# Patient Record
Sex: FEMALE | Race: WHITE | NOT HISPANIC OR LATINO | Employment: OTHER | URBAN - METROPOLITAN AREA
[De-identification: names, ages, dates, MRNs, and addresses within clinical notes are randomized per-mention and may not be internally consistent; named-entity substitution may affect disease eponyms.]

---

## 2017-05-31 ENCOUNTER — HISTORICAL (OUTPATIENT)
Dept: ADMINISTRATIVE | Facility: HOSPITAL | Age: 82
End: 2017-05-31

## 2017-05-31 LAB
APPEARANCE, UA: ABNORMAL
BACTERIA #/AREA URNS AUTO: ABNORMAL /HPF
BILIRUB UR QL STRIP: NEGATIVE
CHOLEST SERPL-MCNC: 105 MG/DL (ref 0–200)
CHOLEST/HDLC SERPL: 2.3 {RATIO} (ref 0–4)
COLOR UR: YELLOW
ERYTHROCYTE [DISTWIDTH] IN BLOOD BY AUTOMATED COUNT: 14.1 % (ref 11.5–17)
FERRITIN SERPL-MCNC: 74.2 NG/ML (ref 8–388)
FOLATE SERPL-MCNC: 16.8 NG/ML (ref 3.1–17.5)
GLUCOSE (UA): NEGATIVE
HCT VFR BLD AUTO: 38.3 % (ref 37–47)
HDLC SERPL-MCNC: 45 MG/DL (ref 35–60)
HGB BLD-MCNC: 11.5 GM/DL (ref 12–16)
HGB UR QL STRIP: NEGATIVE
IRON SATN MFR SERPL: 18.1 % (ref 20–50)
IRON SERPL-MCNC: 75 MCG/DL (ref 50–175)
KETONES UR QL STRIP: NEGATIVE
LDLC SERPL CALC-MCNC: 35 MG/DL (ref 0–129)
LEUKOCYTE ESTERASE UR QL STRIP: ABNORMAL
MCH RBC QN AUTO: 28 PG (ref 27–31)
MCHC RBC AUTO-ENTMCNC: 30 GM/DL (ref 33–36)
MCV RBC AUTO: 93.4 FL (ref 80–94)
NITRITE UR QL STRIP.AUTO: NEGATIVE
PH UR STRIP: 6 [PH] (ref 5–9)
PLATELET # BLD AUTO: 295 X10(3)/MCL (ref 130–400)
PMV BLD AUTO: 11.3 FL (ref 9.4–12.4)
PROT UR QL STRIP: NEGATIVE
RBC # BLD AUTO: 4.1 X10(6)/MCL (ref 4.2–5.4)
RBC #/AREA URNS HPF: ABNORMAL /[HPF]
SP GR UR STRIP: 1.01 (ref 1–1.03)
SQUAMOUS EPITHELIAL, UA: ABNORMAL
TIBC SERPL-MCNC: 415 MCG/DL (ref 250–450)
TRANSFERRIN SERPL-MCNC: 345 MG/DL (ref 200–360)
TRIGL SERPL-MCNC: 124 MG/DL (ref 30–150)
TSH SERPL-ACNC: 2.71 MIU/ML (ref 0.36–3.74)
UROBILINOGEN UR STRIP-ACNC: 0.2
VIT B12 SERPL-MCNC: 113 PG/ML (ref 193–986)
VLDLC SERPL CALC-MCNC: 25 MG/DL
WBC # SPEC AUTO: 5.3 X10(3)/MCL (ref 4.5–11.5)
WBC #/AREA URNS AUTO: 37 /HPF (ref 0–3)

## 2017-11-11 LAB — RAPID GROUP A STREP (OHS): NEGATIVE

## 2018-06-14 ENCOUNTER — HISTORICAL (OUTPATIENT)
Dept: ADMINISTRATIVE | Facility: HOSPITAL | Age: 83
End: 2018-06-14

## 2018-06-14 LAB
ABS NEUT (OLG): 3.71 X10(3)/MCL (ref 2.1–9.2)
ALBUMIN SERPL-MCNC: 3.8 GM/DL (ref 3.4–5)
ALBUMIN/GLOB SERPL: 1.2 RATIO (ref 1.1–2)
ALP SERPL-CCNC: 54 UNIT/L (ref 38–126)
ALT SERPL-CCNC: 28 UNIT/L (ref 12–78)
APPEARANCE, UA: ABNORMAL
AST SERPL-CCNC: 19 UNIT/L (ref 15–37)
BACTERIA SPEC CULT: ABNORMAL /HPF
BASOPHILS # BLD AUTO: 0.1 X10(3)/MCL (ref 0–0.2)
BASOPHILS NFR BLD AUTO: 1 %
BILIRUB SERPL-MCNC: 0.5 MG/DL (ref 0.2–1)
BILIRUB UR QL STRIP: NEGATIVE
BILIRUBIN DIRECT+TOT PNL SERPL-MCNC: 0.2 MG/DL (ref 0–0.5)
BILIRUBIN DIRECT+TOT PNL SERPL-MCNC: 0.3 MG/DL (ref 0–0.8)
BUN SERPL-MCNC: 30 MG/DL (ref 7–18)
CALCIUM SERPL-MCNC: 9.5 MG/DL (ref 8.5–10.1)
CHLORIDE SERPL-SCNC: 104 MMOL/L (ref 98–107)
CHOLEST SERPL-MCNC: 78 MG/DL (ref 0–200)
CHOLEST/HDLC SERPL: 2.3 {RATIO} (ref 0–4)
CO2 SERPL-SCNC: 26 MMOL/L (ref 21–32)
COLOR UR: YELLOW
CREAT SERPL-MCNC: 1.19 MG/DL (ref 0.55–1.02)
DEPRECATED CALCIDIOL+CALCIFEROL SERPL-MC: 37 NG/ML (ref 30–80)
EOSINOPHIL # BLD AUTO: 0.2 X10(3)/MCL (ref 0–0.9)
EOSINOPHIL NFR BLD AUTO: 4 %
ERYTHROCYTE [DISTWIDTH] IN BLOOD BY AUTOMATED COUNT: 12.9 % (ref 11.5–17)
EST. AVERAGE GLUCOSE BLD GHB EST-MCNC: 154 MG/DL
GLOBULIN SER-MCNC: 3.1 GM/DL (ref 2.4–3.5)
GLUCOSE (UA): NEGATIVE
GLUCOSE SERPL-MCNC: 121 MG/DL (ref 74–106)
HBA1C MFR BLD: 7 % (ref 4.2–6.3)
HCT VFR BLD AUTO: 38.2 % (ref 37–47)
HDLC SERPL-MCNC: 34 MG/DL (ref 35–60)
HGB BLD-MCNC: 11.9 GM/DL (ref 12–16)
HGB UR QL STRIP: NEGATIVE
KETONES UR QL STRIP: NEGATIVE
LDLC SERPL CALC-MCNC: 19 MG/DL (ref 0–129)
LEUKOCYTE ESTERASE UR QL STRIP: ABNORMAL
LYMPHOCYTES # BLD AUTO: 2 X10(3)/MCL (ref 0.6–4.6)
LYMPHOCYTES NFR BLD AUTO: 31 %
MCH RBC QN AUTO: 29.7 PG (ref 27–31)
MCHC RBC AUTO-ENTMCNC: 31.2 GM/DL (ref 33–36)
MCV RBC AUTO: 95.3 FL (ref 80–94)
MONOCYTES # BLD AUTO: 0.4 X10(3)/MCL (ref 0.1–1.3)
MONOCYTES NFR BLD AUTO: 7 %
NEUTROPHILS # BLD AUTO: 3.71 X10(3)/MCL (ref 1.4–7.9)
NEUTROPHILS NFR BLD AUTO: 57 %
NITRITE UR QL STRIP: NEGATIVE
PH UR STRIP: 5 [PH] (ref 5–9)
PLATELET # BLD AUTO: 256 X10(3)/MCL (ref 130–400)
PMV BLD AUTO: 11 FL (ref 9.4–12.4)
POTASSIUM SERPL-SCNC: 4.8 MMOL/L (ref 3.5–5.1)
PROT SERPL-MCNC: 6.9 GM/DL (ref 6.4–8.2)
PROT UR QL STRIP: NEGATIVE
RBC # BLD AUTO: 4.01 X10(6)/MCL (ref 4.2–5.4)
RBC #/AREA URNS HPF: ABNORMAL /[HPF]
SODIUM SERPL-SCNC: 137 MMOL/L (ref 136–145)
SP GR UR STRIP: 1.02 (ref 1–1.03)
SQUAMOUS EPITHELIAL, UA: 10 /HPF (ref 0–4)
TRIGL SERPL-MCNC: 123 MG/DL (ref 30–150)
TSH SERPL-ACNC: 2.01 MIU/L (ref 0.36–3.74)
UROBILINOGEN UR STRIP-ACNC: 0.2
VLDLC SERPL CALC-MCNC: 25 MG/DL
WBC # SPEC AUTO: 6.5 X10(3)/MCL (ref 4.5–11.5)
WBC #/AREA URNS HPF: 50 /HPF (ref 0–3)

## 2018-06-21 LAB — BMD RECOMMENDATION EXT: NORMAL

## 2019-01-22 ENCOUNTER — HISTORICAL (OUTPATIENT)
Dept: ADMINISTRATIVE | Facility: HOSPITAL | Age: 84
End: 2019-01-22

## 2019-01-22 LAB
BUN SERPL-MCNC: 17 MG/DL (ref 7–18)
CALCIUM SERPL-MCNC: 9.9 MG/DL (ref 8.5–10.1)
CHLORIDE SERPL-SCNC: 106 MMOL/L (ref 98–107)
CHOLEST SERPL-MCNC: 94 MG/DL (ref 0–200)
CHOLEST/HDLC SERPL: 2.4 {RATIO} (ref 0–4)
CO2 SERPL-SCNC: 30 MMOL/L (ref 21–32)
CREAT SERPL-MCNC: 1.06 MG/DL (ref 0.55–1.02)
CREAT/UREA NIT SERPL: 16
EST. AVERAGE GLUCOSE BLD GHB EST-MCNC: 143 MG/DL
GLUCOSE SERPL-MCNC: 74 MG/DL (ref 74–106)
HBA1C MFR BLD: 6.6 % (ref 4.2–6.3)
HDLC SERPL-MCNC: 40 MG/DL (ref 35–60)
LDLC SERPL CALC-MCNC: 24 MG/DL (ref 0–129)
POTASSIUM SERPL-SCNC: 4.3 MMOL/L (ref 3.5–5.1)
SODIUM SERPL-SCNC: 143 MMOL/L (ref 136–145)
TRIGL SERPL-MCNC: 149 MG/DL (ref 30–150)
VLDLC SERPL CALC-MCNC: 30 MG/DL

## 2019-08-02 ENCOUNTER — HISTORICAL (OUTPATIENT)
Dept: ADMINISTRATIVE | Facility: HOSPITAL | Age: 84
End: 2019-08-02

## 2019-08-02 LAB
ABS NEUT (OLG): 4.44 X10(3)/MCL (ref 2.1–9.2)
ALBUMIN SERPL-MCNC: 3.9 GM/DL (ref 3.4–5)
ALBUMIN/GLOB SERPL: 1.4 RATIO (ref 1.1–2)
ALP SERPL-CCNC: 58 UNIT/L (ref 38–126)
ALT SERPL-CCNC: 21 UNIT/L (ref 12–78)
APPEARANCE, UA: CLEAR
AST SERPL-CCNC: 12 UNIT/L (ref 15–37)
BACTERIA SPEC CULT: ABNORMAL /HPF
BASOPHILS # BLD AUTO: 0.1 X10(3)/MCL (ref 0–0.2)
BASOPHILS NFR BLD AUTO: 1 %
BILIRUB SERPL-MCNC: 0.3 MG/DL (ref 0.2–1)
BILIRUB UR QL STRIP: NEGATIVE
BILIRUBIN DIRECT+TOT PNL SERPL-MCNC: 0.1 MG/DL (ref 0–0.5)
BILIRUBIN DIRECT+TOT PNL SERPL-MCNC: 0.2 MG/DL (ref 0–0.8)
BUN SERPL-MCNC: 18 MG/DL (ref 7–18)
CALCIUM SERPL-MCNC: 9.9 MG/DL (ref 8.5–10.1)
CHLORIDE SERPL-SCNC: 104 MMOL/L (ref 98–107)
CHOLEST SERPL-MCNC: 117 MG/DL (ref 0–200)
CHOLEST/HDLC SERPL: 3.2 {RATIO} (ref 0–4)
CO2 SERPL-SCNC: 29 MMOL/L (ref 21–32)
COLOR UR: YELLOW
CREAT SERPL-MCNC: 0.92 MG/DL (ref 0.55–1.02)
EOSINOPHIL # BLD AUTO: 0.2 X10(3)/MCL (ref 0–0.9)
EOSINOPHIL NFR BLD AUTO: 2 %
ERYTHROCYTE [DISTWIDTH] IN BLOOD BY AUTOMATED COUNT: 13.2 % (ref 11.5–17)
EST. AVERAGE GLUCOSE BLD GHB EST-MCNC: 137 MG/DL
GLOBULIN SER-MCNC: 2.7 GM/DL (ref 2.4–3.5)
GLUCOSE (UA): NEGATIVE
GLUCOSE SERPL-MCNC: 98 MG/DL (ref 74–106)
HBA1C MFR BLD: 6.4 % (ref 4.2–6.3)
HCT VFR BLD AUTO: 38.7 % (ref 37–47)
HDLC SERPL-MCNC: 37 MG/DL (ref 35–60)
HGB BLD-MCNC: 11.6 GM/DL (ref 12–16)
HGB UR QL STRIP: NEGATIVE
KETONES UR QL STRIP: NEGATIVE
LDLC SERPL CALC-MCNC: 51 MG/DL (ref 0–129)
LEUKOCYTE ESTERASE UR QL STRIP: ABNORMAL
LYMPHOCYTES # BLD AUTO: 1.9 X10(3)/MCL (ref 0.6–4.6)
LYMPHOCYTES NFR BLD AUTO: 27 %
MCH RBC QN AUTO: 28.4 PG (ref 27–31)
MCHC RBC AUTO-ENTMCNC: 30 GM/DL (ref 33–36)
MCV RBC AUTO: 94.9 FL (ref 80–94)
MONOCYTES # BLD AUTO: 0.5 X10(3)/MCL (ref 0.1–1.3)
MONOCYTES NFR BLD AUTO: 7 %
NEUTROPHILS # BLD AUTO: 4.44 X10(3)/MCL (ref 2.1–9.2)
NEUTROPHILS NFR BLD AUTO: 62 %
NITRITE UR QL STRIP: NEGATIVE
PH UR STRIP: 5 [PH] (ref 5–9)
PLATELET # BLD AUTO: 243 X10(3)/MCL (ref 130–400)
PMV BLD AUTO: 10.9 FL (ref 9.4–12.4)
POTASSIUM SERPL-SCNC: 4.4 MMOL/L (ref 3.5–5.1)
PROT SERPL-MCNC: 6.6 GM/DL (ref 6.4–8.2)
PROT UR QL STRIP: NEGATIVE
RBC # BLD AUTO: 4.08 X10(6)/MCL (ref 4.2–5.4)
RBC #/AREA URNS HPF: ABNORMAL /[HPF]
SODIUM SERPL-SCNC: 142 MMOL/L (ref 136–145)
SP GR UR STRIP: 1.01 (ref 1–1.03)
SQUAMOUS EPITHELIAL, UA: ABNORMAL
TRIGL SERPL-MCNC: 144 MG/DL (ref 30–150)
UROBILINOGEN UR STRIP-ACNC: 0.2
VLDLC SERPL CALC-MCNC: 29 MG/DL
WBC # SPEC AUTO: 7.1 X10(3)/MCL (ref 4.5–11.5)
WBC #/AREA URNS HPF: 12 /HPF (ref 0–3)

## 2020-06-04 ENCOUNTER — HISTORICAL (OUTPATIENT)
Dept: ADMINISTRATIVE | Facility: HOSPITAL | Age: 85
End: 2020-06-04

## 2020-06-04 LAB
BUN SERPL-MCNC: 19.7 MG/DL (ref 9.8–20.1)
CALCIUM SERPL-MCNC: 9.5 MG/DL (ref 8.4–10.2)
CHLORIDE SERPL-SCNC: 107 MMOL/L (ref 98–107)
CHOLEST SERPL-MCNC: 111 MG/DL
CHOLEST/HDLC SERPL: 3 {RATIO} (ref 0–5)
CO2 SERPL-SCNC: 26 MMOL/L (ref 23–31)
CREAT SERPL-MCNC: 1.1 MG/DL (ref 0.55–1.02)
CREAT/UREA NIT SERPL: 18
EST. AVERAGE GLUCOSE BLD GHB EST-MCNC: 114 MG/DL
GLUCOSE SERPL-MCNC: 108 MG/DL (ref 82–115)
HBA1C MFR BLD: 5.6 %
HDLC SERPL-MCNC: 42 MG/DL (ref 35–60)
LDLC SERPL CALC-MCNC: 48 MG/DL (ref 50–140)
POTASSIUM SERPL-SCNC: 5 MMOL/L (ref 3.5–5.1)
SODIUM SERPL-SCNC: 142 MMOL/L (ref 136–145)
TRIGL SERPL-MCNC: 104 MG/DL (ref 37–140)
VLDLC SERPL CALC-MCNC: 21 MG/DL

## 2021-02-09 ENCOUNTER — HISTORICAL (OUTPATIENT)
Dept: ADMINISTRATIVE | Facility: HOSPITAL | Age: 86
End: 2021-02-09

## 2021-02-09 LAB
ABS NEUT (OLG): 2.73 X10(3)/MCL (ref 2.1–9.2)
ALBUMIN SERPL-MCNC: 3.9 GM/DL (ref 3.4–4.8)
ALBUMIN/GLOB SERPL: 1.6 RATIO (ref 1.1–2)
ALP SERPL-CCNC: 54 UNIT/L (ref 40–150)
ALT SERPL-CCNC: 10 UNIT/L (ref 0–55)
AST SERPL-CCNC: 8 UNIT/L (ref 5–34)
BASOPHILS # BLD AUTO: 0.1 X10(3)/MCL (ref 0–0.2)
BASOPHILS NFR BLD AUTO: 1 %
BILIRUB SERPL-MCNC: 0.3 MG/DL
BILIRUBIN DIRECT+TOT PNL SERPL-MCNC: 0.1 MG/DL (ref 0–0.8)
BILIRUBIN DIRECT+TOT PNL SERPL-MCNC: 0.2 MG/DL (ref 0–0.5)
BUN SERPL-MCNC: 24.2 MG/DL (ref 9.8–20.1)
CALCIUM SERPL-MCNC: 9.5 MG/DL (ref 8.4–10.2)
CHLORIDE SERPL-SCNC: 106 MMOL/L (ref 98–107)
CHOLEST SERPL-MCNC: 100 MG/DL
CHOLEST/HDLC SERPL: 3 {RATIO} (ref 0–5)
CO2 SERPL-SCNC: 25 MMOL/L (ref 23–31)
CREAT SERPL-MCNC: 1.39 MG/DL (ref 0.55–1.02)
DEPRECATED CALCIDIOL+CALCIFEROL SERPL-MC: 62.4 NG/ML (ref 30–80)
EOSINOPHIL # BLD AUTO: 0.2 X10(3)/MCL (ref 0–0.9)
EOSINOPHIL NFR BLD AUTO: 3 %
ERYTHROCYTE [DISTWIDTH] IN BLOOD BY AUTOMATED COUNT: 13.2 % (ref 11.5–17)
EST. AVERAGE GLUCOSE BLD GHB EST-MCNC: 168.6 MG/DL
GLOBULIN SER-MCNC: 2.5 GM/DL (ref 2.4–3.5)
GLUCOSE SERPL-MCNC: 124 MG/DL (ref 82–115)
HBA1C MFR BLD: 7.5 %
HCT VFR BLD AUTO: 37.7 % (ref 37–47)
HDLC SERPL-MCNC: 35 MG/DL (ref 35–60)
HGB BLD-MCNC: 11.3 GM/DL (ref 12–16)
LDLC SERPL CALC-MCNC: 47 MG/DL (ref 50–140)
LYMPHOCYTES # BLD AUTO: 1.8 X10(3)/MCL (ref 0.6–4.6)
LYMPHOCYTES NFR BLD AUTO: 34 %
MCH RBC QN AUTO: 28.5 PG (ref 27–31)
MCHC RBC AUTO-ENTMCNC: 30 GM/DL (ref 33–36)
MCV RBC AUTO: 95 FL (ref 80–94)
MONOCYTES # BLD AUTO: 0.4 X10(3)/MCL (ref 0.1–1.3)
MONOCYTES NFR BLD AUTO: 7 %
NEUTROPHILS # BLD AUTO: 2.73 X10(3)/MCL (ref 2.1–9.2)
NEUTROPHILS NFR BLD AUTO: 54 %
PLATELET # BLD AUTO: 252 X10(3)/MCL (ref 130–400)
PMV BLD AUTO: 11.2 FL (ref 9.4–12.4)
POTASSIUM SERPL-SCNC: 5 MMOL/L (ref 3.5–5.1)
PROT SERPL-MCNC: 6.4 GM/DL (ref 5.8–7.6)
RBC # BLD AUTO: 3.97 X10(6)/MCL (ref 4.2–5.4)
SODIUM SERPL-SCNC: 140 MMOL/L (ref 136–145)
TRIGL SERPL-MCNC: 92 MG/DL (ref 37–140)
VLDLC SERPL CALC-MCNC: 18 MG/DL
WBC # SPEC AUTO: 5.1 X10(3)/MCL (ref 4.5–11.5)

## 2021-08-19 ENCOUNTER — HISTORICAL (OUTPATIENT)
Dept: ADMINISTRATIVE | Facility: HOSPITAL | Age: 86
End: 2021-08-19

## 2021-08-19 LAB
ABS NEUT (OLG): 2.4 X10(3)/MCL (ref 2.1–9.2)
ALBUMIN SERPL-MCNC: 3.8 GM/DL (ref 3.4–4.8)
ALBUMIN/GLOB SERPL: 1.3 RATIO (ref 1.1–2)
ALP SERPL-CCNC: 56 UNIT/L (ref 40–150)
ALT SERPL-CCNC: 11 UNIT/L (ref 0–55)
AST SERPL-CCNC: 9 UNIT/L (ref 5–34)
BASOPHILS # BLD AUTO: 0 X10(3)/MCL (ref 0–0.2)
BASOPHILS NFR BLD AUTO: 1 %
BILIRUB SERPL-MCNC: 0.4 MG/DL
BILIRUBIN DIRECT+TOT PNL SERPL-MCNC: 0.2 MG/DL (ref 0–0.5)
BILIRUBIN DIRECT+TOT PNL SERPL-MCNC: 0.2 MG/DL (ref 0–0.8)
BUN SERPL-MCNC: 29.6 MG/DL (ref 9.8–20.1)
CALCIUM SERPL-MCNC: 10 MG/DL (ref 8.4–10.2)
CHLORIDE SERPL-SCNC: 113 MMOL/L (ref 98–107)
CO2 SERPL-SCNC: 21 MMOL/L (ref 23–31)
CREAT SERPL-MCNC: 1.49 MG/DL (ref 0.55–1.02)
EOSINOPHIL # BLD AUTO: 0.1 X10(3)/MCL (ref 0–0.9)
EOSINOPHIL NFR BLD AUTO: 2 %
ERYTHROCYTE [DISTWIDTH] IN BLOOD BY AUTOMATED COUNT: 13.6 % (ref 11.5–17)
EST. AVERAGE GLUCOSE BLD GHB EST-MCNC: 128.4 MG/DL
GLOBULIN SER-MCNC: 2.9 GM/DL (ref 2.4–3.5)
GLUCOSE SERPL-MCNC: 99 MG/DL (ref 82–115)
HBA1C MFR BLD: 6.1 %
HCT VFR BLD AUTO: 31.7 % (ref 37–47)
HGB BLD-MCNC: 9.7 GM/DL (ref 12–16)
LYMPHOCYTES # BLD AUTO: 2 X10(3)/MCL (ref 0.6–4.6)
LYMPHOCYTES NFR BLD AUTO: 40 %
MCH RBC QN AUTO: 28.4 PG (ref 27–31)
MCHC RBC AUTO-ENTMCNC: 30.6 GM/DL (ref 33–36)
MCV RBC AUTO: 93 FL (ref 80–94)
MONOCYTES # BLD AUTO: 0.4 X10(3)/MCL (ref 0.1–1.3)
MONOCYTES NFR BLD AUTO: 8 %
NEUTROPHILS # BLD AUTO: 2.4 X10(3)/MCL (ref 2.1–9.2)
NEUTROPHILS NFR BLD AUTO: 49 %
PLATELET # BLD AUTO: 221 X10(3)/MCL (ref 130–400)
PMV BLD AUTO: 11.1 FL (ref 9.4–12.4)
POTASSIUM SERPL-SCNC: 5.6 MMOL/L (ref 3.5–5.1)
PROT SERPL-MCNC: 6.7 GM/DL (ref 5.8–7.6)
RBC # BLD AUTO: 3.41 X10(6)/MCL (ref 4.2–5.4)
SODIUM SERPL-SCNC: 142 MMOL/L (ref 136–145)
WBC # SPEC AUTO: 4.9 X10(3)/MCL (ref 4.5–11.5)

## 2021-08-20 ENCOUNTER — HISTORICAL (OUTPATIENT)
Dept: ADMINISTRATIVE | Facility: HOSPITAL | Age: 86
End: 2021-08-20

## 2021-08-20 LAB
ABS NEUT (OLG): 2.33 X10(3)/MCL (ref 2.1–9.2)
BASOPHILS # BLD AUTO: 0 X10(3)/MCL (ref 0–0.2)
BASOPHILS NFR BLD AUTO: 1 %
BUN SERPL-MCNC: 31.9 MG/DL (ref 9.8–20.1)
CALCIUM SERPL-MCNC: 9.9 MG/DL (ref 8.4–10.2)
CHLORIDE SERPL-SCNC: 110 MMOL/L (ref 98–107)
CO2 SERPL-SCNC: 20 MMOL/L (ref 23–31)
CREAT SERPL-MCNC: 1.56 MG/DL (ref 0.55–1.02)
CREAT/UREA NIT SERPL: 20
EOSINOPHIL # BLD AUTO: 0.1 X10(3)/MCL (ref 0–0.9)
EOSINOPHIL NFR BLD AUTO: 2 %
ERYTHROCYTE [DISTWIDTH] IN BLOOD BY AUTOMATED COUNT: 13.5 % (ref 11.5–17)
FOLATE SERPL-MCNC: 11.7 NG/ML (ref 7–31.4)
GLUCOSE SERPL-MCNC: 85 MG/DL (ref 82–115)
HCT VFR BLD AUTO: 31 % (ref 37–47)
HGB BLD-MCNC: 9.5 GM/DL (ref 12–16)
IRON SATN MFR SERPL: 16 % (ref 20–50)
IRON SERPL-MCNC: 55 UG/DL (ref 50–170)
LYMPHOCYTES # BLD AUTO: 1.7 X10(3)/MCL (ref 0.6–4.6)
LYMPHOCYTES NFR BLD AUTO: 38 %
MCH RBC QN AUTO: 28.5 PG (ref 27–31)
MCHC RBC AUTO-ENTMCNC: 30.6 GM/DL (ref 33–36)
MCV RBC AUTO: 93.1 FL (ref 80–94)
MONOCYTES # BLD AUTO: 0.4 X10(3)/MCL (ref 0.1–1.3)
MONOCYTES NFR BLD AUTO: 8 %
NEUTROPHILS # BLD AUTO: 2.33 X10(3)/MCL (ref 2.1–9.2)
NEUTROPHILS NFR BLD AUTO: 51 %
PLATELET # BLD AUTO: 223 X10(3)/MCL (ref 130–400)
PMV BLD AUTO: 11.3 FL (ref 9.4–12.4)
POTASSIUM SERPL-SCNC: 4.9 MMOL/L (ref 3.5–5.1)
RBC # BLD AUTO: 3.33 X10(6)/MCL (ref 4.2–5.4)
RET# (OHS): 0.04 X10^6/ML (ref 0.02–0.08)
RETICULOCYTE COUNT AUTOMATED (OLG): 1.4 % (ref 1.1–2.1)
SODIUM SERPL-SCNC: 144 MMOL/L (ref 136–145)
TIBC SERPL-MCNC: 281 UG/DL (ref 70–310)
TIBC SERPL-MCNC: 336 UG/DL (ref 250–450)
TRANSFERRIN SERPL-MCNC: 318 MG/DL
VIT B12 SERPL-MCNC: 407 PG/ML (ref 213–816)
WBC # SPEC AUTO: 4.6 X10(3)/MCL (ref 4.5–11.5)

## 2022-02-23 ENCOUNTER — HISTORICAL (OUTPATIENT)
Dept: ADMINISTRATIVE | Facility: HOSPITAL | Age: 87
End: 2022-02-23

## 2022-02-23 LAB
ABS NEUT (OLG): 2.75 (ref 2.1–9.2)
ALBUMIN SERPL-MCNC: 3.7 G/DL (ref 3.4–4.8)
ALBUMIN/GLOB SERPL: 1.3 {RATIO} (ref 1.1–2)
ALP SERPL-CCNC: 53 U/L (ref 40–150)
ALT SERPL-CCNC: 13 U/L (ref 0–55)
APPEARANCE, UA: NORMAL
AST SERPL-CCNC: 12 U/L (ref 5–34)
BACTERIA SPEC CULT: NORMAL
BASOPHILS # BLD AUTO: 0 10*3/UL (ref 0–0.2)
BASOPHILS NFR BLD AUTO: 1 %
BILIRUB SERPL-MCNC: 0.4 MG/DL
BILIRUB UR QL STRIP: NEGATIVE
BILIRUBIN DIRECT+TOT PNL SERPL-MCNC: 0.2 (ref 0–0.5)
BILIRUBIN DIRECT+TOT PNL SERPL-MCNC: 0.2 (ref 0–0.8)
BUDDING YEAST: NORMAL
BUN SERPL-MCNC: 40.2 MG/DL (ref 9.8–20.1)
CALCIUM SERPL-MCNC: 9.8 MG/DL (ref 8.7–10.5)
CASTS, UA: 6
CHLORIDE SERPL-SCNC: 113 MMOL/L (ref 98–107)
CHOLEST SERPL-MCNC: 92 MG/DL
CHOLEST/HDLC SERPL: 2 {RATIO} (ref 0–5)
CO2 SERPL-SCNC: 21 MMOL/L (ref 23–31)
COLOR UR: YELLOW
CREAT SERPL-MCNC: 1.86 MG/DL (ref 0.55–1.02)
CRYSTALS: NORMAL
EOSINOPHIL # BLD AUTO: 0.2 10*3/UL (ref 0–0.9)
EOSINOPHIL NFR BLD AUTO: 3 %
ERYTHROCYTE [DISTWIDTH] IN BLOOD BY AUTOMATED COUNT: 13.9 % (ref 11.5–17)
EST. AVERAGE GLUCOSE BLD GHB EST-MCNC: 116.9 MG/DL
FERRITIN SERPL-MCNC: 28.68 NG/ML (ref 4.63–204)
GLOBULIN SER-MCNC: 2.9 G/DL (ref 2.4–3.5)
GLUCOSE (UA): NEGATIVE
GLUCOSE SERPL-MCNC: 91 MG/DL (ref 82–115)
HBA1C MFR BLD: 5.7 %
HCT VFR BLD AUTO: 32.2 % (ref 37–47)
HDLC SERPL-MCNC: 37 MG/DL (ref 35–60)
HEMOLYSIS INTERF INDEX SERPL-ACNC: 3
HGB BLD-MCNC: 9.9 G/DL (ref 12–16)
HGB UR QL STRIP: NEGATIVE
ICTERIC INTERF INDEX SERPL-ACNC: 0
IRON SATN MFR SERPL: 20 % (ref 20–50)
IRON SERPL-MCNC: 69 UG/DL (ref 50–170)
KETONES UR QL STRIP: NEGATIVE
LDLC SERPL CALC-MCNC: 40 MG/DL (ref 50–140)
LEUKOCYTE ESTERASE UR QL STRIP: NORMAL
LIPEMIC INTERF INDEX SERPL-ACNC: <0
LYMPHOCYTES # BLD AUTO: 1.8 10*3/UL (ref 0.6–4.6)
LYMPHOCYTES NFR BLD AUTO: 35 %
MANUAL DIFF? (OHS): NO
MCH RBC QN AUTO: 29.6 PG (ref 27–31)
MCHC RBC AUTO-ENTMCNC: 30.7 G/DL (ref 33–36)
MCV RBC AUTO: 96.4 FL (ref 80–94)
MONOCYTES # BLD AUTO: 0.4 10*3/UL (ref 0.1–1.3)
MONOCYTES NFR BLD AUTO: 8 %
NEUTROPHILS # BLD AUTO: 2.75 10*3/UL (ref 2.1–9.2)
NEUTROPHILS NFR BLD AUTO: 53 %
NITRITE UR QL STRIP: NEGATIVE
PH UR STRIP: 5 [PH] (ref 5–9)
PLATELET # BLD AUTO: 222 10*3/UL (ref 130–400)
PMV BLD AUTO: 10.6 FL (ref 9.4–12.4)
POTASSIUM SERPL-SCNC: 6 MMOL/L (ref 3.5–5.1)
PROT SERPL-MCNC: 6.6 G/DL (ref 5.8–7.6)
PROT UR QL STRIP: NEGATIVE
RBC # BLD AUTO: 3.34 10*6/UL (ref 4.2–5.4)
RBC #/AREA URNS HPF: NORMAL /[HPF] (ref 0–2)
SMALL ROUND CELLS, UA: PRESENT
SODIUM SERPL-SCNC: 144 MMOL/L (ref 136–145)
SP GR UR STRIP: 1.01 (ref 1–1.03)
SPERM URNS QL MICRO: NORMAL
SQUAMOUS EPITHELIAL, UA: 7 (ref 0–4)
TIBC SERPL-MCNC: 269 UG/DL (ref 70–310)
TIBC SERPL-MCNC: 338 UG/DL (ref 250–450)
TRANSFERRIN SERPL-MCNC: 315 MG/DL
TRIGL SERPL-MCNC: 74 MG/DL (ref 37–140)
UA WBC MAN: NORMAL (ref 0–2)
UROBILINOGEN UR STRIP-ACNC: 0.2
VLDLC SERPL CALC-MCNC: 15 MG/DL
WBC # SPEC AUTO: 5.2 10*3/UL (ref 4.5–11.5)
WBC #/AREA URNS HPF: 44 /[HPF] (ref 0–3)

## 2022-03-04 ENCOUNTER — HISTORICAL (OUTPATIENT)
Dept: ADMINISTRATIVE | Facility: HOSPITAL | Age: 87
End: 2022-03-04

## 2022-03-04 LAB
HEMOLYSIS INTERF INDEX SERPL-ACNC: 1
POTASSIUM SERPL-SCNC: 5.6 MMOL/L (ref 3.5–5.1)

## 2022-04-11 ENCOUNTER — HISTORICAL (OUTPATIENT)
Dept: ADMINISTRATIVE | Facility: HOSPITAL | Age: 87
End: 2022-04-11
Payer: MEDICARE

## 2022-04-26 VITALS
WEIGHT: 149.94 LBS | SYSTOLIC BLOOD PRESSURE: 143 MMHG | HEIGHT: 61 IN | DIASTOLIC BLOOD PRESSURE: 67 MMHG | OXYGEN SATURATION: 100 % | BODY MASS INDEX: 28.31 KG/M2

## 2022-04-28 ENCOUNTER — HISTORICAL (OUTPATIENT)
Dept: ADMINISTRATIVE | Facility: HOSPITAL | Age: 87
End: 2022-04-28
Payer: MEDICARE

## 2022-04-28 LAB
HEMOLYSIS INTERF INDEX SERPL-ACNC: 73
POTASSIUM SERPL-SCNC: 5.8 MMOL/L (ref 3.5–5.1)

## 2022-05-03 ENCOUNTER — TELEPHONE (OUTPATIENT)
Dept: INTERNAL MEDICINE | Facility: CLINIC | Age: 87
End: 2022-05-03
Payer: MEDICARE

## 2022-05-03 RX ORDER — HYDROCHLOROTHIAZIDE 25 MG/1
25 TABLET ORAL DAILY
Qty: 30 TABLET | Refills: 11 | Status: SHIPPED | OUTPATIENT
Start: 2022-05-03 | End: 2022-08-22

## 2022-05-03 RX ORDER — AMLODIPINE BESYLATE 10 MG/1
10 TABLET ORAL DAILY
Qty: 30 TABLET | Refills: 11 | Status: SHIPPED | OUTPATIENT
Start: 2022-05-03 | End: 2022-06-03

## 2022-05-03 NOTE — TELEPHONE ENCOUNTER
Spoke with melena this morning.  Her repeat potassium is 5.8.  Plan is to DC the valsartan and begin amlodipine 10 mg with HCTZ 25 mg.

## 2022-05-31 ENCOUNTER — OFFICE VISIT (OUTPATIENT)
Dept: URGENT CARE | Facility: CLINIC | Age: 87
End: 2022-05-31
Payer: MEDICARE

## 2022-05-31 ENCOUNTER — HOSPITAL ENCOUNTER (EMERGENCY)
Facility: HOSPITAL | Age: 87
Discharge: HOME | End: 2022-05-31
Attending: EMERGENCY MEDICINE
Payer: MEDICARE

## 2022-05-31 VITALS
RESPIRATION RATE: 18 BRPM | OXYGEN SATURATION: 95 % | BODY MASS INDEX: 33.66 KG/M2 | SYSTOLIC BLOOD PRESSURE: 188 MMHG | TEMPERATURE: 98 F | HEIGHT: 63 IN | DIASTOLIC BLOOD PRESSURE: 81 MMHG | WEIGHT: 190 LBS | HEART RATE: 75 BPM

## 2022-05-31 VITALS
WEIGHT: 190 LBS | RESPIRATION RATE: 18 BRPM | TEMPERATURE: 98 F | DIASTOLIC BLOOD PRESSURE: 72 MMHG | HEART RATE: 62 BPM | HEIGHT: 63 IN | OXYGEN SATURATION: 95 % | BODY MASS INDEX: 33.66 KG/M2 | SYSTOLIC BLOOD PRESSURE: 174 MMHG

## 2022-05-31 DIAGNOSIS — R60.0 LOWER EXTREMITY EDEMA: Primary | ICD-10-CM

## 2022-05-31 DIAGNOSIS — R60.0 EDEMA OF BOTH LEGS: ICD-10-CM

## 2022-05-31 DIAGNOSIS — M79.89 LEG SWELLING: ICD-10-CM

## 2022-05-31 LAB
ALBUMIN SERPL-MCNC: 3.9 GM/DL (ref 3.4–4.8)
ALBUMIN/GLOB SERPL: 1.3 RATIO (ref 1.1–2)
ALP SERPL-CCNC: 66 UNIT/L (ref 40–150)
ALT SERPL-CCNC: 17 UNIT/L (ref 0–55)
AST SERPL-CCNC: 16 UNIT/L (ref 5–34)
BASOPHILS # BLD AUTO: 0.06 X10(3)/MCL (ref 0–0.2)
BASOPHILS NFR BLD AUTO: 0.9 %
BILIRUBIN DIRECT+TOT PNL SERPL-MCNC: 0.4 MG/DL
BNP BLD-MCNC: 146.9 PG/ML
BUN SERPL-MCNC: 46.3 MG/DL (ref 9.8–20.1)
CALCIUM SERPL-MCNC: 10.1 MG/DL (ref 8.4–10.2)
CHLORIDE SERPL-SCNC: 111 MMOL/L (ref 98–107)
CO2 SERPL-SCNC: 20 MMOL/L (ref 23–31)
CREAT SERPL-MCNC: 2.06 MG/DL (ref 0.55–1.02)
EOSINOPHIL # BLD AUTO: 0.17 X10(3)/MCL (ref 0–0.9)
EOSINOPHIL NFR BLD AUTO: 2.7 %
ERYTHROCYTE [DISTWIDTH] IN BLOOD BY AUTOMATED COUNT: 13.2 % (ref 11.5–17)
GLOBULIN SER-MCNC: 3.1 GM/DL (ref 2.4–3.5)
GLUCOSE SERPL-MCNC: 131 MG/DL (ref 82–115)
HCT VFR BLD AUTO: 30.5 % (ref 37–47)
HGB BLD-MCNC: 9.7 GM/DL (ref 12–16)
IMM GRANULOCYTES # BLD AUTO: 0.05 X10(3)/MCL (ref 0–0.02)
IMM GRANULOCYTES NFR BLD AUTO: 0.8 % (ref 0–0.43)
LYMPHOCYTES # BLD AUTO: 1.32 X10(3)/MCL (ref 0.6–4.6)
LYMPHOCYTES NFR BLD AUTO: 20.7 %
MCH RBC QN AUTO: 29.7 PG (ref 27–31)
MCHC RBC AUTO-ENTMCNC: 31.8 MG/DL (ref 33–36)
MCV RBC AUTO: 93.3 FL (ref 80–94)
MONOCYTES # BLD AUTO: 0.53 X10(3)/MCL (ref 0.1–1.3)
MONOCYTES NFR BLD AUTO: 8.3 %
NEUTROPHILS # BLD AUTO: 4.3 X10(3)/MCL (ref 2.1–9.2)
NEUTROPHILS NFR BLD AUTO: 66.6 %
NRBC BLD AUTO-RTO: 0 %
PLATELET # BLD AUTO: 266 X10(3)/MCL (ref 130–400)
PMV BLD AUTO: 10.8 FL (ref 9.4–12.4)
POTASSIUM SERPL-SCNC: 5.5 MMOL/L (ref 3.5–5.1)
PROT SERPL-MCNC: 7 GM/DL (ref 5.8–7.6)
RBC # BLD AUTO: 3.27 X10(6)/MCL (ref 4.2–5.4)
SODIUM SERPL-SCNC: 141 MMOL/L (ref 136–145)
WBC # SPEC AUTO: 6.4 X10(3)/MCL (ref 4.5–11.5)

## 2022-05-31 PROCEDURE — 99284 EMERGENCY DEPT VISIT MOD MDM: CPT | Mod: 25

## 2022-05-31 PROCEDURE — 83880 ASSAY OF NATRIURETIC PEPTIDE: CPT | Performed by: PHYSICIAN ASSISTANT

## 2022-05-31 PROCEDURE — 80053 COMPREHEN METABOLIC PANEL: CPT | Performed by: PHYSICIAN ASSISTANT

## 2022-05-31 PROCEDURE — 99203 OFFICE O/P NEW LOW 30 MIN: CPT | Mod: ,,, | Performed by: PHYSICIAN ASSISTANT

## 2022-05-31 PROCEDURE — 99203 PR OFFICE/OUTPT VISIT, NEW, LEVL III, 30-44 MIN: ICD-10-PCS | Mod: ,,, | Performed by: PHYSICIAN ASSISTANT

## 2022-05-31 PROCEDURE — 36415 COLL VENOUS BLD VENIPUNCTURE: CPT | Performed by: PHYSICIAN ASSISTANT

## 2022-05-31 PROCEDURE — 85025 COMPLETE CBC W/AUTO DIFF WBC: CPT | Performed by: PHYSICIAN ASSISTANT

## 2022-05-31 PROCEDURE — 25000003 PHARM REV CODE 250: Performed by: STUDENT IN AN ORGANIZED HEALTH CARE EDUCATION/TRAINING PROGRAM

## 2022-05-31 RX ORDER — LORAZEPAM 0.5 MG/1
0.5 TABLET ORAL DAILY PRN
COMMUNITY
Start: 2022-05-11 | End: 2022-08-15 | Stop reason: SDUPTHER

## 2022-05-31 RX ORDER — FUROSEMIDE 40 MG/1
40 TABLET ORAL
Status: COMPLETED | OUTPATIENT
Start: 2022-05-31 | End: 2022-05-31

## 2022-05-31 RX ORDER — VALSARTAN AND HYDROCHLOROTHIAZIDE 320; 25 MG/1; MG/1
TABLET, FILM COATED ORAL
COMMUNITY
Start: 2022-04-11 | End: 2022-08-22

## 2022-05-31 RX ORDER — METFORMIN HYDROCHLORIDE 500 MG/1
1000 TABLET, EXTENDED RELEASE ORAL 2 TIMES DAILY
COMMUNITY
Start: 2022-05-27 | End: 2022-10-25 | Stop reason: SDUPTHER

## 2022-05-31 RX ORDER — METOPROLOL TARTRATE 50 MG/1
50 TABLET ORAL 2 TIMES DAILY
COMMUNITY
Start: 2022-05-27 | End: 2022-08-22

## 2022-05-31 RX ORDER — NAPROXEN SODIUM 220 MG/1
81 TABLET, FILM COATED ORAL DAILY
COMMUNITY

## 2022-05-31 RX ADMIN — FUROSEMIDE 40 MG: 40 TABLET ORAL at 03:05

## 2022-05-31 NOTE — PROGRESS NOTES
"Subjective:       Patient ID: Rayna Haider is a 87 y.o. female.    Vitals:  height is 5' 3" (1.6 m) and weight is 86.2 kg (190 lb). Her oral temperature is 98.3 °F (36.8 °C). Her blood pressure is 174/72 (abnormal) and her pulse is 62. Her respiration is 18 and oxygen saturation is 95%.     Chief Complaint: Leg Swelling (Leg swelling x 3 weeks and leg rash x 1 week)    Leg swelling x 3 weeks and leg rash x 1 week    Patient is an 87-year-old female who complains of a 3 week history of gradually progressing bilateral lower extremity edema.  She also reports a 1 history of a rash on the lower legs.  He denies any chest pain shortness of breath or fever.  She denies having a history of lower extremity swelling or known cardiac disease.    Rash  This is a new problem. The current episode started 1 to 4 weeks ago. The problem has been gradually worsening since onset. The affected locations include the left lower leg, left ankle, right lower leg and right ankle. The rash is characterized by redness and swelling. She was exposed to nothing. Past treatments include moisturizer. The treatment provided no relief.       Skin: Positive for rash and erythema.       Objective:      Physical Exam   HENT:   Head: Normocephalic and atraumatic.   Neck: Neck supple.   Cardiovascular: Normal rate and regular rhythm.   Murmur heard.  Pulmonary/Chest: No respiratory distress. She has no wheezes. She has no rhonchi.   Abdominal: Normal appearance.   Neurological: She is alert.   Skin: Skin is rash. erythema and lesion     Significant bilateral lower extremity edema.  There is also an erythematous rash present on the lower legs.  Due to the patient's age and lower extremity edema I feel as though the patient needs further evaluation at the emergency department.  She will have a family member take her to the emergency department now.      Assessment:       1. Lower extremity edema          Plan:         Lower extremity edema  -     " Refer to Emergency Dept.      As discussed, it is recommended you go to the ER now for further evaluation.

## 2022-05-31 NOTE — ED NOTES
No evidence of deep vein thrombosis identified in bilateral lower extremities.    Verbal given to JENNIFER Zuluaga.

## 2022-05-31 NOTE — ED PROVIDER NOTES
Encounter Date: 5/31/2022       History     Chief Complaint   Patient presents with    Leg Swelling     Patient reports sent from urgent care for lower bilateral leg swelling and redness     The history is provided by the patient. No  was used.   Leg Pain   The incident occurred at home. There was no injury mechanism. The incident occurred several weeks ago. The pain is present in the left leg and right leg. The pain is at a severity of 0/10. The pain has been constant since onset. She reports no foreign bodies present. Nothing aggravates the symptoms. She has tried nothing for the symptoms. The treatment provided no relief.      88yo F presents complaining of LE swelling x3 weeks with associated rash on bilateral LEs. Rash was initially on the RLE but just recently began on the LLE. Patient denies pain, difficulty with ambulation, no warmth in the bilateral LEs, no SOB, orthopnea, or changes in urination. Patient went to urgent care this AM and was sent to the ED for further evaluation of her edema. Patient did report changing from valsartan to amlodipine about 3wks ago due to hyperkalemia, but no other changes.     PMH: CKD stage II, HTN, T2DM      Review of patient's allergies indicates:  No Known Allergies  Past Medical History:   Diagnosis Date    Diabetes mellitus, type 2     Hypertension      Past Surgical History:   Procedure Laterality Date    EYE SURGERY      HYSTERECTOMY       Family History   Problem Relation Age of Onset    Cancer Mother     Cancer Father      Social History     Tobacco Use    Smoking status: Never Smoker    Smokeless tobacco: Never Used   Substance Use Topics    Alcohol use: Not Currently    Drug use: Never     Review of Systems   Constitutional: Negative for activity change, fatigue and unexpected weight change.   Respiratory: Negative for cough, chest tightness and shortness of breath.    Cardiovascular: Positive for leg swelling. Negative for chest  pain and palpitations.   Gastrointestinal: Negative for abdominal pain, nausea and vomiting.   Genitourinary: Negative for frequency.   Musculoskeletal: Negative for arthralgias, joint swelling and myalgias.   Skin: Positive for rash.   Neurological: Negative for dizziness, syncope and light-headedness.       Physical Exam     Initial Vitals [05/31/22 1129]   BP Pulse Resp Temp SpO2   (!) 182/71 68 18 97.9 °F (36.6 °C) 96 %      MAP       --         Physical Exam    Nursing note and vitals reviewed.  Constitutional: She appears well-developed and well-nourished. She is not diaphoretic. She is Obese . No distress.   HENT:   Head: Normocephalic and atraumatic.   Nose: Nose normal.   Mouth/Throat: Oropharynx is clear and moist.   Eyes: Conjunctivae and EOM are normal. Pupils are equal, round, and reactive to light.   Neck: Trachea normal. Neck supple. No thyromegaly present. No JVD present.   Normal range of motion.  Cardiovascular: Normal rate, regular rhythm and intact distal pulses.   Murmur heard.  Pulmonary/Chest: Breath sounds normal. No respiratory distress. She has no wheezes. She has no rhonchi. She has no rales. She exhibits no tenderness.   Abdominal: Abdomen is soft. Bowel sounds are normal. She exhibits no distension and no mass. There is no abdominal tenderness. There is no rebound and no guarding.   Musculoskeletal:         General: Edema present. No tenderness. Normal range of motion.      Cervical back: Normal range of motion and neck supple.      Lumbar back: Normal. Normal range of motion.      Right lower leg: No tenderness. Edema present.      Left lower leg: No tenderness. Edema present.      Right foot: Normal capillary refill. Normal pulse.      Left foot: Normal capillary refill. Normal pulse.      Comments: Trace pitting in bilateral LEs     Neurological: She is alert and oriented to person, place, and time. She has normal strength. No cranial nerve deficit or sensory deficit.   Skin: Skin is  warm and dry. Capillary refill takes less than 2 seconds. Rash noted. No abscess noted. Rash is macular. There is erythema. No pallor.   Bilateral macular erythematous rash on bilateral LEs with no scaling, no raised borders, no central clearing   Psychiatric: She has a normal mood and affect. Her behavior is normal. Judgment and thought content normal.         ED Course   Procedures  Labs Reviewed   B-TYPE NATRIURETIC PEPTIDE - Abnormal; Notable for the following components:       Result Value    Natriuretic Peptide 146.9 (*)     All other components within normal limits   COMPREHENSIVE METABOLIC PANEL - Abnormal; Notable for the following components:    Potassium Level 5.5 (*)     Chloride 111 (*)     Carbon Dioxide 20 (*)     Glucose Level 131 (*)     Blood Urea Nitrogen 46.3 (*)     Creatinine 2.06 (*)     All other components within normal limits   CBC WITH DIFFERENTIAL - Abnormal; Notable for the following components:    RBC 3.27 (*)     Hgb 9.7 (*)     Hct 30.5 (*)     MCHC 31.8 (*)     IG# 0.05 (*)     IG% 0.8 (*)     All other components within normal limits   CBC W/ AUTO DIFFERENTIAL    Narrative:     The following orders were created for panel order CBC auto differential.  Procedure                               Abnormality         Status                     ---------                               -----------         ------                     CBC with Differential[[id number]]        Abnormal            Final result                 Please view results for these tests on the individual orders.          Imaging Results          X-Ray Chest 1 View (Final result)  Result time 05/31/22 16:03:45    Final result by Michele Stanley MD (05/31/22 16:03:45)                 Impression:      Mild cardiomegaly and mild bilateral perihilar interstitial prominence, suggestive of mild congestion.  No focal consolidative airspace disease.      Electronically signed by: Michele Stanley  Date:    05/31/2022  Time:    16:03              Narrative:    EXAMINATION:  XR CHEST 1 VIEW    CLINICAL HISTORY:  Localized edema    TECHNIQUE:  Portable upright AP view of the chest.    COMPARISON:  Chest radiographs 06/14/2018.    FINDINGS:  Mild enlargement and mild bilateral perihilar interstitial prominence, suggestive of mild congestion.  No focal consolidation, pleural effusion, or pneumothorax.    The imaged osseous structures and soft tissues are without acute abnormality.                                 Medications   furosemide tablet 40 mg (40 mg Oral Given 5/31/22 1525)     Medical Decision Making:   History:   Old Medical Records: I decided to obtain old medical records.  Old Records Summarized: records from clinic visits.  Initial Assessment:   86yo F presented with 3wks of bilateral LE swelling and a rash on bilateral LEs. Patient seen in [facility] and was advised to come get evaluated in the ED. She had a bilateral LE NIVA US and showed no signs of DVT, BNP was mildly elevated to 146, BP was elevated on arrival. PE was only significant for bilateral LE edema with trace pitting and venous stasis-like skin changes. CXR showed mild cardiomegaly and mild increased interstitial markings.  Differential Diagnosis:   CHF, DVT, Venous Stasis, Medication Side effect  Clinical Tests:   Lab Tests: Ordered and Reviewed  The following lab test(s) were unremarkable: CBC, CMP and BNP  Radiological Study: Ordered and Reviewed  ED Management:  Labs and imaging were reviewed and patient was stable with no acute complaints. She was given a one time dose of Lasix 40mg and advised to follow up with PCP within the next few days. Return ED precautions given. Patient and daughter in room were agreeable to plan.             Attending Attestation:   Physician Attestation Statement for Resident:  As the supervising MD   Physician Attestation Statement: I have personally seen and examined this patient.   I agree with the above history. -:   As the supervising MD I agree with the  above PE.    As the supervising MD I agree with the above treatment, course, plan, and disposition.  I have reviewed and agree with the residents interpretation of the following: lab data and x-rays.  I have reviewed the following: old records at this facility.                ED Course as of 05/31/22 1929 Tue May 31, 2022   1420 Dr. Neely supervisory attestation  I performed substantive portion of MDM. I performed a history, physical exam, reviewed pertinent available previous records and discussed plan of care with resident I had face to face time evaluation of the patient    HPI:  87-year-old female with a history of hypertension and CKD presents ED with several weeks of lower extremity edema.  In the morning she wakes up with no edema and worsened throughout the day.  She has no pain or fever.  She has no shortness of breath or chest discomfort.  She does have some erythema but it does not itch.  She went to an urgent care was sent to the ED for further evaluation.  She was recently discontinued off of her valsartan due to hyperkalemia and was started on amlodipine about 1 month ago  PE:  Well-developed, well-nourished  Normocephalic, atraumatic  Regular rate, was clear auscultation bilaterally  Abdomen soft and nontender  2+ bilateral lower extremity edema with venous stasis type dermatitis  MDM:  Mild increase in creatinine from baseline and slight hyperkalemia.  Will obtain a chest x-ray.  Anemia is chronic and she is on iron.  Will discuss treatment of venous stasis with leg elevation and compression stocks.  May consider p.r.n. Lasix and patient should follow-up closely with her PCP for repeat chemistry   [BS]      ED Course User Index  [BS] Brandi Neely MD             Clinical Impression:   Final diagnoses:  [M79.89] Leg swelling          ED Disposition Condition    Discharge Stable        ED Prescriptions     None        Follow-up Information     Follow up With Specialties Details Why Contact  Info    David Moore MD Internal Medicine In 1 week  [address] [facility] [address]  [phone]      [facility] - Emergency Dept Emergency Medicine  If symptoms worsen, As needed [address] [facility] [address]  [phone]           Kathy Brown MD  Resident  05/31/22 1607       Brandi Neely MD  05/31/22 1923

## 2022-05-31 NOTE — FIRST PROVIDER EVALUATION
"Medical screening exam completed.  I have conducted a focused provider triage encounter, findings are as follows:    Brief history of present illness:  86 yo female presents to ED for evaluation of bilateral leg swelling with rash to right leg. Hx of DM. Sent from  for further evaluation.    Vitals:    05/31/22 1129   BP: (!) 182/71   Pulse: 68   Resp: 18   Temp: 97.9 °F (36.6 °C)   TempSrc: Oral   SpO2: 96%   Weight: 86.2 kg (190 lb)   Height: 5' 3" (1.6 m)       Pertinent physical exam:  Ambulated into triage. DP pulses 2+. Erythema noted to anterior shin.    Brief workup plan:  Labs and NIVA.    Preliminary workup initiated; this workup will be continued and followed by the physician or advanced practice provider that is assigned to the patient when roomed.  "

## 2022-06-03 ENCOUNTER — OFFICE VISIT (OUTPATIENT)
Dept: INTERNAL MEDICINE | Facility: CLINIC | Age: 87
End: 2022-06-03
Payer: MEDICARE

## 2022-06-03 VITALS
RESPIRATION RATE: 18 BRPM | BODY MASS INDEX: 34.38 KG/M2 | DIASTOLIC BLOOD PRESSURE: 80 MMHG | WEIGHT: 194 LBS | SYSTOLIC BLOOD PRESSURE: 140 MMHG | HEIGHT: 63 IN | HEART RATE: 78 BPM | TEMPERATURE: 97 F

## 2022-06-03 DIAGNOSIS — E87.5 HYPERKALEMIA: ICD-10-CM

## 2022-06-03 DIAGNOSIS — I10 HYPERTENSION, UNSPECIFIED TYPE: ICD-10-CM

## 2022-06-03 DIAGNOSIS — R60.0 PEDAL EDEMA: Primary | ICD-10-CM

## 2022-06-03 DIAGNOSIS — E11.9 TYPE 2 DIABETES MELLITUS WITHOUT COMPLICATION, WITHOUT LONG-TERM CURRENT USE OF INSULIN: ICD-10-CM

## 2022-06-03 DIAGNOSIS — E55.9 VITAMIN D DEFICIENCY: ICD-10-CM

## 2022-06-03 DIAGNOSIS — D63.8 ANEMIA OF CHRONIC DISEASE: ICD-10-CM

## 2022-06-03 DIAGNOSIS — N18.2 CKD (CHRONIC KIDNEY DISEASE), STAGE II: ICD-10-CM

## 2022-06-03 PROCEDURE — 99214 PR OFFICE/OUTPT VISIT, EST, LEVL IV, 30-39 MIN: ICD-10-PCS | Mod: ,,, | Performed by: INTERNAL MEDICINE

## 2022-06-03 PROCEDURE — 99214 OFFICE O/P EST MOD 30 MIN: CPT | Mod: ,,, | Performed by: INTERNAL MEDICINE

## 2022-06-03 RX ORDER — FUROSEMIDE 20 MG/1
20 TABLET ORAL DAILY
Qty: 30 TABLET | Refills: 11 | Status: ON HOLD | OUTPATIENT
Start: 2022-06-03 | End: 2023-01-18 | Stop reason: HOSPADM

## 2022-06-03 RX ORDER — AMLODIPINE BESYLATE 5 MG/1
5 TABLET ORAL DAILY
Qty: 30 TABLET | Refills: 11 | Status: SHIPPED | OUTPATIENT
Start: 2022-06-03 | End: 2022-08-22

## 2022-06-03 RX ORDER — AMOXICILLIN 500 MG
1 CAPSULE ORAL DAILY
COMMUNITY
End: 2023-01-23

## 2022-06-03 NOTE — PROGRESS NOTES
David Castañeda MD        PATIENT NAME: Rayna Haider  : 1935  DATE: 6/3/22  MRN: [id number]      Billing Provider: David Castañeda MD  Level of Service: MD OFFICE/OUTPT VISIT, EST, LEVL IV, 30-39 MIN  Patient PCP Information     Provider PCP Type    David Castañeda MD General          Reason for Visit / Chief Complaint: Follow-up (Hosp follow up) and Leg Swelling       Update PCP  Update Chief Complaint         History of Present Illness / Problem Focused Workflow     Rayna Haider presents to the clinic with Follow-up (Hosp follow up) and Leg Swelling     Esperanza comes in today for follow-up for an urgent care visit.  Over the last few weeks she has developed significant edema of her lower legs from her knees down.  In urgent care they gave her a dose of Lasix and had blood work in venous NIVA done I reviewed the knee which was normal.  Her blood work her BNP is only slightly elevated.      Review of Systems   Review of Systems   Constitutional: Negative.    HENT: Negative.    Eyes: Negative.    Respiratory: Negative.    Cardiovascular: Positive for leg swelling.   Gastrointestinal: Negative.    Endocrine: Negative.    Genitourinary: Negative.    Musculoskeletal: Negative.    Integumentary:  Negative.   Neurological: Negative.    Psychiatric/Behavioral: Negative.         Medical / Social / Family History     Past Medical History:   Diagnosis Date    Diabetes mellitus, type 2     Hyperkalemia     Hypertension        Past Surgical History:   Procedure Laterality Date    EYE SURGERY      HYSTERECTOMY      RETINAL DETACHMENT SURGERY Left        Social History  Ms. Haider  reports that she has never smoked. She has never used smokeless tobacco. She reports previous alcohol use. She reports that she does not use drugs.    Family History  Ms.'s Haider  family history includes Cancer in her brother, father, and mother; Diabetes in her brother and father; Glaucoma in her  mother.    Medications and Allergies     Medications  Outpatient Medications Marked as Taking for the 6/3/22 encounter (Office Visit) with David Moore MD   Medication Sig Dispense Refill    amLODIPine (NORVASC) 10 MG tablet Take 1 tablet (10 mg total) by mouth once daily. 30 tablet 11    aspirin 81 MG Chew Take 81 mg by mouth once daily.      hydroCHLOROthiazide (HYDRODIURIL) 25 MG tablet Take 1 tablet (25 mg total) by mouth once daily. 30 tablet 11    LORazepam (ATIVAN) 0.5 MG tablet Take 0.5 mg by mouth daily as needed.      metFORMIN (GLUCOPHAGE-XR) 500 MG ER 24hr tablet Take 1,000 mg by mouth 2 (two) times daily.      metoprolol tartrate (LOPRESSOR) 50 MG tablet Take 50 mg by mouth 2 (two) times daily.         Allergies  Review of patient's allergies indicates:  No Known Allergies    Physical Examination     Vitals:    22 0855   BP: (!) 158/72   Pulse: 78   Resp: 18   Temp: 97.4 °F (36.3 °C)     Physical Exam  Constitutional:       Appearance: Normal appearance.   HENT:      Head: Normocephalic and atraumatic.      Right Ear: Tympanic membrane normal.      Left Ear: Tympanic membrane normal.      Nose: Nose normal.      Mouth/Throat:      Mouth: Mucous membranes are moist.   Eyes:      Extraocular Movements: Extraocular movements intact.      Pupils: Pupils are equal, round, and reactive to light.   Cardiovascular:      Rate and Rhythm: Normal rate and regular rhythm.      Pulses: Normal pulses.   Pulmonary:      Effort: Pulmonary effort is normal.      Breath sounds: Normal breath sounds.   Abdominal:      General: Abdomen is flat. Bowel sounds are normal.      Palpations: Abdomen is soft.   Musculoskeletal:         General: Normal range of motion.      Cervical back: Normal range of motion and neck supple.      Right lower le+ Edema present.      Left lower le+ Edema present.   Skin:     General: Skin is warm and dry.   Neurological:      General: No focal deficit present.       Mental Status: She is alert and oriented to person, place, and time.   Psychiatric:         Mood and Affect: Mood normal.         Behavior: Behavior normal.          Assessment and Plan (including Health Maintenance)      Problem List  Smart Sets  Document Outside HM   :    Plan:   Pedal edema    Hypertension, unspecified type    Type 2 diabetes mellitus without complication, without long-term current use of insulin    CKD (chronic kidney disease), stage II    Vitamin D deficiency    Anemia of chronic disease    Hyperkalemia    Discussed leg edema and it is causes.  Will decrease her amlodipine to 5 mg a day.  Add Lasix 20 mg daily.  Needs evaluation with Cardiology with a 2D echo.  Will follow up here in July with further lab work.          Health Maintenance Due   Topic Date Due    COVID-19 Vaccine (4 - Booster for Pfizer series) 02/12/2022       Problem List Items Addressed This Visit        Cardiac/Vascular    Hypertension       Renal/    CKD (chronic kidney disease), stage II    Hyperkalemia       Oncology    Anemia of chronic disease       Endocrine    Diabetes mellitus, type 2    Vitamin D deficiency       Other    Pedal edema - Primary          Health Maintenance Topics with due status: Not Due       Topic Last Completion Date    Lipid Panel 02/23/2022       No future appointments.         Signature:  David Castañeda MD  [facility]  [facility] [facility]  [place] [address]    Date of encounter: 6/3/22

## 2022-06-21 ENCOUNTER — PATIENT OUTREACH (OUTPATIENT)
Dept: ADMINISTRATIVE | Facility: HOSPITAL | Age: 87
End: 2022-06-21
Payer: MEDICARE

## 2022-06-21 NOTE — PROGRESS NOTES
The following record(s)  below were uploaded for Health Maintenance .        06/21/2018 DEXA SCREENING

## 2022-07-21 ENCOUNTER — LAB VISIT (OUTPATIENT)
Dept: LAB | Facility: HOSPITAL | Age: 87
End: 2022-07-21
Attending: INTERNAL MEDICINE
Payer: MEDICARE

## 2022-07-21 ENCOUNTER — TELEPHONE (OUTPATIENT)
Dept: INTERNAL MEDICINE | Facility: CLINIC | Age: 87
End: 2022-07-21
Payer: MEDICARE

## 2022-07-21 DIAGNOSIS — R60.0 PEDAL EDEMA: ICD-10-CM

## 2022-07-21 LAB
ALBUMIN SERPL-MCNC: 3.7 GM/DL (ref 3.4–4.8)
ALBUMIN/GLOB SERPL: 1.2 RATIO (ref 1.1–2)
ALP SERPL-CCNC: 54 UNIT/L (ref 40–150)
ALT SERPL-CCNC: 6 UNIT/L (ref 0–55)
AST SERPL-CCNC: 10 UNIT/L (ref 5–34)
BILIRUBIN DIRECT+TOT PNL SERPL-MCNC: 0.4 MG/DL
BNP BLD-MCNC: 115 PG/ML
BUN SERPL-MCNC: 41.7 MG/DL (ref 9.8–20.1)
CALCIUM SERPL-MCNC: 9.7 MG/DL (ref 8.4–10.2)
CHLORIDE SERPL-SCNC: 106 MMOL/L (ref 98–107)
CO2 SERPL-SCNC: 20 MMOL/L (ref 23–31)
CREAT SERPL-MCNC: 2.04 MG/DL (ref 0.55–1.02)
GLOBULIN SER-MCNC: 3.1 GM/DL (ref 2.4–3.5)
GLUCOSE SERPL-MCNC: 114 MG/DL (ref 82–115)
POTASSIUM SERPL-SCNC: 5.3 MMOL/L (ref 3.5–5.1)
PROT SERPL-MCNC: 6.8 GM/DL (ref 5.8–7.6)
SODIUM SERPL-SCNC: 136 MMOL/L (ref 136–145)

## 2022-07-21 PROCEDURE — 36415 COLL VENOUS BLD VENIPUNCTURE: CPT

## 2022-07-21 PROCEDURE — 83880 ASSAY OF NATRIURETIC PEPTIDE: CPT

## 2022-07-21 PROCEDURE — 80053 COMPREHEN METABOLIC PANEL: CPT

## 2022-07-21 NOTE — TELEPHONE ENCOUNTER
----- Message from Belkys Berry sent at 7/21/2022  9:19 AM CDT -----  Regarding: needs diagnosis info for ins  Daughter Kaye called and needs some info on patient. Her supplemental ins was dropped. Now she needs to know if patient has any heart or kidney illnesses and when she was diagnosed with them.  Her callback number is [phone]

## 2022-07-21 NOTE — TELEPHONE ENCOUNTER
Call Back patient's daughter  Listed the diagnosis she has on the chart 1. Diabetes 2. Hypertension 3. Chronic kidney disease 4. Anemia secondary to the chronic kidney disease  She understood she needs help for her new health policy.

## 2022-08-15 DIAGNOSIS — F41.9 ANXIETY: Primary | ICD-10-CM

## 2022-08-15 RX ORDER — LORAZEPAM 0.5 MG/1
0.5 TABLET ORAL DAILY PRN
Qty: 7 TABLET | Refills: 0 | Status: SHIPPED | OUTPATIENT
Start: 2022-08-15 | End: 2022-08-22 | Stop reason: SDUPTHER

## 2022-08-22 ENCOUNTER — OFFICE VISIT (OUTPATIENT)
Dept: INTERNAL MEDICINE | Facility: CLINIC | Age: 87
End: 2022-08-22
Payer: MEDICARE

## 2022-08-22 VITALS
DIASTOLIC BLOOD PRESSURE: 84 MMHG | WEIGHT: 178 LBS | OXYGEN SATURATION: 97 % | TEMPERATURE: 97 F | SYSTOLIC BLOOD PRESSURE: 120 MMHG | BODY MASS INDEX: 33.61 KG/M2 | HEART RATE: 58 BPM | HEIGHT: 61 IN

## 2022-08-22 DIAGNOSIS — E11.9 TYPE 2 DIABETES MELLITUS WITHOUT COMPLICATION, WITHOUT LONG-TERM CURRENT USE OF INSULIN: ICD-10-CM

## 2022-08-22 DIAGNOSIS — N18.2 CKD (CHRONIC KIDNEY DISEASE), STAGE II: Primary | ICD-10-CM

## 2022-08-22 DIAGNOSIS — I10 PRIMARY HYPERTENSION: ICD-10-CM

## 2022-08-22 DIAGNOSIS — F41.9 ANXIETY: ICD-10-CM

## 2022-08-22 PROBLEM — E78.00 HYPERCHOLESTEROLEMIA: Status: ACTIVE | Noted: 2022-08-22

## 2022-08-22 PROBLEM — R60.0 PEDAL EDEMA: Status: RESOLVED | Noted: 2022-05-31 | Resolved: 2022-08-22

## 2022-08-22 PROBLEM — E66.9 OBESITY: Status: ACTIVE | Noted: 2022-08-22

## 2022-08-22 PROCEDURE — 99213 OFFICE O/P EST LOW 20 MIN: CPT | Mod: ,,,

## 2022-08-22 PROCEDURE — 99213 PR OFFICE/OUTPT VISIT, EST, LEVL III, 20-29 MIN: ICD-10-PCS | Mod: ,,,

## 2022-08-22 RX ORDER — CARVEDILOL 12.5 MG/1
12.5 TABLET ORAL 2 TIMES DAILY
Status: ON HOLD | COMMUNITY
Start: 2022-07-25 | End: 2023-01-18 | Stop reason: HOSPADM

## 2022-08-22 RX ORDER — CLONIDINE HYDROCHLORIDE 0.1 MG/1
0.2 TABLET ORAL
COMMUNITY
Start: 2022-07-01 | End: 2023-01-14

## 2022-08-22 RX ORDER — LORAZEPAM 0.5 MG/1
0.5 TABLET ORAL DAILY PRN
Qty: 30 TABLET | Refills: 2 | Status: SHIPPED | OUTPATIENT
Start: 2022-08-22 | End: 2022-11-28 | Stop reason: SDUPTHER

## 2022-08-22 NOTE — Clinical Note
Please request most recent note, labs, and medication list from patient's cardiologist Dr. Gibbons with [facility]

## 2022-08-22 NOTE — PROGRESS NOTES
Patient ID: Rayna Haider is a 87 y.o. female.    Chief Complaint: Follow-up (No complaints/concerns)    87-year-old female with hypertension, DM2, obesity, and vitamin-D deficiency.  Today presents for follow-up on pedal edema now much improved currently off of amlodipine.  Also noted currently on furosemide with previously elevated renal function.  Discussed obtaining hydrated follow-up labs as well as hemoglobin A1c, for which patient was in agreeance to.  There is some discrepancies on antihypertensive medications, we will request most recent notes, diagnostics, and medication list from Cardiology.  Otherwise denies any recent injuries or illnesses since last visit.  No other acute medical concerns noted today.      MEDICAL HISTORY:    Past Medical History:   Diagnosis Date    Diabetes mellitus, type 2     Hyperkalemia     Hypertension       Past Surgical History:   Procedure Laterality Date    EYE SURGERY      HYSTERECTOMY      RETINAL DETACHMENT SURGERY Left       Social History     Tobacco Use    Smoking status: Never Smoker    Smokeless tobacco: Never Used   Substance Use Topics    Alcohol use: Not Currently    Drug use: Never          Health Maintenance Due   Topic Date Due    Diabetes Urine Screening  Never done    Foot Exam  Never done    COVID-19 Vaccine (4 - Booster for Pfizer series) 02/12/2022    Eye Exam  09/13/2022          Patient Care Team:  David Moore MD as PCP - General (Internal Medicine)  David Moore MD as Primary Care  Destiny Gibbons MD as Consulting Physician (Cardiovascular Disease)  Bebeto Reilly MD as Consulting Physician (Ophthalmology)      Review of Systems   Constitutional: Negative for fatigue and fever.   HENT: Negative for congestion, rhinorrhea, sore throat and trouble swallowing.    Eyes: Negative for redness and visual disturbance.   Respiratory: Negative for cough, chest tightness and shortness of breath.    Cardiovascular: Negative  "for chest pain and palpitations.   Gastrointestinal: Negative for abdominal pain, constipation, diarrhea, nausea and vomiting.   Genitourinary: Negative for dysuria, flank pain, frequency and urgency.   Musculoskeletal: Negative for arthralgias, gait problem and myalgias.   Skin: Negative for rash and wound.   Neurological: Negative for facial asymmetry, speech difficulty, weakness and headaches.   All other systems reviewed and are negative.      Objective:   /84   Pulse (!) 58   Temp 97.3 °F (36.3 °C)   Ht 5' 1" (1.549 m)   Wt 80.7 kg (178 lb)   LMP  (LMP Unknown)   SpO2 97%   BMI 33.63 kg/m²      Physical Exam  Constitutional:       General: She is not in acute distress.     Appearance: Normal appearance.   HENT:      Right Ear: Tympanic membrane, ear canal and external ear normal.      Left Ear: Tympanic membrane, ear canal and external ear normal.      Nose: Nose normal.      Mouth/Throat:      Mouth: Mucous membranes are moist.      Pharynx: Oropharynx is clear.   Eyes:      Extraocular Movements: Extraocular movements intact.      Conjunctiva/sclera: Conjunctivae normal.      Pupils: Pupils are equal, round, and reactive to light.   Cardiovascular:      Rate and Rhythm: Normal rate and regular rhythm.      Pulses: Normal pulses.      Heart sounds: Normal heart sounds. No murmur heard.    No gallop.   Pulmonary:      Effort: Pulmonary effort is normal.      Breath sounds: Normal breath sounds. No wheezing.   Abdominal:      General: Bowel sounds are normal. There is no distension.      Palpations: Abdomen is soft. There is no mass.      Tenderness: There is no abdominal tenderness. There is no guarding.   Musculoskeletal:         General: Normal range of motion.      Right lower leg: Edema (Minimal dependent) present.      Left lower leg: Edema ( minimal dependent) present.   Skin:     General: Skin is warm and dry.   Neurological:      Mental Status: She is alert. Mental status is at baseline.    "   Sensory: No sensory deficit.      Motor: No weakness.         Assessment:     Problem List Items Addressed This Visit        Psychiatric    Anxiety     -currently well controlled on Ativan 0.5 mg daily p.r.n. for several years now, refill medication           Relevant Medications    carvediloL (COREG) 12.5 MG tablet    LORazepam (ATIVAN) 0.5 MG tablet       Cardiac/Vascular    Hypertension     -initially BP elevated 166/71 however repeat 120/84  -does have some discrepancies regarding antihypertensives, request most recent notes, labs, and medication list from Cardiology           Relevant Medications    carvediloL (COREG) 12.5 MG tablet    cloNIDine (CATAPRES) 0.1 MG tablet       Renal/    CKD (chronic kidney disease), stage II - Primary    Relevant Orders    Hemoglobin A1C       Endocrine    Diabetes mellitus, type 2     -currently on metformin 1000 mg b.i.d., continue   -obtain up-to-date HGB A1c           Relevant Orders    Comprehensive Metabolic Panel    Hemoglobin A1C           Plan:   Follow up for Previously scheduled visit in December and p.r.n. if need.     -plan specifics discussed above    Orders Placed This Encounter    Comprehensive Metabolic Panel    Hemoglobin A1C    LORazepam (ATIVAN) 0.5 MG tablet        Medication List with Changes/Refills   Current Medications    ASPIRIN 81 MG CHEW    Take 81 mg by mouth once daily.    CARVEDILOL (COREG) 12.5 MG TABLET    Take 12.5 mg by mouth 2 (two) times daily.    CLONIDINE (CATAPRES) 0.1 MG TABLET        FERROUS SULFATE, DRIED (SLOW FE) 160 MG (50 MG IRON) TBSR    Take 160 mg by mouth once daily.    FUROSEMIDE (LASIX) 20 MG TABLET    Take 1 tablet (20 mg total) by mouth once daily.    METFORMIN (GLUCOPHAGE-XR) 500 MG ER 24HR TABLET    Take 1,000 mg by mouth 2 (two) times daily.    OMEGA-3 FATTY ACIDS/FISH OIL (FISH OIL-OMEGA-3 FATTY ACIDS) 300-1,000 MG CAPSULE    Take 1 capsule by mouth once daily.   Changed and/or Refilled Medications    Modified  Medication Previous Medication    LORAZEPAM (ATIVAN) 0.5 MG TABLET LORazepam (ATIVAN) 0.5 MG tablet       Take 1 tablet (0.5 mg total) by mouth daily as needed.    Take 1 tablet (0.5 mg total) by mouth daily as needed.   Discontinued Medications    AMLODIPINE (NORVASC) 5 MG TABLET    Take 1 tablet (5 mg total) by mouth once daily.    HYDROCHLOROTHIAZIDE (HYDRODIURIL) 25 MG TABLET    Take 1 tablet (25 mg total) by mouth once daily.    METOPROLOL TARTRATE (LOPRESSOR) 50 MG TABLET    Take 50 mg by mouth 2 (two) times daily.    VALSARTAN-HYDROCHLOROTHIAZIDE (DIOVAN-HCT) 320-25 MG PER TABLET           95.3

## 2022-08-22 NOTE — ASSESSMENT & PLAN NOTE
-initially BP elevated 166/71 however repeat 120/84  -does have some discrepancies regarding antihypertensives, request most recent notes, labs, and medication list from Cardiology

## 2022-09-22 ENCOUNTER — HISTORICAL (OUTPATIENT)
Dept: ADMINISTRATIVE | Facility: HOSPITAL | Age: 87
End: 2022-09-22
Payer: MEDICARE

## 2022-10-13 ENCOUNTER — OFFICE VISIT (OUTPATIENT)
Dept: URGENT CARE | Facility: CLINIC | Age: 87
End: 2022-10-13
Payer: MEDICARE

## 2022-10-13 VITALS
DIASTOLIC BLOOD PRESSURE: 76 MMHG | WEIGHT: 173 LBS | HEART RATE: 86 BPM | BODY MASS INDEX: 32.66 KG/M2 | SYSTOLIC BLOOD PRESSURE: 178 MMHG | HEIGHT: 61 IN | TEMPERATURE: 98 F | RESPIRATION RATE: 18 BRPM | OXYGEN SATURATION: 95 %

## 2022-10-13 DIAGNOSIS — R05.9 COUGH, UNSPECIFIED TYPE: ICD-10-CM

## 2022-10-13 DIAGNOSIS — J01.40 ACUTE NON-RECURRENT PANSINUSITIS: Primary | ICD-10-CM

## 2022-10-13 DIAGNOSIS — H61.23 IMPACTED CERUMEN OF BOTH EARS: ICD-10-CM

## 2022-10-13 LAB
CTP QC/QA: YES
CTP QC/QA: YES
FLUAV AG NPH QL: NEGATIVE
FLUBV AG NPH QL: NEGATIVE
SARS-COV-2 RDRP RESP QL NAA+PROBE: NEGATIVE

## 2022-10-13 PROCEDURE — 87804 POCT INFLUENZA A/B: ICD-10-PCS | Mod: 59,QW,, | Performed by: FAMILY MEDICINE

## 2022-10-13 PROCEDURE — 87804 INFLUENZA ASSAY W/OPTIC: CPT | Mod: QW,,, | Performed by: FAMILY MEDICINE

## 2022-10-13 PROCEDURE — 87635: ICD-10-PCS | Mod: QW,CR,, | Performed by: FAMILY MEDICINE

## 2022-10-13 PROCEDURE — 87635 SARS-COV-2 COVID-19 AMP PRB: CPT | Mod: QW,CR,, | Performed by: FAMILY MEDICINE

## 2022-10-13 PROCEDURE — 99214 OFFICE O/P EST MOD 30 MIN: CPT | Mod: ,,, | Performed by: FAMILY MEDICINE

## 2022-10-13 PROCEDURE — 99214 PR OFFICE/OUTPT VISIT, EST, LEVL IV, 30-39 MIN: ICD-10-PCS | Mod: ,,, | Performed by: FAMILY MEDICINE

## 2022-10-13 RX ORDER — AMOXICILLIN AND CLAVULANATE POTASSIUM 875; 125 MG/1; MG/1
1 TABLET, FILM COATED ORAL EVERY 12 HOURS
Qty: 14 TABLET | Refills: 0 | Status: SHIPPED | OUTPATIENT
Start: 2022-10-13 | End: 2022-10-18

## 2022-10-13 NOTE — PATIENT INSTRUCTIONS
Saline nasal spray twice a day.  Stay hydrated.  Augmentin with food, yogurt or daily probiotic.  Monitor for stomach issues. Cough may linger a few weeks but should not have fever, chest pain, or shortness of breath.

## 2022-10-13 NOTE — PROGRESS NOTES
"Subjective:       Patient ID: Rayna Haider is a 87 y.o. female.    Vitals:  height is 5' 1" (1.549 m) and weight is 78.5 kg (173 lb). Her temperature is 98.4 °F (36.9 °C). Her blood pressure is 178/76 (abnormal) and her pulse is 86. Her respiration is 18 and oxygen saturation is 95%.     Chief Complaint: Cough (Sinus pressure, cough, runny nose, loss of appetite, fatigue x 1 week. )    7 days of sinusitis, cough and fatigue as well as reduced appetite.  No fever.       Constitution: Negative for chills, fatigue and fever.   HENT:  Positive for postnasal drip. Negative for congestion, sinus pressure and trouble swallowing.    Neck: Negative for neck pain and neck stiffness.   Cardiovascular:  Negative for chest pain, leg swelling and sob on exertion.   Respiratory:  Positive for cough. Negative for chest tightness, shortness of breath and wheezing.    Neurological:  Negative for dizziness, disorientation and altered mental status.   Psychiatric/Behavioral:  Negative for altered mental status and disorientation.      Objective:      Physical Exam   Constitutional: She is oriented to person, place, and time. She appears well-developed. No distress.   HENT:   Head: Normocephalic.   Ears:   Right Ear: External ear normal. impacted cerumen  Left Ear: External ear normal. impacted cerumen  Nose: Rhinorrhea present.   Mouth/Throat: Uvula is midline and mucous membranes are normal. No uvula swelling. Cobblestoning present. No oropharyngeal exudate or posterior oropharyngeal edema. Tonsils are 0 on the right. Tonsils are 0 on the left. No tonsillar exudate.   Eyes: Pupils are equal, round, and reactive to light. Right eye exhibits no discharge. Left eye exhibits no discharge.   Neck: Neck supple. No tracheal deviation present.   Cardiovascular: Normal rate, regular rhythm and normal heart sounds.   No murmur heard.  Pulmonary/Chest: Effort normal and breath sounds normal. No stridor. No respiratory distress. She has no " wheezes.   Lymphadenopathy:     She has no cervical adenopathy.   Neurological: no focal deficit. She is alert and oriented to person, place, and time.   Skin: Skin is warm and dry.   Psychiatric: Thought content normal.   Nursing note and vitals reviewed.      Assessment:       1. Acute non-recurrent pansinusitis    2. Cough, unspecified type    3. Impacted cerumen of both ears            Plan:         Acute non-recurrent pansinusitis  -     amoxicillin-clavulanate 875-125mg (AUGMENTIN) 875-125 mg per tablet; Take 1 tablet by mouth every 12 (twelve) hours. for 7 days  Dispense: 14 tablet; Refill: 0    Cough, unspecified type  -     POCT COVID-19 Rapid Screening  -     POCT Influenza A/B    Impacted cerumen of both ears  -     Ear wax removal          COVID and Flu test negative.        Ceruminosis is noted.  Wax is removed from both ears by syringing and manual debridement. Instructions for home care to prevent wax buildup are given.

## 2022-10-18 ENCOUNTER — TELEPHONE (OUTPATIENT)
Dept: URGENT CARE | Facility: CLINIC | Age: 87
End: 2022-10-18
Payer: MEDICARE

## 2022-10-18 DIAGNOSIS — J01.40 ACUTE NON-RECURRENT PANSINUSITIS: Primary | ICD-10-CM

## 2022-10-18 RX ORDER — CEFDINIR 300 MG/1
300 CAPSULE ORAL 2 TIMES DAILY
Qty: 14 CAPSULE | Refills: 0 | Status: SHIPPED | OUTPATIENT
Start: 2022-10-18 | End: 2022-10-25

## 2022-10-25 ENCOUNTER — TELEPHONE (OUTPATIENT)
Dept: INTERNAL MEDICINE | Facility: CLINIC | Age: 87
End: 2022-10-25
Payer: MEDICARE

## 2022-10-25 DIAGNOSIS — E11.9 TYPE 2 DIABETES MELLITUS WITHOUT COMPLICATION, WITHOUT LONG-TERM CURRENT USE OF INSULIN: ICD-10-CM

## 2022-10-25 DIAGNOSIS — E11.9 TYPE 2 DIABETES MELLITUS WITHOUT COMPLICATION, WITHOUT LONG-TERM CURRENT USE OF INSULIN: Primary | ICD-10-CM

## 2022-10-25 RX ORDER — METFORMIN HYDROCHLORIDE 500 MG/1
1000 TABLET, EXTENDED RELEASE ORAL 2 TIMES DAILY
Qty: 120 TABLET | Refills: 11 | Status: SHIPPED | OUTPATIENT
Start: 2022-10-25 | End: 2022-10-25 | Stop reason: SDUPTHER

## 2022-10-25 RX ORDER — METFORMIN HYDROCHLORIDE 500 MG/1
1000 TABLET, EXTENDED RELEASE ORAL 2 TIMES DAILY
Qty: 120 TABLET | Refills: 11 | Status: ON HOLD | OUTPATIENT
Start: 2022-10-25 | End: 2023-01-18 | Stop reason: HOSPADM

## 2022-10-25 NOTE — TELEPHONE ENCOUNTER
----- Message from Belkys Berry sent at 10/25/2022  9:07 AM CDT -----  Regarding: refill  Patient wants to know if you received her refill request for metformin 500mg from [facility] pharmacy. Call her at [phone]

## 2022-10-26 ENCOUNTER — CLINICAL SUPPORT (OUTPATIENT)
Dept: URGENT CARE | Facility: CLINIC | Age: 87
End: 2022-10-26
Payer: MEDICARE

## 2022-10-26 VITALS — RESPIRATION RATE: 18 BRPM

## 2022-10-26 DIAGNOSIS — Z23 NEED FOR IMMUNIZATION AGAINST INFLUENZA: Primary | ICD-10-CM

## 2022-10-26 PROCEDURE — G0008 FLU VACCINE - QUADRIVALENT - HIGH DOSE (65+) PRESERVATIVE FREE IM: ICD-10-PCS | Mod: ,,, | Performed by: FAMILY MEDICINE

## 2022-10-26 PROCEDURE — 90662 FLU VACCINE - QUADRIVALENT - HIGH DOSE (65+) PRESERVATIVE FREE IM: ICD-10-PCS | Mod: ,,, | Performed by: FAMILY MEDICINE

## 2022-10-26 PROCEDURE — 90662 IIV NO PRSV INCREASED AG IM: CPT | Mod: ,,, | Performed by: FAMILY MEDICINE

## 2022-10-26 PROCEDURE — G0008 ADMIN INFLUENZA VIRUS VAC: HCPCS | Mod: ,,, | Performed by: FAMILY MEDICINE

## 2022-10-26 NOTE — PROGRESS NOTES
Subjective:       Patient ID: Rayna Haider is a 87 y.o. female.    Vitals:  respiration is 18.     Chief Complaint: No chief complaint on file.    Patient presented to clinic for influenza vaccination. Vaccine information sheet given to patient along with written consent form. After written consent was obtained, patient given influenza vaccine in Left deltoid. Patient tolerated vaccine well.    ROS    Objective:      Physical Exam      Assessment:       1. Need for immunization against influenza            Plan:         Need for immunization against influenza  -     Influenza - Quadrivalent - High Dose (65+) (PF) (IM)

## 2022-11-14 ENCOUNTER — TELEPHONE (OUTPATIENT)
Dept: INTERNAL MEDICINE | Facility: CLINIC | Age: 87
End: 2022-11-14
Payer: MEDICARE

## 2022-11-14 DIAGNOSIS — E11.9 TYPE 2 DIABETES MELLITUS WITHOUT COMPLICATION, WITHOUT LONG-TERM CURRENT USE OF INSULIN: Primary | ICD-10-CM

## 2022-11-14 NOTE — TELEPHONE ENCOUNTER
----- Message from Belkys Berry sent at 11/14/2022  9:09 AM CST -----  Regarding: refill  Needs free style light test strips also needles (she said plungers) sent to [facility].   Her number is [phone]

## 2022-11-15 ENCOUNTER — TELEPHONE (OUTPATIENT)
Dept: INTERNAL MEDICINE | Facility: CLINIC | Age: 87
End: 2022-11-15
Payer: MEDICARE

## 2022-11-15 NOTE — TELEPHONE ENCOUNTER
----- Message from Belkys Berry sent at 11/15/2022  9:16 AM CST -----  Regarding: test strips  Patient wants to know if her test strips were called into [facility]. She tests once a day. Her number is [phone]

## 2022-11-28 DIAGNOSIS — F41.9 ANXIETY: Primary | ICD-10-CM

## 2022-11-28 RX ORDER — LORAZEPAM 0.5 MG/1
0.5 TABLET ORAL DAILY PRN
Qty: 30 TABLET | Refills: 5 | Status: SHIPPED | OUTPATIENT
Start: 2022-11-28 | End: 2023-09-12 | Stop reason: SDUPTHER

## 2022-11-28 NOTE — TELEPHONE ENCOUNTER
----- Message from Antonette Carreon sent at 2022 10:49 AM CST -----  Regarding: refill  MEDICATION REFILL REQUEST      PATIENT PHONE #:[phone]     :[birth year]     PHARMACY:[facility]     PHARMACY PHONE #: [phone]     ALLERGIES:     MESSAGE    Lorazepam   .5 mg   PRN

## 2022-12-19 ENCOUNTER — TELEPHONE (OUTPATIENT)
Dept: INTERNAL MEDICINE | Facility: CLINIC | Age: 87
End: 2022-12-19
Payer: MEDICARE

## 2022-12-19 NOTE — TELEPHONE ENCOUNTER
----- Message from Antonette Carreon sent at 12/19/2022  9:11 AM CST -----  Regarding: release labs  Please make sure patient labs are ready at [facility]. Said she doesn't want to go and not be seen. Not used to not having her lab order

## 2023-01-12 ENCOUNTER — TELEPHONE (OUTPATIENT)
Dept: INTERNAL MEDICINE | Facility: CLINIC | Age: 88
End: 2023-01-12
Payer: MEDICARE

## 2023-01-12 DIAGNOSIS — R53.1 GENERALIZED WEAKNESS: Primary | ICD-10-CM

## 2023-01-12 DIAGNOSIS — E87.5 HYPERKALEMIA: Primary | ICD-10-CM

## 2023-01-12 NOTE — TELEPHONE ENCOUNTER
----- Message from Antonette Carreon sent at 1/12/2023  2:56 PM CST -----  Regarding: observation  Dtr Kaye has been observing different things lately. (Doesn't want her mom to know that she called)    1.) Patient had a sinus infection a few months ago, and was put on Augmentin, tore patients stomach up. She lost 34 pounds. She is just now getting her appetite back.    2.) Heart problems--sees Dr. Gibbons    3.) memory loss    4.) sleepless nights    5.) anxiety    6.) would like home health to go out and evaluate to see if PT would help to strengthen her legs.      Kaye (dtr)  [phone]

## 2023-01-12 NOTE — TELEPHONE ENCOUNTER
Spoke with the patient in regards to her potassium level results.  She is aware that she will need to repeat the lab on 1/17/23 and drink at least 4 bottles of water a day.

## 2023-01-12 NOTE — TELEPHONE ENCOUNTER
----- Message from Antonette Carreon sent at 1/12/2023  2:56 PM CST -----  Regarding: observation  Dtr Kaye has been observing different things lately. (Doesn't want her mom to know that she called)    1.) Patient had a sinus infection a few months ago, and was put on Augmentin, tore patients stomach up. She lost 34 pounds. She is just now getting her appetite back.    2.) Heart problems--sees Dr. Gibbons    3.) memory loss    4.) sleepless nights    5.) anxiety    6.) would like home health to go out and evaluate to see if PT would help to strengthen her legs.      Kaye (dtr)  [phone]

## 2023-01-12 NOTE — TELEPHONE ENCOUNTER
Patient having multiple problems as outlined by the previous note   Refer her to home health for evaluation.

## 2023-01-13 ENCOUNTER — TELEPHONE (OUTPATIENT)
Dept: INTERNAL MEDICINE | Facility: CLINIC | Age: 88
End: 2023-01-13
Payer: MEDICARE

## 2023-01-13 ENCOUNTER — HOSPITAL ENCOUNTER (INPATIENT)
Facility: HOSPITAL | Age: 88
LOS: 5 days | Discharge: HOME HEALTH | DRG: 683 | End: 2023-01-18
Attending: STUDENT IN AN ORGANIZED HEALTH CARE EDUCATION/TRAINING PROGRAM | Admitting: INTERNAL MEDICINE
Payer: MEDICARE

## 2023-01-13 DIAGNOSIS — D64.9 ANEMIA, UNSPECIFIED TYPE: ICD-10-CM

## 2023-01-13 DIAGNOSIS — R07.9 CHEST PAIN: ICD-10-CM

## 2023-01-13 DIAGNOSIS — N17.9 ACUTE RENAL FAILURE, UNSPECIFIED ACUTE RENAL FAILURE TYPE: Primary | ICD-10-CM

## 2023-01-13 DIAGNOSIS — N18.9 ACUTE RENAL FAILURE SUPERIMPOSED ON CHRONIC KIDNEY DISEASE, UNSPECIFIED CKD STAGE, UNSPECIFIED ACUTE RENAL FAILURE TYPE: ICD-10-CM

## 2023-01-13 DIAGNOSIS — E87.5 HYPERKALEMIA: ICD-10-CM

## 2023-01-13 DIAGNOSIS — N17.9 ACUTE RENAL FAILURE SUPERIMPOSED ON CHRONIC KIDNEY DISEASE, UNSPECIFIED CKD STAGE, UNSPECIFIED ACUTE RENAL FAILURE TYPE: ICD-10-CM

## 2023-01-13 LAB
ABORH RETYPE: NORMAL
ALBUMIN SERPL-MCNC: 3.6 G/DL (ref 3.4–4.8)
ALBUMIN/GLOB SERPL: 1.1 RATIO (ref 1.1–2)
ALP SERPL-CCNC: 46 UNIT/L (ref 40–150)
ALT SERPL-CCNC: 6 UNIT/L (ref 0–55)
APPEARANCE UR: CLEAR
AST SERPL-CCNC: 9 UNIT/L (ref 5–34)
BACTERIA #/AREA URNS AUTO: ABNORMAL /HPF
BASOPHILS # BLD AUTO: 0.05 X10(3)/MCL (ref 0–0.2)
BASOPHILS NFR BLD AUTO: 1.2 %
BILIRUB UR QL STRIP.AUTO: NEGATIVE MG/DL
BILIRUBIN DIRECT+TOT PNL SERPL-MCNC: 0.5 MG/DL
BUN SERPL-MCNC: 81.9 MG/DL (ref 9.8–20.1)
CALCIUM SERPL-MCNC: 9.7 MG/DL (ref 8.4–10.2)
CHLORIDE SERPL-SCNC: 107 MMOL/L (ref 98–107)
CO2 SERPL-SCNC: 12 MMOL/L (ref 23–31)
COLOR UR AUTO: YELLOW
CREAT SERPL-MCNC: 5.54 MG/DL (ref 0.55–1.02)
EOSINOPHIL # BLD AUTO: 0.1 X10(3)/MCL (ref 0–0.9)
EOSINOPHIL NFR BLD AUTO: 2.3 %
ERYTHROCYTE [DISTWIDTH] IN BLOOD BY AUTOMATED COUNT: 15.3 % (ref 11.5–17)
GFR SERPLBLD CREATININE-BSD FMLA CKD-EPI: 7 MLS/MIN/1.73/M2
GLOBULIN SER-MCNC: 3.3 GM/DL (ref 2.4–3.5)
GLUCOSE SERPL-MCNC: 93 MG/DL (ref 82–115)
GLUCOSE UR QL STRIP.AUTO: NEGATIVE MG/DL
GROUP & RH: NORMAL
HCT VFR BLD AUTO: 24.3 % (ref 37–47)
HGB BLD-MCNC: 7.5 GM/DL (ref 12–16)
IMM GRANULOCYTES # BLD AUTO: 0.01 X10(3)/MCL (ref 0–0.04)
IMM GRANULOCYTES NFR BLD AUTO: 0.2 %
INDIRECT COOMBS GEL: NORMAL
KETONES UR QL STRIP.AUTO: NEGATIVE MG/DL
LACTATE SERPL-SCNC: 1.6 MMOL/L (ref 0.5–2.2)
LEUKOCYTE ESTERASE UR QL STRIP.AUTO: ABNORMAL UNIT/L
LYMPHOCYTES # BLD AUTO: 1.03 X10(3)/MCL (ref 0.6–4.6)
LYMPHOCYTES NFR BLD AUTO: 24 %
MAGNESIUM SERPL-MCNC: 1.8 MG/DL (ref 1.6–2.6)
MCH RBC QN AUTO: 28.6 PG
MCHC RBC AUTO-ENTMCNC: 30.9 MG/DL (ref 33–36)
MCV RBC AUTO: 92.7 FL (ref 80–94)
MONOCYTES # BLD AUTO: 0.37 X10(3)/MCL (ref 0.1–1.3)
MONOCYTES NFR BLD AUTO: 8.6 %
NEUTROPHILS # BLD AUTO: 2.74 X10(3)/MCL (ref 2.1–9.2)
NEUTROPHILS NFR BLD AUTO: 63.7 %
NITRITE UR QL STRIP.AUTO: NEGATIVE
NRBC BLD AUTO-RTO: 0 %
PH UR STRIP.AUTO: 5 [PH]
PHOSPHATE SERPL-MCNC: 5.2 MG/DL (ref 2.3–4.7)
PLATELET # BLD AUTO: 219 X10(3)/MCL (ref 130–400)
PMV BLD AUTO: 10.9 FL (ref 7.4–10.4)
POCT GLUCOSE: 103 MG/DL (ref 70–110)
POCT GLUCOSE: 110 MG/DL (ref 70–110)
POCT GLUCOSE: 79 MG/DL (ref 70–110)
POCT GLUCOSE: 92 MG/DL (ref 70–110)
POTASSIUM SERPL-SCNC: 5.6 MMOL/L (ref 3.5–5.1)
PROT SERPL-MCNC: 6.9 GM/DL (ref 5.8–7.6)
PROT UR QL STRIP.AUTO: ABNORMAL MG/DL
RBC # BLD AUTO: 2.62 X10(6)/MCL (ref 4.2–5.4)
RBC #/AREA URNS AUTO: <5 /HPF
RBC UR QL AUTO: NEGATIVE UNIT/L
SODIUM SERPL-SCNC: 134 MMOL/L (ref 136–145)
SP GR UR STRIP.AUTO: 1.01 (ref 1–1.03)
SQUAMOUS #/AREA URNS AUTO: <5 /HPF
UROBILINOGEN UR STRIP-ACNC: 0.2 MG/DL
WBC # SPEC AUTO: 4.3 X10(3)/MCL (ref 4.5–11.5)
WBC #/AREA URNS AUTO: 12 /HPF

## 2023-01-13 PROCEDURE — 25000003 PHARM REV CODE 250: Performed by: NURSE PRACTITIONER

## 2023-01-13 PROCEDURE — 80053 COMPREHEN METABOLIC PANEL: CPT | Performed by: NURSE PRACTITIONER

## 2023-01-13 PROCEDURE — 83605 ASSAY OF LACTIC ACID: CPT | Performed by: NURSE PRACTITIONER

## 2023-01-13 PROCEDURE — 93005 ELECTROCARDIOGRAM TRACING: CPT

## 2023-01-13 PROCEDURE — 93010 ELECTROCARDIOGRAM REPORT: CPT | Mod: ,,, | Performed by: STUDENT IN AN ORGANIZED HEALTH CARE EDUCATION/TRAINING PROGRAM

## 2023-01-13 PROCEDURE — 87088 URINE BACTERIA CULTURE: CPT | Performed by: NURSE PRACTITIONER

## 2023-01-13 PROCEDURE — 11000001 HC ACUTE MED/SURG PRIVATE ROOM

## 2023-01-13 PROCEDURE — 81001 URINALYSIS AUTO W/SCOPE: CPT | Performed by: NURSE PRACTITIONER

## 2023-01-13 PROCEDURE — 86900 BLOOD TYPING SEROLOGIC ABO: CPT | Performed by: NURSE PRACTITIONER

## 2023-01-13 PROCEDURE — P9016 RBC LEUKOCYTES REDUCED: HCPCS | Performed by: NURSE PRACTITIONER

## 2023-01-13 PROCEDURE — 99285 EMERGENCY DEPT VISIT HI MDM: CPT | Mod: 25

## 2023-01-13 PROCEDURE — 83735 ASSAY OF MAGNESIUM: CPT | Performed by: NURSE PRACTITIONER

## 2023-01-13 PROCEDURE — 36430 TRANSFUSION BLD/BLD COMPNT: CPT

## 2023-01-13 PROCEDURE — 96361 HYDRATE IV INFUSION ADD-ON: CPT

## 2023-01-13 PROCEDURE — 82962 GLUCOSE BLOOD TEST: CPT

## 2023-01-13 PROCEDURE — 84100 ASSAY OF PHOSPHORUS: CPT | Performed by: NURSE PRACTITIONER

## 2023-01-13 PROCEDURE — 96360 HYDRATION IV INFUSION INIT: CPT

## 2023-01-13 PROCEDURE — 93010 EKG 12-LEAD: ICD-10-PCS | Mod: ,,, | Performed by: STUDENT IN AN ORGANIZED HEALTH CARE EDUCATION/TRAINING PROGRAM

## 2023-01-13 PROCEDURE — 86923 COMPATIBILITY TEST ELECTRIC: CPT | Performed by: NURSE PRACTITIONER

## 2023-01-13 PROCEDURE — 85025 COMPLETE CBC W/AUTO DIFF WBC: CPT | Performed by: NURSE PRACTITIONER

## 2023-01-13 RX ORDER — CLONIDINE HYDROCHLORIDE 0.2 MG/1
0.2 TABLET ORAL 3 TIMES DAILY PRN
Status: DISCONTINUED | OUTPATIENT
Start: 2023-01-13 | End: 2023-01-15

## 2023-01-13 RX ORDER — ONDANSETRON 2 MG/ML
4 INJECTION INTRAMUSCULAR; INTRAVENOUS EVERY 4 HOURS PRN
Status: DISCONTINUED | OUTPATIENT
Start: 2023-01-14 | End: 2023-01-18 | Stop reason: HOSPADM

## 2023-01-13 RX ORDER — LABETALOL HYDROCHLORIDE 5 MG/ML
10 INJECTION, SOLUTION INTRAVENOUS EVERY 4 HOURS PRN
Status: DISCONTINUED | OUTPATIENT
Start: 2023-01-13 | End: 2023-01-17

## 2023-01-13 RX ORDER — SODIUM CHLORIDE 9 MG/ML
1000 INJECTION, SOLUTION INTRAVENOUS CONTINUOUS
Status: DISCONTINUED | OUTPATIENT
Start: 2023-01-13 | End: 2023-01-13

## 2023-01-13 RX ORDER — POLYETHYLENE GLYCOL 3350 17 G/17G
17 POWDER, FOR SOLUTION ORAL 2 TIMES DAILY PRN
Status: DISCONTINUED | OUTPATIENT
Start: 2023-01-14 | End: 2023-01-18 | Stop reason: HOSPADM

## 2023-01-13 RX ORDER — MAG HYDROX/ALUMINUM HYD/SIMETH 200-200-20
30 SUSPENSION, ORAL (FINAL DOSE FORM) ORAL 4 TIMES DAILY PRN
Status: DISCONTINUED | OUTPATIENT
Start: 2023-01-14 | End: 2023-01-18 | Stop reason: HOSPADM

## 2023-01-13 RX ORDER — PROCHLORPERAZINE EDISYLATE 5 MG/ML
5 INJECTION INTRAMUSCULAR; INTRAVENOUS EVERY 6 HOURS PRN
Status: DISCONTINUED | OUTPATIENT
Start: 2023-01-14 | End: 2023-01-18 | Stop reason: HOSPADM

## 2023-01-13 RX ORDER — HYDROCODONE BITARTRATE AND ACETAMINOPHEN 500; 5 MG/1; MG/1
TABLET ORAL
Status: DISCONTINUED | OUTPATIENT
Start: 2023-01-13 | End: 2023-01-18 | Stop reason: HOSPADM

## 2023-01-13 RX ORDER — SODIUM CHLORIDE 0.9 % (FLUSH) 0.9 %
10 SYRINGE (ML) INJECTION
Status: DISCONTINUED | OUTPATIENT
Start: 2023-01-14 | End: 2023-01-18 | Stop reason: HOSPADM

## 2023-01-13 RX ORDER — ACETAMINOPHEN 325 MG/1
650 TABLET ORAL EVERY 4 HOURS PRN
Status: DISCONTINUED | OUTPATIENT
Start: 2023-01-14 | End: 2023-01-18 | Stop reason: HOSPADM

## 2023-01-13 RX ORDER — SIMETHICONE 80 MG
1 TABLET,CHEWABLE ORAL 4 TIMES DAILY PRN
Status: DISCONTINUED | OUTPATIENT
Start: 2023-01-14 | End: 2023-01-18 | Stop reason: HOSPADM

## 2023-01-13 RX ORDER — HYDRALAZINE HYDROCHLORIDE 20 MG/ML
10 INJECTION INTRAMUSCULAR; INTRAVENOUS EVERY 4 HOURS PRN
Status: DISCONTINUED | OUTPATIENT
Start: 2023-01-13 | End: 2023-01-16

## 2023-01-13 RX ORDER — ACETAMINOPHEN 500 MG
1000 TABLET ORAL EVERY 6 HOURS PRN
Status: DISCONTINUED | OUTPATIENT
Start: 2023-01-14 | End: 2023-01-18 | Stop reason: HOSPADM

## 2023-01-13 RX ORDER — TALC
6 POWDER (GRAM) TOPICAL NIGHTLY PRN
Status: DISCONTINUED | OUTPATIENT
Start: 2023-01-14 | End: 2023-01-18 | Stop reason: HOSPADM

## 2023-01-13 RX ORDER — AMOXICILLIN 250 MG
2 CAPSULE ORAL 2 TIMES DAILY PRN
Status: DISCONTINUED | OUTPATIENT
Start: 2023-01-14 | End: 2023-01-18 | Stop reason: HOSPADM

## 2023-01-13 RX ORDER — IBUPROFEN 200 MG
24 TABLET ORAL
Status: DISCONTINUED | OUTPATIENT
Start: 2023-01-14 | End: 2023-01-18 | Stop reason: HOSPADM

## 2023-01-13 RX ORDER — GLUCAGON 1 MG
1 KIT INJECTION
Status: DISCONTINUED | OUTPATIENT
Start: 2023-01-14 | End: 2023-01-18 | Stop reason: HOSPADM

## 2023-01-13 RX ORDER — INSULIN ASPART 100 [IU]/ML
1-10 INJECTION, SOLUTION INTRAVENOUS; SUBCUTANEOUS
Status: DISCONTINUED | OUTPATIENT
Start: 2023-01-14 | End: 2023-01-18 | Stop reason: HOSPADM

## 2023-01-13 RX ORDER — IBUPROFEN 200 MG
16 TABLET ORAL
Status: DISCONTINUED | OUTPATIENT
Start: 2023-01-14 | End: 2023-01-18 | Stop reason: HOSPADM

## 2023-01-13 RX ADMIN — SODIUM CHLORIDE 1000 ML: 9 INJECTION, SOLUTION INTRAVENOUS at 03:01

## 2023-01-13 NOTE — TELEPHONE ENCOUNTER
(labs reviewed along side of Dr. David Moore)    Spoke to patient encouraged to present to ER for acute renal failure GFR 8, previously GFR of 20.  Along with worsening BUN and creatinine.  Daughter Kaye called twice with voicemail left once with same instructions to bring mother to ER for evaluation of new renal failure.    Ms. Way was called twice after initial call this morning as well, however no answer.  Voice message left with Rayna to again encouraged to present to ER for evaluation of acute renal failure.

## 2023-01-13 NOTE — FIRST PROVIDER EVALUATION
Medical screening examination initiated.  I have conducted a focused provider triage encounter, findings are as follows:    Brief history of present illness:  88 y/o female with abnormal labs sent from pcp office.     There were no vitals filed for this visit.    Pertinent physical exam:  alert, nonlabored, ambulatory    Brief workup plan:  labs, urine, ekg    Preliminary workup initiated; this workup will be continued and followed by the physician or advanced practice provider that is assigned to the patient when roomed.

## 2023-01-14 PROBLEM — N18.9 ACUTE KIDNEY INJURY SUPERIMPOSED ON CKD: Status: ACTIVE | Noted: 2023-01-14

## 2023-01-14 PROBLEM — N17.9 ACUTE KIDNEY INJURY SUPERIMPOSED ON CKD: Status: ACTIVE | Noted: 2023-01-14

## 2023-01-14 LAB
ABO + RH BLD: NORMAL
ABO + RH BLD: NORMAL
ALBUMIN SERPL-MCNC: 3.3 G/DL (ref 3.4–4.8)
ALBUMIN/GLOB SERPL: 1.2 RATIO (ref 1.1–2)
ALP SERPL-CCNC: 42 UNIT/L (ref 40–150)
ALT SERPL-CCNC: 5 UNIT/L (ref 0–55)
AST SERPL-CCNC: 10 UNIT/L (ref 5–34)
BASOPHILS # BLD AUTO: 0.04 X10(3)/MCL (ref 0–0.2)
BASOPHILS NFR BLD AUTO: 1 %
BILIRUBIN DIRECT+TOT PNL SERPL-MCNC: 0.6 MG/DL
BLD PROD TYP BPU: NORMAL
BLD PROD TYP BPU: NORMAL
BLOOD UNIT EXPIRATION DATE: NORMAL
BLOOD UNIT EXPIRATION DATE: NORMAL
BLOOD UNIT TYPE CODE: 5100
BLOOD UNIT TYPE CODE: 5100
BUN SERPL-MCNC: 77.2 MG/DL (ref 9.8–20.1)
CALCIUM SERPL-MCNC: 9.3 MG/DL (ref 8.4–10.2)
CHLORIDE SERPL-SCNC: 108 MMOL/L (ref 98–107)
CK SERPL-CCNC: 31 U/L (ref 29–168)
CO2 SERPL-SCNC: 16 MMOL/L (ref 23–31)
CREAT SERPL-MCNC: 5.43 MG/DL (ref 0.55–1.02)
CREAT UR-MCNC: 45.6 MG/DL (ref 47–110)
CROSSMATCH INTERPRETATION: NORMAL
CROSSMATCH INTERPRETATION: NORMAL
DEPRECATED CALCIDIOL+CALCIFEROL SERPL-MC: 44 NG/ML (ref 30–80)
DISPENSE STATUS: NORMAL
DISPENSE STATUS: NORMAL
EOSINOPHIL # BLD AUTO: 0.1 X10(3)/MCL (ref 0–0.9)
EOSINOPHIL NFR BLD AUTO: 2.5 %
ERYTHROCYTE [DISTWIDTH] IN BLOOD BY AUTOMATED COUNT: 16.3 % (ref 11.5–17)
FERRITIN SERPL-MCNC: 73.01 NG/ML (ref 4.63–204)
FOLATE SERPL-MCNC: 7.1 NG/ML (ref 7–31.4)
GFR SERPLBLD CREATININE-BSD FMLA CKD-EPI: 7 MLS/MIN/1.73/M2
GLOBULIN SER-MCNC: 2.7 GM/DL (ref 2.4–3.5)
GLUCOSE SERPL-MCNC: 197 MG/DL (ref 82–115)
HCT VFR BLD AUTO: 30.7 % (ref 37–47)
HGB BLD-MCNC: 9.8 GM/DL (ref 12–16)
IMM GRANULOCYTES # BLD AUTO: 0.01 X10(3)/MCL (ref 0–0.04)
IMM GRANULOCYTES NFR BLD AUTO: 0.2 %
IRON SATN MFR SERPL: 18 % (ref 20–50)
IRON SERPL-MCNC: 45 UG/DL (ref 50–170)
LYMPHOCYTES # BLD AUTO: 0.88 X10(3)/MCL (ref 0.6–4.6)
LYMPHOCYTES NFR BLD AUTO: 21.7 %
MAGNESIUM SERPL-MCNC: 2 MG/DL (ref 1.6–2.6)
MCH RBC QN AUTO: 28.1 PG
MCHC RBC AUTO-ENTMCNC: 31.9 MG/DL (ref 33–36)
MCV RBC AUTO: 88 FL (ref 80–94)
MONOCYTES # BLD AUTO: 0.33 X10(3)/MCL (ref 0.1–1.3)
MONOCYTES NFR BLD AUTO: 8.1 %
NEUTROPHILS # BLD AUTO: 2.7 X10(3)/MCL (ref 2.1–9.2)
NEUTROPHILS NFR BLD AUTO: 66.5 %
NRBC BLD AUTO-RTO: 0 %
PHOSPHATE SERPL-MCNC: 5.2 MG/DL (ref 2.3–4.7)
PHOSPHATE SERPL-MCNC: 5.4 MG/DL (ref 2.3–4.7)
PLATELET # BLD AUTO: 209 X10(3)/MCL (ref 130–400)
PMV BLD AUTO: 10.6 FL (ref 7.4–10.4)
POCT GLUCOSE: 121 MG/DL (ref 70–110)
POCT GLUCOSE: 130 MG/DL (ref 70–110)
POCT GLUCOSE: 190 MG/DL (ref 70–110)
POTASSIUM SERPL-SCNC: 5.3 MMOL/L (ref 3.5–5.1)
PROT SERPL-MCNC: 6 GM/DL (ref 5.8–7.6)
PROT UR STRIP-MCNC: 22.5 MG/DL
PTH-INTACT SERPL-MCNC: 118.8 PG/ML (ref 8.7–77)
RBC # BLD AUTO: 3.49 X10(6)/MCL (ref 4.2–5.4)
RET# (OHS): 0.03 (ref 0.02–0.08)
RETICULOCYTE COUNT AUTOMATED (OLG): 1.01 % (ref 1.1–2.1)
SODIUM SERPL-SCNC: 137 MMOL/L (ref 136–145)
SODIUM UR-SCNC: 54 MMOL/L
TIBC SERPL-MCNC: 203 UG/DL (ref 70–310)
TIBC SERPL-MCNC: 248 UG/DL (ref 250–450)
UNIT NUMBER: NORMAL
UNIT NUMBER: NORMAL
URATE SERPL-MCNC: 10.7 MG/DL (ref 2.6–6)
URINE PROTEIN/CREATININE RATIO (OHS): 0.5
UUN UR-MCNC: 356 MG/DL
VIT B12 SERPL-MCNC: 223 PG/ML (ref 213–816)
WBC # SPEC AUTO: 4.1 X10(3)/MCL (ref 4.5–11.5)

## 2023-01-14 PROCEDURE — 84550 ASSAY OF BLOOD/URIC ACID: CPT | Performed by: INTERNAL MEDICINE

## 2023-01-14 PROCEDURE — 82550 ASSAY OF CK (CPK): CPT | Performed by: INTERNAL MEDICINE

## 2023-01-14 PROCEDURE — 85045 AUTOMATED RETICULOCYTE COUNT: CPT | Performed by: INTERNAL MEDICINE

## 2023-01-14 PROCEDURE — 82607 VITAMIN B-12: CPT | Performed by: INTERNAL MEDICINE

## 2023-01-14 PROCEDURE — 63600175 PHARM REV CODE 636 W HCPCS: Performed by: INTERNAL MEDICINE

## 2023-01-14 PROCEDURE — 83550 IRON BINDING TEST: CPT | Performed by: INTERNAL MEDICINE

## 2023-01-14 PROCEDURE — 82746 ASSAY OF FOLIC ACID SERUM: CPT | Performed by: INTERNAL MEDICINE

## 2023-01-14 PROCEDURE — 84100 ASSAY OF PHOSPHORUS: CPT | Performed by: INTERNAL MEDICINE

## 2023-01-14 PROCEDURE — 82570 ASSAY OF URINE CREATININE: CPT | Performed by: INTERNAL MEDICINE

## 2023-01-14 PROCEDURE — 25000003 PHARM REV CODE 250: Performed by: INTERNAL MEDICINE

## 2023-01-14 PROCEDURE — 84520 ASSAY OF UREA NITROGEN: CPT | Performed by: INTERNAL MEDICINE

## 2023-01-14 PROCEDURE — 84300 ASSAY OF URINE SODIUM: CPT | Performed by: INTERNAL MEDICINE

## 2023-01-14 PROCEDURE — 83735 ASSAY OF MAGNESIUM: CPT | Performed by: INTERNAL MEDICINE

## 2023-01-14 PROCEDURE — 21400001 HC TELEMETRY ROOM

## 2023-01-14 PROCEDURE — 85025 COMPLETE CBC W/AUTO DIFF WBC: CPT | Performed by: INTERNAL MEDICINE

## 2023-01-14 PROCEDURE — 80053 COMPREHEN METABOLIC PANEL: CPT | Performed by: INTERNAL MEDICINE

## 2023-01-14 PROCEDURE — 82306 VITAMIN D 25 HYDROXY: CPT | Performed by: INTERNAL MEDICINE

## 2023-01-14 PROCEDURE — 83970 ASSAY OF PARATHORMONE: CPT | Performed by: INTERNAL MEDICINE

## 2023-01-14 PROCEDURE — 82728 ASSAY OF FERRITIN: CPT | Performed by: INTERNAL MEDICINE

## 2023-01-14 PROCEDURE — P9016 RBC LEUKOCYTES REDUCED: HCPCS | Performed by: NURSE PRACTITIONER

## 2023-01-14 RX ORDER — CYANOCOBALAMIN 1000 UG/ML
1000 INJECTION, SOLUTION INTRAMUSCULAR; SUBCUTANEOUS ONCE
Status: COMPLETED | OUTPATIENT
Start: 2023-01-14 | End: 2023-01-14

## 2023-01-14 RX ORDER — DOXAZOSIN 1 MG/1
4 TABLET ORAL DAILY
Status: DISCONTINUED | OUTPATIENT
Start: 2023-01-14 | End: 2023-01-18 | Stop reason: HOSPADM

## 2023-01-14 RX ORDER — CLONIDINE HYDROCHLORIDE 0.1 MG/1
0.1 TABLET ORAL 2 TIMES DAILY
Status: DISCONTINUED | OUTPATIENT
Start: 2023-01-14 | End: 2023-01-14

## 2023-01-14 RX ORDER — GLUCOSAM/CHONDRO/HERB 149/HYAL 750-100 MG
1 TABLET ORAL DAILY
Status: DISCONTINUED | OUTPATIENT
Start: 2023-01-14 | End: 2023-01-18 | Stop reason: HOSPADM

## 2023-01-14 RX ORDER — LORAZEPAM 0.5 MG/1
0.5 TABLET ORAL DAILY PRN
Status: DISCONTINUED | OUTPATIENT
Start: 2023-01-14 | End: 2023-01-18 | Stop reason: HOSPADM

## 2023-01-14 RX ORDER — DOXAZOSIN 4 MG/1
4 TABLET ORAL DAILY
Status: ON HOLD | COMMUNITY
Start: 2022-12-19 | End: 2023-02-10 | Stop reason: HOSPADM

## 2023-01-14 RX ORDER — NIFEDIPINE 60 MG/1
60 TABLET, EXTENDED RELEASE ORAL 2 TIMES DAILY
Status: DISCONTINUED | OUTPATIENT
Start: 2023-01-14 | End: 2023-01-14

## 2023-01-14 RX ORDER — CARVEDILOL 12.5 MG/1
12.5 TABLET ORAL 2 TIMES DAILY
Status: DISCONTINUED | OUTPATIENT
Start: 2023-01-14 | End: 2023-01-18

## 2023-01-14 RX ORDER — ALLOPURINOL 100 MG/1
100 TABLET ORAL DAILY
Status: DISCONTINUED | OUTPATIENT
Start: 2023-01-14 | End: 2023-01-18 | Stop reason: HOSPADM

## 2023-01-14 RX ADMIN — INSULIN ASPART 2 UNITS: 100 INJECTION, SOLUTION INTRAVENOUS; SUBCUTANEOUS at 05:01

## 2023-01-14 RX ADMIN — LABETALOL HYDROCHLORIDE 10 MG: 5 INJECTION, SOLUTION INTRAVENOUS at 09:01

## 2023-01-14 RX ADMIN — CARVEDILOL 12.5 MG: 12.5 TABLET, FILM COATED ORAL at 08:01

## 2023-01-14 RX ADMIN — Medication 6 MG: at 08:01

## 2023-01-14 RX ADMIN — DOXAZOSIN 4 MG: 4 TABLET ORAL at 01:01

## 2023-01-14 RX ADMIN — CYANOCOBALAMIN 1000 MCG: 1000 INJECTION, SOLUTION INTRAMUSCULAR; SUBCUTANEOUS at 11:01

## 2023-01-14 RX ADMIN — ONDANSETRON 4 MG: 2 INJECTION INTRAMUSCULAR; INTRAVENOUS at 10:01

## 2023-01-14 RX ADMIN — OMEGA-3 FATTY ACIDS CAP 1000 MG 1 CAPSULE: 1000 CAP at 11:01

## 2023-01-14 RX ADMIN — CLONIDINE HYDROCHLORIDE 0.2 MG: 0.2 TABLET ORAL at 02:01

## 2023-01-14 RX ADMIN — LORAZEPAM 0.5 MG: 0.5 TABLET ORAL at 09:01

## 2023-01-14 RX ADMIN — ALLOPURINOL 100 MG: 100 TABLET ORAL at 11:01

## 2023-01-14 RX ADMIN — CARVEDILOL 12.5 MG: 12.5 TABLET, FILM COATED ORAL at 11:01

## 2023-01-14 RX ADMIN — SODIUM ZIRCONIUM CYCLOSILICATE 10 G: 10 POWDER, FOR SUSPENSION ORAL at 07:01

## 2023-01-14 RX ADMIN — SODIUM BICARBONATE: 84 INJECTION, SOLUTION INTRAVENOUS at 11:01

## 2023-01-14 RX ADMIN — SODIUM BICARBONATE: 84 INJECTION, SOLUTION INTRAVENOUS at 08:01

## 2023-01-14 RX ADMIN — HYDRALAZINE HYDROCHLORIDE 10 MG: 20 INJECTION INTRAMUSCULAR; INTRAVENOUS at 09:01

## 2023-01-14 RX ADMIN — INSULIN ASPART 1 UNITS: 100 INJECTION, SOLUTION INTRAVENOUS; SUBCUTANEOUS at 08:01

## 2023-01-14 RX ADMIN — SODIUM CHLORIDE 125 MG: 9 INJECTION, SOLUTION INTRAVENOUS at 01:01

## 2023-01-14 RX ADMIN — NEPHROCAP 1 CAPSULE: 1 CAP ORAL at 11:01

## 2023-01-14 NOTE — H&P
[facility] Hospital Medicine - H&P Note    Patient Name: Rayna Haider  : 1935  MRN: [id number]  PCP: David Castañeda MD  Admitting Physician: Jennifer Guzman MD  Admission Class: IP- Inpatient   Length of Stay: 0  Face-to-Face encounter date: 2023  Code status: Full    Chief Complaint   Abnormal labs    History of Present Illness   This is an 87-year-old female with medical history of T2DM, hypertension, hyperlipidemia, anemia of chronic disease/iron-deficiency, CKD stage 4 with creatinine over the past year ranging between 1.8-2 and estimated GFR 24-27 present to the ED for evaluation of abnormal labs found on routine outpatient visit that showed hyperkalemia, worsening renal function and anemia.  Patient herself denies any new complaints over the past 2 months except for worsening claudication of bilateral lower extremities.  Denies melena or hematochezia.  Report no changes in urination.  No recent medication changes.    On arrival to ED she was afebrile, hypertensive, and saturating 97% on room air.  Labs repeated and noted for potassium 5.6, CO2 12, creatinine 5.54, BUN 81.9, hemoglobin 7.5.  Urinalysis show +1 protein.  She was given 1 L of normal saline and started on blood transfusion and subsequently referred to hospital medicine service for further evaluation and management.    ROS   Except as documented, all other systems reviewed and negative     Past Medical History   T2DM  HTN  HLD  Anemia of chronic disease/iron-deficiency - baseline Hb 9 -10  CKD stage 4 ( baseline Cr 1.8  to 2 and eGFR 24-27  -- feb to 2022)  Anxiety    Past Surgical History     Past Surgical History:   Procedure Laterality Date    EYE SURGERY      HYSTERECTOMY      RETINAL DETACHMENT SURGERY Left      Social History     Social History     Tobacco Use    Smoking status: Never    Smokeless tobacco: Never   Substance Use Topics    Alcohol use: Not Currently        Family History    Reviewed and negative    Allergies   Patient has no known allergies.    Home Medications     Prior to Admission medications    Medication Sig Start Date End Date Taking? Authorizing Provider   aspirin 81 MG Chew Take 81 mg by mouth once daily.    Historical Provider   blood sugar diagnostic Strp 1 strip by Misc.(Non-Drug; Combo Route) route once daily. 11/14/22   David Moore MD   carvediloL (COREG) 12.5 MG tablet Take 12.5 mg by mouth 2 (two) times daily. 7/25/22   Historical Provider   cloNIDine (CATAPRES) 0.1 MG tablet  7/1/22   Historical Provider   ferrous sulfate, dried (SLOW FE) 160 mg (50 mg iron) TbSR Take 160 mg by mouth once daily.    Historical Provider   furosemide (LASIX) 20 MG tablet Take 1 tablet (20 mg total) by mouth once daily. 6/3/22 6/3/23  David Moore MD   LORazepam (ATIVAN) 0.5 MG tablet Take 1 tablet (0.5 mg total) by mouth daily as needed for Anxiety. 11/28/22   David Moore MD   metFORMIN (GLUCOPHAGE-XR) 500 MG ER 24hr tablet Take 2 tablets (1,000 mg total) by mouth 2 (two) times daily. 10/25/22   David Moore MD   omega-3 fatty acids/fish oil (FISH OIL-OMEGA-3 FATTY ACIDS) 300-1,000 mg capsule Take 1 capsule by mouth once daily.    Historical Provider        Physical Exam   Vital Signs  Temp:  [98 °F (36.7 °C)-98.1 °F (36.7 °C)]   Pulse:  [62-71]   Resp:  [17-20]   BP: (151-194)/(50-70)   SpO2:  [94 %-97 %]    General: Appears comfortable  HEENT: NC/AT  Neck:  No JVD  Chest: CTABL  CVS: Regular rhythm. Normal S1/S2.  Trace pedal  Abdomen: nondistended, normoactive BS, soft and non-tender.  MSK: No obvious deformity or joint swelling  Skin: Warm and dry  Neuro: AAOx3, no focal neurological deficit  Psych: Cooperative    Labs     Recent Labs     01/13/23  1251   WBC 4.3*   RBC 2.62*   HGB 7.5*   HCT 24.3*   MCV 92.7   MCH 28.6   MCHC 30.9*   RDW 15.3        Recent Labs     01/14/23  0046   FERRITIN 73.01   IRON 45*   TIBC 248*   LABIRON 18*   [id number]  223   FOLATE 7.1   RETICCNTAUTO 1.01*   RETABS 0.0276      Recent Labs     01/12/23  0723 01/13/23  1251    134*   K 5.7* 5.6*   CHLORIDE 107 107   CO2 16* 12*   BUN 88.9* 81.9*   CREATININE 5.18* 5.54*   EGFRNORACEVR 8 7   GLUCOSE 87 93   CALCIUM 9.5 9.7   MG  --  1.80   PHOS  --  5.2*   ALBUMIN 3.6 3.6   GLOBULIN 2.9 3.3   ALKPHOS 41 46   ALT 7 6   AST 8 9   BILITOT 0.6 0.5   HGBA1C 5.3  --      Recent Labs     01/13/23  2224   LACTIC 1.6          Microbiology Results (last 7 days)       Procedure Component Value Units Date/Time    Urine culture [[id number]] Collected: 01/13/23 1440    Order Status: Sent Specimen: Urine Updated: 01/13/23 1530           Imaging     US Retroperitoneal Complete    (Results Pending)     Assessment & Plan   AMPARO superimposed on CKD stage 4  Hyperkalemia and metabolic acidosis  Acute on chronic disease anemia and iron-deficiency  Hypertensive urgency    HX T2DM, HTN, Anxiety      Plan:    Start D5W+150mEq Sodium bicarb @ 150 ml/hr  Lokelma 10 mg x 1  Retroperitoneal ultrasound  Urine FENa and PRr/Cr  CK level, uric acid, PTH  Nephrology consult  Transfuse 2 unit PRBC  Ferrlecit 125 mg daily for 5 doses  B12 borderline low will give IM 1000 mcg x1, then PO/SL  Nephrocaps daily  Stool occult blood  Continue carvedilol 12.5mg BID, start nifedipine XL 60 mg BID  SSI ACHS, hold metformin  Hold aspirin pending stool occult blood  Hold Lasix  Remaining home medication reviewed and resumed  VTE Prophylaxis: SCDs, to start pharmacological prophylaxis if stool occult blood negative    Critical care time:  35 minutes  Critical care diagnosis:  Acute on chronic anemia, hyperkalemia and metabolic acidosis    Jennifer Guzman MD  Internal Medicine

## 2023-01-14 NOTE — PROGRESS NOTES
[facility]  Hospital Medicine Progress Note        Chief Complaint: Inpatient Follow-up for AMPARO on CKD, Hyperkalemia     HPI: This is an 87-year-old female with medical history of T2DM, hypertension, hyperlipidemia, anemia of chronic disease/iron-deficiency, CKD stage 4 with creatinine over the past year ranging between 1.8-2 and estimated GFR 24-27 present to the ED for evaluation of abnormal labs found on routine outpatient visit that showed hyperkalemia, worsening renal function and anemia.  Patient herself denies any new complaints over the past 2 months except for worsening claudication of bilateral lower extremities.  Denies melena or hematochezia.  Report no changes in urination.  No recent medication changes.  On arrival to ED she was afebrile, hypertensive, and saturating 97% on room air.  Labs repeated and noted for potassium 5.6, CO2 12, creatinine 5.54, BUN 81.9, hemoglobin 7.5.  Urinalysis show +1 protein.  She was given 1 L of normal saline and started on blood transfusion and subsequently referred to hospital medicine service for further evaluation and management.    Interval Hx:   Sitting in recliner, daughter at bedside and grand daughter on the phone  Reports she feels a little better.   Discussed finding and treatment plan and they agree  Pt and family informed me that pt has never seen a nephrologist and was managed per her primary care physician Dr Moore.  Last blood work 5 months ago and everything was reported as stable to them        Objective/physical exam:  General: In no acute distress, afebrile  Chest: Clear to auscultation bilaterally  Heart: RRR, +S1, S2, no appreciable murmur  Abdomen: Soft, nontender, BS +  MSK: Warm, no lower extremity edema, no clubbing or cyanosis  Neurologic: Alert and oriented x4, Cranial nerve II-XII intact, Strength 5/5 in all 4 extremities    VITAL SIGNS: 24 HRS MIN & MAX LAST   Temp  Min: 98 °F (36.7 °C)  Max: 98.6 °F (37 °C)  98.1 °F (36.7 °C)   BP  Min: 151/50  Max: 194/59 (!) 194/59     Pulse  Min: 59  Max: 73  73   Resp  Min: 16  Max: 25 18   SpO2  Min: 89 %  Max: 97 % (!) 94 %         Recent Labs   Lab 01/13/23  1251   WBC 4.3*   RBC 2.62*   HGB 7.5*   HCT 24.3*   MCV 92.7   MCH 28.6   MCHC 30.9*   RDW 15.3      MPV 10.9*       Recent Labs   Lab 01/12/23  0723 01/13/23  0959 01/13/23  1251     --  134*   K 5.7* 6.0* 5.6*   CO2 16*  --  12*   BUN 88.9*  --  81.9*   CREATININE 5.18*  --  5.54*   CALCIUM 9.5  --  9.7   MG  --   --  1.80   ALBUMIN 3.6  --  3.6   ALKPHOS 41  --  46   ALT 7  --  6   AST 8  --  9   BILITOT 0.6  --  0.5          Microbiology Results (last 7 days)       Procedure Component Value Units Date/Time    Urine culture [[id number]] Collected: 01/13/23 1440    Order Status: Sent Specimen: Urine Updated: 01/13/23 1530             See below for Radiology    Scheduled Med:   sodium zirconium cyclosilicate  10 g Oral Once        Continuous Infusions:   sodium bicarbonate drip          PRN Meds:  sodium chloride, acetaminophen, acetaminophen, aluminum-magnesium hydroxide-simethicone, cloNIDine, dextrose 10%, dextrose 10%, glucagon (human recombinant), glucose, glucose, hydrALAZINE, insulin aspart U-100, labetalol, melatonin, ondansetron, polyethylene glycol, prochlorperazine, senna-docusate 8.6-50 mg, simethicone, sodium chloride 0.9%       Assessment/Plan:  AMPARO superimposed on CKD stage 4  Hyperkalemia and metabolic acidosis  Acute on chronic disease anemia and iron-deficiency  Hypertensive urgency  T2DM- well controlled at 5.3 on 1/12/2023  HTN, Anxiety        Admitted as in patient on 1/13  Nephrology consulted, pending eval and recs   Started D5W+150mEq Sodium bicarb @ 150 ml/hr, monitor fluid status   Lokelma 10 mg x 1, K has come down to 5.3 (was 6.0)  Chart reviewed, noted K has peen persistently elevated for atleast 10 months   Retroperitoneal ultrasound--> prelim report: There is increased cortical  echogenicity with maintained corticomedullary junction distinction seen in both kidneys. Suggestive of chronic renal disease  .8(H), Phos 5.4 (H), Vit D 25 OH 44 (wnl)  Urine creatinine 45.6, protein 22.5 with ratio 0.5  Urine sodium 54  CK level 31 (wnl)  Uric acid 10.7 (H)  S/P  2 unit PRBC transfusion  Hb improved from 7.5--> 9.8  Ferrlecit 125 mg daily for 5 doses- day 1 of 5  B12 borderline low will give IM 1000 mcg x1, then PO/SL ordered  Nephrocaps daily  Stool occult blood  Continue carvedilol 12.5mg BID, and doxazosin 4mg daily, will discontinue Nifedipine given pt and family reported excessive leg swelling so PCP changed it to Doxazosin and since then BP is stable at home   A1C 5.3 on 1/12/2023  SSI ACHS, hold metformin  Hold aspirin pending stool occult blood  Hold Lasix given AMPARO on underlying CKD   Remaining home medication reviewed and resumed  Morning CBC, renal function, Mag ordered       VTE prophylaxis: SCDs for now, to start pharmacological prophylaxis if stool occult blood negative    Patient condition:  Fair    Anticipated discharge and Disposition:   TBD     Critical care note:  Critical care diagnosis: metabolic acidosis requiring bicarb drip  Critical care interventions: Hands-on evaluation, review of labs/radiographs/records and discussion with patient and family if present  Critical care time spent: 35 minutes        All diagnosis and differential diagnosis have been reviewed; assessment and plan has been documented; I have personally reviewed the labs and test results that are presently available; I have reviewed the patients medication list; I have reviewed the consulting providers response and recommendations. I have reviewed or attempted to review medical records based upon their availability    All of the patient's questions have been  addressed and answered. Patient's is agreeable to the above stated plan. I will continue to monitor closely and make adjustments to medical  management as needed.  _____________________________________________________________________    Nutrition Status:    Radiology:  US Retroperitoneal Complete  START OF REPORT:  Technique: Gray scale and color doppler images of the kidneys and bladder were performed.    Comparison: None.    Clinical history: ED 10 marsha.    Kidneys: There is increased cortical echogenicity with maintained corticomedullary junction distinction seen in both kidneys. Suggestive of chronic renal disease.  Right Kidney: Measurement long 11.49 cm.  Intrarenal arterial resistive index: Multiple non-shadowing echogenic foci throughout the right kidney could represent tiny calcifications/concretions. There is a simple cyst measuring 2.1 x 2.3 x 1.4 cm in the lower pole. Otherwise unremarkable right kidney with no masses or hydronephrosis identified.  Left Kidney: Unremarkable left kidney with no stones cysts masses or hydronephrosis identified. Measurement long 9.5 cm.    Urinary bladder: Urinary bladder is collapsed at the time of scan.    Impression:  1. There is increased cortical echogenicity with maintained corticomedullary junction distinction seen in both kidneys. Suggestive of chronic renal disease.  2. Details and other findings as discussed above.      Gerardo Bianchi MD  Department of Hospital Medicine   [facility]   01/14/2023

## 2023-01-14 NOTE — ED PROVIDER NOTES
Encounter Date: 1/13/2023       History     Chief Complaint   Patient presents with    Abnormal Lab     Reports Dr Moore sent for kidney failure from labs yesterday/today. Pt reports some fatigue and insomnia but otherwise denies symptoms. GCS 15. Ambulatory with steady gait.     See MDM    The history is provided by the patient. No  was used.   Review of patient's allergies indicates:  No Known Allergies  Past Medical History:   Diagnosis Date    Anemia, unspecified     Anxiety disorder, unspecified     CKD (chronic kidney disease)     Diabetes mellitus, type 2     Hyperkalemia     Hypertension     Mixed hyperlipidemia     Vitamin D deficiency      Past Surgical History:   Procedure Laterality Date    EYE SURGERY      HYSTERECTOMY      RETINAL DETACHMENT SURGERY Left      Family History   Problem Relation Age of Onset    Cancer Mother     Glaucoma Mother     Diabetes Father     Cancer Father     Cancer Brother     Diabetes Brother      Social History     Tobacco Use    Smoking status: Never    Smokeless tobacco: Never   Substance Use Topics    Alcohol use: Not Currently    Drug use: Never     Review of Systems   Constitutional:  Negative for fever.   Respiratory:  Negative for cough and shortness of breath.    Cardiovascular:  Negative for chest pain.   Gastrointestinal:  Negative for abdominal pain.   Genitourinary:  Negative for difficulty urinating and dysuria.   Musculoskeletal:  Negative for gait problem.   Skin:  Negative for color change.   Neurological:  Negative for dizziness, speech difficulty and headaches.   Psychiatric/Behavioral:  Negative for hallucinations and suicidal ideas.    All other systems reviewed and are negative.    Physical Exam     Initial Vitals [01/13/23 1222]   BP Pulse Resp Temp SpO2   (!) 170/69 71 18 98 °F (36.7 °C) (!) 94 %      MAP       --         Physical Exam    Nursing note and vitals reviewed.  Constitutional: She appears well-developed and  well-nourished.   HENT:   Head: Normocephalic.   Eyes: EOM are normal.   Neck:   Normal range of motion.  Cardiovascular:  Normal rate, regular rhythm, normal heart sounds and intact distal pulses.           Pulmonary/Chest: Breath sounds normal. No respiratory distress.   Abdominal: Abdomen is soft. Bowel sounds are normal. There is no abdominal tenderness.   Genitourinary: Rectum:      Guaiac result negative.   Guaiac negative stool. : Acceptable.   Genitourinary Comments: Matthew Becerril     Musculoskeletal:         General: Normal range of motion.      Cervical back: Normal range of motion.     Neurological: She is alert and oriented to person, place, and time. She has normal strength.   Skin: Skin is warm and dry.   Psychiatric: She has a normal mood and affect. Her behavior is normal. Judgment and thought content normal.       ED Course   Procedures  Labs Reviewed   CBC WITH DIFFERENTIAL - Abnormal; Notable for the following components:       Result Value    WBC 4.3 (*)     RBC 2.62 (*)     Hgb 7.5 (*)     Hct 24.3 (*)     MCHC 30.9 (*)     MPV 10.9 (*)     All other components within normal limits   COMPREHENSIVE METABOLIC PANEL - Abnormal; Notable for the following components:    Sodium Level 134 (*)     Potassium Level 5.6 (*)     Carbon Dioxide 12 (*)     Blood Urea Nitrogen 81.9 (*)     Creatinine 5.54 (*)     All other components within normal limits   URINALYSIS, REFLEX TO URINE CULTURE - Abnormal; Notable for the following components:    Protein, UA 1+ (*)     Leukocyte Esterase, UA 2+ (*)     All other components within normal limits   PHOSPHORUS - Abnormal; Notable for the following components:    Phosphorus Level 5.2 (*)     All other components within normal limits   URINALYSIS, MICROSCOPIC - Abnormal; Notable for the following components:    WBC, UA 12 (*)     All other components within normal limits   MAGNESIUM - Normal   CULTURE, URINE   TYPE & SCREEN   ABORH RETYPE   POCT  GLUCOSE   POCT GLUCOSE   POCT GLUCOSE     EKG Readings: (Independently Interpreted)   Rhythm: Normal Sinus Rhythm. Heart Rate: 67. Ectopy: No Ectopy. Conduction: Normal. ST Segments: Normal ST Segments. T Waves: Normal. Clinical Impression: Normal Sinus Rhythm     Imaging Results    None          Medications   sodium chloride 0.9% bolus 1,000 mL 1,000 mL (1,000 mLs Intravenous New Bag 1/13/23 9359)     Medical Decision Making:   Initial Assessment:   Historian:  Daughter.  Patient is a 87-year-old female  that presents with abnormal lab studies that has been present yesterday. Associated symptoms none. Surrounding information is sent by internal medicine for acute on chronic renal failure. Exacerbated by nothing. Relieved by nothing. Patient treatment prior to arrival none. Risk factors include age. Other history pertaining to this complaint nothing.   Assessment:  See physical exam.    ED Management:  Medical Decision Making:     Patient also has chronic illness diabetes, hypertension, chronic kidney disease, hyperlipidemia.  Patient's blood pressure is not controlled at present time.  She will need medications to help control her blood pressure..      Independent review of previous charts and test with results.   Reviewed notes from Dr. Moore.  Patient has having worsening of her kidney functions.  She was sent to the emergency room for evaluation.  Independent review of current medications performed.     Ancillary test ordered in the ER were CBC, CMP, magnesium, phosphorus, urinalysis.  Interpretation of these tests are patient is anemic with a H&H of 7.5 and 24.3.  BUN and creatinine 81.9 and 5.54.  Potassium is 5.6 with a phosphorus of 5.2.  Treatments/Procedures ordered in the ER were none.  Evaluation of patient response to treatments/procedures in the ER were not applicable. Medications ordered in the ER were normal saline.  Evaluation of response to medications given in ER were tolerating well.       Consults Nephrology.  Content discussed with consult was spoke to Rhonda.  Discussed current and previous labs.  They will consult and see patient tomorrow.  Continue patient on IV fluids..  Spoke to Raghu with [facility].  They accept admit.  We did discuss current lab studies and previous lab studies.    Disposition was as follows:  Disposition to admission, disposition diagnoses acute on chronic renal failure, anemia.    Rationale for surgical consult not indicated  Rationale and decision-making for disposition patient has worsening of her kidney functions.  She also has some anemia that required blood transfusion.  Patient be better served to be admitted to monitor her kidney functions and her H&H.  social determinants of care none              ED Course as of 01/13/23 2219 Fri Jan 13, 2023 2135 Patient Nephrology [CL]   2153 Spoke with Rhonda with Nephrology [CL]   2158 Paged [facility] [CL]   2216 Alicia accepts admit  [CL]      ED Course User Index  [CL] DEON Truong                   Clinical Impression:   Final diagnoses:  [E87.5] Hyperkalemia               DEON Truong  01/13/23 2220

## 2023-01-14 NOTE — NURSING
Nurses Note -- 4 Eyes      1/14/2023   12:37 PM      Skin assessed during: Admit      [] No Pressure Injuries Present    []Prevention Measures Documented      [x] Yes- Altered Skin Integrity Present or Discovered   [x] LDA Added if Not in Epic (Describe Wound)   [x] New Altered Skin Integrity was Present on Admit and Documented in LDA   [x] Wound Image Taken    Wound Care Consulted? Yes    Attending Nurse:  Sixto Leung RN     Second RN/Staff Member:  Vanda Wiggins RN

## 2023-01-14 NOTE — ED PROVIDER NOTES
Encounter Date: 1/13/2023       History     Chief Complaint   Patient presents with    Abnormal Lab     Reports Dr Moore sent for kidney failure from labs yesterday/today. Pt reports some fatigue and insomnia but otherwise denies symptoms. GCS 15. Ambulatory with steady gait.     HPI  Review of patient's allergies indicates:  No Known Allergies  Past Medical History:   Diagnosis Date    Anemia, unspecified     Anxiety disorder, unspecified     CKD (chronic kidney disease)     Diabetes mellitus, type 2     Hyperkalemia     Hypertension     Mixed hyperlipidemia     Vitamin D deficiency      Past Surgical History:   Procedure Laterality Date    EYE SURGERY      HYSTERECTOMY      RETINAL DETACHMENT SURGERY Left      Family History   Problem Relation Age of Onset    Cancer Mother     Glaucoma Mother     Diabetes Father     Cancer Father     Cancer Brother     Diabetes Brother      Social History     Tobacco Use    Smoking status: Never    Smokeless tobacco: Never   Substance Use Topics    Alcohol use: Not Currently    Drug use: Never     Review of Systems    Physical Exam     Initial Vitals [01/13/23 1222]   BP Pulse Resp Temp SpO2   (!) 170/69 71 18 98 °F (36.7 °C) (!) 94 %      MAP       --         Physical Exam    ED Course   Procedures  Labs Reviewed   CBC WITH DIFFERENTIAL - Abnormal; Notable for the following components:       Result Value    WBC 4.3 (*)     RBC 2.62 (*)     Hgb 7.5 (*)     Hct 24.3 (*)     MCHC 30.9 (*)     MPV 10.9 (*)     All other components within normal limits   COMPREHENSIVE METABOLIC PANEL - Abnormal; Notable for the following components:    Sodium Level 134 (*)     Potassium Level 5.6 (*)     Carbon Dioxide 12 (*)     Blood Urea Nitrogen 81.9 (*)     Creatinine 5.54 (*)     All other components within normal limits   URINALYSIS, REFLEX TO URINE CULTURE - Abnormal; Notable for the following components:    Protein, UA 1+ (*)     Leukocyte Esterase, UA 2+ (*)     All  "other components within normal limits   PHOSPHORUS - Abnormal; Notable for the following components:    Phosphorus Level 5.2 (*)     All other components within normal limits   URINALYSIS, MICROSCOPIC - Abnormal; Notable for the following components:    WBC, UA 12 (*)     All other components within normal limits   MAGNESIUM - Normal   CULTURE, URINE   TYPE & SCREEN   ABORH RETYPE   POCT GLUCOSE   POCT GLUCOSE   POCT GLUCOSE          Imaging Results    None          Medications   sodium chloride 0.9% bolus 1,000 mL 1,000 mL (0 mLs Intravenous Stopped 1/13/23 4660)                Attending Attestation:     Physician Attestation Statement for NP/PA:   I have conducted a face to face encounter with this patient in addition to the NP/PA, due to Medical Complexity    Other NP/PA Attestation Additions:    History of Present Illness: Patient and daughter state that she had some lab work done by her primary care physician and was directed to the emergency department.  Patient states that she has not been feeling particularly ill recently, maybe a little bit decreased stamina "less perky "per the daughter.  Denies any back pain, nausea vomiting diarrhea.  They have been adjusting her blood pressure medicines over the last few months.  She is on a diuretic.   Physical Exam: Looks very good for age, normal heart tones lungs are clear, no peripheral edema   Medical Decision Making: I performed a substantive portion of medical decision-making   Differential diagnosis includes renal artery stenosis, medication reaction, dehydration, glomerular nephritis, acute renal failure, anemia, pancytopenia, GI bleed, cardiac disease   Labs included CMP and CBC, as Labs the previous day revealed acute renal failure, hyperkalemia along with anemia  CMP reveals potassium 5.6, BUN 82, creatinine 5.5, CO2 of 12, hemoglobin is 7.5, these are all acute findings compared to patient's baseline  Hospitalist as well as nephrology consulted for " admission consultation for acute renal failure, as well as anemia.  Patient given IV fluids in the emergency department.  Had a long discussion with the patient, daughter as well as granddaughter who was on the phone about the findings, plan of treatment and they are in agreement.             ED Course as of 01/14/23 0000   Fri Jan 13, 2023 2135 Patient Nephrology [CL]   2153 Spoke with Rhonda with Nephrology [CL]   2158 [facility] [CL]   2216 Alicia accepts admit  [CL]      ED Course User Index  [CL] DEON Truong                 Clinical Impression:   Final diagnoses:  [E87.5] Hyperkalemia  [N17.9] Acute renal failure, unspecified acute renal failure type (Primary)  [D64.9] Anemia, unspecified type               Dario Garcia MD  01/13/23 2149       Dario Garcia MD  01/14/23 0000

## 2023-01-14 NOTE — PLAN OF CARE
Problem: Adult Inpatient Plan of Care  Goal: Plan of Care Review  Outcome: Ongoing, Progressing  Flowsheets (Taken 1/14/2023 1244)  Plan of Care Reviewed With: patient  Goal: Patient-Specific Goal (Individualized)  Outcome: Ongoing, Progressing  Flowsheets (Taken 1/14/2023 1244)  Anxieties, Fears or Concerns: I am worried that I will need Dialysis  Individualized Care Needs: I want my BP to be under control  Goal: Absence of Hospital-Acquired Illness or Injury  Outcome: Ongoing, Progressing  Intervention: Identify and Manage Fall Risk  Flowsheets (Taken 1/14/2023 1244)  Safety Promotion/Fall Prevention:   assistive device/personal item within reach   medications reviewed   nonskid shoes/socks when out of bed   side rails raised x 2  Intervention: Prevent Skin Injury  Flowsheets (Taken 1/14/2023 1244)  Body Position: position changed independently  Skin Protection:   adhesive use limited   incontinence pads utilized  Intervention: Prevent and Manage VTE (Venous Thromboembolism) Risk  Flowsheets (Taken 1/14/2023 1244)  Activity Management: Ambulated -L4  VTE Prevention/Management:   ambulation promoted   bleeding risk assessed   ROM (active) performed  Range of Motion:   active ROM (range of motion) encouraged   ROM (range of motion) performed  Intervention: Prevent Infection  Flowsheets (Taken 1/14/2023 1244)  Infection Prevention:   rest/sleep promoted   single patient room provided  Goal: Optimal Comfort and Wellbeing  Outcome: Ongoing, Progressing  Intervention: Monitor Pain and Promote Comfort  Flowsheets (Taken 1/14/2023 1244)  Pain Management Interventions:   care clustered   pain management plan reviewed with patient/caregiver   pillow support provided   position adjusted  Intervention: Provide Person-Centered Care  Flowsheets (Taken 1/14/2023 1244)  Trust Relationship/Rapport: care explained  Goal: Readiness for Transition of Care  Outcome: Ongoing, Progressing  Intervention: Mutually Develop Transition  Plan  Flowsheets (Taken 1/14/2023 1244)  Equipment Currently Used at Home: none  Transportation Anticipated: family or friend will provide  Communicated EMIR with patient/caregiver: Yes  Do you expect to return to your current living situation?: Yes  Do you have help at home or someone to help you manage your care at home?: No  Readmission within 30 days?: No  Do you currently have service(s) that help you manage your care at home?: No     Problem: Fluid and Electrolyte Imbalance (Acute Kidney Injury/Impairment)  Goal: Fluid and Electrolyte Balance  Outcome: Ongoing, Progressing  Intervention: Monitor and Manage Fluid and Electrolyte Balance  Flowsheets (Taken 1/14/2023 1244)  Fluid/Electrolyte Management: fluids provided     Problem: Oral Intake Inadequate (Acute Kidney Injury/Impairment)  Goal: Optimal Nutrition Intake  Outcome: Ongoing, Progressing  Intervention: Promote and Optimize Nutrition  Flowsheets (Taken 1/14/2023 1244)  Oral Nutrition Promotion:   physical activity promoted   rest periods promoted     Problem: Renal Function Impairment (Acute Kidney Injury/Impairment)  Goal: Effective Renal Function  Outcome: Ongoing, Progressing  Intervention: Monitor and Support Renal Function  Flowsheets (Taken 1/14/2023 1244)  Medication Review/Management: medications reviewed     Problem: Impaired Wound Healing  Goal: Optimal Wound Healing  Outcome: Ongoing, Progressing  Intervention: Promote Wound Healing  Flowsheets (Taken 1/14/2023 1244)  Oral Nutrition Promotion:   physical activity promoted   rest periods promoted  Sleep/Rest Enhancement: regular sleep/rest pattern promoted  Activity Management: Ambulated -L4  Pain Management Interventions:   care clustered   pain management plan reviewed with patient/caregiver   pillow support provided   position adjusted

## 2023-01-15 LAB
ALBUMIN SERPL-MCNC: 3 G/DL (ref 3.4–4.8)
ALBUMIN/GLOB SERPL: 1.2 RATIO (ref 1.1–2)
ALP SERPL-CCNC: 34 UNIT/L (ref 40–150)
ALT SERPL-CCNC: 5 UNIT/L (ref 0–55)
AST SERPL-CCNC: 9 UNIT/L (ref 5–34)
BACTERIA UR CULT: NORMAL
BILIRUBIN DIRECT+TOT PNL SERPL-MCNC: 0.6 MG/DL
BUN SERPL-MCNC: 71.2 MG/DL (ref 9.8–20.1)
CALCIUM SERPL-MCNC: 8.8 MG/DL (ref 8.4–10.2)
CHLORIDE SERPL-SCNC: 100 MMOL/L (ref 98–107)
CO2 SERPL-SCNC: 26 MMOL/L (ref 23–31)
CREAT SERPL-MCNC: 4.78 MG/DL (ref 0.55–1.02)
ERYTHROCYTE [DISTWIDTH] IN BLOOD BY AUTOMATED COUNT: 16.2 % (ref 11.5–17)
GFR SERPLBLD CREATININE-BSD FMLA CKD-EPI: 8 MLS/MIN/1.73/M2
GLOBULIN SER-MCNC: 2.5 GM/DL (ref 2.4–3.5)
GLUCOSE SERPL-MCNC: 169 MG/DL (ref 82–115)
HCT VFR BLD AUTO: 28.5 % (ref 37–47)
HGB BLD-MCNC: 9.4 GM/DL (ref 12–16)
MAGNESIUM SERPL-MCNC: 1.7 MG/DL (ref 1.6–2.6)
MCH RBC QN AUTO: 28.7 PG
MCHC RBC AUTO-ENTMCNC: 33 MG/DL (ref 33–36)
MCV RBC AUTO: 87.2 FL (ref 80–94)
NRBC BLD AUTO-RTO: 0 %
PLATELET # BLD AUTO: 193 X10(3)/MCL (ref 130–400)
PMV BLD AUTO: 10.5 FL (ref 7.4–10.4)
POCT GLUCOSE: 141 MG/DL (ref 70–110)
POCT GLUCOSE: 142 MG/DL (ref 70–110)
POCT GLUCOSE: 160 MG/DL (ref 70–110)
POCT GLUCOSE: 166 MG/DL (ref 70–110)
POTASSIUM SERPL-SCNC: 3.9 MMOL/L (ref 3.5–5.1)
PROT SERPL-MCNC: 5.5 GM/DL (ref 5.8–7.6)
RBC # BLD AUTO: 3.27 X10(6)/MCL (ref 4.2–5.4)
SODIUM SERPL-SCNC: 140 MMOL/L (ref 136–145)
WBC # SPEC AUTO: 4 X10(3)/MCL (ref 4.5–11.5)

## 2023-01-15 PROCEDURE — 25000003 PHARM REV CODE 250: Performed by: INTERNAL MEDICINE

## 2023-01-15 PROCEDURE — 83735 ASSAY OF MAGNESIUM: CPT | Performed by: INTERNAL MEDICINE

## 2023-01-15 PROCEDURE — 63600175 PHARM REV CODE 636 W HCPCS: Performed by: INTERNAL MEDICINE

## 2023-01-15 PROCEDURE — 27000221 HC OXYGEN, UP TO 24 HOURS

## 2023-01-15 PROCEDURE — 36415 COLL VENOUS BLD VENIPUNCTURE: CPT | Performed by: INTERNAL MEDICINE

## 2023-01-15 PROCEDURE — 21400001 HC TELEMETRY ROOM

## 2023-01-15 PROCEDURE — 11000001 HC ACUTE MED/SURG PRIVATE ROOM

## 2023-01-15 PROCEDURE — 25000003 PHARM REV CODE 250: Performed by: NURSE PRACTITIONER

## 2023-01-15 PROCEDURE — 85027 COMPLETE CBC AUTOMATED: CPT | Performed by: INTERNAL MEDICINE

## 2023-01-15 PROCEDURE — 80053 COMPREHEN METABOLIC PANEL: CPT | Performed by: INTERNAL MEDICINE

## 2023-01-15 RX ORDER — SODIUM CHLORIDE 9 MG/ML
INJECTION, SOLUTION INTRAVENOUS CONTINUOUS
Status: DISCONTINUED | OUTPATIENT
Start: 2023-01-15 | End: 2023-01-17

## 2023-01-15 RX ORDER — CLONIDINE 0.2 MG/24H
1 PATCH, EXTENDED RELEASE TRANSDERMAL
Status: DISCONTINUED | OUTPATIENT
Start: 2023-01-15 | End: 2023-01-17

## 2023-01-15 RX ADMIN — NEPHROCAP 1 CAPSULE: 1 CAP ORAL at 08:01

## 2023-01-15 RX ADMIN — LABETALOL HYDROCHLORIDE 10 MG: 5 INJECTION, SOLUTION INTRAVENOUS at 03:01

## 2023-01-15 RX ADMIN — CLONIDINE 1 PATCH: 0.2 PATCH TRANSDERMAL at 05:01

## 2023-01-15 RX ADMIN — ONDANSETRON 4 MG: 2 INJECTION INTRAMUSCULAR; INTRAVENOUS at 01:01

## 2023-01-15 RX ADMIN — OMEGA-3 FATTY ACIDS CAP 1000 MG 1 CAPSULE: 1000 CAP at 08:01

## 2023-01-15 RX ADMIN — SODIUM CHLORIDE 125 MG: 9 INJECTION, SOLUTION INTRAVENOUS at 10:01

## 2023-01-15 RX ADMIN — CLONIDINE HYDROCHLORIDE 0.2 MG: 0.2 TABLET ORAL at 01:01

## 2023-01-15 RX ADMIN — CARVEDILOL 12.5 MG: 12.5 TABLET, FILM COATED ORAL at 08:01

## 2023-01-15 RX ADMIN — CLONIDINE HYDROCHLORIDE 0.2 MG: 0.2 TABLET ORAL at 06:01

## 2023-01-15 RX ADMIN — LABETALOL HYDROCHLORIDE 10 MG: 5 INJECTION, SOLUTION INTRAVENOUS at 11:01

## 2023-01-15 RX ADMIN — LABETALOL HYDROCHLORIDE 10 MG: 5 INJECTION, SOLUTION INTRAVENOUS at 06:01

## 2023-01-15 RX ADMIN — Medication 6 MG: at 08:01

## 2023-01-15 RX ADMIN — LORAZEPAM 0.5 MG: 0.5 TABLET ORAL at 08:01

## 2023-01-15 RX ADMIN — ACETAMINOPHEN 1000 MG: 500 TABLET, FILM COATED ORAL at 08:01

## 2023-01-15 RX ADMIN — ALLOPURINOL 100 MG: 100 TABLET ORAL at 08:01

## 2023-01-15 RX ADMIN — SODIUM CHLORIDE: 9 INJECTION, SOLUTION INTRAVENOUS at 11:01

## 2023-01-15 RX ADMIN — DOXAZOSIN 4 MG: 4 TABLET ORAL at 08:01

## 2023-01-15 RX ADMIN — CLONIDINE HYDROCHLORIDE 0.2 MG: 0.2 TABLET ORAL at 12:01

## 2023-01-15 RX ADMIN — SODIUM BICARBONATE: 84 INJECTION, SOLUTION INTRAVENOUS at 02:01

## 2023-01-15 RX ADMIN — INSULIN ASPART 2 UNITS: 100 INJECTION, SOLUTION INTRAVENOUS; SUBCUTANEOUS at 06:01

## 2023-01-15 NOTE — CONSULTS
[facility]  Nephrology Consultation  Patient Name: Rayna Haider  Age: 87 y.o.  : 1935  MRN: [id number]  Admission Date: 2023    Date of Consultation: 23  Consultation Requested By: Dr Bianchi    Reason for Consultation: AMPARO    Chief Complaint: Abnormal Lab (Reports Dr Moore sent for kidney failure from labs yesterday/today. Pt reports some fatigue and insomnia but otherwise denies symptoms. GCS 15. Ambulatory with steady gait.)      History of Present Illness:  Ms Rayna Haider is a 87 y.o. White female with past medical history of DM type 2, hypertension, hyperlipidemia, anemia of chronic disease and known iron deficiency, CKD stage 4.  In the past year creatinine seemed to be around 2.0 with GFR around 27.  In August/September patient was treated for sinusitis in experienced a loss of appetite for awhile in 30 lb weight loss.  Since then appetite has improved some.  She is been reporting vague symptoms of not feeling very well for which she presented to PCP.  On Thursday lab was done with abnormal results prompting repeat of labs on Friday.  On Thursday, BUN 88, creatinine 5.1, no improvement on Friday in fact metabolic acidosis seemed to be worsening with serum bicarb of 12.  She also had hyperkalemia at 5.6.  Patient was placed on sodium bicarb fluids.  Metabolic acidosis and hyperkalemia have resolved.  However, BUN and creatinine with very minimal change.  GFR today is 8.  She is making some urine.  Patient is lethargic and in fact did not realize I had a long discussion with family with Dr. Macias at bedside prior to arousing her.  She does arouse to awake and oriented state but does seem to have difficulty staying awake.  At baseline she lives alone and likes to complete her own laundry and household tasks.  Daughter does live right across the street from her and does the cooking and patient does have a .  She is unable to drive secondary to  "vision problems involving macular degeneration and retinal detachment in 1 eye.  She is seemingly stable on room air without dyspnea.  Aside from lethargy and reported weakness, she does not appear to have overt symptoms of uremia present.  Urinalysis significant for protein without evidence of UTI.    Patient has No Known Allergies.     Review of systems: 12 point review of systems conducted, negative except as stated in the HPI.     Past Medical History:  has a past medical history of Anemia, unspecified, Anxiety disorder, unspecified, CKD (chronic kidney disease), Diabetes mellitus, type 2, Hyperkalemia, Hypertension, Mixed hyperlipidemia, and Vitamin D deficiency.    Procedure History:  has a past surgical history that includes Hysterectomy; Eye surgery; and Retinal detachment surgery (Left).    Family History: family history includes Cancer in her brother, father, and mother; Diabetes in her brother and father; Glaucoma in her mother.    Social History:  reports that she has never smoked. She has never used smokeless tobacco. She reports that she does not currently use alcohol. She reports that she does not use drugs.    Physical Exam:   BP (!) 195/72   Pulse (!) 53   Temp 97.8 °F (36.6 °C)   Resp 18   Ht 5' 1" (1.549 m)   Wt 71.7 kg (158 lb 1.1 oz)   LMP  (LMP Unknown)   SpO2 (!) 92%   Breastfeeding No   BMI 29.87 kg/m²  Body mass index is 29.87 kg/m².  General Appearance:  Alert, cooperative, no distress, appropriate for age  Head:  Normocephalic, no obvious abnormality  EENT:  conjunctiva and corneas clear, mucosa clear and moist, teeth intact                     Neck:  Supple, symmetrical, no tenderness, no JVD  Lungs:  Clear to auscultation bilaterally, respirations unlabored, Room Air   Heart:  regular rate & rhythm, S1 and S2 normal, no murmurs, rubs, or gallops  Abdomen:  Soft, non-tender, bowel sounds active all four quadrants  Musculoskeletal:  no peripheral edema  Skin:  Skin warm, dry, and " intact, no rashes or abnormal dyspigmentation  Neurologic:  Lethargic, easily arousable from sleep with difficulty staying awake after a minute or so      Inpatient Medications:     Current Facility-Administered Medications:     0.9%  NaCl infusion (for blood administration), , Intravenous, Q24H PRN, DEON Truong    acetaminophen tablet 1,000 mg, 1,000 mg, Oral, Q6H PRN, Jennifer Guzman MD    acetaminophen tablet 650 mg, 650 mg, Oral, Q4H PRN, Jennifer Guzman MD    allopurinoL tablet 100 mg, 100 mg, Oral, Daily, Jennifer Guzman MD, 100 mg at 01/15/23 0818    aluminum-magnesium hydroxide-simethicone 200-200-20 mg/5 mL suspension 30 mL, 30 mL, Oral, QID PRN, Jennifer Guzman MD    carvediloL tablet 12.5 mg, 12.5 mg, Oral, BID, Jennifer Guzman MD, 12.5 mg at 01/15/23 0818    cloNIDine tablet 0.2 mg, 0.2 mg, Oral, TID PRN, Jennifer Guzman MD, 0.2 mg at 01/15/23 0615    dextrose 10% bolus 125 mL 125 mL, 12.5 g, Intravenous, PRN, Jennifer Guzman MD    dextrose 10% bolus 250 mL 250 mL, 25 g, Intravenous, PRN, Jnenifer Guzman MD    doxazosin tablet 4 mg, 4 mg, Oral, Daily, Gerardo Bianchi MD, 4 mg at 01/15/23 0818    ferric gluconate (FERRLECIT) 125 mg in sodium chloride 0.9% 100 mL IVPB, 125 mg, Intravenous, Daily, Jennifer Guzman MD, Stopped at 01/14/23 1455    glucagon (human recombinant) injection 1 mg, 1 mg, Intramuscular, PRN, Jennifer Guzman MD    glucose chewable tablet 16 g, 16 g, Oral, PRN, Jennifer Guzman MD    glucose chewable tablet 24 g, 24 g, Oral, PRN, Jennifer Guzman MD    hydrALAZINE injection 10 mg, 10 mg, Intravenous, Q4H PRN, Jennifer Guzman MD, 10 mg at 01/14/23 0924    insulin aspart U-100 injection 1-10 Units, 1-10 Units, Subcutaneous, QID (AC + HS) PRN, Jennifer Guzman MD, 2 Units at 01/15/23 0606    labetaloL injection 10 mg, 10 mg, Intravenous, Q4H PRN, Jennifer Guzman MD, 10 mg at 01/15/23 0346    LORazepam tablet 0.5 mg, 0.5 mg, Oral, Daily PRN, Jennifer Guzman MD, 0.5 mg at 01/14/23 5854    melatonin tablet 6 mg, 6 mg, Oral, Nightly PRN,  Jennifer Guzman MD, 6 mg at 01/14/23 2004    omega 3-dha-epa-fish oil 1,000 mg (120 mg-180 mg) Cap 1 capsule, 1 capsule, Oral, Daily, Jennifer Guzman MD, 1 capsule at 01/15/23 0818    ondansetron injection 4 mg, 4 mg, Intravenous, Q4H PRN, Jennifer Guzman MD, 4 mg at 01/14/23 1040    polyethylene glycol packet 17 g, 17 g, Oral, BID PRN, Jennifer Guzman MD    prochlorperazine injection Soln 5 mg, 5 mg, Intravenous, Q6H PRN, Jennifer Guzman MD    senna-docusate 8.6-50 mg per tablet 2 tablet, 2 tablet, Oral, BID PRN, Jennifer Guzman MD    simethicone chewable tablet 80 mg, 1 tablet, Oral, QID PRN, Jennifer Guzman MD    sodium bicarbonate 150 mEq in dextrose 5 % 1,000 mL infusion, , Intravenous, Continuous, Jennifer Guzman MD, Last Rate: 150 mL/hr at 01/15/23 0248, New Bag at 01/15/23 0248    sodium chloride 0.9% flush 10 mL, 10 mL, Intravenous, PRN, Jennifer Guzman MD    vitamin renal formula (B-complex-vitamin c-folic acid) 1 mg per capsule 1 capsule, 1 capsule, Oral, Daily, Jennifer Guzman MD, 1 capsule at 01/15/23 0818     Imaging:  US Retroperitoneal Complete   Final Result   Impression:      1. There is increased cortical echogenicity with maintained corticomedullary junction distinction seen in both kidneys. Suggestive of chronic renal disease.      2. Details and other findings as discussed above.      I concur with the preliminary report         Electronically signed by: Sylvester Hunter   Date:    01/14/2023   Time:    10:42          Laboratory Data:  Recent Labs   Lab 01/14/23  0809 01/15/23  0749    140   K 5.3* 3.9   CO2 16* 26   BUN 77.2* 71.2*   CREATININE 5.43* 4.78*   GLUCOSE 197* 169*   CALCIUM 9.3 8.8   PHOS 5.4*  --      Recent Labs   Lab 01/15/23  0749   WBC 4.0*   HGB 9.4*   HCT 28.5*            Impression:   AMPARO versus advanced renal disease requiring initiation of hemodialysis  Hyperkalemia metabolic acidosis-improved with treatment  Hyperparathyroidism  Hyperuricemia  DM type 2 controlled-A1c 5.3  Anemia with  history of iron-deficiency-iron deficiency noted on admission and is been ordered 5 days Ferrlecit, 1st dose 1/14  Hypertension    Plan:   -had a long discussion with daughter at bedside and granddaughter who is a nurse practitioner via phone.  Patient was also awake and Dr. Macias was at bedside.  It appears patient's presentation is most consistent with further decline of baseline CKD 4.  She would be an appropriate candidate for hemodialysis initiation given baseline status and fairly controlled state of comorbidities.  Consideration was given to realistic requirements of hemodialysis including travel to and from clinic, 3 times a week time requirement.  Procedure, risks, and benefits of hemodialysis were also discussed  -the decision was made to continue supportive care for now with IV fluids, make patient NPO after midnight in determined next course of action tomorrow morning.    Thank you very much for your consultation.     Rhonda Terrazas, MATT  Nephrology  1/15/2023 10:08 AM

## 2023-01-15 NOTE — PLAN OF CARE
Problem: Adult Inpatient Plan of Care  Goal: Plan of Care Review  Outcome: Ongoing, Progressing  Flowsheets (Taken 1/14/2023 2046)  Plan of Care Reviewed With:   patient   daughter  Goal: Patient-Specific Goal (Individualized)  Outcome: Ongoing, Progressing  Goal: Absence of Hospital-Acquired Illness or Injury  Outcome: Ongoing, Progressing  Intervention: Prevent and Manage VTE (Venous Thromboembolism) Risk  Flowsheets (Taken 1/14/2023 2046)  VTE Prevention/Management: ambulation promoted  Range of Motion: active ROM (range of motion) encouraged  Intervention: Prevent Infection  Flowsheets (Taken 1/14/2023 2046)  Infection Prevention:   rest/sleep promoted   environmental surveillance performed   single patient room provided   equipment surfaces disinfected   hand hygiene promoted   personal protective equipment utilized  Goal: Optimal Comfort and Wellbeing  Outcome: Ongoing, Progressing  Intervention: Monitor Pain and Promote Comfort  Flowsheets (Taken 1/14/2023 2046)  Pain Management Interventions:   care clustered   pillow support provided   quiet environment facilitated   relaxation techniques promoted  Intervention: Provide Person-Centered Care  Flowsheets (Taken 1/14/2023 2046)  Trust Relationship/Rapport:   care explained   choices provided   emotional support provided   empathic listening provided   questions answered   questions encouraged   reassurance provided   thoughts/feelings acknowledged

## 2023-01-15 NOTE — PROGRESS NOTES
[facility]  Hospital Medicine Progress Note        Chief Complaint: Inpatient Follow-up for AMPARO on CKD, Hyperkalemia     HPI: This is an 87-year-old female with medical history of T2DM, hypertension, hyperlipidemia, anemia of chronic disease/iron-deficiency, CKD stage 4 with creatinine over the past year ranging between 1.8-2 and estimated GFR 24-27 present to the ED for evaluation of abnormal labs found on routine outpatient visit that showed hyperkalemia, worsening renal function and anemia.  Patient herself denies any new complaints over the past 2 months except for worsening claudication of bilateral lower extremities.  Denies melena or hematochezia.  Report no changes in urination.  No recent medication changes.  On arrival to ED she was afebrile, hypertensive, and saturating 97% on room air.  Labs repeated and noted for potassium 5.6, CO2 12, creatinine 5.54, BUN 81.9, hemoglobin 7.5.  Urinalysis show +1 protein.  She was given 1 L of normal saline and started on blood transfusion and subsequently referred to hospital medicine service for further evaluation and management.    Interval Hx:   Patient sleeping.  Just transferred from room 865 to 808, and was exhausted per patient's daughter  I walked in and Rhonda with Nephrology was talking to patient's daughter in the room and granddaughter on the phone  She answered all their questions and they seem pretty satisfied  Daughter and granddaughter both things patient will benefit from moving into nursing home given she lives alone and if she starts hemodialysis, it may become difficult for the family.  Awaiting for patient's decision given she makes her own decision.  Discussed will consult case management probably Tuesday given Monday is Nafisa Coates Jr's day    Objective/physical exam:  General: In no acute distress, afebrile, sleeping comfortably  Chest: Clear to auscultation bilaterally  Heart: RRR, +S1, S2, no appreciable  murmur  Abdomen: Soft, nontender, BS +  MSK: Warm, no lower extremity edema, no clubbing or cyanosis  Neurologic:  Sleeping comfortably     VITAL SIGNS: 24 HRS MIN & MAX LAST   Temp  Min: 97.7 °F (36.5 °C)  Max: 98.6 °F (37 °C) 97.9 °F (36.6 °C)   BP  Min: 150/65  Max: 204/71 (!) 204/71     Pulse  Min: 55  Max: 73  (!) 55   Resp  Min: 17  Max: 23 19   SpO2  Min: 62 %  Max: 96 % (!) 91 %         Recent Labs   Lab 01/13/23  1251 01/14/23  0810   WBC 4.3* 4.1*   RBC 2.62* 3.49*   HGB 7.5* 9.8*   HCT 24.3* 30.7*   MCV 92.7 88.0   MCH 28.6 28.1   MCHC 30.9* 31.9*   RDW 15.3 16.3    209   MPV 10.9* 10.6*       Recent Labs   Lab 01/12/23  0723 01/13/23  0959 01/13/23  1251 01/14/23  0809     --  134* 137   K 5.7* 6.0* 5.6* 5.3*   CO2 16*  --  12* 16*   BUN 88.9*  --  81.9* 77.2*   CREATININE 5.18*  --  5.54* 5.43*   CALCIUM 9.5  --  9.7 9.3   MG  --   --  1.80 2.00   ALBUMIN 3.6  --  3.6 3.3*   ALKPHOS 41  --  46 42   ALT 7  --  6 5   AST 8  --  9 10   BILITOT 0.6  --  0.5 0.6          Microbiology Results (last 7 days)       Procedure Component Value Units Date/Time    Urine culture [[id number]] Collected: 01/13/23 1440    Order Status: Completed Specimen: Urine Updated: 01/14/23 0718     Urine Culture No Growth At 24 Hours             See below for Radiology    Scheduled Med:   allopurinoL  100 mg Oral Daily    carvediloL  12.5 mg Oral BID    doxazosin  4 mg Oral Daily    ferric gluconate (FERRLECIT) IVPB  125 mg Intravenous Daily    omega 3-dha-epa-fish oil  1 capsule Oral Daily    vitamin renal formula (B-complex-vitamin c-folic acid)  1 capsule Oral Daily        Continuous Infusions:   sodium bicarbonate drip 150 mL/hr at 01/15/23 0248        PRN Meds:  sodium chloride, acetaminophen, acetaminophen, aluminum-magnesium hydroxide-simethicone, cloNIDine, dextrose 10%, dextrose 10%, glucagon (human recombinant), glucose, glucose, hydrALAZINE, insulin aspart U-100, labetalol, LORazepam, melatonin, ondansetron,  polyethylene glycol, prochlorperazine, senna-docusate 8.6-50 mg, simethicone, sodium chloride 0.9%       Assessment/Plan:  AMPARO superimposed on CKD stage 4 vs progression to CKD5/ ESRD  Hyperkalemia and metabolic acidosis- resolved   Acute on chronic disease anemia and iron deficiency  Hypertensive urgency  T2DM- well controlled at 5.3 on 1/12/2023  HTN, Anxiety        Admitted as in patient on 1/13  Nephrology consulted, they had a long discussion with patient's daughter in the room and granddaughter on the phone.  Discussed multiple option with concern for advancement of her CKD 4 to CKD 5/end-stage.  Discuss hemodialysis and the commitment with the schedule.  Patient and family will discuss before making the decision  Plan for NPO after midnight in case patient and family decides for starting hemodialysis, she will need tunneled catheter tomorrow  D5W+150mEq Sodium bicarb @ 150 ml/hr changed to normal saline at 65 cc/hour  Lokelma 10 mg x 1 --> hyperkalemia resolved  Chart reviewed, noted K has peen persistently elevated for atleast 10 months   Retroperitoneal ultrasound--> prelim report: There is increased cortical echogenicity with maintained corticomedullary junction distinction seen in both kidneys. Suggestive of chronic renal disease  .8(H), Phos 5.4 (H), Vit D 25 OH 44 (wnl)  Urine creatinine 45.6, protein 22.5 with ratio 0.5  Urine sodium 54  CK level 31 (wnl)  Uric acid 10.7 (H)  S/P  2 unit PRBC transfusion on admission  Hb improved from 7.5--> 9.8--> 9.4  Ferrlecit 125 mg daily for 5 doses- day 2 of 5  B12 borderline low, received 1 dose of IM 1000 mcg--> started on oral supplement  Nephrocaps daily  Stool occult blood - pending collection  Continue carvedilol 12.5mg BID, will discontinue Nifedipine given pt and family reported excessive leg swelling so PCP changed it to Doxazosin and since then BP is stable at home   A1C 5.3 on 1/12/2023  SSI ACHS, hold metformin  Hold aspirin pending stool occult  blood  Hold Lasix given AMPARO on underlying CKD   Remaining home medication reviewed and resumed  Morning BMP ordered       VTE prophylaxis: SCDs for now, to start pharmacological prophylaxis if stool occult blood negative    Patient condition:  Fair    Anticipated discharge and Disposition:   TBD           All diagnosis and differential diagnosis have been reviewed; assessment and plan has been documented; I have personally reviewed the labs and test results that are presently available; I have reviewed the patients medication list; I have reviewed the consulting providers response and recommendations. I have reviewed or attempted to review medical records based upon their availability    All of the patient's questions have been  addressed and answered. Patient's is agreeable to the above stated plan. I will continue to monitor closely and make adjustments to medical management as needed.  _____________________________________________________________________    Nutrition Status:    Radiology:  US Retroperitoneal Complete  Narrative: EXAMINATION:  US RETROPERITONEAL COMPLETE    Technique: Gray scale and color doppler images of the kidneys and bladder were performed.    Comparison: None.    Clinical history: ED 10 amparo.    Kidneys: There is increased cortical echogenicity with maintained corticomedullary junction distinction seen in both kidneys. Suggestive of chronic renal disease.    Right Kidney: Measurement long 11.49 cm.    Intrarenal arterial resistive index: Multiple non-shadowing echogenic foci throughout the right kidney could represent tiny calcifications/concretions. There is a simple cyst measuring 2.1 x 2.3 x 1.4 cm in the lower pole. Otherwise unremarkable right kidney with no masses or hydronephrosis identified.    Left Kidney: Unremarkable left kidney with no stones cysts masses or hydronephrosis identified. Measurement long 9.5 cm.    Urinary bladder: Urinary bladder is collapsed at the time of  scan.  Impression: Impression:    1. There is increased cortical echogenicity with maintained corticomedullary junction distinction seen in both kidneys. Suggestive of chronic renal disease.    2. Details and other findings as discussed above.    I concur with the preliminary report    Electronically signed by: Sylvester Hunter  Date:    01/14/2023  Time:    10:42      Gerardo Bianchi MD  Department of Hospital Medicine   [facility]   01/15/2023

## 2023-01-15 NOTE — CONSULTS
"Inpatient Nutrition Evaluation    Admit Date: 1/13/2023   Total duration of encounter: 2 days    Nutrition Recommendation/Prescription     - continue diabetic diet     Nutrition Assessment     Chart Review    Reason Seen: physician consult for sacral wound    Malnutrition Screening Tool Results   Have you recently lost weight without trying?: No  Have you been eating poorly because of a decreased appetite?: No   MST Score: 0     Diagnosis:  AMPARO superimposed on CKD stage 4 vs progression to CKD5/ ESRD  Hyperkalemia and metabolic acidosis- resolved   Acute on chronic disease anemia and iron deficiency  Hypertensive urgency    Relevant Medical History: T2DM, hypertension, hyperlipidemia, anemia of chronic disease/iron-deficiency, CKD stage 4    Nutrition-Related Medications: ferric gluconate, omega 3, renal mv    Nutrition-Related Labs:  1/15: BUN 71.2, Crea 4.78, GFR 8, Glu 169    Diet Order: Diet diabetic  Diet NPO  Oral Supplement Order: none  Appetite/Oral Intake: good/50-75% of meals  Factors Affecting Nutritional Intake: none identified  Food/Sikh/Cultural Preferences: none reported  Food Allergies: none reported    Skin Integrity: other (see comments) (sacral reddness)  Wound(s):       Comments    1/15: pt tired and would like to sleep, reports good appetite, weight loss since August of last year but unsure if this was intentional; declined oral supplement; noted plans to possibly start HD    Anthropometrics    Height: 5' 1" (154.9 cm) Height Method: Stated  Last Weight: 71.7 kg (158 lb 1.1 oz) (01/14/23 1244) Weight Method: Standard Scale  BMI (Calculated): 29.9  BMI Classification: overweight (BMI 25-29.9)     Ideal Body Weight (IBW), Female: 105 lb     % Ideal Body Weight, Female (lb): 150.54 %                             Usual Weight Provided By: patient    Wt Readings from Last 3 Encounters:   01/14/23 1244 71.7 kg (158 lb 1.1 oz)   01/14/23 1234 71.7 kg (158 lb 1.1 oz)   01/13/23 1222 71.7 kg (158 lb) "   10/13/22 1802 78.5 kg (173 lb)   08/22/22 1002 80.7 kg (178 lb)      Weight Change(s) Since Admission:  Admit Weight: 71.7 kg (158 lb) (01/13/23 1222)      Patient Education    Not applicable.    Monitoring & Evaluation     Dietitian will monitor food and beverage intake and weight change.  Nutrition Risk/Follow-Up: low (follow-up in 5-7 days)  Patients assigned 'low nutrition risk' status do not qualify for a full nutritional assessment but will be monitored and re-evaluated in a 5-7 day time period. Please consult if re-evaluation needed sooner.

## 2023-01-16 LAB
ANION GAP SERPL CALC-SCNC: 14 MEQ/L
BUN SERPL-MCNC: 58.4 MG/DL (ref 9.8–20.1)
CALCIUM SERPL-MCNC: 9.3 MG/DL (ref 8.4–10.2)
CHLORIDE SERPL-SCNC: 101 MMOL/L (ref 98–107)
CO2 SERPL-SCNC: 27 MMOL/L (ref 23–31)
CREAT SERPL-MCNC: 4.85 MG/DL (ref 0.55–1.02)
CREAT/UREA NIT SERPL: 12
GFR SERPLBLD CREATININE-BSD FMLA CKD-EPI: 8 MLS/MIN/1.73/M2
GLUCOSE SERPL-MCNC: 114 MG/DL (ref 82–115)
POCT GLUCOSE: 116 MG/DL (ref 70–110)
POCT GLUCOSE: 147 MG/DL (ref 70–110)
POCT GLUCOSE: 176 MG/DL (ref 70–110)
POTASSIUM SERPL-SCNC: 4 MMOL/L (ref 3.5–5.1)
SODIUM SERPL-SCNC: 142 MMOL/L (ref 136–145)

## 2023-01-16 PROCEDURE — 63600175 PHARM REV CODE 636 W HCPCS: Performed by: INTERNAL MEDICINE

## 2023-01-16 PROCEDURE — 21400001 HC TELEMETRY ROOM

## 2023-01-16 PROCEDURE — 25000003 PHARM REV CODE 250: Performed by: INTERNAL MEDICINE

## 2023-01-16 PROCEDURE — 36415 COLL VENOUS BLD VENIPUNCTURE: CPT | Performed by: NURSE PRACTITIONER

## 2023-01-16 PROCEDURE — 80048 BASIC METABOLIC PNL TOTAL CA: CPT | Performed by: NURSE PRACTITIONER

## 2023-01-16 RX ORDER — AMLODIPINE BESYLATE 5 MG/1
5 TABLET ORAL DAILY
Status: DISCONTINUED | OUTPATIENT
Start: 2023-01-16 | End: 2023-01-17

## 2023-01-16 RX ORDER — MICONAZOLE NITRATE 2 %
POWDER (GRAM) TOPICAL 2 TIMES DAILY
Status: DISCONTINUED | OUTPATIENT
Start: 2023-01-16 | End: 2023-01-18 | Stop reason: HOSPADM

## 2023-01-16 RX ADMIN — SODIUM CHLORIDE 125 MG: 9 INJECTION, SOLUTION INTRAVENOUS at 09:01

## 2023-01-16 RX ADMIN — LABETALOL HYDROCHLORIDE 10 MG: 5 INJECTION, SOLUTION INTRAVENOUS at 08:01

## 2023-01-16 RX ADMIN — AMLODIPINE BESYLATE 5 MG: 5 TABLET ORAL at 11:01

## 2023-01-16 RX ADMIN — ACETAMINOPHEN 1000 MG: 500 TABLET, FILM COATED ORAL at 08:01

## 2023-01-16 RX ADMIN — CARVEDILOL 12.5 MG: 12.5 TABLET, FILM COATED ORAL at 08:01

## 2023-01-16 RX ADMIN — LORAZEPAM 0.5 MG: 0.5 TABLET ORAL at 08:01

## 2023-01-16 RX ADMIN — Medication 6 MG: at 08:01

## 2023-01-16 RX ADMIN — LABETALOL HYDROCHLORIDE 10 MG: 5 INJECTION, SOLUTION INTRAVENOUS at 03:01

## 2023-01-16 RX ADMIN — DOXAZOSIN 4 MG: 4 TABLET ORAL at 08:01

## 2023-01-16 NOTE — PROGRESS NOTES
[facility]  Hospital Medicine Progress Note        Chief Complaint: Inpatient Follow-up for     HPI:  This is an 87-year-old female with medical history of T2DM, hypertension, hyperlipidemia, anemia of chronic disease/iron-deficiency, CKD stage 4 with creatinine over the past year ranging between 1.8-2 and estimated GFR 24-27 present to the ED for evaluation of abnormal labs found on routine outpatient visit that showed hyperkalemia, worsening renal function and anemia.  Patient herself denies any new complaints over the past 2 months except for worsening claudication of bilateral lower extremities.  Denies melena or hematochezia.  Report no changes in urination.  No recent medication changes.    On arrival to ED she was afebrile, hypertensive, and saturating 97% on room air.  Labs repeated and noted for potassium 5.6, CO2 12, creatinine 5.54, BUN 81.9, hemoglobin 7.5.  Urinalysis show +1 protein.  She was given 1 L of normal saline and started on blood transfusion and subsequently referred to hospital medicine service for further evaluation and management.    January 16, 2023, the creatinine is 4.8 this morning, nephrology recommended observe 2 more days before deciding hemodialysis since the patient does not have a volume overload or hyperkalemia    Interval Hx:       Objective/physical exam:  General: In no acute distress, afebrile  Chest: Clear to auscultation bilaterally  Heart: RRR, +S1, S2, no appreciable murmur  Abdomen: Soft, nontender, BS +  MSK: Warm, no lower extremity edema, no clubbing or cyanosis  Neurologic: Alert and oriented x4, Cranial nerve II-XII intact, Strength 5/5 in all 4 extremities    VITAL SIGNS: 24 HRS MIN & MAX LAST   Temp  Min: 97.5 °F (36.4 °C)  Max: 98.3 °F (36.8 °C) 98.1 °F (36.7 °C)   BP  Min: 189/76  Max: 212/73 (!) 192/76     Pulse  Min: 55  Max: 60  (!) 58     Resp  Min: 18  Max: 20 20   SpO2  Min: 93 %  Max: 98 % (!) 93 %         Recent Labs   Lab  01/13/23  1251 01/14/23  0810 01/15/23  0749   WBC 4.3* 4.1* 4.0*   RBC 2.62* 3.49* 3.27*   HGB 7.5* 9.8* 9.4*   HCT 24.3* 30.7* 28.5*   MCV 92.7 88.0 87.2   MCH 28.6 28.1 28.7   MCHC 30.9* 31.9* 33.0   RDW 15.3 16.3 16.2    209 193   MPV 10.9* 10.6* 10.5*       Recent Labs   Lab 01/13/23  1251 01/14/23  0809 01/15/23  0749 01/16/23  0722   * 137 140 142   K 5.6* 5.3* 3.9 4.0   CO2 12* 16* 26 27   BUN 81.9* 77.2* 71.2* 58.4*   CREATININE 5.54* 5.43* 4.78* 4.85*   CALCIUM 9.7 9.3 8.8 9.3   MG 1.80 2.00 1.70  --    ALBUMIN 3.6 3.3* 3.0*  --    ALKPHOS 46 42 34*  --    ALT 6 5 5  --    AST 9 10 9  --    BILITOT 0.5 0.6 0.6  --           Microbiology Results (last 7 days)       Procedure Component Value Units Date/Time    Urine culture [[id number]] Collected: 01/13/23 1440    Order Status: Completed Specimen: Urine Updated: 01/15/23 0940     Urine Culture Multiple organisms present indicate probable contamination. Recommend recollection.             See below for Radiology    Scheduled Med:   allopurinoL  100 mg Oral Daily    amLODIPine  5 mg Oral Daily    carvediloL  12.5 mg Oral BID    cloNIDine 0.2 mg/24 hr td ptwk  1 patch Transdermal Q7 Days    doxazosin  4 mg Oral Daily    ferric gluconate (FERRLECIT) IVPB  125 mg Intravenous Daily    omega 3-dha-epa-fish oil  1 capsule Oral Daily    vitamin renal formula (B-complex-vitamin c-folic acid)  1 capsule Oral Daily        Continuous Infusions:   sodium chloride 0.9% 65 mL/hr at 01/15/23 1130        PRN Meds:  sodium chloride, acetaminophen, acetaminophen, aluminum-magnesium hydroxide-simethicone, dextrose 10%, dextrose 10%, glucagon (human recombinant), glucose, glucose, hydrALAZINE, insulin aspart U-100, labetalol, LORazepam, melatonin, ondansetron, polyethylene glycol, prochlorperazine, senna-docusate 8.6-50 mg, simethicone, sodium chloride 0.9%       Assessment/Plan:    AMPARO superimposed on CKD stage 4 vs progression to CKD5/ ESRD    Hyperkalemia and  metabolic acidosis- resolved     Acute on chronic disease anemia and iron deficiency    Hypertensive urgency    T2DM- well controlled at 5.3 on 1/12/2023    HTN, Anxiety              Admitted as in patient on 1/13    Nephrology consulted, they had a long discussion with patient's daughter in the room and granddaughter on the phone.  Discussed multiple option with concern for advancement of her CKD 4 to CKD 5/end-stage.  Discuss hemodialysis and the commitment with the schedule.  Patient and family will discuss before making the decision    D5W+150mEq Sodium bicarb @ 150 ml/hr changed to normal saline at 65 cc/hour    Lokelma 10 mg x 1 --> hyperkalemia resolved    Chart reviewed, noted K has peen persistently elevated for atleast 10 months     Retroperitoneal ultrasound--> prelim report: There is increased cortical echogenicity with maintained corticomedullary junction distinction seen in both kidneys. Suggestive of chronic renal disease    .8(H), Phos 5.4 (H), Vit D 25 OH 44 (wnl)    Urine creatinine 45.6, protein 22.5 with ratio 0.5    Urine sodium 54    CK level 31 (wnl)    Uric acid 10.7 (H)    S/P  2 unit PRBC transfusion on admission    Hb improved from 7.5--> 9.8--> 9.4    Ferrlecit 125 mg daily for 5 doses- day 2 of 5    B12 borderline low, received 1 dose of IM 1000 mcg--> started on oral supplement    Nephrocaps daily    Stool occult blood - pending collection    Continue carvedilol 12.5mg BID, will discontinue Nifedipine given pt and family reported excessive leg swelling so PCP changed it to Doxazosin and since then BP is stable at home     A1C 5.3 on 1/12/2023    SSI ACHS, hold metformin    Hold aspirin pending stool occult blood    Hold Lasix given AMPARO on underlying CKD     Nephrology recommended wait 2 more days before deciding hemodialysis    VTE prophylaxis:     Patient condition:  Stable/Fair/Guarded/ Serious/ Critical    Anticipated discharge and Disposition:         All diagnosis and  differential diagnosis have been reviewed; assessment and plan has been documented; I have personally reviewed the labs and test results that are presently available; I have reviewed the patients medication list; I have reviewed the consulting providers response and recommendations. I have reviewed or attempted to review medical records based upon their availability    All of the patient's questions have been  addressed and answered. Patient's is agreeable to the above stated plan. I will continue to monitor closely and make adjustments to medical management as needed.  _____________________________________________________________________    Nutrition Status:    Radiology:  US Retroperitoneal Complete  Narrative: EXAMINATION:  US RETROPERITONEAL COMPLETE    Technique: Gray scale and color doppler images of the kidneys and bladder were performed.    Comparison: None.    Clinical history: ED 10 marsha.    Kidneys: There is increased cortical echogenicity with maintained corticomedullary junction distinction seen in both kidneys. Suggestive of chronic renal disease.    Right Kidney: Measurement long 11.49 cm.    Intrarenal arterial resistive index: Multiple non-shadowing echogenic foci throughout the right kidney could represent tiny calcifications/concretions. There is a simple cyst measuring 2.1 x 2.3 x 1.4 cm in the lower pole. Otherwise unremarkable right kidney with no masses or hydronephrosis identified.    Left Kidney: Unremarkable left kidney with no stones cysts masses or hydronephrosis identified. Measurement long 9.5 cm.    Urinary bladder: Urinary bladder is collapsed at the time of scan.  Impression: Impression:    1. There is increased cortical echogenicity with maintained corticomedullary junction distinction seen in both kidneys. Suggestive of chronic renal disease.    2. Details and other findings as discussed above.    I concur with the preliminary report    Electronically signed by: Sylvester  Elsa  Date:    01/14/2023  Time:    10:42      Eduin Cox MD   01/16/2023

## 2023-01-16 NOTE — PROGRESS NOTES
[facility] - 8th Floor Med Surg  Wound Care    Patient Name:  Rayna Haider  MRN:  [id number]  Date: 1/16/2023  Diagnosis: Acute kidney injury superimposed on CKD    History:     Past Medical History:   Diagnosis Date    Anemia, unspecified     Anxiety disorder, unspecified     CKD (chronic kidney disease)     Diabetes mellitus, type 2     Hyperkalemia     Hypertension     Mixed hyperlipidemia     Vitamin D deficiency        Social History     Socioeconomic History    Marital status:    Tobacco Use    Smoking status: Never    Smokeless tobacco: Never   Substance and Sexual Activity    Alcohol use: Not Currently    Drug use: Never       Precautions:     Allergies as of 01/13/2023    (No Known Allergies)       Comfort Measures Prior to Treatment:     Subjective:     Need for Treatment:     WOC Assessment Details:      01/16/23 1145        Altered Skin Integrity 01/14/23 1238 Buttocks #1 Intact skin with non-blanchable redness of localized area   Date First Assessed/Time First Assessed: 01/14/23 1238   Altered Skin Integrity Present on Admission: yes  Location: Buttocks  Wound Number: #1  Is this injury device related?: No  Description of Altered Skin Integrity: Intact skin with non-blanchable...   Wound Image      Care Cleansed with:;Soap and water;Applied:;Skin Barrier   Dressing   (abd pad,)       Treatment:   Sacral area: cleanse with Remedy Foam & 4x4s, pat dry, apply Desitin, cover with abd pad, Medipore to secure BID & PRN.  Pannus: cleanse with Remedy foam & 4x4s, apply Miconazole powder, apply dryskin sheet BID & PRN.  Goals:     Plan:   Recommendations:   Continue head to toe skin assessment q 12 hours;  Continue turning/repositioning q 2 hours;  Continue with specialty bed and wedge for 30 degree pelvic tilt;  Keep pannus and sacral areas clean and dry;  Avoid foam dressings on sacrum;  Avoid adult briefs;  Encourage activity as ordered;  Ensure adequate nutrition/hydration for  healing;  Orders placed. 01/16/2023

## 2023-01-16 NOTE — PROGRESS NOTES
Nephrology follow up progress note    HPI:      Rayna Haider is a 87 y.o. female has no new complaints.  No nausea no vomiting no diarrhea no shortness of breath no chest pain no abdominal pains no fever or chills.  No pedal edema.  No PND orthopnea.  No headache no blurring no diplopia.  Blood pressure has been little high overnight.    Interval history:          Review of Systems:       Past medical, family, surgical, and social history reviewed and unchanged from initial consult note.     Objective:   Awake alert oriented to time person place.  No acute distress noted.  Patient's daughter Ms. Restrepo is at the bedside.    VITAL SIGNS: 24 HR MIN & MAX LAST    Temp  Min: 97.5 °F (36.4 °C)  Max: 98.3 °F (36.8 °C)  97.7 °F (36.5 °C)        BP  Min: 189/76  Max: 212/73  (!) 206/72     Pulse  Min: 55  Max: 60  (!) 56     Resp  Min: 18  Max: 20  20    SpO2  Min: 94 %  Max: 98 %  95 %      GEN:  Well developed and nourished.  No acute distress noted.  HEENT: Conjunctiva anicteric, pupils reactive.  Extraocular movements intact.  No oral lesion.  Neck supple.  No JVD.  CV: RRR without rub or gallop.  PULM: CTAB, unlabored  ABD: Soft, NT/ND abdomen with NABS  EXT: No cyanosis or edema  SKIN: Warm and dry  PSYCH: Awake, alert, and appropriately conversant            Component Value Date/Time     01/16/2023 0722     01/15/2023 0749    K 4.0 01/16/2023 0722    K 3.9 01/15/2023 0749    CHLORIDE 101 01/16/2023 0722    CHLORIDE 100 01/15/2023 0749    CO2 27 01/16/2023 0722    CO2 26 01/15/2023 0749    BUN 58.4 (H) 01/16/2023 0722    BUN 71.2 (H) 01/15/2023 0749    CREATININE 4.85 (H) 01/16/2023 0722    CREATININE 4.78 (H) 01/15/2023 0749    CALCIUM 9.3 01/16/2023 0722    CALCIUM 8.8 01/15/2023 0749    PHOS 5.4 (H) 01/14/2023 0809            Component Value Date/Time    WBC 4.0 (L) 01/15/2023 0749    WBC 4.1 (L) 01/14/2023 0810    HGB 9.4 (L) 01/15/2023 0749    HGB 9.8 (L) 01/14/2023 0810    HCT 28.5 (L) 01/15/2023  0749    HCT 30.7 (L) 01/14/2023 0810     01/15/2023 0749     01/14/2023 0810       Imaging reviewed  Ultrasound noted and detailed to the patient and daughter and granddaughter MsCindy Hemphill NP.    Assessment / Plan:   AMPARO superimposed on advanced chronic kidney disease.  Adequate urine output.  Patient is not symptomatic with uremia.  Hyperkalemia improved  Metabolic acidosis improving  Anemia of iron deficiency for which she is getting Ferrlecit  Hypertension    Recommendation  Discussed with patient, her daughter and her granddaughter his nurse practitioner.  Since patient remains asymptomatic from uremic symptoms, I would like to hold off initiation of hemodialysis on her for some more time.  And monitor renal functions for next 24-48 hours.  They all agree on the plan.  Resume diet.  Start amlodipine 5 mg p.o. once a day for high blood pressure control.

## 2023-01-16 NOTE — PT/OT/SLP PROGRESS
Physical Therapy      Patient Name:  Rayna Haider   MRN:  [id number]    PT to hold evaluation, pt hypertensive. Will f/u tomorrow.

## 2023-01-17 PROBLEM — E78.00 HYPERCHOLESTEROLEMIA: Chronic | Status: ACTIVE | Noted: 2022-08-22

## 2023-01-17 PROBLEM — E66.9 OBESITY: Chronic | Status: ACTIVE | Noted: 2022-08-22

## 2023-01-17 PROBLEM — F41.9 ANXIETY: Chronic | Status: ACTIVE | Noted: 2022-08-22

## 2023-01-17 LAB
ALBUMIN SERPL-MCNC: 2.8 G/DL (ref 3.4–4.8)
ALBUMIN/GLOB SERPL: 1 RATIO (ref 1.1–2)
ALP SERPL-CCNC: 37 UNIT/L (ref 40–150)
ALT SERPL-CCNC: 7 UNIT/L (ref 0–55)
AST SERPL-CCNC: 9 UNIT/L (ref 5–34)
BASOPHILS # BLD AUTO: 0.04 X10(3)/MCL (ref 0–0.2)
BASOPHILS NFR BLD AUTO: 1 %
BILIRUBIN DIRECT+TOT PNL SERPL-MCNC: 0.6 MG/DL
BUN SERPL-MCNC: 55.1 MG/DL (ref 9.8–20.1)
CALCIUM SERPL-MCNC: 8.9 MG/DL (ref 8.4–10.2)
CHLORIDE SERPL-SCNC: 102 MMOL/L (ref 98–107)
CO2 SERPL-SCNC: 26 MMOL/L (ref 23–31)
CREAT SERPL-MCNC: 4.2 MG/DL (ref 0.55–1.02)
EOSINOPHIL # BLD AUTO: 0.09 X10(3)/MCL (ref 0–0.9)
EOSINOPHIL NFR BLD AUTO: 2.3 %
ERYTHROCYTE [DISTWIDTH] IN BLOOD BY AUTOMATED COUNT: 15.9 % (ref 11.5–17)
GFR SERPLBLD CREATININE-BSD FMLA CKD-EPI: 10 MLS/MIN/1.73/M2
GLOBULIN SER-MCNC: 2.8 GM/DL (ref 2.4–3.5)
GLUCOSE SERPL-MCNC: 115 MG/DL (ref 82–115)
GLUCOSE SERPL-MCNC: 139 MG/DL (ref 70–110)
HCT VFR BLD AUTO: 28.9 % (ref 37–47)
HGB BLD-MCNC: 9.2 GM/DL (ref 12–16)
IMM GRANULOCYTES # BLD AUTO: 0.01 X10(3)/MCL (ref 0–0.04)
IMM GRANULOCYTES NFR BLD AUTO: 0.3 %
LYMPHOCYTES # BLD AUTO: 1.06 X10(3)/MCL (ref 0.6–4.6)
LYMPHOCYTES NFR BLD AUTO: 26.8 %
MCH RBC QN AUTO: 28.2 PG
MCHC RBC AUTO-ENTMCNC: 31.8 MG/DL (ref 33–36)
MCV RBC AUTO: 88.7 FL (ref 80–94)
MONOCYTES # BLD AUTO: 0.49 X10(3)/MCL (ref 0.1–1.3)
MONOCYTES NFR BLD AUTO: 12.4 %
NEUTROPHILS # BLD AUTO: 2.26 X10(3)/MCL (ref 2.1–9.2)
NEUTROPHILS NFR BLD AUTO: 57.2 %
NRBC BLD AUTO-RTO: 0 %
PLATELET # BLD AUTO: 200 X10(3)/MCL (ref 130–400)
PMV BLD AUTO: 10.5 FL (ref 7.4–10.4)
POCT GLUCOSE: 108 MG/DL (ref 70–110)
POCT GLUCOSE: 132 MG/DL (ref 70–110)
POCT GLUCOSE: 144 MG/DL (ref 70–110)
POCT GLUCOSE: 149 MG/DL (ref 70–110)
POTASSIUM SERPL-SCNC: 3.7 MMOL/L (ref 3.5–5.1)
PROT SERPL-MCNC: 5.6 GM/DL (ref 5.8–7.6)
RBC # BLD AUTO: 3.26 X10(6)/MCL (ref 4.2–5.4)
SODIUM SERPL-SCNC: 139 MMOL/L (ref 136–145)
WBC # SPEC AUTO: 4 X10(3)/MCL (ref 4.5–11.5)

## 2023-01-17 PROCEDURE — 85025 COMPLETE CBC W/AUTO DIFF WBC: CPT | Performed by: INTERNAL MEDICINE

## 2023-01-17 PROCEDURE — 97162 PT EVAL MOD COMPLEX 30 MIN: CPT

## 2023-01-17 PROCEDURE — 25000003 PHARM REV CODE 250: Performed by: INTERNAL MEDICINE

## 2023-01-17 PROCEDURE — 21400001 HC TELEMETRY ROOM

## 2023-01-17 PROCEDURE — 63600175 PHARM REV CODE 636 W HCPCS: Performed by: INTERNAL MEDICINE

## 2023-01-17 PROCEDURE — 25000003 PHARM REV CODE 250: Performed by: STUDENT IN AN ORGANIZED HEALTH CARE EDUCATION/TRAINING PROGRAM

## 2023-01-17 PROCEDURE — 80053 COMPREHEN METABOLIC PANEL: CPT | Performed by: INTERNAL MEDICINE

## 2023-01-17 PROCEDURE — 36415 COLL VENOUS BLD VENIPUNCTURE: CPT | Performed by: INTERNAL MEDICINE

## 2023-01-17 RX ORDER — AMLODIPINE BESYLATE 5 MG/1
5 TABLET ORAL ONCE
Status: COMPLETED | OUTPATIENT
Start: 2023-01-17 | End: 2023-01-17

## 2023-01-17 RX ORDER — AMLODIPINE BESYLATE 5 MG/1
10 TABLET ORAL DAILY
Status: DISCONTINUED | OUTPATIENT
Start: 2023-01-18 | End: 2023-01-18 | Stop reason: HOSPADM

## 2023-01-17 RX ORDER — CLONIDINE HYDROCHLORIDE 0.2 MG/1
0.2 TABLET ORAL 3 TIMES DAILY
Status: DISCONTINUED | OUTPATIENT
Start: 2023-01-17 | End: 2023-01-18 | Stop reason: HOSPADM

## 2023-01-17 RX ORDER — LABETALOL HYDROCHLORIDE 5 MG/ML
10 INJECTION, SOLUTION INTRAVENOUS EVERY 4 HOURS PRN
Status: DISCONTINUED | OUTPATIENT
Start: 2023-01-17 | End: 2023-01-18 | Stop reason: HOSPADM

## 2023-01-17 RX ADMIN — AMLODIPINE BESYLATE 5 MG: 5 TABLET ORAL at 08:01

## 2023-01-17 RX ADMIN — ACETAMINOPHEN 650 MG: 325 TABLET, FILM COATED ORAL at 01:01

## 2023-01-17 RX ADMIN — Medication 6 MG: at 08:01

## 2023-01-17 RX ADMIN — DOXAZOSIN 4 MG: 4 TABLET ORAL at 08:01

## 2023-01-17 RX ADMIN — MICONAZOLE NITRATE: 20 POWDER TOPICAL at 09:01

## 2023-01-17 RX ADMIN — CARVEDILOL 12.5 MG: 12.5 TABLET, FILM COATED ORAL at 08:01

## 2023-01-17 RX ADMIN — MICONAZOLE NITRATE: 20 POWDER TOPICAL at 08:01

## 2023-01-17 RX ADMIN — Medication: at 08:01

## 2023-01-17 RX ADMIN — POLYETHYLENE GLYCOL 3350 17 G: 17 POWDER, FOR SOLUTION ORAL at 08:01

## 2023-01-17 RX ADMIN — NEPHROCAP 1 CAPSULE: 1 CAP ORAL at 08:01

## 2023-01-17 RX ADMIN — CLONIDINE HYDROCHLORIDE 0.2 MG: 0.2 TABLET ORAL at 08:01

## 2023-01-17 RX ADMIN — SENNOSIDES AND DOCUSATE SODIUM 2 TABLET: 50; 8.6 TABLET ORAL at 08:01

## 2023-01-17 RX ADMIN — LORAZEPAM 0.5 MG: 0.5 TABLET ORAL at 08:01

## 2023-01-17 RX ADMIN — CLONIDINE HYDROCHLORIDE 0.2 MG: 0.2 TABLET ORAL at 04:01

## 2023-01-17 RX ADMIN — AMLODIPINE BESYLATE 5 MG: 5 TABLET ORAL at 12:01

## 2023-01-17 RX ADMIN — SODIUM CHLORIDE 125 MG: 9 INJECTION, SOLUTION INTRAVENOUS at 09:01

## 2023-01-17 NOTE — PLAN OF CARE
01/17/23 1347   Discharge Assessment   Assessment Type Discharge Planning Assessment   Confirmed/corrected address, phone number and insurance Yes   Confirmed Demographics Correct on Facesheet   Source of Information patient   Reason For Admission hyperkalemia   People in Home alone   Do you expect to return to your current living situation? Yes   Do you have help at home or someone to help you manage your care at home? Yes   Who are your caregiver(s) and their phone number(s)? Daughter Kaye [phone] lives across the street and provides meals and transportation   Current cognitive status: Alert/Oriented;No Deficits   Equipment Currently Used at Home glucometer   Do you currently have service(s) that help you manage your care at home? No   Who is going to help you get home at discharge? Kaye   How do you get to doctors appointments? family or friend will provide   Are you on dialysis? No   Do you take coumadin? No   Discharge Plan A Home with family   Discharge Plan B Home with family;Home Health   Discharge Plan discussed with: Patient   Discharge Barriers Identified None     Pt states she lives alone in a single level home. She states her daughter lives across the street and helps with meals and provides transportation. She states she is able to care for herself independently. She is not active with a  home health agency. Will follow for discharge needs.

## 2023-01-17 NOTE — PROGRESS NOTES
Nephrology follow up progress note    HPI:      Rayna Haider is a 87 y.o. female with a past medical history of diabetes mellitus type II, hypertension, hyperlipidemia, anemia of chronic disease and known iron deficiency, and CKD stage 4. She has lost approximately 30 lbs in the last 4 months. Her baseline creatinine over the last year is around 2. On 1/12/23 labs were done with her PCP, revealing a BUN 88 and creatinine 5.1. Renal was consulted for progression of CKD management.    Interval history:     1/17/23- up in chair, in good spirits. Reports she is urinating more. Denies complaints, including SOB, CP, and BLE swelling.     Review of Systems:       Past medical, family, surgical, and social history reviewed and unchanged from initial consult note.     Objective:       VITAL SIGNS: 24 HR MIN & MAX LAST    Temp  Min: 97.7 °F (36.5 °C)  Max: 98.3 °F (36.8 °C)  97.9 °F (36.6 °C)        BP  Min: 154/70  Max: 192/76  (!) 161/74     Pulse  Min: 58  Max: 68  67     Resp  Min: 18  Max: 20  18    SpO2  Min: 91 %  Max: 95 %  (!) 92 %      Physical Exam  Constitutional:       General: She is not in acute distress.  HENT:      Head: Normocephalic.      Mouth/Throat:      Mouth: Mucous membranes are moist.   Cardiovascular:      Rate and Rhythm: Normal rate and regular rhythm.      Heart sounds: Normal heart sounds.   Pulmonary:      Effort: Pulmonary effort is normal. No respiratory distress.      Breath sounds: Normal breath sounds.   Abdominal:      General: There is no distension.      Palpations: Abdomen is soft.   Musculoskeletal:         General: Normal range of motion.      Cervical back: Normal range of motion.      Right lower leg: No edema.      Left lower leg: No edema.   Skin:     General: Skin is warm and dry.   Neurological:      General: No focal deficit present.      Mental Status: She is alert and oriented to person, place, and time.              Component Value Date/Time     01/17/2023 4303      01/16/2023 0722    K 3.7 01/17/2023 0438    K 4.0 01/16/2023 0722    CHLORIDE 102 01/17/2023 0438    CHLORIDE 101 01/16/2023 0722    CO2 26 01/17/2023 0438    CO2 27 01/16/2023 0722    BUN 55.1 (H) 01/17/2023 0438    BUN 58.4 (H) 01/16/2023 0722    CREATININE 4.20 (H) 01/17/2023 0438    CREATININE 4.85 (H) 01/16/2023 0722    CALCIUM 8.9 01/17/2023 0438    CALCIUM 9.3 01/16/2023 0722    PHOS 5.4 (H) 01/14/2023 0809            Component Value Date/Time    WBC 4.0 (L) 01/17/2023 0438    WBC 4.0 (L) 01/15/2023 0749    HGB 9.2 (L) 01/17/2023 0438    HGB 9.4 (L) 01/15/2023 0749    HCT 28.9 (L) 01/17/2023 0438    HCT 28.5 (L) 01/15/2023 0749     01/17/2023 0438     01/15/2023 0749       Imaging reviewed      Assessment / Plan:   AMPARO superimposed on CKD Stage 4.   Hyperkalemia resolved  Metabolic acidosis resolved  Iron deficiency anemia on ferrilicit  Hypertension-- amlodipine 5 mg added on 1/16    PLAN:  -Renal indices improving  -Good urine output  -Continue hydration

## 2023-01-17 NOTE — PLAN OF CARE
Goals to be met by: 2023     Patient will increase functional independence with mobility by performin. Sit to stand transfer with Modified [place]  2. Gait  x 300 feet with Modified [place] using Rolling Walker vs LRAD.

## 2023-01-17 NOTE — PLAN OF CARE
Problem: Physical Therapy  Goal: Physical Therapy Goal  Description: Goals to be met by: 2023     Patient will increase functional independence with mobility by performin. Sit to stand transfer with Modified [place]  2. Gait  x 300 feet with Modified [place] using Rolling Walker vs LRAD.     Outcome: Ongoing, Progressing

## 2023-01-17 NOTE — PT/OT/SLP EVAL
Physical Therapy Evaluation    Patient Name:  Rayna Haider   MRN:  [id number]    Recommendations:     Discharge Recommendations: home with home health   Discharge Equipment Recommendations: walker, rolling   Barriers to discharge:  none    Assessment:     Rayna Haider is a 87 y.o. female admitted with a medical diagnosis of Acute kidney injury superimposed on CKD.  She presents with the following impairments/functional limitations: weakness, impaired endurance, impaired functional mobility, impaired balance.    Rehab Prognosis: Good; patient would benefit from acute skilled PT services to address these deficits and reach maximum level of function.    Recent Surgery: * No surgery found *      Plan:     During this hospitalization, patient to be seen 3 x/week (to 5x/week) to address the identified rehab impairments via gait training, therapeutic activities, neuromuscular re-education, therapeutic exercises and progress toward the following goals:    Plan of Care Expires:  02/16/23    Subjective     Chief Complaint: none  Patient/Family Comments/goals: to get stronger  Pain/Comfort:  Pain Rating 1: 0/10    Patients cultural, spiritual, Hoahaoism conflicts given the current situation: no    Living Environment:  Pt lives alone in a SL home, no steps to enter.   Prior to admission, patients level of function was independent.  Equipment used at home: none.  DME owned (not currently used): rolling walker.  Upon discharge, patient will have assistance from family.    Objective:     Communicated with RN prior to session.  Patient found HOB elevated with peripheral IV  upon PT entry to room.    General Precautions: Standard,    Orthopedic Precautions:N/A   Braces: N/A  Respiratory Status: Nasal cannula, flow 2 L/min    Exams:2  Cognitive Exam:  Patient is oriented to Person, Place, Time, and Situation    Functional Mobility:  Bed Mobility:     Supine to Sit: stand by assistance  Transfers:     Sit to Stand:  contact  guard assistance with no AD  Gait: pt demo'd a slow step through gt pattern w/ CGA x 20 ft, no AD 2/2 not wanting but pt was grabbing for wall or railling so may benefit from a RW.       AM-PAC 6 CLICK MOBILITY  Total Score:18           Patient left up in chair with all lines intact, call button in reach, RN notified, and daughter present.    GOALS:   Multidisciplinary Problems       Physical Therapy Goals          Problem: Physical Therapy    Goal Priority Disciplines Outcome Goal Variances Interventions   Physical Therapy Goal     PT, PT/OT Ongoing, Progressing     Description: Goals to be met by: 2023     Patient will increase functional independence with mobility by performin. Sit to stand transfer with Modified [place]  2. Gait  x 300 feet with Modified [place] using Rolling Walker vs LRAD.                          History:     Past Medical History:   Diagnosis Date    Diabetes mellitus, type 2     Hyperkalemia     Hypertension     Vitamin D deficiency        Past Surgical History:   Procedure Laterality Date    EYE SURGERY      HYSTERECTOMY      RETINAL DETACHMENT SURGERY Left        Time Tracking:     PT Received On: 23  PT Start Time: 1023     PT Stop Time: 1040  PT Total Time (min): 17 min     Billable Minutes: Evaluation , moderate complexity      2023

## 2023-01-17 NOTE — PROGRESS NOTES
[facility]  Hospital Medicine Progress Note        Chief Complaint: Inpatient Follow-up for CKD     HPI:   This is an 87-year-old female with medical history of T2DM, hypertension, hyperlipidemia, anemia of chronic disease/iron-deficiency, CKD stage 4 with creatinine over the past year ranging between 1.8-2 and estimated GFR 24-27 present to the ED for evaluation of abnormal labs found on routine outpatient visit that showed hyperkalemia, worsening renal function and anemia.  Patient herself denies any new complaints over the past 2 months except for worsening claudication of bilateral lower extremities.  Denies melena or hematochezia.  Report no changes in urination.  No recent medication changes.  On arrival to ED she was afebrile, hypertensive, and saturating 97% on room air.  Labs repeated and noted for potassium 5.6, CO2 12, creatinine 5.54, BUN 81.9, hemoglobin 7.5.  Urinalysis show +1 protein.  She was given 1 L of normal saline and started on blood transfusion and subsequently referred to hospital medicine service for further evaluation and management.   Renal team did not recommend HD, advised to watch renal functions for 1-2 days.     Interval Hx:   Patient today awake and comfortable. Denies any SOB, chest pain, cough or dysuria. She is urinating well. No muscle cramps.     Objective/physical exam:  General: In no acute distress  Chest: Clear to auscultation bilaterally  Heart: RRR, +S1, S2, no appreciable murmur  Abdomen: Soft, nontender, BS +  MSK: Warm, no lower extremity edema, no clubbing or cyanosis  Neurologic: Alert and oriented x4, Cranial nerve II-XII intact, Strength 5/5 in all 4 extremities    VITAL SIGNS: 24 HRS MIN & MAX LAST   Temp  Min: 97.9 °F (36.6 °C)  Max: 98.3 °F (36.8 °C) 98.3 °F (36.8 °C)   BP  Min: 161/74  Max: 186/66 (!) 180/73     Pulse  Min: 58  Max: 68  62   Resp  Min: 18  Max: 18 18   SpO2  Min: 91 %  Max: 95 % (!) 94 %         Recent Labs   Lab  01/14/23  0810 01/15/23  0749 01/17/23  0438   WBC 4.1* 4.0* 4.0*   RBC 3.49* 3.27* 3.26*   HGB 9.8* 9.4* 9.2*   HCT 30.7* 28.5* 28.9*   MCV 88.0 87.2 88.7   MCH 28.1 28.7 28.2   MCHC 31.9* 33.0 31.8*   RDW 16.3 16.2 15.9    193 200   MPV 10.6* 10.5* 10.5*       Recent Labs   Lab 01/13/23  1251 01/14/23  0809 01/15/23  0749 01/16/23  0722 01/17/23 0438   * 137 140 142 139   K 5.6* 5.3* 3.9 4.0 3.7   CO2 12* 16* 26 27 26   BUN 81.9* 77.2* 71.2* 58.4* 55.1*   CREATININE 5.54* 5.43* 4.78* 4.85* 4.20*   CALCIUM 9.7 9.3 8.8 9.3 8.9   MG 1.80 2.00 1.70  --   --    ALBUMIN 3.6 3.3* 3.0*  --  2.8*   ALKPHOS 46 42 34*  --  37*   ALT 6 5 5  --  7   AST 9 10 9  --  9   BILITOT 0.5 0.6 0.6  --  0.6          Microbiology Results (last 7 days)       Procedure Component Value Units Date/Time    Urine culture [[id number]] Collected: 01/13/23 1440    Order Status: Completed Specimen: Urine Updated: 01/15/23 0940     Urine Culture Multiple organisms present indicate probable contamination. Recommend recollection.             See below for Radiology    Scheduled Med:   allopurinoL  100 mg Oral Daily    [START ON 1/18/2023] amLODIPine  10 mg Oral Daily    carvediloL  12.5 mg Oral BID    cloNIDine 0.2 mg/24 hr td ptwk  1 patch Transdermal Q7 Days    doxazosin  4 mg Oral Daily    ferric gluconate (FERRLECIT) IVPB  125 mg Intravenous Daily    miconazole NITRATE 2 %   Topical (Top) BID    omega 3-dha-epa-fish oil  1 capsule Oral Daily    vitamin renal formula (B-complex-vitamin c-folic acid)  1 capsule Oral Daily    zinc oxide-cod liver oil   Topical (Top) BID        Continuous Infusions:       PRN Meds:  sodium chloride, acetaminophen, acetaminophen, aluminum-magnesium hydroxide-simethicone, dextrose 10%, dextrose 10%, glucagon (human recombinant), glucose, glucose, insulin aspart U-100, labetalol, LORazepam, melatonin, ondansetron, polyethylene glycol, prochlorperazine, senna-docusate 8.6-50 mg, simethicone, sodium chloride  0.9%       Assessment/Plan:  CKD5 non oliguric   Hypertensive urgency   Hyperkalemia and metabolic acidosis- resolved   Acute on chronic disease anemia and iron deficiency  Hypertensive urgency  T2DM- well controlled at 5.3 on 1/12/2023    Plan:  Patient has no new issues  Has good urine out put  Renal functions stable.   Avoid NSAIDs   Will closely monitor patients daily weight, urine out put, renal parameters and volume status    Will use iv prn labetalol for SBP >180  Cont norvasc and clonidine patch   Cont supportive care       Critical care note:  Critical care diagnosis: Hypertensive urgency needing iv labetalol   Critical care interventions: Hands-on evaluation, review of labs/radiographs/records and discussion with patient and family if present  Critical care time spent: 35 minutes     VTE prophylaxis: SCDs    Patient condition:  Guarded    Anticipated discharge and Disposition:         All diagnosis and differential diagnosis have been reviewed; assessment and plan has been documented; I have personally reviewed the labs and test results that are presently available; I have reviewed the patients medication list; I have reviewed the consulting providers response and recommendations. I have reviewed or attempted to review medical records based upon their availability    All of the patient's questions have been  addressed and answered. Patient's is agreeable to the above stated plan. I will continue to monitor closely and make adjustments to medical management as needed.  _____________________________________________________________________    Nutrition Status:    Radiology:  US Retroperitoneal Complete  Narrative: EXAMINATION:  US RETROPERITONEAL COMPLETE    Technique: Gray scale and color doppler images of the kidneys and bladder were performed.    Comparison: None.    Clinical history: ED 10 marsha.    Kidneys: There is increased cortical echogenicity with maintained corticomedullary junction distinction seen  in both kidneys. Suggestive of chronic renal disease.    Right Kidney: Measurement long 11.49 cm.    Intrarenal arterial resistive index: Multiple non-shadowing echogenic foci throughout the right kidney could represent tiny calcifications/concretions. There is a simple cyst measuring 2.1 x 2.3 x 1.4 cm in the lower pole. Otherwise unremarkable right kidney with no masses or hydronephrosis identified.    Left Kidney: Unremarkable left kidney with no stones cysts masses or hydronephrosis identified. Measurement long 9.5 cm.    Urinary bladder: Urinary bladder is collapsed at the time of scan.  Impression: Impression:    1. There is increased cortical echogenicity with maintained corticomedullary junction distinction seen in both kidneys. Suggestive of chronic renal disease.    2. Details and other findings as discussed above.    I concur with the preliminary report    Electronically signed by: Sylvester Hunter  Date:    01/14/2023  Time:    10:42      Ranjan Cage MD   01/17/2023

## 2023-01-17 NOTE — PROGRESS NOTES
WOCN follow up visit related to consult 01/16/23 for buttocks;  all wound care supplies are in room, patient expressed desire NOT to have DENNIS bed as she prefers her current bed.  Alvin Zepeda Rep notified.  Will continue to follow.

## 2023-01-17 NOTE — PLAN OF CARE
Discharge planning discussed with patient and daughter. FOC obtained for [facility]. Referral sent via care port to [facility].

## 2023-01-18 VITALS
BODY MASS INDEX: 29.84 KG/M2 | DIASTOLIC BLOOD PRESSURE: 70 MMHG | HEART RATE: 57 BPM | TEMPERATURE: 98 F | RESPIRATION RATE: 18 BRPM | OXYGEN SATURATION: 91 % | HEIGHT: 61 IN | WEIGHT: 158.06 LBS | SYSTOLIC BLOOD PRESSURE: 144 MMHG

## 2023-01-18 LAB
ANION GAP SERPL CALC-SCNC: 12 MEQ/L
BASOPHILS # BLD AUTO: 0.03 X10(3)/MCL (ref 0–0.2)
BASOPHILS NFR BLD AUTO: 0.7 %
BUN SERPL-MCNC: 51 MG/DL (ref 9.8–20.1)
CALCIUM SERPL-MCNC: 9.1 MG/DL (ref 8.4–10.2)
CHLORIDE SERPL-SCNC: 101 MMOL/L (ref 98–107)
CO2 SERPL-SCNC: 26 MMOL/L (ref 23–31)
CREAT SERPL-MCNC: 4.22 MG/DL (ref 0.55–1.02)
CREAT/UREA NIT SERPL: 12
EOSINOPHIL # BLD AUTO: 0.14 X10(3)/MCL (ref 0–0.9)
EOSINOPHIL NFR BLD AUTO: 3.4 %
ERYTHROCYTE [DISTWIDTH] IN BLOOD BY AUTOMATED COUNT: 15.8 % (ref 11.5–17)
GFR SERPLBLD CREATININE-BSD FMLA CKD-EPI: 10 MLS/MIN/1.73/M2
GLUCOSE SERPL-MCNC: 130 MG/DL (ref 82–115)
HCT VFR BLD AUTO: 29 % (ref 37–47)
HGB BLD-MCNC: 9.1 GM/DL (ref 12–16)
IMM GRANULOCYTES # BLD AUTO: 0.01 X10(3)/MCL (ref 0–0.04)
IMM GRANULOCYTES NFR BLD AUTO: 0.2 %
LYMPHOCYTES # BLD AUTO: 0.91 X10(3)/MCL (ref 0.6–4.6)
LYMPHOCYTES NFR BLD AUTO: 22.2 %
MCH RBC QN AUTO: 28.3 PG
MCHC RBC AUTO-ENTMCNC: 31.4 MG/DL (ref 33–36)
MCV RBC AUTO: 90.1 FL (ref 80–94)
MONOCYTES # BLD AUTO: 0.59 X10(3)/MCL (ref 0.1–1.3)
MONOCYTES NFR BLD AUTO: 14.4 %
NEUTROPHILS # BLD AUTO: 2.41 X10(3)/MCL (ref 2.1–9.2)
NEUTROPHILS NFR BLD AUTO: 59.1 %
NRBC BLD AUTO-RTO: 0 %
PLATELET # BLD AUTO: 200 X10(3)/MCL (ref 130–400)
PMV BLD AUTO: 10.2 FL (ref 7.4–10.4)
POTASSIUM SERPL-SCNC: 3.7 MMOL/L (ref 3.5–5.1)
RBC # BLD AUTO: 3.22 X10(6)/MCL (ref 4.2–5.4)
SODIUM SERPL-SCNC: 139 MMOL/L (ref 136–145)
WBC # SPEC AUTO: 4.1 X10(3)/MCL (ref 4.5–11.5)

## 2023-01-18 PROCEDURE — 25000003 PHARM REV CODE 250: Performed by: INTERNAL MEDICINE

## 2023-01-18 PROCEDURE — 63600175 PHARM REV CODE 636 W HCPCS: Performed by: INTERNAL MEDICINE

## 2023-01-18 PROCEDURE — 85025 COMPLETE CBC W/AUTO DIFF WBC: CPT | Performed by: INTERNAL MEDICINE

## 2023-01-18 PROCEDURE — 80048 BASIC METABOLIC PNL TOTAL CA: CPT | Performed by: INTERNAL MEDICINE

## 2023-01-18 PROCEDURE — 36415 COLL VENOUS BLD VENIPUNCTURE: CPT | Performed by: INTERNAL MEDICINE

## 2023-01-18 PROCEDURE — 25000003 PHARM REV CODE 250: Performed by: STUDENT IN AN ORGANIZED HEALTH CARE EDUCATION/TRAINING PROGRAM

## 2023-01-18 PROCEDURE — 97116 GAIT TRAINING THERAPY: CPT | Mod: CQ

## 2023-01-18 PROCEDURE — 97530 THERAPEUTIC ACTIVITIES: CPT | Mod: CQ

## 2023-01-18 RX ORDER — AMLODIPINE BESYLATE 10 MG/1
10 TABLET ORAL DAILY
Qty: 30 TABLET | Refills: 11 | Status: ON HOLD | OUTPATIENT
Start: 2023-01-19 | End: 2023-07-19 | Stop reason: HOSPADM

## 2023-01-18 RX ORDER — ALLOPURINOL 100 MG/1
100 TABLET ORAL DAILY
Qty: 30 TABLET | Refills: 0 | Status: SHIPPED | OUTPATIENT
Start: 2023-01-19 | End: 2023-02-15

## 2023-01-18 RX ORDER — CARVEDILOL 25 MG/1
25 TABLET ORAL 2 TIMES DAILY
Qty: 60 TABLET | Refills: 11 | Status: ON HOLD | OUTPATIENT
Start: 2023-01-18 | End: 2023-02-10 | Stop reason: HOSPADM

## 2023-01-18 RX ORDER — CARVEDILOL 12.5 MG/1
25 TABLET ORAL 2 TIMES DAILY
Status: DISCONTINUED | OUTPATIENT
Start: 2023-01-18 | End: 2023-01-18 | Stop reason: HOSPADM

## 2023-01-18 RX ORDER — CLONIDINE HYDROCHLORIDE 0.2 MG/1
0.2 TABLET ORAL 3 TIMES DAILY
Qty: 90 TABLET | Refills: 11 | Status: ON HOLD | OUTPATIENT
Start: 2023-01-18 | End: 2023-02-10 | Stop reason: HOSPADM

## 2023-01-18 RX ADMIN — AMLODIPINE BESYLATE 10 MG: 5 TABLET ORAL at 09:01

## 2023-01-18 RX ADMIN — CARVEDILOL 12.5 MG: 12.5 TABLET, FILM COATED ORAL at 09:01

## 2023-01-18 RX ADMIN — NEPHROCAP 1 CAPSULE: 1 CAP ORAL at 09:01

## 2023-01-18 RX ADMIN — Medication: at 09:01

## 2023-01-18 RX ADMIN — OMEGA-3 FATTY ACIDS CAP 1000 MG 1 CAPSULE: 1000 CAP at 09:01

## 2023-01-18 RX ADMIN — MICONAZOLE NITRATE: 20 POWDER TOPICAL at 09:01

## 2023-01-18 RX ADMIN — SODIUM CHLORIDE 125 MG: 9 INJECTION, SOLUTION INTRAVENOUS at 09:01

## 2023-01-18 RX ADMIN — DOXAZOSIN 4 MG: 4 TABLET ORAL at 09:01

## 2023-01-18 RX ADMIN — INSULIN ASPART 2 UNITS: 100 INJECTION, SOLUTION INTRAVENOUS; SUBCUTANEOUS at 10:01

## 2023-01-18 RX ADMIN — CLONIDINE HYDROCHLORIDE 0.2 MG: 0.2 TABLET ORAL at 09:01

## 2023-01-18 RX ADMIN — ALLOPURINOL 100 MG: 100 TABLET ORAL at 09:01

## 2023-01-18 NOTE — PT/OT/SLP PROGRESS
Physical Therapy         Treatment        Rayna Haider   MRN: [id number]     PT Received On: 01/18/23  PT Start Time: 1114     PT Stop Time: 1137    PT Total Time (min): 23 min       Billable Minutes:  Gait [id number] and Therapeutic Activity 8  Total Minutes: 23    Treatment Type: Treatment  PT/PTA: PTA     PTA Visit Number: 1       General Precautions: Standard, fall  Orthopedic Precautions: Orthopedic Precautions : N/A   Braces:      Spiritual, Cultural Beliefs, Roman Catholic Practices, Values that Affect Care: no    Subjective:  Communicated with NSG prior to session.    Pain/Comfort  Pain Rating 1: 0/10    Objective:  Patient found UIC, with oxygen.   Pt on 3L NC satting at 97%.   Family eager for pt to get off NC. Nursing stating pt okay to attempt PT tx on room air.     O2 room air: 88-96%. When ot would perform pursed lip breathing her O2 remained WNL. Pt never had any SOB.     Functional Mobility:  Bed Mobility:   Supine to sit: Activity did not occur   Sit to supine: Activity did not occur   Rolling: Activity did not occur   Scooting: Standby Assistance    Balance:   Static Sit: NORMAL: No deviations seen in posture held statically  Dynamic Sit:  GOOD: Maintains balance through MODERATE excursions of active trunk movement  Static Stand: NORMAL: No deviations seen in posture held statically  Dynamic stand: FAIR: Needs CONTACT GUARD during gait    Transfer Training:  Sit to stand:Stand-by Assistance with No Assistive Device . From bedside chair.     Gait Training:  Pt amb 30ft with no AD. Pt presents with shuffling gait patten. PTA recommended pt amb with RW but pt declined at this time.     Additional Treatment:  Increased time required checking O2 sats throughout tx session.       Activity Tolerance:  Patient tolerated treatment well and Patient limited by fatigue    Patient left  sitting on couch  with call button in reach and family present.    Assessment:  Rayna Haider is a 87 y.o. female with a  medical diagnosis of Acute kidney injury superimposed on CKD. Pt progressing well with PT. Pt okay to d/c home with supervision to ensure safety. Pt would benefit from RW upon d/c and HH PT.     Rehab potential is excellent.    Activity tolerance: Excellent    Discharge recommendations: Discharge Facility/Level of Care Needs: home with home health, home health PT (pt would benefit from having supervision at home to ensure safety)     Equipment recommendations: Equipment Needed After Discharge: walker, rolling     GOALS:   Multidisciplinary Problems       Physical Therapy Goals          Problem: Physical Therapy    Goal Priority Disciplines Outcome Goal Variances Interventions   Physical Therapy Goal     PT, PT/OT Ongoing, Progressing     Description: Goals to be met by: 2023     Patient will increase functional independence with mobility by performin. Sit to stand transfer with Modified [place]  2. Gait  x 300 feet with Modified [place] using Rolling Walker vs LRAD.                          PLAN:    Patient to be seen 3 x/week (to 5x/week)  to address the above listed problems via gait training, therapeutic activities, neuromuscular re-education, therapeutic exercises  Plan of Care expires: 23  Plan of Care reviewed with: patient, family         2023

## 2023-01-18 NOTE — PLAN OF CARE
Problem: Adult Inpatient Plan of Care  Goal: Plan of Care Review  Outcome: Ongoing, Progressing  Goal: Patient-Specific Goal (Individualized)  Outcome: Ongoing, Progressing  Goal: Absence of Hospital-Acquired Illness or Injury  Outcome: Ongoing, Progressing  Goal: Optimal Comfort and Wellbeing  Outcome: Ongoing, Progressing  Goal: Readiness for Transition of Care  Outcome: Ongoing, Progressing     Problem: Fluid and Electrolyte Imbalance (Acute Kidney Injury/Impairment)  Goal: Fluid and Electrolyte Balance  Outcome: Ongoing, Progressing     Problem: Oral Intake Inadequate (Acute Kidney Injury/Impairment)  Goal: Optimal Nutrition Intake  Outcome: Ongoing, Progressing     Problem: Renal Function Impairment (Acute Kidney Injury/Impairment)  Goal: Effective Renal Function  Outcome: Ongoing, Progressing     Problem: Impaired Wound Healing  Goal: Optimal Wound Healing  Outcome: Ongoing, Progressing     Problem: Diabetes Comorbidity  Goal: Blood Glucose Level Within Targeted Range  Outcome: Ongoing, Progressing     Problem: Skin Injury Risk Increased  Goal: Skin Health and Integrity  Outcome: Ongoing, Progressing

## 2023-01-18 NOTE — DISCHARGE SUMMARY
[facility] Hospital Medicine Discharge Summary    Admit Date: 1/13/2023  Discharge Date and Time: 1/18/20231:04 PM  Admitting Physician: [facility] Team  Discharging Physician: Ranjan Cage MD.  Primary Care Physician: David Castañeda MD  Consults: Nephrology    Discharge Diagnoses:  CKD5 non oliguric   Hypertension, benign   Hyperkalemia and metabolic acidosis- resolved   Acute on chronic disease anemia and iron deficiency  Hypertensive urgency  T2DM- well controlled at 5.3 on 1/12/2023    Hospital Course:   This is an 87-year-old female with medical history of T2DM, hypertension, hyperlipidemia, anemia of chronic disease/iron-deficiency, CKD stage 4 with creatinine over the past year ranging between 1.8-2 and estimated GFR 24-27 present to the ED for evaluation of abnormal labs found on routine outpatient visit that showed hyperkalemia, worsening renal function and anemia.  Patient herself denies any new complaints over the past 2 months except for worsening claudication of bilateral lower extremities.  Denies melena or hematochezia.  Report no changes in urination.  No recent medication changes.  On arrival to ED she was afebrile, hypertensive, and saturating 97% on room air.  Labs repeated and noted for potassium 5.6, CO2 12, creatinine 5.54, BUN 81.9, hemoglobin 7.5.  Urinalysis show +1 protein.  She was given 1 L of normal saline and started on blood transfusion and subsequently referred to hospital medicine service for further evaluation and management.   Renal team did not recommend HD, advised to watch renal functions for 1-2 days. Her renal function was stable. She was eating well and ambulating with PT. BP meds was adjusted to keep SBP < 140.  HH was set up and she was discharged in a stable condition. She was cleared by renal team for dc.   Pt was seen and examined on the day of discharge  Vitals:  VITAL SIGNS: 24 HRS MIN & MAX LAST   Temp  Min: 97.5 °F (36.4 °C)  Max:  98.3 °F (36.8 °C) 97.5 °F (36.4 °C)   BP  Min: 144/70  Max: 186/68 (!) 144/70     Pulse  Min: 57  Max: 66  (!) 57     Resp  Min: 18  Max: 20 18   SpO2  Min: 91 %  Max: 96 % (!) 91 %         Physical Exam:  Heart RRR  Lungs clear   Abdomen soft and non tender   Neuro: No FND      Procedures Performed: No admission procedures for hospital encounter.     Significant Diagnostic Studies: See Full reports for all details    Recent Labs   Lab 01/15/23  0749 01/17/23  0438 01/18/23  0526   WBC 4.0* 4.0* 4.1*   RBC 3.27* 3.26* 3.22*   HGB 9.4* 9.2* 9.1*   HCT 28.5* 28.9* 29.0*   MCV 87.2 88.7 90.1   MCH 28.7 28.2 28.3   MCHC 33.0 31.8* 31.4*   RDW 16.2 15.9 15.8    200 200   MPV 10.5* 10.5* 10.2       Recent Labs   Lab 01/13/23  1251 01/14/23  0809 01/15/23  0749 01/16/23  0722 01/17/23  0438 01/18/23  0526   * 137 140 142 139 139   K 5.6* 5.3* 3.9 4.0 3.7 3.7   CO2 12* 16* 26 27 26 26   BUN 81.9* 77.2* 71.2* 58.4* 55.1* 51.0*   CREATININE 5.54* 5.43* 4.78* 4.85* 4.20* 4.22*   CALCIUM 9.7 9.3 8.8 9.3 8.9 9.1   MG 1.80 2.00 1.70  --   --   --    ALBUMIN 3.6 3.3* 3.0*  --  2.8*  --    ALKPHOS 46 42 34*  --  37*  --    ALT 6 5 5  --  7  --    AST 9 10 9  --  9  --    BILITOT 0.5 0.6 0.6  --  0.6  --         Microbiology Results (last 7 days)       Procedure Component Value Units Date/Time    Urine culture [[id number]] Collected: 01/13/23 1440    Order Status: Completed Specimen: Urine Updated: 01/15/23 0940     Urine Culture Multiple organisms present indicate probable contamination. Recommend recollection.             US Retroperitoneal Complete  Narrative: EXAMINATION:  US RETROPERITONEAL COMPLETE    Technique: Gray scale and color doppler images of the kidneys and bladder were performed.    Comparison: None.    Clinical history: ED 10 marsha.    Kidneys: There is increased cortical echogenicity with maintained corticomedullary junction distinction seen in both kidneys. Suggestive of chronic renal disease.    Right  Kidney: Measurement long 11.49 cm.    Intrarenal arterial resistive index: Multiple non-shadowing echogenic foci throughout the right kidney could represent tiny calcifications/concretions. There is a simple cyst measuring 2.1 x 2.3 x 1.4 cm in the lower pole. Otherwise unremarkable right kidney with no masses or hydronephrosis identified.    Left Kidney: Unremarkable left kidney with no stones cysts masses or hydronephrosis identified. Measurement long 9.5 cm.    Urinary bladder: Urinary bladder is collapsed at the time of scan.  Impression: Impression:    1. There is increased cortical echogenicity with maintained corticomedullary junction distinction seen in both kidneys. Suggestive of chronic renal disease.    2. Details and other findings as discussed above.    I concur with the preliminary report    Electronically signed by: Sylvester Hunter  Date:    01/14/2023  Time:    10:42         Medication List        START taking these medications      allopurinoL 100 MG tablet  Commonly known as: ZYLOPRIM  Take 1 tablet (100 mg total) by mouth once daily.  Start taking on: January 19, 2023     amLODIPine 10 MG tablet  Commonly known as: NORVASC  Take 1 tablet (10 mg total) by mouth once daily.  Start taking on: January 19, 2023     cloNIDine 0.2 MG tablet  Commonly known as: CATAPRES  Take 1 tablet (0.2 mg total) by mouth 3 (three) times daily.            CHANGE how you take these medications      carvediloL 25 MG tablet  Commonly known as: COREG  Take 1 tablet (25 mg total) by mouth 2 (two) times daily.  What changed:   medication strength  how much to take            CONTINUE taking these medications      aspirin 81 MG Chew     doxazosin 4 MG tablet  Commonly known as: CARDURA     ferrous sulfate, dried 160 mg (50 mg iron) Tbsr  Commonly known as: SLOW FE     fish oil-omega-3 fatty acids 300-1,000 mg capsule     LORazepam 0.5 MG tablet  Commonly known as: ATIVAN  Take 1 tablet (0.5 mg total) by mouth daily as  needed for Anxiety.            STOP taking these medications      blood sugar diagnostic Strp     furosemide 20 MG tablet  Commonly known as: LASIX     metFORMIN 500 MG ER 24hr tablet  Commonly known as: GLUCOPHAGE-XR               Where to Get Your Medications        These medications were sent to [facility] [address] [place] [address]      Phone: [phone]   allopurinoL 100 MG tablet  amLODIPine 10 MG tablet  carvediloL 25 MG tablet  cloNIDine 0.2 MG tablet          Explained in detail to the patient about the discharge plan, medications, and follow-up visits. Pt understands and agrees with the treatment plan  Discharge Disposition: Home or Self Care   Discharged Condition: stable  Diet-   Dietary Orders (From admission, onward)       Start     Ordered    01/16/23 1119  DIET RENAL NON-DIALYSIS  Diet effective now         01/16/23 1118                   Medications Per DC med rec  Activities as tolerated   Follow-up Information       [facility] Follow up.    Specialty: Home Health Services  Why: This is your home health agency  Contact information:  [address] [facility]  [address]  [place] [address]  [phone]               Braydon Hylton MD Follow up in 2 week(s).    Specialty: Nephrology  Why: will call back w/date and time  Follow up in 2-3 weeks with repeat renal function panel and CBC  Contact information:  [address] [facility].  [place] [id number] [phone]                           For further questions contact hospitalist office    Discharge time 33 minutes    For worsening symptoms, chest pain, shortness of breath, increased abdominal pain, high grade fever, stroke or stroke like symptoms, immediately go to the nearest Emergency Room or call 911 as soon as possible.      Ranjan Martin M.D on 1/18/2023. at 1:04 PM.

## 2023-01-18 NOTE — PROGRESS NOTES
Nephrology consult follow up note    HPI:      Rayna Haider is a 87 y.o. female with a past medical history of diabetes mellitus type II, hypertension, hyperlipidemia, anemia of chronic disease and known iron deficiency, and CKD stage 4. She has lost approximately 30 lbs in the last 4 months. Her baseline creatinine over the last year is around 2. On 1/12/23 labs were done with her PCP, revealing a BUN 88 and creatinine 5.1. Renal was consulted for progression of CKD management.    Interval history:     No acute events overnight. IVF stopped yesterday and she is drinking more water. She does have LE edema. No CP, abd pain, N/V.      Review of Systems:     Comprehensive 10pt ROS negative except as noted per HPI.    Past medical, family, surgical, and social history reviewed and unchanged from initial consult note.     Objective:       VITAL SIGNS: 24 HR MIN & MAX LAST    Temp  Min: 97.6 °F (36.4 °C)  Max: 98.3 °F (36.8 °C)  98.3 °F (36.8 °C)        BP  Min: 151/72  Max: 186/68  (!) 151/72     Pulse  Min: 59  Max: 66  66     Resp  Min: 18  Max: 20  18    SpO2  Min: 91 %  Max: 96 %  96 %      GEN: Well appearing elderly WF in NAD  CV: RRR +S1,S2 without murmur  PULM: CTAB, unlabored  ABD: Soft, NT/ND abdomen with NABS  EXT: 1+ BLE edema  SKIN: Warm and dry  PSYCH: Awake, alert and appropriately conversant.   Vascular access: no HD access            Component Value Date/Time     01/18/2023 0526     01/17/2023 0438    K 3.7 01/18/2023 0526    K 3.7 01/17/2023 0438    CHLORIDE 101 01/18/2023 0526    CHLORIDE 102 01/17/2023 0438    CO2 26 01/18/2023 0526    CO2 26 01/17/2023 0438    BUN 51.0 (H) 01/18/2023 0526    BUN 55.1 (H) 01/17/2023 0438    CREATININE 4.22 (H) 01/18/2023 0526    CREATININE 4.20 (H) 01/17/2023 0438    CALCIUM 9.1 01/18/2023 0526    CALCIUM 8.9 01/17/2023 0438    PHOS 5.4 (H) 01/14/2023 0809            Component Value Date/Time    WBC 4.1 (L) 01/18/2023 0526    WBC 4.0 (L) 01/17/2023  0438    HGB 9.1 (L) 01/18/2023 0526    HGB 9.2 (L) 01/17/2023 0438    HCT 29.0 (L) 01/18/2023 0526    HCT 28.9 (L) 01/17/2023 0438     01/18/2023 0526     01/17/2023 0438         Imaging reviewed      Assessment / Plan:       Active Hospital Problems    Diagnosis  POA    *Acute kidney injury superimposed on CKD [N17.9, N18.9]  Yes      Resolved Hospital Problems   No resolved problems to display.       AMPARO superimposed on CKD Stage 4.   Hyperkalemia resolved  Metabolic acidosis resolved  Iron deficiency anemia on ferrilicit  Hypertension-- amlodipine increased to 10mg qd 1/17/23     Plan:  Renal function stable. She appears to be maintaining herself without IVF. Continue to encourage PO water intake. Ok to discharge from a nephrology standpoint. Follow up with Dr. Hylton in a few weeks.       Jonathan Danielson DO  Nephrology  [facility]  Clinic number: [phone]

## 2023-01-19 ENCOUNTER — PATIENT OUTREACH (OUTPATIENT)
Dept: ADMINISTRATIVE | Facility: CLINIC | Age: 88
End: 2023-01-19
Payer: MEDICARE

## 2023-01-19 LAB — POCT GLUCOSE: 160 MG/DL (ref 70–110)

## 2023-01-20 PROCEDURE — G0180 MD CERTIFICATION HHA PATIENT: HCPCS | Mod: ,,, | Performed by: INTERNAL MEDICINE

## 2023-01-20 PROCEDURE — G0180 PR HOME HEALTH MD CERTIFICATION: ICD-10-PCS | Mod: ,,, | Performed by: INTERNAL MEDICINE

## 2023-01-20 NOTE — PROGRESS NOTES
C3 nurse spoke with Rayna Haider for a TCC post hospital discharge follow up call. The patient has a scheduled HOS appointment with Braydon Hylton MD (Nephrology) on 2/2/2023; @1298 and David Castañeda MD (Internal Medicine) on 1/23/2023; @0940.

## 2023-01-23 ENCOUNTER — OFFICE VISIT (OUTPATIENT)
Dept: INTERNAL MEDICINE | Facility: CLINIC | Age: 88
End: 2023-01-23
Payer: MEDICARE

## 2023-01-23 VITALS
BODY MASS INDEX: 34.06 KG/M2 | HEIGHT: 61 IN | DIASTOLIC BLOOD PRESSURE: 62 MMHG | SYSTOLIC BLOOD PRESSURE: 128 MMHG | WEIGHT: 180.38 LBS | OXYGEN SATURATION: 95 % | HEART RATE: 60 BPM

## 2023-01-23 DIAGNOSIS — E11.9 TYPE 2 DIABETES MELLITUS WITHOUT COMPLICATION, WITHOUT LONG-TERM CURRENT USE OF INSULIN: Chronic | ICD-10-CM

## 2023-01-23 DIAGNOSIS — E11.3512 TYPE 2 DIABETES MELLITUS WITH LEFT EYE AFFECTED BY PROLIFERATIVE RETINOPATHY AND MACULAR EDEMA, WITHOUT LONG-TERM CURRENT USE OF INSULIN: ICD-10-CM

## 2023-01-23 DIAGNOSIS — E11.3311 TYPE 2 DIABETES MELLITUS WITH RIGHT EYE AFFECTED BY MODERATE NONPROLIFERATIVE RETINOPATHY AND MACULAR EDEMA, WITHOUT LONG-TERM CURRENT USE OF INSULIN: ICD-10-CM

## 2023-01-23 DIAGNOSIS — N17.9 ACUTE KIDNEY INJURY SUPERIMPOSED ON CKD: Chronic | ICD-10-CM

## 2023-01-23 DIAGNOSIS — E55.9 VITAMIN D DEFICIENCY: Chronic | ICD-10-CM

## 2023-01-23 DIAGNOSIS — Z09 HOSPITAL DISCHARGE FOLLOW-UP: Primary | ICD-10-CM

## 2023-01-23 DIAGNOSIS — N18.9 ACUTE KIDNEY INJURY SUPERIMPOSED ON CKD: Chronic | ICD-10-CM

## 2023-01-23 DIAGNOSIS — R54 FRAILTY: ICD-10-CM

## 2023-01-23 DIAGNOSIS — I10 HYPERTENSION, UNSPECIFIED TYPE: Chronic | ICD-10-CM

## 2023-01-23 DIAGNOSIS — N18.2 CKD (CHRONIC KIDNEY DISEASE), STAGE II: Chronic | ICD-10-CM

## 2023-01-23 PROCEDURE — 99214 PR OFFICE/OUTPT VISIT, EST, LEVL IV, 30-39 MIN: ICD-10-PCS | Mod: ,,, | Performed by: INTERNAL MEDICINE

## 2023-01-23 PROCEDURE — 99214 OFFICE O/P EST MOD 30 MIN: CPT | Mod: ,,, | Performed by: INTERNAL MEDICINE

## 2023-01-23 NOTE — PROGRESS NOTES
David Castañeda MD        PATIENT NAME: Rayna Haider  : 1935  DATE: 23  MRN: [id number]      Billing Provider: David Castañeda MD  Level of Service: [place] OFFICE/OUTPT VISIT, EST, ANDRES IV, 30-39 MIN  Patient PCP Information       Provider PCP Type    David Castañeda MD General            Reason for Visit / Chief Complaint: Hosp F/U       Update PCP  Update Chief Complaint         History of Present Illness / Problem Focused Workflow     Rayna Haider presents to the clinic with Hosp F/U     Rayna was admitted to the hospital  and state to the 18 she would a diagnosis of acute kidney disease on top of chronic kidney disease   Her medications were adjusted and she was treated by Nephrology she has an outpatient visit scheduled with Dr. Hylton he took her off her Lasix and her metformin.  She is feeling better and following      Review of Systems   Review of Systems   Constitutional:  Positive for fatigue.   HENT: Negative.     Eyes: Negative.    Respiratory: Negative.     Cardiovascular: Negative.    Gastrointestinal: Negative.    Endocrine: Negative.    Genitourinary: Negative.    Musculoskeletal: Negative.    Integumentary:  Negative.   Neurological:  Positive for weakness.   Psychiatric/Behavioral: Negative.        Medical / Social / Family History     Past Medical History:   Diagnosis Date    Diabetes mellitus, type 2     Hyperkalemia     Hypertension     Vitamin D deficiency        Past Surgical History:   Procedure Laterality Date    EYE SURGERY      HYSTERECTOMY      RETINAL DETACHMENT SURGERY Left        Social History  Ms. Haider  reports that she has never smoked. She has never used smokeless tobacco. She reports that she does not currently use alcohol. She reports that she does not use drugs.    Family History  Ms.'s Haider  family history includes Cancer in her brother, father, and mother; Diabetes in her brother and father; Glaucoma in her  mother.    Medications and Allergies     Medications  Outpatient Medications Marked as Taking for the 1/23/23 encounter (Office Visit) with David Moore MD   Medication Sig Dispense Refill    allopurinoL (ZYLOPRIM) 100 MG tablet Take 1 tablet (100 mg total) by mouth once daily. 30 tablet 0    amLODIPine (NORVASC) 10 MG tablet Take 1 tablet (10 mg total) by mouth once daily. 30 tablet 11    aspirin 81 MG Chew Take 81 mg by mouth once daily.      carvediloL (COREG) 25 MG tablet Take 1 tablet (25 mg total) by mouth 2 (two) times daily. 60 tablet 11    cloNIDine (CATAPRES) 0.2 MG tablet Take 1 tablet (0.2 mg total) by mouth 3 (three) times daily. 90 tablet 11    doxazosin (CARDURA) 4 MG tablet Take 4 mg by mouth once daily.      ferrous sulfate, dried (SLOW FE) 160 mg (50 mg iron) TbSR Take 160 mg by mouth once daily.      LORazepam (ATIVAN) 0.5 MG tablet Take 1 tablet (0.5 mg total) by mouth daily as needed for Anxiety. 30 tablet 5       Allergies  Review of patient's allergies indicates:   Allergen Reactions    Hydralazide Nausea And Vomiting     Pt also gets Tremors when taking this med       Physical Examination     Vitals:    01/23/23 1002   BP: 128/62   Pulse: 60     Physical Exam  Constitutional:       Appearance: Normal appearance. She is ill-appearing.   HENT:      Head: Normocephalic and atraumatic.      Right Ear: Tympanic membrane normal.      Left Ear: Tympanic membrane normal.      Nose: Nose normal.      Mouth/Throat:      Mouth: Mucous membranes are moist.   Eyes:      Extraocular Movements: Extraocular movements intact.      Pupils: Pupils are equal, round, and reactive to light.   Cardiovascular:      Rate and Rhythm: Normal rate and regular rhythm.      Pulses: Normal pulses.   Pulmonary:      Effort: Pulmonary effort is normal.      Breath sounds: Normal breath sounds.   Abdominal:      General: Abdomen is flat. Bowel sounds are normal.      Palpations: Abdomen is soft.   Musculoskeletal:          General: Normal range of motion.      Cervical back: Normal range of motion and neck supple.   Skin:     General: Skin is warm and dry.   Neurological:      General: No focal deficit present.      Mental Status: She is alert and oriented to person, place, and time.   Psychiatric:         Mood and Affect: Mood normal.         Behavior: Behavior normal.        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, without long-term current use of insulin     Discussion of hospital treatment and evaluation   Review data from hospital records   Discussed plan with Nephrology   Discussion on her diabetes which is very well controlled   At this point she may not need any medication   Discussed goal for A1c to be at 8  At her age and condition allowed to liberalize her diet enjoy her meals   Plan to revisit 3 months for follow-up   Plan to schedule lab with her home health agency to check her labs this week at home   Time spent with patient and daughter time reviewing medical records from the hospital time to discussion of all the events time to coordinate care with home health time to make future orders greater than 45 minutes.           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, without long-term current use of insulin    Vitamin D deficiency (Chronic)     Other Visit Diagnoses       Hospital discharge follow-up    -  Primary            Health Maintenance Topics with due status: Not Due       Topic Last Completion Date    Lipid Panel 02/23/2022    Eye Exam 10/05/2022    Hemoglobin A1c 01/12/2023       Future Appointments   Date Time Provider Department Center   9/12/2023 11:20 AM Rosie Flores MD [place] [address]            Signature:  David Castañeda MD  [facility]  [address] [place] [id number]    Date of encounter: 1/23/23

## 2023-01-26 ENCOUNTER — TELEPHONE (OUTPATIENT)
Dept: INTERNAL MEDICINE | Facility: CLINIC | Age: 88
End: 2023-01-26
Payer: MEDICARE

## 2023-01-26 NOTE — TELEPHONE ENCOUNTER
----- Message from Antonette Carreon sent at 1/26/2023  3:48 PM CST -----  Regarding: OT?  Blanqu[facility] is requesting to add on OT Eval and treat/    Please call Natalie VEGA/ [facility]. [phone] (cell), (office) [phone], (fax) [phone]    She will even take a verbal

## 2023-01-27 ENCOUNTER — TELEPHONE (OUTPATIENT)
Dept: INTERNAL MEDICINE | Facility: CLINIC | Age: 88
End: 2023-01-27
Payer: MEDICARE

## 2023-01-27 DIAGNOSIS — R60.9 EDEMA, UNSPECIFIED TYPE: Primary | ICD-10-CM

## 2023-01-27 RX ORDER — FUROSEMIDE 20 MG/1
20 TABLET ORAL DAILY
Qty: 7 TABLET | Refills: 0 | Status: ON HOLD | OUTPATIENT
Start: 2023-01-27 | End: 2023-02-10 | Stop reason: HOSPADM

## 2023-01-27 NOTE — TELEPHONE ENCOUNTER
----- Message from Belkys Berry sent at 1/27/2023  8:53 AM CST -----  Regarding: weight gain  Francie from [employer] called and said patient had weight gain of 3 lbs in a day. Yesterday she was 177 and today she is 180. She has swelling and edema in legs. Call francie at [phone]

## 2023-01-27 NOTE — TELEPHONE ENCOUNTER
Spoke to Pt,Kaye, and home health about new orders, HH will call lab to add BNP and awaiting call back from Dr Hylton [phone] about lasix order

## 2023-01-27 NOTE — TELEPHONE ENCOUNTER
Spoke to on-call at Dr Hylton office due to Pt stating she was concerned about starting lasix because Dr Hylton took her off of medication in hospital, on call stated she can take a dose today and hold medication until lab results are back from this AM, Blanquita was called to add BNP to lab order

## 2023-01-30 ENCOUNTER — HOSPITAL ENCOUNTER (INPATIENT)
Facility: HOSPITAL | Age: 88
LOS: 11 days | Discharge: HOME HEALTH | DRG: 291 | End: 2023-02-10
Attending: EMERGENCY MEDICINE | Admitting: EMERGENCY MEDICINE
Payer: MEDICARE

## 2023-01-30 ENCOUNTER — OFFICE VISIT (OUTPATIENT)
Dept: URGENT CARE | Facility: CLINIC | Age: 88
End: 2023-01-30
Payer: MEDICARE

## 2023-01-30 ENCOUNTER — TELEPHONE (OUTPATIENT)
Dept: INTERNAL MEDICINE | Facility: CLINIC | Age: 88
End: 2023-01-30
Payer: MEDICARE

## 2023-01-30 VITALS
HEART RATE: 54 BPM | RESPIRATION RATE: 16 BRPM | HEIGHT: 59 IN | OXYGEN SATURATION: 96 % | DIASTOLIC BLOOD PRESSURE: 71 MMHG | SYSTOLIC BLOOD PRESSURE: 148 MMHG | BODY MASS INDEX: 37.17 KG/M2 | WEIGHT: 184.38 LBS | TEMPERATURE: 98 F

## 2023-01-30 DIAGNOSIS — E87.1 HYPONATREMIA: ICD-10-CM

## 2023-01-30 DIAGNOSIS — N18.6 ESRD (END STAGE RENAL DISEASE) ON DIALYSIS: ICD-10-CM

## 2023-01-30 DIAGNOSIS — E87.1 HYPONATREMIA WITH EXCESS EXTRACELLULAR FLUID VOLUME: Primary | ICD-10-CM

## 2023-01-30 DIAGNOSIS — R89.9 ABNORMAL LABORATORY TEST RESULT: Primary | ICD-10-CM

## 2023-01-30 DIAGNOSIS — I50.9 CHF (CONGESTIVE HEART FAILURE): ICD-10-CM

## 2023-01-30 DIAGNOSIS — R01.1 HEART MURMUR: ICD-10-CM

## 2023-01-30 DIAGNOSIS — Z99.2 ESRD (END STAGE RENAL DISEASE) ON DIALYSIS: ICD-10-CM

## 2023-01-30 DIAGNOSIS — R53.1 WEAKNESS: ICD-10-CM

## 2023-01-30 DIAGNOSIS — N18.4 STAGE 4 CHRONIC KIDNEY DISEASE: ICD-10-CM

## 2023-01-30 DIAGNOSIS — R00.1 BRADYCARDIA: ICD-10-CM

## 2023-01-30 DIAGNOSIS — R07.9 CHEST PAIN: ICD-10-CM

## 2023-01-30 LAB
ALBUMIN SERPL-MCNC: 3.4 G/DL (ref 3.4–4.8)
ALBUMIN/GLOB SERPL: 1.1 RATIO (ref 1.1–2)
ALP SERPL-CCNC: 49 UNIT/L (ref 40–150)
ALT SERPL-CCNC: 11 UNIT/L (ref 0–55)
ANION GAP SERPL CALC-SCNC: 16 MEQ/L
APPEARANCE UR: CLEAR
AST SERPL-CCNC: 15 UNIT/L (ref 5–34)
BACTERIA #/AREA URNS AUTO: ABNORMAL /HPF
BASOPHILS # BLD AUTO: 0.03 X10(3)/MCL (ref 0–0.2)
BASOPHILS NFR BLD AUTO: 0.6 %
BILIRUB UR QL STRIP.AUTO: NEGATIVE MG/DL
BILIRUBIN DIRECT+TOT PNL SERPL-MCNC: 0.4 MG/DL
BNP BLD-MCNC: 232.9 PG/ML
BUN SERPL-MCNC: 57.9 MG/DL (ref 9.8–20.1)
BUN SERPL-MCNC: 58.4 MG/DL (ref 9.8–20.1)
CALCIUM SERPL-MCNC: 9.4 MG/DL (ref 8.4–10.2)
CALCIUM SERPL-MCNC: 9.7 MG/DL (ref 8.4–10.2)
CHLORIDE SERPL-SCNC: 85 MMOL/L (ref 98–107)
CHLORIDE SERPL-SCNC: 86 MMOL/L (ref 98–107)
CO2 SERPL-SCNC: 20 MMOL/L (ref 23–31)
CO2 SERPL-SCNC: 21 MMOL/L (ref 23–31)
COLOR UR AUTO: ABNORMAL
CREAT SERPL-MCNC: 4.39 MG/DL (ref 0.55–1.02)
CREAT SERPL-MCNC: 4.69 MG/DL (ref 0.55–1.02)
CREAT UR-MCNC: 62.5 MG/DL (ref 47–110)
CREAT/UREA NIT SERPL: 12
EOSINOPHIL # BLD AUTO: 0.11 X10(3)/MCL (ref 0–0.9)
EOSINOPHIL NFR BLD AUTO: 2.4 %
ERYTHROCYTE [DISTWIDTH] IN BLOOD BY AUTOMATED COUNT: 14.5 % (ref 11.5–17)
GFR SERPLBLD CREATININE-BSD FMLA CKD-EPI: 9 MLS/MIN/1.73/M2
GFR SERPLBLD CREATININE-BSD FMLA CKD-EPI: 9 MLS/MIN/1.73/M2
GLOBULIN SER-MCNC: 3.1 GM/DL (ref 2.4–3.5)
GLUCOSE SERPL-MCNC: 127 MG/DL (ref 82–115)
GLUCOSE SERPL-MCNC: 148 MG/DL (ref 82–115)
GLUCOSE UR QL STRIP.AUTO: NORMAL MG/DL
HCT VFR BLD AUTO: 30.9 % (ref 37–47)
HGB BLD-MCNC: 10.4 GM/DL (ref 12–16)
HYALINE CASTS #/AREA URNS LPF: ABNORMAL /LPF
IMM GRANULOCYTES # BLD AUTO: 0.01 X10(3)/MCL (ref 0–0.04)
IMM GRANULOCYTES NFR BLD AUTO: 0.2 %
KETONES UR QL STRIP.AUTO: NEGATIVE MG/DL
LEUKOCYTE ESTERASE UR QL STRIP.AUTO: 250 UNIT/L
LYMPHOCYTES # BLD AUTO: 0.78 X10(3)/MCL (ref 0.6–4.6)
LYMPHOCYTES NFR BLD AUTO: 16.7 %
MAGNESIUM SERPL-MCNC: 1.9 MG/DL (ref 1.6–2.6)
MCH RBC QN AUTO: 29 PG
MCHC RBC AUTO-ENTMCNC: 33.7 MG/DL (ref 33–36)
MCV RBC AUTO: 86.1 FL (ref 80–94)
MONOCYTES # BLD AUTO: 0.41 X10(3)/MCL (ref 0.1–1.3)
MONOCYTES NFR BLD AUTO: 8.8 %
NEUTROPHILS # BLD AUTO: 3.33 X10(3)/MCL (ref 2.1–9.2)
NEUTROPHILS NFR BLD AUTO: 71.3 %
NITRITE UR QL STRIP.AUTO: NEGATIVE
NON-SQ EPI CELLS URNS QL MICRO: ABNORMAL /HPF
NRBC BLD AUTO-RTO: 0 %
PH UR STRIP.AUTO: 5 [PH]
PHOSPHATE SERPL-MCNC: 5.5 MG/DL (ref 2.3–4.7)
PLATELET # BLD AUTO: 209 X10(3)/MCL (ref 130–400)
PMV BLD AUTO: 11.8 FL (ref 7.4–10.4)
POTASSIUM SERPL-SCNC: 4.2 MMOL/L (ref 3.5–5.1)
POTASSIUM SERPL-SCNC: 4.2 MMOL/L (ref 3.5–5.1)
PROT SERPL-MCNC: 6.5 GM/DL (ref 5.8–7.6)
PROT UR QL STRIP.AUTO: ABNORMAL MG/DL
PROT UR STRIP-MCNC: 24.7 MG/DL
RBC # BLD AUTO: 3.59 X10(6)/MCL (ref 4.2–5.4)
RBC #/AREA URNS AUTO: ABNORMAL /HPF
RBC UR QL AUTO: ABNORMAL UNIT/L
SODIUM SERPL-SCNC: 121 MMOL/L (ref 136–145)
SODIUM SERPL-SCNC: 121 MMOL/L (ref 136–145)
SODIUM UR-SCNC: 26.6 MMOL/L
SP GR UR STRIP.AUTO: 1.01
SQUAMOUS #/AREA URNS LPF: ABNORMAL /HPF
TSH SERPL-ACNC: 2.27 UIU/ML (ref 0.35–4.94)
UNIDENT CRYS #/AREA URNS HPF: ABNORMAL /HPF
URATE SERPL-MCNC: 7 MG/DL (ref 2.6–6)
UROBILINOGEN UR STRIP-ACNC: NORMAL MG/DL
UUN UR-MCNC: 295 MG/DL
WBC # SPEC AUTO: 4.7 X10(3)/MCL (ref 4.5–11.5)
WBC #/AREA URNS AUTO: ABNORMAL /HPF

## 2023-01-30 PROCEDURE — 80053 COMPREHEN METABOLIC PANEL: CPT | Performed by: NURSE PRACTITIONER

## 2023-01-30 PROCEDURE — 84300 ASSAY OF URINE SODIUM: CPT | Performed by: EMERGENCY MEDICINE

## 2023-01-30 PROCEDURE — 82570 ASSAY OF URINE CREATININE: CPT | Performed by: NURSE PRACTITIONER

## 2023-01-30 PROCEDURE — 63600175 PHARM REV CODE 636 W HCPCS: Performed by: INTERNAL MEDICINE

## 2023-01-30 PROCEDURE — 85025 COMPLETE CBC W/AUTO DIFF WBC: CPT | Performed by: NURSE PRACTITIONER

## 2023-01-30 PROCEDURE — 25000003 PHARM REV CODE 250: Performed by: INTERNAL MEDICINE

## 2023-01-30 PROCEDURE — 99285 EMERGENCY DEPT VISIT HI MDM: CPT | Mod: 25,27

## 2023-01-30 PROCEDURE — 63600175 PHARM REV CODE 636 W HCPCS: Performed by: EMERGENCY MEDICINE

## 2023-01-30 PROCEDURE — 84156 ASSAY OF PROTEIN URINE: CPT | Performed by: NURSE PRACTITIONER

## 2023-01-30 PROCEDURE — 99213 PR OFFICE/OUTPT VISIT, EST, LEVL III, 20-29 MIN: ICD-10-PCS | Mod: S$PBB,,, | Performed by: NURSE PRACTITIONER

## 2023-01-30 PROCEDURE — 83735 ASSAY OF MAGNESIUM: CPT | Performed by: NURSE PRACTITIONER

## 2023-01-30 PROCEDURE — 36415 COLL VENOUS BLD VENIPUNCTURE: CPT | Performed by: NURSE PRACTITIONER

## 2023-01-30 PROCEDURE — 11000001 HC ACUTE MED/SURG PRIVATE ROOM

## 2023-01-30 PROCEDURE — 84300 ASSAY OF URINE SODIUM: CPT | Performed by: NURSE PRACTITIONER

## 2023-01-30 PROCEDURE — 96374 THER/PROPH/DIAG INJ IV PUSH: CPT

## 2023-01-30 PROCEDURE — 83880 ASSAY OF NATRIURETIC PEPTIDE: CPT | Performed by: NURSE PRACTITIONER

## 2023-01-30 PROCEDURE — 99213 OFFICE O/P EST LOW 20 MIN: CPT | Mod: S$PBB,,, | Performed by: NURSE PRACTITIONER

## 2023-01-30 PROCEDURE — 81001 URINALYSIS AUTO W/SCOPE: CPT | Performed by: NURSE PRACTITIONER

## 2023-01-30 PROCEDURE — 84520 ASSAY OF UREA NITROGEN: CPT | Performed by: NURSE PRACTITIONER

## 2023-01-30 PROCEDURE — 99215 OFFICE O/P EST HI 40 MIN: CPT | Mod: PBBFAC | Performed by: NURSE PRACTITIONER

## 2023-01-30 PROCEDURE — 84443 ASSAY THYROID STIM HORMONE: CPT | Performed by: INTERNAL MEDICINE

## 2023-01-30 PROCEDURE — 84100 ASSAY OF PHOSPHORUS: CPT | Performed by: NURSE PRACTITIONER

## 2023-01-30 PROCEDURE — 83935 ASSAY OF URINE OSMOLALITY: CPT | Performed by: EMERGENCY MEDICINE

## 2023-01-30 PROCEDURE — 84550 ASSAY OF BLOOD/URIC ACID: CPT | Performed by: INTERNAL MEDICINE

## 2023-01-30 RX ORDER — FUROSEMIDE 10 MG/ML
40 INJECTION INTRAMUSCULAR; INTRAVENOUS
Status: COMPLETED | OUTPATIENT
Start: 2023-01-30 | End: 2023-01-30

## 2023-01-30 RX ORDER — CLONIDINE HYDROCHLORIDE 0.2 MG/1
0.2 TABLET ORAL 2 TIMES DAILY
Status: DISCONTINUED | OUTPATIENT
Start: 2023-01-30 | End: 2023-02-10 | Stop reason: HOSPADM

## 2023-01-30 RX ORDER — ONDANSETRON 2 MG/ML
4 INJECTION INTRAMUSCULAR; INTRAVENOUS EVERY 8 HOURS PRN
Status: DISCONTINUED | OUTPATIENT
Start: 2023-01-30 | End: 2023-01-31

## 2023-01-30 RX ORDER — NAPROXEN SODIUM 220 MG/1
81 TABLET, FILM COATED ORAL DAILY
Status: DISCONTINUED | OUTPATIENT
Start: 2023-01-31 | End: 2023-02-10 | Stop reason: HOSPADM

## 2023-01-30 RX ORDER — DOXAZOSIN 4 MG/1
4 TABLET ORAL DAILY
Status: DISCONTINUED | OUTPATIENT
Start: 2023-01-31 | End: 2023-02-10 | Stop reason: HOSPADM

## 2023-01-30 RX ORDER — SODIUM CHLORIDE 0.9 % (FLUSH) 0.9 %
10 SYRINGE (ML) INJECTION
Status: DISCONTINUED | OUTPATIENT
Start: 2023-01-30 | End: 2023-02-10 | Stop reason: HOSPADM

## 2023-01-30 RX ORDER — CARVEDILOL 12.5 MG/1
25 TABLET ORAL 2 TIMES DAILY
Status: DISCONTINUED | OUTPATIENT
Start: 2023-01-30 | End: 2023-02-05

## 2023-01-30 RX ORDER — FUROSEMIDE 10 MG/ML
80 INJECTION INTRAMUSCULAR; INTRAVENOUS
Status: DISCONTINUED | OUTPATIENT
Start: 2023-01-30 | End: 2023-01-31

## 2023-01-30 RX ORDER — LANOLIN ALCOHOL/MO/W.PET/CERES
1 CREAM (GRAM) TOPICAL DAILY
Status: DISCONTINUED | OUTPATIENT
Start: 2023-01-31 | End: 2023-02-10 | Stop reason: HOSPADM

## 2023-01-30 RX ORDER — TALC
6 POWDER (GRAM) TOPICAL NIGHTLY PRN
Status: DISCONTINUED | OUTPATIENT
Start: 2023-01-30 | End: 2023-02-10 | Stop reason: HOSPADM

## 2023-01-30 RX ORDER — ALLOPURINOL 100 MG/1
100 TABLET ORAL DAILY
Status: DISCONTINUED | OUTPATIENT
Start: 2023-01-31 | End: 2023-02-10 | Stop reason: HOSPADM

## 2023-01-30 RX ORDER — ENOXAPARIN SODIUM 100 MG/ML
30 INJECTION SUBCUTANEOUS EVERY 24 HOURS
Status: DISCONTINUED | OUTPATIENT
Start: 2023-01-30 | End: 2023-02-10 | Stop reason: HOSPADM

## 2023-01-30 RX ADMIN — CLONIDINE HYDROCHLORIDE 0.2 MG: 0.2 TABLET ORAL at 11:01

## 2023-01-30 RX ADMIN — FUROSEMIDE 80 MG: 10 INJECTION, SOLUTION INTRAVENOUS at 11:01

## 2023-01-30 RX ADMIN — FUROSEMIDE 40 MG: 10 INJECTION, SOLUTION INTRAMUSCULAR; INTRAVENOUS at 04:01

## 2023-01-30 RX ADMIN — ENOXAPARIN SODIUM 30 MG: 30 INJECTION SUBCUTANEOUS at 07:01

## 2023-01-30 RX ADMIN — CARVEDILOL 25 MG: 25 TABLET, FILM COATED ORAL at 11:01
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
## 2023-01-30 NOTE — TELEPHONE ENCOUNTER
Have reviewed laboratory data home health sent in today  BUN and creatinine are similar to that of last week BUN 55 creatinine 4 of significance is the sodium is now down to 125 this is a significant decline and needs immediate attention   Recommend her with her kidney failure weight gain and hyponatremia proceed back to the hospital for further evaluation to be addressed by Nephrology

## 2023-01-30 NOTE — PATIENT INSTRUCTIONS
Your labs will be Cc'ed or forwarded directly to Dr. Hylton.  Either our clinic or Dr. Hylton's office will advise you of the next step in the plan of care.

## 2023-01-30 NOTE — PROGRESS NOTES
"Subjective:       Patient ID: Rayna Haider is a 87 y.o. female.    Vitals:  height is 4' 10.66" (1.49 m) and weight is 83.6 kg (184 lb 6.4 oz). Her oral temperature is 97.7 °F (36.5 °C). Her blood pressure is 148/71 (abnormal) and her pulse is 54 (abnormal). Her respiration is 16 and oxygen saturation is 96%.     Chief Complaint: bloodwork and urine    HPI had blood work done with PCP/[facility], abnormal results, unknown #s, repeat labs today to determine need for hospital readmission; recent d/c from [facility] for Stage IV CKD, no dialysis. Reports took ativan 0.5mg PO pta. Denies SOB/Chest Pain. Reports edema onset Friday, rec'd 7 day Rx for lasix from PCP, no improvement. Yesterday weight 181 at home, today 183 at home. 1.5-2 weeks ago HH admit performed and weighed 167#.    ROS    Objective:      Physical Exam   Constitutional: She is oriented to person, place, and time.  Non-toxic appearance. She appears ill. No distress. obesity  HENT:   Nose: No rhinorrhea or congestion.   Cardiovascular: Bradycardia present.   Pulmonary/Chest: Effort normal.   Abdominal: Normal appearance.   Musculoskeletal:         General: Swelling present.      Right lower leg: Edema present.      Left lower leg: Edema present.   Neurological: She is alert and oriented to person, place, and time.   Skin: Skin is not diaphoretic.   Psychiatric: Her behavior is normal. Mood normal.   Vitals reviewed.    Slow to answer questions, slow recall. But oriented. Understands visit/current plan.  Assessment:       1. Abnormal laboratory test result            Plan:         Abnormal laboratory test result  -     CBC Auto Differential  -     Comprehensive Metabolic Panel  -     Magnesium  -     Urinalysis, Reflex to Urine Culture  -     Phosphorus  -     BNP  -     Sodium, Random Urine  -     Protein, Random Urine  -     Urea Nitrogen, Random Urine  -     Creatinine, Random Urine         Your labs will be Cc'ed or forwarded directly to Dr." Concetta.  Either our clinic or Dr. Hylton's office will advise you of the next step in the plan of care.

## 2023-01-30 NOTE — Clinical Note
Diagnosis: Hyponatremia [198519]   Admitting Provider:: VICKI GALLAGHER [[id number]]   Future Attending Provider: GISELLA SILVA [[id number]]   Reason for IP Medical Treatment  (Clinical interventions that can only be accomplished in the IP setting? ) :: significant weight gain - need for diuresis and/or dialysis   Estimated Length of Stay:: 3-4 midnights   I certify that Inpatient services for greater than or equal to 2 midnights are medically necessary:: Yes   Plans for Post-Acute care--if anticipated (pick the single best option):: C. Discharge home with home health services   Special Needs:: No Special Needs [1]

## 2023-01-30 NOTE — TELEPHONE ENCOUNTER
Kaye called and notified to go to ER, stated understanding, labs being faxed to [facility] [phone]

## 2023-01-30 NOTE — ED TRIAGE NOTES
Pt instructed to go to ED by MATT Cheung for further evaluation and admission relating ongoing abnormal lab values with Dr Hylton as nephrologist.

## 2023-01-31 ENCOUNTER — APPOINTMENT (OUTPATIENT)
Dept: CARDIOLOGY | Facility: HOSPITAL | Age: 88
End: 2023-01-31
Attending: INTERNAL MEDICINE
Payer: MEDICARE

## 2023-01-31 PROBLEM — E87.1 HYPONATREMIA: Status: ACTIVE | Noted: 2023-01-31

## 2023-01-31 LAB
ALBUMIN SERPL-MCNC: 2.8 G/DL (ref 3.4–4.8)
ALBUMIN/GLOB SERPL: 1 RATIO (ref 1.1–2)
ALP SERPL-CCNC: 47 UNIT/L (ref 40–150)
ALT SERPL-CCNC: 10 UNIT/L (ref 0–55)
AST SERPL-CCNC: 13 UNIT/L (ref 5–34)
AV INDEX (PROSTH): 0.78
AV MEAN GRADIENT: 5 MMHG
AV PEAK GRADIENT: 9 MMHG
AV VELOCITY RATIO: 0.71
BASOPHILS # BLD AUTO: 0.04 X10(3)/MCL (ref 0–0.2)
BASOPHILS NFR BLD AUTO: 0.8 %
BILIRUBIN DIRECT+TOT PNL SERPL-MCNC: 0.4 MG/DL
BSA FOR ECHO PROCEDURE: 1.87 M2
BUN SERPL-MCNC: 58.1 MG/DL (ref 9.8–20.1)
CALCIUM SERPL-MCNC: 8.7 MG/DL (ref 8.4–10.2)
CHLORIDE SERPL-SCNC: 88 MMOL/L (ref 98–107)
CO2 SERPL-SCNC: 18 MMOL/L (ref 23–31)
CREAT SERPL-MCNC: 4.45 MG/DL (ref 0.55–1.02)
CV ECHO LV RWT: 0.54 CM
DOP CALC AO PEAK VEL: 1.46 M/S
DOP CALC AO VTI: 33.6 CM
DOP CALC LVOT PEAK VEL: 1.03 M/S
DOP CALCLVOT PEAK VEL VTI: 26.3 CM
E WAVE DECELERATION TIME: 227 MSEC
E/A RATIO: 0.85
E/E' RATIO: 17.85 M/S
ECHO LV POSTERIOR WALL: 1.38 CM (ref 0.6–1.1)
EJECTION FRACTION: 55 %
EOSINOPHIL # BLD AUTO: 0.09 X10(3)/MCL (ref 0–0.9)
EOSINOPHIL NFR BLD AUTO: 1.9 %
ERYTHROCYTE [DISTWIDTH] IN BLOOD BY AUTOMATED COUNT: 15.1 % (ref 11.5–17)
FRACTIONAL SHORTENING: 45 % (ref 28–44)
GFR SERPLBLD CREATININE-BSD FMLA CKD-EPI: 9 MLS/MIN/1.73/M2
GLOBULIN SER-MCNC: 2.8 GM/DL (ref 2.4–3.5)
GLUCOSE SERPL-MCNC: 131 MG/DL (ref 82–115)
HCT VFR BLD AUTO: 25.8 % (ref 37–47)
HGB BLD-MCNC: 8.6 GM/DL (ref 12–16)
IMM GRANULOCYTES # BLD AUTO: 0.02 X10(3)/MCL (ref 0–0.04)
IMM GRANULOCYTES NFR BLD AUTO: 0.4 %
INTERVENTRICULAR SEPTUM: 1.12 CM (ref 0.6–1.1)
LEFT ATRIUM SIZE: 4.1 CM
LEFT ATRIUM VOLUME INDEX MOD: 30.2 ML/M2
LEFT ATRIUM VOLUME MOD: 54.4 CM3
LEFT INTERNAL DIMENSION IN SYSTOLE: 2.8 CM (ref 2.1–4)
LEFT VENTRICLE DIASTOLIC VOLUME INDEX: 68.33 ML/M2
LEFT VENTRICLE DIASTOLIC VOLUME: 123 ML
LEFT VENTRICLE MASS INDEX: 141 G/M2
LEFT VENTRICLE SYSTOLIC VOLUME INDEX: 16.4 ML/M2
LEFT VENTRICLE SYSTOLIC VOLUME: 29.6 ML
LEFT VENTRICULAR INTERNAL DIMENSION IN DIASTOLE: 5.09 CM (ref 3.5–6)
LEFT VENTRICULAR MASS: 254.67 G
LV LATERAL E/E' RATIO: 16.57 M/S
LV SEPTAL E/E' RATIO: 19.33 M/S
LVOT MG: 2 MMHG
LVOT MV: 0.7 CM/S
LYMPHOCYTES # BLD AUTO: 0.92 X10(3)/MCL (ref 0.6–4.6)
LYMPHOCYTES NFR BLD AUTO: 19.2 %
MAGNESIUM SERPL-MCNC: 1.8 MG/DL (ref 1.6–2.6)
MCH RBC QN AUTO: 28.5 PG
MCHC RBC AUTO-ENTMCNC: 33.3 MG/DL (ref 33–36)
MCV RBC AUTO: 85.4 FL (ref 80–94)
MONOCYTES # BLD AUTO: 0.55 X10(3)/MCL (ref 0.1–1.3)
MONOCYTES NFR BLD AUTO: 11.5 %
MV PEAK A VEL: 1.36 M/S
MV PEAK E VEL: 1.16 M/S
NEUTROPHILS # BLD AUTO: 3.16 X10(3)/MCL (ref 2.1–9.2)
NEUTROPHILS NFR BLD AUTO: 66.2 %
NRBC BLD AUTO-RTO: 0 %
OSMOLALITY SERPL: 274 MOSM/KG (ref 280–300)
OSMOLALITY UR: 217 MOSM/KG (ref 300–1300)
PHOSPHATE SERPL-MCNC: 5.6 MG/DL (ref 2.3–4.7)
PLATELET # BLD AUTO: 202 X10(3)/MCL (ref 130–400)
PMV BLD AUTO: 11.6 FL (ref 7.4–10.4)
POCT GLUCOSE: 129 MG/DL (ref 70–110)
POCT GLUCOSE: 151 MG/DL (ref 70–110)
POCT GLUCOSE: 157 MG/DL (ref 70–110)
POCT GLUCOSE: 163 MG/DL (ref 70–110)
POTASSIUM SERPL-SCNC: 3.9 MMOL/L (ref 3.5–5.1)
PROT SERPL-MCNC: 5.6 GM/DL (ref 5.8–7.6)
PTH-INTACT SERPL-MCNC: 223.6 PG/ML (ref 8.7–77)
PV PEAK VELOCITY: 1.4 CM/S
RBC # BLD AUTO: 3.02 X10(6)/MCL (ref 4.2–5.4)
RIGHT VENTRICULAR END-DIASTOLIC DIMENSION: 3.88 CM
SODIUM SERPL-SCNC: 119 MMOL/L (ref 136–145)
SODIUM UR-SCNC: 34 MMOL/L
TDI LATERAL: 0.07 M/S
TDI SEPTAL: 0.06 M/S
TDI: 0.07 M/S
TRICUSPID ANNULAR PLANE SYSTOLIC EXCURSION: 2.24 CM
WBC # SPEC AUTO: 4.8 X10(3)/MCL (ref 4.5–11.5)

## 2023-01-31 PROCEDURE — 93306 TTE W/DOPPLER COMPLETE: CPT

## 2023-01-31 PROCEDURE — 83970 ASSAY OF PARATHORMONE: CPT | Performed by: NURSE PRACTITIONER

## 2023-01-31 PROCEDURE — 63600175 PHARM REV CODE 636 W HCPCS: Performed by: EMERGENCY MEDICINE

## 2023-01-31 PROCEDURE — 83735 ASSAY OF MAGNESIUM: CPT | Performed by: INTERNAL MEDICINE

## 2023-01-31 PROCEDURE — 93306 ECHO (CUPID ONLY): ICD-10-PCS | Mod: 26,,, | Performed by: STUDENT IN AN ORGANIZED HEALTH CARE EDUCATION/TRAINING PROGRAM

## 2023-01-31 PROCEDURE — 93306 TTE W/DOPPLER COMPLETE: CPT | Mod: 26,,, | Performed by: STUDENT IN AN ORGANIZED HEALTH CARE EDUCATION/TRAINING PROGRAM

## 2023-01-31 PROCEDURE — 21400001 HC TELEMETRY ROOM

## 2023-01-31 PROCEDURE — 85025 COMPLETE CBC W/AUTO DIFF WBC: CPT | Performed by: INTERNAL MEDICINE

## 2023-01-31 PROCEDURE — 11000001 HC ACUTE MED/SURG PRIVATE ROOM

## 2023-01-31 PROCEDURE — 63600175 PHARM REV CODE 636 W HCPCS: Performed by: INTERNAL MEDICINE

## 2023-01-31 PROCEDURE — 83930 ASSAY OF BLOOD OSMOLALITY: CPT | Performed by: INTERNAL MEDICINE

## 2023-01-31 PROCEDURE — P9047 ALBUMIN (HUMAN), 25%, 50ML: HCPCS | Mod: JG | Performed by: NURSE PRACTITIONER

## 2023-01-31 PROCEDURE — 25000003 PHARM REV CODE 250: Performed by: NURSE PRACTITIONER

## 2023-01-31 PROCEDURE — 84100 ASSAY OF PHOSPHORUS: CPT | Performed by: INTERNAL MEDICINE

## 2023-01-31 PROCEDURE — 63600175 PHARM REV CODE 636 W HCPCS: Mod: JG | Performed by: NURSE PRACTITIONER

## 2023-01-31 PROCEDURE — 27000221 HC OXYGEN, UP TO 24 HOURS

## 2023-01-31 PROCEDURE — 25000003 PHARM REV CODE 250: Performed by: INTERNAL MEDICINE

## 2023-01-31 PROCEDURE — 25000003 PHARM REV CODE 250: Performed by: EMERGENCY MEDICINE

## 2023-01-31 PROCEDURE — 80053 COMPREHEN METABOLIC PANEL: CPT | Performed by: INTERNAL MEDICINE

## 2023-01-31 RX ORDER — TOLVAPTAN 15 MG/1
15 TABLET ORAL DAILY
Status: DISCONTINUED | OUTPATIENT
Start: 2023-01-31 | End: 2023-02-02

## 2023-01-31 RX ORDER — FUROSEMIDE 10 MG/ML
20 INJECTION INTRAMUSCULAR; INTRAVENOUS 2 TIMES DAILY
Status: DISCONTINUED | OUTPATIENT
Start: 2023-01-31 | End: 2023-01-31

## 2023-01-31 RX ORDER — ONDANSETRON 2 MG/ML
4 INJECTION INTRAMUSCULAR; INTRAVENOUS EVERY 4 HOURS PRN
Status: DISCONTINUED | OUTPATIENT
Start: 2023-01-31 | End: 2023-02-10 | Stop reason: HOSPADM

## 2023-01-31 RX ORDER — AMOXICILLIN 250 MG
2 CAPSULE ORAL 2 TIMES DAILY PRN
Status: DISCONTINUED | OUTPATIENT
Start: 2023-01-31 | End: 2023-02-10 | Stop reason: HOSPADM

## 2023-01-31 RX ORDER — ACETAMINOPHEN 500 MG
1000 TABLET ORAL EVERY 6 HOURS PRN
Status: DISCONTINUED | OUTPATIENT
Start: 2023-01-31 | End: 2023-02-10 | Stop reason: HOSPADM

## 2023-01-31 RX ORDER — GLUCAGON 1 MG
1 KIT INJECTION
Status: DISCONTINUED | OUTPATIENT
Start: 2023-01-31 | End: 2023-02-10 | Stop reason: HOSPADM

## 2023-01-31 RX ORDER — SODIUM BICARBONATE 650 MG/1
1300 TABLET ORAL 2 TIMES DAILY
Status: DISCONTINUED | OUTPATIENT
Start: 2023-01-31 | End: 2023-01-31

## 2023-01-31 RX ORDER — POLYETHYLENE GLYCOL 3350 17 G/17G
17 POWDER, FOR SOLUTION ORAL 2 TIMES DAILY PRN
Status: DISCONTINUED | OUTPATIENT
Start: 2023-01-31 | End: 2023-02-10 | Stop reason: HOSPADM

## 2023-01-31 RX ORDER — ACETAMINOPHEN 325 MG/1
650 TABLET ORAL EVERY 4 HOURS PRN
Status: DISCONTINUED | OUTPATIENT
Start: 2023-01-31 | End: 2023-02-10 | Stop reason: HOSPADM

## 2023-01-31 RX ORDER — PROCHLORPERAZINE EDISYLATE 5 MG/ML
5 INJECTION INTRAMUSCULAR; INTRAVENOUS EVERY 6 HOURS PRN
Status: DISCONTINUED | OUTPATIENT
Start: 2023-01-31 | End: 2023-02-10 | Stop reason: HOSPADM

## 2023-01-31 RX ORDER — FUROSEMIDE 10 MG/ML
80 INJECTION INTRAMUSCULAR; INTRAVENOUS EVERY 8 HOURS
Status: DISCONTINUED | OUTPATIENT
Start: 2023-01-31 | End: 2023-01-31

## 2023-01-31 RX ORDER — MAG HYDROX/ALUMINUM HYD/SIMETH 200-200-20
30 SUSPENSION, ORAL (FINAL DOSE FORM) ORAL 4 TIMES DAILY PRN
Status: DISCONTINUED | OUTPATIENT
Start: 2023-01-31 | End: 2023-02-10 | Stop reason: HOSPADM

## 2023-01-31 RX ORDER — SODIUM BICARBONATE 650 MG/1
1300 TABLET ORAL
Status: DISCONTINUED | OUTPATIENT
Start: 2023-01-31 | End: 2023-02-02

## 2023-01-31 RX ORDER — AMLODIPINE BESYLATE 5 MG/1
10 TABLET ORAL DAILY
Status: DISCONTINUED | OUTPATIENT
Start: 2023-01-31 | End: 2023-02-10 | Stop reason: HOSPADM

## 2023-01-31 RX ORDER — IBUPROFEN 200 MG
24 TABLET ORAL
Status: DISCONTINUED | OUTPATIENT
Start: 2023-01-31 | End: 2023-02-10 | Stop reason: HOSPADM

## 2023-01-31 RX ORDER — IBUPROFEN 200 MG
16 TABLET ORAL
Status: DISCONTINUED | OUTPATIENT
Start: 2023-01-31 | End: 2023-02-10 | Stop reason: HOSPADM

## 2023-01-31 RX ORDER — ALBUMIN HUMAN 250 G/1000ML
12.5 SOLUTION INTRAVENOUS ONCE
Status: COMPLETED | OUTPATIENT
Start: 2023-01-31 | End: 2023-01-31

## 2023-01-31 RX ORDER — LORAZEPAM 0.5 MG/1
0.5 TABLET ORAL DAILY PRN
Status: DISCONTINUED | OUTPATIENT
Start: 2023-01-31 | End: 2023-02-10 | Stop reason: HOSPADM

## 2023-01-31 RX ORDER — SIMETHICONE 80 MG
1 TABLET,CHEWABLE ORAL 4 TIMES DAILY PRN
Status: DISCONTINUED | OUTPATIENT
Start: 2023-01-31 | End: 2023-02-10 | Stop reason: HOSPADM

## 2023-01-31 RX ORDER — INSULIN ASPART 100 [IU]/ML
0-5 INJECTION, SOLUTION INTRAVENOUS; SUBCUTANEOUS
Status: DISCONTINUED | OUTPATIENT
Start: 2023-01-31 | End: 2023-02-10 | Stop reason: HOSPADM

## 2023-01-31 RX ADMIN — SODIUM BICARBONATE 1300 MG: 650 TABLET ORAL at 05:01

## 2023-01-31 RX ADMIN — LORAZEPAM 0.5 MG: 0.5 TABLET ORAL at 09:01

## 2023-01-31 RX ADMIN — ALBUMIN (HUMAN) 12.5 G: 0.25 INJECTION, SOLUTION INTRAVENOUS at 12:01

## 2023-01-31 RX ADMIN — ALLOPURINOL 100 MG: 100 TABLET ORAL at 09:01

## 2023-01-31 RX ADMIN — CARVEDILOL 25 MG: 25 TABLET, FILM COATED ORAL at 09:01

## 2023-01-31 RX ADMIN — FUROSEMIDE 80 MG: 10 INJECTION, SOLUTION INTRAMUSCULAR; INTRAVENOUS at 05:01

## 2023-01-31 RX ADMIN — ENOXAPARIN SODIUM 30 MG: 30 INJECTION SUBCUTANEOUS at 05:01

## 2023-01-31 RX ADMIN — Medication 81 MG: at 09:01

## 2023-01-31 RX ADMIN — TOLVAPTAN 15 MG: 15 TABLET ORAL at 02:01

## 2023-01-31 RX ADMIN — AMLODIPINE BESYLATE 10 MG: 5 TABLET ORAL at 09:01

## 2023-01-31 RX ADMIN — Medication 6 MG: at 09:01

## 2023-01-31 RX ADMIN — CLONIDINE HYDROCHLORIDE 0.2 MG: 0.2 TABLET ORAL at 09:01

## 2023-01-31 RX ADMIN — SODIUM BICARBONATE 1300 MG: 650 TABLET ORAL at 09:01

## 2023-01-31 RX ADMIN — DOXAZOSIN 4 MG: 4 TABLET ORAL at 09:01

## 2023-01-31 RX ADMIN — FERROUS SULFATE TAB 325 MG (65 MG ELEMENTAL FE) 1 EACH: 325 (65 FE) TAB at 09:01
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
## 2023-01-31 NOTE — CONSULTS
[facility]  Nephrology Consultation  Patient Name: Rayna Haider  Age: 87 y.o.  : 1935  MRN: [id number]  Admission Date: 2023    Date of Consultation: 23    Consultation Requested By: ED    Reason for Consultation: AMPARO on CKD     Chief Complaint: Abnormal Lab (Pt instructed to go to ED by MATT Cheung for further evaluation and admission relating ongoing abnormal lab values with Dr Hylton as nephrologist.)      History of Present Illness:  Ms Rayna Haider is a 87 y.o. White female with past medical history of DM type 2, hypertension, hyperlipidemia, anemia of chronic disease and known iron deficiency, CKD stage 4.  In the past year creatinine seemed to be around 2.0 with GFR around 27.  She was seen by our service in the middle of the month when she was admitted for vague symptoms of generalized ill feeling.  Labs have been drawn showing acute kidney injury with BUN 88, creatinine 5.1 with metabolic acidosis and hyperkalemia.  Patient was discharged on  in stable condition.  She started to gain weight comment 1st 3 lb over 24 hour.  Diuretics were prescribed intake and some days with increased urine output though not dramatic increase.  And total she is gained 17 lb over the past week.  She denies fever, chills, malaise.  Denies sick contacts.  Denies lapse in medications or low-sodium diet.  She is put much effort into drinking at least 2 L of water per  Day.        Patient is allergic to hydralazide and hydralazine analogues.     Review of systems: 12 point review of systems conducted, negative except as stated in the HPI.     Past Medical History:  has a past medical history of Diabetes mellitus, type 2, Hyperkalemia, Hypertension, and Vitamin D deficiency.    Procedure History:  has a past surgical history that includes Hysterectomy; Eye surgery; Retinal detachment surgery (Left); and Skin graft (Right).    Family History: family history includes Cancer in  her brother, father, and mother; Diabetes in her brother and father; Glaucoma in her mother.    Social History:  reports that she has never smoked. She has never used smokeless tobacco. She reports that she does not currently use alcohol. She reports that she does not use drugs.    Physical Exam:   BP (!) 139/57   Pulse 60   Temp 97.7 °F (36.5 °C) (Oral)   Resp 18   Ht 5' (1.524 m)   Wt 83 kg (182 lb 15.7 oz)   LMP  (LMP Unknown)   SpO2 (!) 94%   Breastfeeding No   BMI 35.74 kg/m²  Body mass index is 35.74 kg/m².    Physical Exam  Constitutional:       Appearance: She is obese. She is ill-appearing.   HENT:      Head: Atraumatic.      Nose: Nose normal.      Mouth/Throat:      Mouth: Mucous membranes are moist.   Eyes:      Extraocular Movements: Extraocular movements intact.      Conjunctiva/sclera: Conjunctivae normal.   Cardiovascular:      Rate and Rhythm: Normal rate and regular rhythm.   Pulmonary:      Effort: Pulmonary effort is normal.      Comments: NC at 2L  Abdominal:      General: There is no distension.      Palpations: Abdomen is soft.   Musculoskeletal:         General: Swelling present.      Cervical back: Neck supple.      Comments: 2+ pitting edema to BLE and hips   Skin:     General: Skin is warm and dry.   Neurological:      Mental Status: She is alert and oriented to person, place, and time.   Psychiatric:         Mood and Affect: Mood normal.         Behavior: Behavior normal.           Inpatient Medications:     Current Facility-Administered Medications:     acetaminophen tablet 1,000 mg, 1,000 mg, Oral, Q6H PRN, Jennifer Guzman MD    acetaminophen tablet 650 mg, 650 mg, Oral, Q4H PRN, Jennifer Guzman MD    allopurinoL tablet 100 mg, 100 mg, Oral, Daily, Rufina Valencia MD, 100 mg at 01/31/23 0915    aluminum-magnesium hydroxide-simethicone 200-200-20 mg/5 mL suspension 30 mL, 30 mL, Oral, QID PRN, Jennifer Guzman MD    amLODIPine tablet 10 mg, 10 mg, Oral, Daily, Jennifer Guzman MD, 10 mg at  01/31/23 0916    aspirin chewable tablet 81 mg, 81 mg, Oral, Daily, Rufina Valencia MD, 81 mg at 01/31/23 0916    carvediloL tablet 25 mg, 25 mg, Oral, BID, Rufina Valencia MD, 25 mg at 01/31/23 0916    cloNIDine tablet 0.2 mg, 0.2 mg, Oral, BID, Rufina Valencia MD, 0.2 mg at 01/31/23 0915    dextrose 10% bolus 125 mL 125 mL, 12.5 g, Intravenous, PRN, Jennifer Guzman MD    dextrose 10% bolus 250 mL 250 mL, 25 g, Intravenous, PRN, Jennifer Guzman MD    doxazosin tablet 4 mg, 4 mg, Oral, Daily, Rufina Valencia MD, 4 mg at 01/31/23 0915    enoxaparin injection 30 mg, 30 mg, Subcutaneous, Daily, Owen Stokes MD, 30 mg at 01/30/23 1950    ferrous sulfate tablet 1 each, 1 tablet, Oral, Daily, Rufina Valencia MD, 1 each at 01/31/23 0916    furosemide injection 80 mg, 80 mg, Intravenous, Q8H, Jennifer Guzman MD, 80 mg at 01/31/23 0543    glucagon (human recombinant) injection 1 mg, 1 mg, Intramuscular, PRN, Jennifer Guzman MD    glucose chewable tablet 16 g, 16 g, Oral, PRN, Jennifer Guzman MD    glucose chewable tablet 24 g, 24 g, Oral, PRN, Jennifer Guzman MD    insulin aspart U-100 injection 0-5 Units, 0-5 Units, Subcutaneous, QID (AC + HS) PRN, Jennifer Guzman MD    LORazepam tablet 0.5 mg, 0.5 mg, Oral, Daily PRN, Jennifer Guzman MD    melatonin tablet 6 mg, 6 mg, Oral, Nightly PRN, Owen Stokes MD    ondansetron injection 4 mg, 4 mg, Intravenous, Q4H PRN, Jennifer Guzman MD    polyethylene glycol packet 17 g, 17 g, Oral, BID PRN, Jennifer Guzman MD    prochlorperazine injection Soln 5 mg, 5 mg, Intravenous, Q6H PRN, Jennifer Guzman MD    senna-docusate 8.6-50 mg per tablet 2 tablet, 2 tablet, Oral, BID PRN, Jennifer Guzman MD    simethicone chewable tablet 80 mg, 1 tablet, Oral, QID PRN, Jennifer Guzman MD    sodium bicarbonate tablet 1,300 mg, 1,300 mg, Oral, BID, Jennifer Guzman MD, 1,300 mg at 01/31/23 0938    sodium chloride 0.9% flush 10 mL, 10 mL, Intravenous, PRN, Owen Stokes MD     Imaging:  X-Ray Chest 1 View   Final Result       Changes suggestive of bilateral pleural effusions.      Increased left retrocardiac density and silhouetting of the left hemidiaphragm as above         Electronically signed by: Driss Macias   Date:    01/31/2023   Time:    08:40          Laboratory Data:  Recent Labs   Lab 01/31/23 0427   *   K 3.9   CO2 18*   BUN 58.1*   CREATININE 4.45*   GLUCOSE 131*   CALCIUM 8.7   PHOS 5.6*     Recent Labs   Lab 01/31/23 0427   WBC 4.8   HGB 8.6*   HCT 25.8*            Impression:   AMPARO on CKD stage IV versus progression to CKD V  CHF exacerbation - - documented 17# weight gain in one week despite compliance with medication and diet restrictions   Hyponatremia likely secondary to hypervolemia due to CHF exacerbation needing further workup  DM II   HTN  Anemia with known iron deficiency. She was given Ferrlecit IV iron replacement for 5 days starting 1/14    Plan:   -Family requested dietitian consultation for clarification of dietary needs.   -The patient's hyponatremia must be corrected prior to considering dialysis. We will start Tolvaptan 15mg x3 days, first dose now in addition to continuing IV Lasix.   -Start NaBicarb tablets 1300mg TID.   -I will also give one dose of IV albumin now to promote oncotic pressure with serum albumin <3.   -Repeat labs in the morning. We will discuss dialysis needs further tomorrow.   -Discussed plan in detail with patient, daughter (at bedside) and granddaughter, Ramila, via phone. They also discussed again with Dr Hylton.     Rhonda Terrazas NP  Nephrology  1/31/2023 10:50 AM

## 2023-01-31 NOTE — NURSING
Nurses Note -- 4 Eyes      1/31/2023   2:48 AM      Skin assessed during: Admit      [x] No Pressure Injuries Present    [x]Prevention Measures Documented      [] Yes- Altered Skin Integrity Present or Discovered   [] LDA Added if Not in Epic (Describe Wound)   [] New Altered Skin Integrity was Present on Admit and Documented in LDA   [] Wound Image Taken    Wound Care Consulted? No    Attending Nurse:  Nieves Luna RN     Second RN/Staff Member: ANNE Hanna

## 2023-02-01 LAB
ALBUMIN SERPL-MCNC: 3.1 G/DL (ref 3.4–4.8)
ALBUMIN/GLOB SERPL: 1.3 RATIO (ref 1.1–2)
ALP SERPL-CCNC: 43 UNIT/L (ref 40–150)
ALT SERPL-CCNC: 10 UNIT/L (ref 0–55)
AST SERPL-CCNC: 10 UNIT/L (ref 5–34)
BASOPHILS # BLD AUTO: 0.03 X10(3)/MCL (ref 0–0.2)
BASOPHILS NFR BLD AUTO: 0.8 %
BILIRUBIN DIRECT+TOT PNL SERPL-MCNC: 0.5 MG/DL
BUN SERPL-MCNC: 62.7 MG/DL (ref 9.8–20.1)
CALCIUM SERPL-MCNC: 9 MG/DL (ref 8.4–10.2)
CHLORIDE SERPL-SCNC: 88 MMOL/L (ref 98–107)
CO2 SERPL-SCNC: 22 MMOL/L (ref 23–31)
CREAT SERPL-MCNC: 4.51 MG/DL (ref 0.55–1.02)
EOSINOPHIL # BLD AUTO: 0.12 X10(3)/MCL (ref 0–0.9)
EOSINOPHIL NFR BLD AUTO: 3.1 %
ERYTHROCYTE [DISTWIDTH] IN BLOOD BY AUTOMATED COUNT: 15.2 % (ref 11.5–17)
FERRITIN SERPL-MCNC: 271.63 NG/ML (ref 4.63–204)
GFR SERPLBLD CREATININE-BSD FMLA CKD-EPI: 9 MLS/MIN/1.73/M2
GLOBULIN SER-MCNC: 2.4 GM/DL (ref 2.4–3.5)
GLUCOSE SERPL-MCNC: 117 MG/DL (ref 82–115)
HCT VFR BLD AUTO: 26.1 % (ref 37–47)
HGB BLD-MCNC: 8.7 GM/DL (ref 12–16)
IMM GRANULOCYTES # BLD AUTO: 0.01 X10(3)/MCL (ref 0–0.04)
IMM GRANULOCYTES NFR BLD AUTO: 0.3 %
IRON SATN MFR SERPL: 28 % (ref 20–50)
IRON SERPL-MCNC: 67 UG/DL (ref 50–170)
LYMPHOCYTES # BLD AUTO: 0.79 X10(3)/MCL (ref 0.6–4.6)
LYMPHOCYTES NFR BLD AUTO: 20.6 %
MCH RBC QN AUTO: 28.8 PG
MCHC RBC AUTO-ENTMCNC: 33.3 MG/DL (ref 33–36)
MCV RBC AUTO: 86.4 FL (ref 80–94)
MONOCYTES # BLD AUTO: 0.48 X10(3)/MCL (ref 0.1–1.3)
MONOCYTES NFR BLD AUTO: 12.5 %
NEUTROPHILS # BLD AUTO: 2.41 X10(3)/MCL (ref 2.1–9.2)
NEUTROPHILS NFR BLD AUTO: 62.7 %
NRBC BLD AUTO-RTO: 0 %
PLATELET # BLD AUTO: 211 X10(3)/MCL (ref 130–400)
PMV BLD AUTO: 11.4 FL (ref 7.4–10.4)
POCT GLUCOSE: 118 MG/DL (ref 70–110)
POCT GLUCOSE: 134 MG/DL (ref 70–110)
POCT GLUCOSE: 164 MG/DL (ref 70–110)
POCT GLUCOSE: 165 MG/DL (ref 70–110)
POTASSIUM SERPL-SCNC: 3.7 MMOL/L (ref 3.5–5.1)
PROT SERPL-MCNC: 5.5 GM/DL (ref 5.8–7.6)
RBC # BLD AUTO: 3.02 X10(6)/MCL (ref 4.2–5.4)
SODIUM SERPL-SCNC: 123 MMOL/L (ref 136–145)
TIBC SERPL-MCNC: 174 UG/DL (ref 70–310)
TIBC SERPL-MCNC: 241 UG/DL (ref 250–450)
WBC # SPEC AUTO: 3.8 X10(3)/MCL (ref 4.5–11.5)

## 2023-02-01 PROCEDURE — 94761 N-INVAS EAR/PLS OXIMETRY MLT: CPT

## 2023-02-01 PROCEDURE — 63600175 PHARM REV CODE 636 W HCPCS: Mod: JG | Performed by: INTERNAL MEDICINE

## 2023-02-01 PROCEDURE — 27000221 HC OXYGEN, UP TO 24 HOURS

## 2023-02-01 PROCEDURE — 63600175 PHARM REV CODE 636 W HCPCS: Mod: JG | Performed by: NURSE PRACTITIONER

## 2023-02-01 PROCEDURE — P9047 ALBUMIN (HUMAN), 25%, 50ML: HCPCS | Mod: JG | Performed by: NURSE PRACTITIONER

## 2023-02-01 PROCEDURE — 83550 IRON BINDING TEST: CPT | Performed by: INTERNAL MEDICINE

## 2023-02-01 PROCEDURE — 25000003 PHARM REV CODE 250: Performed by: INTERNAL MEDICINE

## 2023-02-01 PROCEDURE — 25000003 PHARM REV CODE 250: Performed by: EMERGENCY MEDICINE

## 2023-02-01 PROCEDURE — 82728 ASSAY OF FERRITIN: CPT | Performed by: INTERNAL MEDICINE

## 2023-02-01 PROCEDURE — 80053 COMPREHEN METABOLIC PANEL: CPT | Performed by: NURSE PRACTITIONER

## 2023-02-01 PROCEDURE — 25000003 PHARM REV CODE 250: Performed by: NURSE PRACTITIONER

## 2023-02-01 PROCEDURE — 85025 COMPLETE CBC W/AUTO DIFF WBC: CPT | Performed by: NURSE PRACTITIONER

## 2023-02-01 PROCEDURE — 21400001 HC TELEMETRY ROOM

## 2023-02-01 PROCEDURE — 63600175 PHARM REV CODE 636 W HCPCS: Performed by: EMERGENCY MEDICINE

## 2023-02-01 RX ORDER — ALBUMIN HUMAN 250 G/1000ML
12.5 SOLUTION INTRAVENOUS ONCE
Status: COMPLETED | OUTPATIENT
Start: 2023-02-01 | End: 2023-02-01

## 2023-02-01 RX ADMIN — FERROUS SULFATE TAB 325 MG (65 MG ELEMENTAL FE) 1 EACH: 325 (65 FE) TAB at 08:02

## 2023-02-01 RX ADMIN — ALBUMIN (HUMAN) 12.5 G: 0.25 INJECTION, SOLUTION INTRAVENOUS at 10:02

## 2023-02-01 RX ADMIN — TOLVAPTAN 15 MG: 15 TABLET ORAL at 08:02

## 2023-02-01 RX ADMIN — AMLODIPINE BESYLATE 10 MG: 5 TABLET ORAL at 08:02

## 2023-02-01 RX ADMIN — ENOXAPARIN SODIUM 30 MG: 30 INJECTION SUBCUTANEOUS at 05:02

## 2023-02-01 RX ADMIN — LORAZEPAM 0.5 MG: 0.5 TABLET ORAL at 08:02

## 2023-02-01 RX ADMIN — ERYTHROPOIETIN 20000 UNITS: 10000 INJECTION, SOLUTION INTRAVENOUS; SUBCUTANEOUS at 01:02

## 2023-02-01 RX ADMIN — CLONIDINE HYDROCHLORIDE 0.2 MG: 0.2 TABLET ORAL at 08:02

## 2023-02-01 RX ADMIN — SODIUM BICARBONATE 1300 MG: 650 TABLET ORAL at 01:02

## 2023-02-01 RX ADMIN — SODIUM BICARBONATE 1300 MG: 650 TABLET ORAL at 05:02

## 2023-02-01 RX ADMIN — Medication 6 MG: at 08:02

## 2023-02-01 RX ADMIN — DOXAZOSIN 4 MG: 4 TABLET ORAL at 08:02

## 2023-02-01 RX ADMIN — CARVEDILOL 25 MG: 25 TABLET, FILM COATED ORAL at 08:02

## 2023-02-01 RX ADMIN — ACETAMINOPHEN 1000 MG: 500 TABLET ORAL at 08:02

## 2023-02-01 RX ADMIN — Medication 81 MG: at 08:02

## 2023-02-01 RX ADMIN — ALLOPURINOL 100 MG: 100 TABLET ORAL at 08:02

## 2023-02-01 RX ADMIN — SODIUM BICARBONATE 1300 MG: 650 TABLET ORAL at 08:02

## 2023-02-01 NOTE — PROGRESS NOTES
[facility]  Hospital Medicine Progress Note        Chief Complaint: Inpatient Follow-up for weakness     HPI: This is an 87-year-old female with medical history notable for CKD stage 5 with recent admission for AMPARO on 01/13/2023 treated conservatively and kidney function stabilized and did not require hemodialysis.  Had a follow-up labs with her nephrologist and was noted to be hyponatremic for which she was sent to [facility] ED for further evaluation.  Patient reports since her discharge she was instructed to drink a lot of water and has been drinking half a gallon of water daily and also has stopped her Lasix.  Report increased swelling in her bilateral lower extremities.  Reports generalized weakness and fatigue, denies shortness breath or chest pain, fever or chills.  On arrival to ED, she was afebrile and hemodynamically stable.  Saturating 92% on room air.  Labs notable for sodium 121, creatinine 4.69, BUN 58, CO2 20, potassium 4.2, , serum osmolarity pending, urine sodium 26.  Urine osmolarity and urine sodium after receiving Lasix 40 mg IV is 217 and 34, serum osmolarity not ordered.  She was given another Lasix 80 mg IV.  Subsequently transferred to [facility] and referred to hospital medicine service for further evaluation and management.    Interval Hx:   Working with occupational therapy in the room, reports feels much better.  Daughter at bedside  Reports Nephrology came by, wants to continue the same management today and will re-evaluate tomorrow if she needs to start dialysis on not  No other complaints  Case discussed with patient's nurse and  on the floor    Objective/physical exam:  General: In no acute distress, afebrile, obese, pleasant  Chest:  Decreased breath sounds bilateral bases, good air entry otherwise  Heart: RRR, +S1, S2, no appreciable murmur  Abdomen: Soft, nontender, BS +  MSK: Warm, 1+ pitting edema bilateral lower extremities  Neurologic: Alert  and oriented x4, Cranial nerve II-XII intact, Strength 5/5 in all 4 extremities    VITAL SIGNS: 24 HRS MIN & MAX LAST   Temp  Min: 97.6 °F (36.4 °C)  Max: 97.7 °F (36.5 °C) 97.7 °F (36.5 °C)   BP  Min: 124/65  Max: 153/57 132/64   Pulse  Min: 55  Max: 64  (!) 58     Resp  Min: 17  Max: 18 18   SpO2  Min: 91 %  Max: 92 % (!) 92 %         Recent Labs   Lab 01/30/23  1319 01/31/23  0427 02/01/23  0433   WBC 4.7 4.8 3.8*   RBC 3.59* 3.02* 3.02*   HGB 10.4* 8.6* 8.7*   HCT 30.9* 25.8* 26.1*   MCV 86.1 85.4 86.4   MCH 29.0 28.5 28.8   MCHC 33.7 33.3 33.3   RDW 14.5 15.1 15.2    202 211   MPV 11.8* 11.6* 11.4*       Recent Labs   Lab 01/30/23  1319 01/30/23  2140 01/31/23 0427 02/01/23  0433   * 121* 119* 123*   K 4.2 4.2 3.9 3.7   CO2 21* 20* 18* 22*   BUN 57.9* 58.4* 58.1* 62.7*   CREATININE 4.39* 4.69* 4.45* 4.51*   CALCIUM 9.7 9.4 8.7 9.0   MG 1.90  --  1.80  --    ALBUMIN 3.4  --  2.8* 3.1*   ALKPHOS 49  --  47 43   ALT 11  --  10 10   AST 15  --  13 10   BILITOT 0.4  --  0.4 0.5          Microbiology Results (last 7 days)       ** No results found for the last 168 hours. **             See below for Radiology    Scheduled Med:   allopurinoL  100 mg Oral Daily    amLODIPine  10 mg Oral Daily    aspirin  81 mg Oral Daily    carvediloL  25 mg Oral BID    cloNIDine  0.2 mg Oral BID    doxazosin  4 mg Oral Daily    enoxaparin  30 mg Subcutaneous Daily    ferrous sulfate  1 tablet Oral Daily    sodium bicarbonate  1,300 mg Oral TID WM    tolvaptan  15 mg Oral Daily        Continuous Infusions:       PRN Meds:  acetaminophen, acetaminophen, aluminum-magnesium hydroxide-simethicone, dextrose 10%, dextrose 10%, glucagon (human recombinant), glucose, glucose, insulin aspart U-100, LORazepam, melatonin, ondansetron, polyethylene glycol, prochlorperazine, senna-docusate 8.6-50 mg, simethicone, sodium chloride 0.9%       Assessment/Plan:  Hypervolemic Hyponatremia -- ? excess free water intake/polydipsia  AMPARO on CKD  IV- not on RRT  Renal related anasarca/volume overload  Metabolic acidosis and hyperphosphatemia  Hypertension  HX T2DM, hypertension, hyperlipidemia, anemia of chronic disease, hyperuricemia, and anxiety          Received Lasix 80 mg IV Q8H x24 hrs with 1 dose of IV albumin and now Lasix on hold  Nephrology on board, appreciate recommendations  Sodium slightly improved to 123  Continue tolvaptan 15 mg daily for 3 days-day 2 of 3  Continue sodium bicarb 1300 mg t.i.d.  Plan for starting hemodialysis once electrolytes are near normal, probably  Save left arm, no BP is on needle sticks   will probably need vascular consult for tunnel catheter placement for initiation of hemodialysis  Echocardiogram reviewed, EF 55%, normal ventricular size and function.  Mild TR, small left pleural effusion  Home medication reviewed and resumed  Morning CMP ordered    VTE prophylaxis:  SCDs for now    Patient condition:  Stable    Anticipated discharge and Disposition:   TBD      All diagnosis and differential diagnosis have been reviewed; assessment and plan has been documented; I have personally reviewed the labs and test results that are presently available; I have reviewed the patients medication list; I have reviewed the consulting providers response and recommendations. I have reviewed or attempted to review medical records based upon their availability    All of the patient's questions have been  addressed and answered. Patient's is agreeable to the above stated plan. I will continue to monitor closely and make adjustments to medical management as needed.  _____________________________________________________________________    Nutrition Status:    Radiology:  Echo  · The left ventricle is normal in size with concentric hypertrophy and   normal systolic function.  · The estimated ejection fraction is 55%.  · Normal right ventricular size with normal right ventricular systolic   function.  · Mild tricuspid regurgitation.  ·  There is a small left pleural effusion.     X-Ray Chest 1 View  Narrative: EXAMINATION:  XR CHEST 1 VIEW    CPT 11809    CLINICAL HISTORY:  hypoxia;    COMPARISON:  May 31st 2022    FINDINGS:  Examination reveals mediastinal silhouette to be within normal limits cardiac silhouette is not enlarged there might be slight blunting to both costophrenic angles which might be related to small pleural effusions in addition there is increased left retrocardiac density and silhouetting of the left hemidiaphragm although these might be related to pleural fluid it may also represent an infiltrate/atelectasis.    No other focal consolidative changes  Impression: Changes suggestive of bilateral pleural effusions.    Increased left retrocardiac density and silhouetting of the left hemidiaphragm as above    Electronically signed by: Driss Macias  Date:    01/31/2023  Time:    08:40      Gerardo Bianchi MD  Department of Hospital Medicine   [facility]   02/01/2023

## 2023-02-01 NOTE — PROGRESS NOTES
[facility]  Nephrology Progress Note  Patient Name: Rayna Haider  Age: 87 y.o.  : 1935  MRN: [id number]  Admission Date: 2023    Date of Consultation: 23     Consultation Requested By: ED     Reason for Consultation: AMPARO on CKD     Chief Complaint: Abnormal Lab (Pt instructed to go to ED by MATT Cheung for further evaluation and admission relating ongoing abnormal lab values with Dr Hylton as nephrologist.)      History of Present Illness:  Ms Rayna Haider is a 87 y.o. White female with past medical history of DM type 2, hypertension, hyperlipidemia, anemia of chronic disease and known iron deficiency, CKD stage 4.  In the past year creatinine seemed to be around 2.0 with GFR around 27.  She was seen by our service in the middle of the month when she was admitted for vague symptoms of generalized ill feeling.  Labs have been drawn showing acute kidney injury with BUN 88, creatinine 5.1 with metabolic acidosis and hyperkalemia.  Patient was discharged on  in stable condition.  She started to gain weight comment 1st 3 lb over 24 hour.  Diuretics were prescribed intake and some days with increased urine output though not dramatic increase.  And total she is gained 17 lb over the past week.  She denies fever, chills, malaise.  Denies sick contacts.  Denies lapse in medications or low-sodium diet.  She is put much effort into drinking at least 2 L of water per  Day.     23: Patient awake, alert and oriented sitting in bed without issue. Documented 800 mL UOP over 24 hours though I suspect she urinated more as she reports 600 cc just during the night.       Physical Exam:   /64   Pulse 61   Temp 97.5 °F (36.4 °C) (Oral)   Resp 18   Ht 5' (1.524 m)   Wt 83 kg (182 lb 15.7 oz)   LMP  (LMP Unknown)   SpO2 (!) 93%   Breastfeeding No   BMI 35.74 kg/m²  Body mass index is 35.74 kg/m².        Inpatient Medications:     Current Facility-Administered  Medications:     acetaminophen tablet 1,000 mg, 1,000 mg, Oral, Q6H PRN, Jennifer Guzman MD    acetaminophen tablet 650 mg, 650 mg, Oral, Q4H PRN, Jennifer Guzman MD    allopurinoL tablet 100 mg, 100 mg, Oral, Daily, Rufina Valencia MD, 100 mg at 02/01/23 0849    aluminum-magnesium hydroxide-simethicone 200-200-20 mg/5 mL suspension 30 mL, 30 mL, Oral, QID PRN, Jennifer Guzman MD    amLODIPine tablet 10 mg, 10 mg, Oral, Daily, Jennifer Guzman MD, 10 mg at 02/01/23 0848    aspirin chewable tablet 81 mg, 81 mg, Oral, Daily, Rufina Valencia MD, 81 mg at 02/01/23 0848    carvediloL tablet 25 mg, 25 mg, Oral, BID, Rufina Valencia MD, 25 mg at 02/01/23 0848    cloNIDine tablet 0.2 mg, 0.2 mg, Oral, BID, Rufina Valencia MD, 0.2 mg at 02/01/23 0848    dextrose 10% bolus 125 mL 125 mL, 12.5 g, Intravenous, PRN, Jennifer Guzman MD    dextrose 10% bolus 250 mL 250 mL, 25 g, Intravenous, PRN, Jennifer Guzman MD    doxazosin tablet 4 mg, 4 mg, Oral, Daily, Rufina Valencia MD, 4 mg at 02/01/23 0849    enoxaparin injection 30 mg, 30 mg, Subcutaneous, Daily, Owen Stokes MD, 30 mg at 01/31/23 1754    ferrous sulfate tablet 1 each, 1 tablet, Oral, Daily, Rufina Valencia MD, 1 each at 02/01/23 0848    glucagon (human recombinant) injection 1 mg, 1 mg, Intramuscular, PRN, Jennifer Guzman MD    glucose chewable tablet 16 g, 16 g, Oral, PRN, Jennifer Guzman MD    glucose chewable tablet 24 g, 24 g, Oral, PRN, Jennifer Guzman MD    insulin aspart U-100 injection 0-5 Units, 0-5 Units, Subcutaneous, QID (AC + HS) PRN, Jennifer Guzman MD    LORazepam tablet 0.5 mg, 0.5 mg, Oral, Daily PRN, Jennifer Guzman MD, 0.5 mg at 01/31/23 2105    melatonin tablet 6 mg, 6 mg, Oral, Nightly PRN, Owen Stokes MD, 6 mg at 01/31/23 2105    ondansetron injection 4 mg, 4 mg, Intravenous, Q4H PRN, Jennifer Guzman MD    polyethylene glycol packet 17 g, 17 g, Oral, BID PRN, Jennifer Guzman MD    prochlorperazine injection Soln 5 mg, 5 mg, Intravenous, Q6H PRN, Jennifer Guzman MD     senna-docusate 8.6-50 mg per tablet 2 tablet, 2 tablet, Oral, BID PRN, Jennifer Guzman MD    simethicone chewable tablet 80 mg, 1 tablet, Oral, QID PRN, Jennifer Guzman MD    sodium bicarbonate tablet 1,300 mg, 1,300 mg, Oral, TID WM, Braydon Hylton MD, 1,300 mg at 02/01/23 0848    sodium chloride 0.9% flush 10 mL, 10 mL, Intravenous, PRN, Owen Stokes MD    tolvaptan tablet 15 mg, 15 mg, Oral, Daily, Rhonda Terrazas, ELDERP, 15 mg at 02/01/23 0848     Imaging:  X-Ray Chest 1 View   Final Result      Changes suggestive of bilateral pleural effusions.      Increased left retrocardiac density and silhouetting of the left hemidiaphragm as above         Electronically signed by: Driss Macias   Date:    01/31/2023   Time:    08:40          Laboratory Data:  Recent Labs   Lab 01/31/23 0427 02/01/23  0433   * 123*   K 3.9 3.7   CO2 18* 22*   BUN 58.1* 62.7*   CREATININE 4.45* 4.51*   GLUCOSE 131* 117*   CALCIUM 8.7 9.0   PHOS 5.6*  --      Recent Labs   Lab 02/01/23 0433   WBC 3.8*   HGB 8.7*   HCT 26.1*        Impression:   AMPARO on CKD stage IV versus progression to CKD V  CHF exacerbation - - documented 17# weight gain in one week despite compliance with medication and diet restrictions   Hyponatremia likely secondary to hypervolemia due to CHF exacerbation needing further workup  DM II   HTN  Anemia with known iron deficiency. She was given Ferrlecit IV iron replacement for 5 days starting 1/14     Plan:   -Continue present plan with Lasix q8 hours, Tolvaptan daily for now. Hyponatremia improving.   -Will consider dialysis needs once electrolytes are corrected. Discussed with Dr Hylton  Admit weight 83.5kg --> 83 kg      Rhonda Terrazas NP  Nephrology  2/1/2023 9:22 AM

## 2023-02-01 NOTE — PROGRESS NOTES
Inpatient Nutrition Evaluation    Admit Date: 1/30/2023   Total duration of encounter: 2 days    Nutrition Recommendation/Prescription     Continue oral diet as tolerated.  Add Novasource Renal (provides 475 kcal, 22 g protein per serving) once daily as needed.  Encouraged intake.    Nutrition Assessment     Chart Review    Reason Seen: continuous nutrition monitoring and physician consult for family request for needs regarding CKD    Malnutrition Screening Tool Results   Have you recently lost weight without trying?: No  Have you been eating poorly because of a decreased appetite?: No   MST Score: 0     Diagnosis:  AMPARO on CKD stage IV versus progression to CKD V  CHF exacerbation  Hyponatremia likely secondary to hypervolemia due to CHF exacerbation needing further workup  DM II   HTN  Anemia with known iron deficiency    Relevant Medical History:  DM type 2, hypertension, hyperlipidemia, anemia of chronic disease and known iron deficiency, CKD stage 4    Nutrition-Related Medications: ferrous sulfate, sodium bicarbonate    Nutrition-Related Labs:  2/1/23 Na 123, Cl 88, GFR 9, Glu 117, Alb 3.1    Diet Order: Diet diabetic  Oral Supplement Order: none  Appetite/Oral Intake: good/% of meals  Factors Affecting Nutritional Intake: decreased appetite intermittently  Food/Hoahaoism/Cultural Preferences: none reported  Food Allergies: none reported       Wound(s):   redness to buttocks noted    Comments    2/1/23 Patient reports a good appetite that is intermittently decreased, denies weight loss, reports fluid weight gain at present, agreeable to vanilla/strawberry oral supplement; patient/family requesting nutrition/diet information regarding CKD (provided).    Anthropometrics    Height: 5' (152.4 cm) Height Method: Stated  Last Weight: 83 kg (182 lb 15.7 oz) (01/31/23 0000) Weight Method: Standard Scale  BMI (Calculated): 35.7  BMI Classification: obese grade II (BMI 35-39.9)     Ideal Body Weight (IBW), Female:  100 lb     % Ideal Body Weight, Female (lb): 182.98 %                    Usual Body Weight (UBW), k.3 kg  % Usual Body Weight: 118.31     Usual Weight Provided By: patient, family/caregiver, and patient denies unintentional weight loss    Wt Readings from Last 3 Encounters:   23 0000 83 kg (182 lb 15.7 oz)   23 1545 83.5 kg (184 lb)   23 1309 83.6 kg (184 lb 6.4 oz)   23 1002 81.8 kg (180 lb 6.4 oz)      Weight Change(s) Since Admission:  Admit Weight: 83.5 kg (184 lb) (23 1545)    Patient Education    Education Provided: renal diet  Teaching Method: explanation and printed materials  Comprehension: verbalizes understanding  Barriers to Learning: none evident  Expected Compliance: good  Comments: All questions were answered.    Monitoring & Evaluation     Dietitian will monitor food and beverage intake, weight, and electrolyte/renal panel.  Nutrition Risk/Follow-Up: low (follow-up in 5-7 days)  Patients assigned 'low nutrition risk' status do not qualify for a full nutritional assessment but will be monitored and re-evaluated in a 5-7 day time period. Please consult if re-evaluation needed sooner.

## 2023-02-02 LAB
ALBUMIN SERPL-MCNC: 3.4 G/DL (ref 3.4–4.8)
ALBUMIN/GLOB SERPL: 1.5 RATIO (ref 1.1–2)
ALP SERPL-CCNC: 44 UNIT/L (ref 40–150)
ALT SERPL-CCNC: 10 UNIT/L (ref 0–55)
AST SERPL-CCNC: 10 UNIT/L (ref 5–34)
BILIRUBIN DIRECT+TOT PNL SERPL-MCNC: 0.5 MG/DL
BUN SERPL-MCNC: 61.7 MG/DL (ref 9.8–20.1)
CALCIUM SERPL-MCNC: 9.1 MG/DL (ref 8.4–10.2)
CHLORIDE SERPL-SCNC: 90 MMOL/L (ref 98–107)
CO2 SERPL-SCNC: 22 MMOL/L (ref 23–31)
CREAT SERPL-MCNC: 4.35 MG/DL (ref 0.55–1.02)
GFR SERPLBLD CREATININE-BSD FMLA CKD-EPI: 9 MLS/MIN/1.73/M2
GLOBULIN SER-MCNC: 2.3 GM/DL (ref 2.4–3.5)
GLUCOSE SERPL-MCNC: 119 MG/DL (ref 82–115)
HBV SURFACE AG SERPL QL IA: NONREACTIVE
POCT GLUCOSE: 116 MG/DL (ref 70–110)
POCT GLUCOSE: 120 MG/DL (ref 70–110)
POCT GLUCOSE: 125 MG/DL (ref 70–110)
POTASSIUM SERPL-SCNC: 4.2 MMOL/L (ref 3.5–5.1)
PROT SERPL-MCNC: 5.7 GM/DL (ref 5.8–7.6)
SODIUM SERPL-SCNC: 126 MMOL/L (ref 136–145)

## 2023-02-02 PROCEDURE — 21400001 HC TELEMETRY ROOM

## 2023-02-02 PROCEDURE — 99152 MOD SED SAME PHYS/QHP 5/>YRS: CPT | Performed by: STUDENT IN AN ORGANIZED HEALTH CARE EDUCATION/TRAINING PROGRAM

## 2023-02-02 PROCEDURE — 87340 HEPATITIS B SURFACE AG IA: CPT | Performed by: NURSE PRACTITIONER

## 2023-02-02 PROCEDURE — 25500020 PHARM REV CODE 255: Performed by: STUDENT IN AN ORGANIZED HEALTH CARE EDUCATION/TRAINING PROGRAM

## 2023-02-02 PROCEDURE — 63600175 PHARM REV CODE 636 W HCPCS: Performed by: STUDENT IN AN ORGANIZED HEALTH CARE EDUCATION/TRAINING PROGRAM

## 2023-02-02 PROCEDURE — 25000003 PHARM REV CODE 250: Performed by: STUDENT IN AN ORGANIZED HEALTH CARE EDUCATION/TRAINING PROGRAM

## 2023-02-02 PROCEDURE — 80100016 HC MAINTENANCE HEMODIALYSIS

## 2023-02-02 PROCEDURE — C1750 CATH, HEMODIALYSIS,LONG-TERM: HCPCS | Performed by: STUDENT IN AN ORGANIZED HEALTH CARE EDUCATION/TRAINING PROGRAM

## 2023-02-02 PROCEDURE — 27000221 HC OXYGEN, UP TO 24 HOURS

## 2023-02-02 PROCEDURE — 99153 MOD SED SAME PHYS/QHP EA: CPT | Performed by: STUDENT IN AN ORGANIZED HEALTH CARE EDUCATION/TRAINING PROGRAM

## 2023-02-02 PROCEDURE — 77001 FLUOROGUIDE FOR VEIN DEVICE: CPT | Performed by: STUDENT IN AN ORGANIZED HEALTH CARE EDUCATION/TRAINING PROGRAM

## 2023-02-02 PROCEDURE — 36558 INSERT TUNNELED CV CATH: CPT | Performed by: STUDENT IN AN ORGANIZED HEALTH CARE EDUCATION/TRAINING PROGRAM

## 2023-02-02 PROCEDURE — 25000003 PHARM REV CODE 250: Performed by: INTERNAL MEDICINE

## 2023-02-02 PROCEDURE — 80053 COMPREHEN METABOLIC PANEL: CPT | Performed by: INTERNAL MEDICINE

## 2023-02-02 DEVICE — 15.5F X 24CM CHRONIC HEMODIALYSIS CATHETER SET (WITH CUFF 19CM FROM TIP)
Type: IMPLANTABLE DEVICE | Site: CHEST  WALL | Status: FUNCTIONAL
Brand: DURAMAX

## 2023-02-02 RX ORDER — MIDAZOLAM HYDROCHLORIDE 1 MG/ML
INJECTION INTRAMUSCULAR; INTRAVENOUS
Status: DISCONTINUED | OUTPATIENT
Start: 2023-02-02 | End: 2023-02-10 | Stop reason: HOSPADM

## 2023-02-02 RX ORDER — HEPARIN SODIUM 1000 [USP'U]/ML
INJECTION, SOLUTION INTRAVENOUS; SUBCUTANEOUS
Status: DISCONTINUED | OUTPATIENT
Start: 2023-02-02 | End: 2023-02-10 | Stop reason: HOSPADM

## 2023-02-02 RX ORDER — FENTANYL CITRATE 50 UG/ML
INJECTION, SOLUTION INTRAMUSCULAR; INTRAVENOUS
Status: DISCONTINUED | OUTPATIENT
Start: 2023-02-02 | End: 2023-02-10 | Stop reason: HOSPADM

## 2023-02-02 RX ORDER — LIDOCAINE HYDROCHLORIDE 20 MG/ML
INJECTION, SOLUTION INFILTRATION; PERINEURAL
Status: DISCONTINUED | OUTPATIENT
Start: 2023-02-02 | End: 2023-02-10 | Stop reason: HOSPADM

## 2023-02-02 RX ADMIN — CARVEDILOL 25 MG: 25 TABLET, FILM COATED ORAL at 08:02

## 2023-02-02 RX ADMIN — ACETAMINOPHEN 1000 MG: 500 TABLET ORAL at 06:02

## 2023-02-02 RX ADMIN — CLONIDINE HYDROCHLORIDE 0.2 MG: 0.2 TABLET ORAL at 09:02

## 2023-02-02 RX ADMIN — AMLODIPINE BESYLATE 10 MG: 5 TABLET ORAL at 09:02

## 2023-02-02 RX ADMIN — ACETAMINOPHEN 1000 MG: 500 TABLET ORAL at 11:02

## 2023-02-02 RX ADMIN — FERROUS SULFATE TAB 325 MG (65 MG ELEMENTAL FE) 1 EACH: 325 (65 FE) TAB at 09:02

## 2023-02-02 RX ADMIN — Medication 81 MG: at 09:02

## 2023-02-02 RX ADMIN — DOXAZOSIN 4 MG: 4 TABLET ORAL at 09:02

## 2023-02-02 RX ADMIN — ALLOPURINOL 100 MG: 100 TABLET ORAL at 09:02

## 2023-02-02 RX ADMIN — CLONIDINE HYDROCHLORIDE 0.2 MG: 0.2 TABLET ORAL at 08:02

## 2023-02-02 NOTE — PROGRESS NOTES
[facility]  Hospital Medicine Progress Note        Chief Complaint: Inpatient Follow-up for weakness     HPI: This is an 87-year-old female with medical history notable for CKD stage 5 with recent admission for AMPARO on 01/13/2023 treated conservatively and kidney function stabilized and did not require hemodialysis.  Had a follow-up labs with her nephrologist and was noted to be hyponatremic for which she was sent to [facility] ED for further evaluation.  Patient reports since her discharge she was instructed to drink a lot of water and has been drinking half a gallon of water daily and also has stopped her Lasix.  Report increased swelling in her bilateral lower extremities.  Reports generalized weakness and fatigue, denies shortness breath or chest pain, fever or chills.  On arrival to ED, she was afebrile and hemodynamically stable.  Saturating 92% on room air.  Labs notable for sodium 121, creatinine 4.69, BUN 58, CO2 20, potassium 4.2, , serum osmolarity pending, urine sodium 26.  Urine osmolarity and urine sodium after receiving Lasix 40 mg IV is 217 and 34, serum osmolarity not ordered.  She was given another Lasix 80 mg IV.  Subsequently transferred to [facility] and referred to hospital medicine service for further evaluation and management.    Interval Hx:   Sitting in bed, seems depressed today given she had gotten a band on her left wrist with no BP and no sticks.  Patient reported she knew dialysis was at the corner and was coming but now it is all really.  She had multiple questions regarding dialysis and I answered them to the best of my knowledge and their satisfaction.  As I was completing my assessment, Rhonda with Nephrology walked in.  I requested her to answer the rest of the questions they have which are more dialysis specific     Case discussed with patient's nurse and  on the floor    Objective/physical exam:  General: In no acute distress, afebrile, obese,  seems depressed today  Chest:  Decreased breath sounds bilateral bases, good air entry otherwise  Heart: RRR, +S1, S2, no appreciable murmur  Abdomen: Soft, nontender, BS +  MSK: Warm, 1+ pitting edema bilateral lower extremities  Neurologic: Alert and oriented x4, Cranial nerve II-XII intact, Strength 5/5 in all 4 extremities    VITAL SIGNS: 24 HRS MIN & MAX LAST   Temp  Min: 97 °F (36.1 °C)  Max: 97.7 °F (36.5 °C) 97.5 °F (36.4 °C)   BP  Min: 121/62  Max: 151/69 (!) 129/55   Pulse  Min: 52  Max: 61  (!) 57     Resp  Min: 18  Max: 18 18   SpO2  Min: 91 %  Max: 95 % (!) 94 %         Recent Labs   Lab 01/30/23  1319 01/31/23  0427 02/01/23  0433   WBC 4.7 4.8 3.8*   RBC 3.59* 3.02* 3.02*   HGB 10.4* 8.6* 8.7*   HCT 30.9* 25.8* 26.1*   MCV 86.1 85.4 86.4   MCH 29.0 28.5 28.8   MCHC 33.7 33.3 33.3   RDW 14.5 15.1 15.2    202 211   MPV 11.8* 11.6* 11.4*       Recent Labs   Lab 01/30/23  1319 01/30/23  2140 01/31/23 0427 02/01/23  0433 02/02/23  0346   *   < > 119* 123* 126*   K 4.2   < > 3.9 3.7 4.2   CO2 21*   < > 18* 22* 22*   BUN 57.9*   < > 58.1* 62.7* 61.7*   CREATININE 4.39*   < > 4.45* 4.51* 4.35*   CALCIUM 9.7   < > 8.7 9.0 9.1   MG 1.90  --  1.80  --   --    ALBUMIN 3.4  --  2.8* 3.1* 3.4   ALKPHOS 49  --  47 43 44   ALT 11  --  10 10 10   AST 15  --  13 10 10   BILITOT 0.4  --  0.4 0.5 0.5    < > = values in this interval not displayed.          Microbiology Results (last 7 days)       ** No results found for the last 168 hours. **             See below for Radiology    Scheduled Med:   allopurinoL  100 mg Oral Daily    amLODIPine  10 mg Oral Daily    aspirin  81 mg Oral Daily    carvediloL  25 mg Oral BID    cloNIDine  0.2 mg Oral BID    doxazosin  4 mg Oral Daily    enoxaparin  30 mg Subcutaneous Daily    ferrous sulfate  1 tablet Oral Daily    sodium bicarbonate  1,300 mg Oral TID WM    tolvaptan  15 mg Oral Daily        Continuous Infusions:       PRN Meds:  acetaminophen, acetaminophen,  aluminum-magnesium hydroxide-simethicone, dextrose 10%, dextrose 10%, glucagon (human recombinant), glucose, glucose, insulin aspart U-100, LORazepam, melatonin, ondansetron, polyethylene glycol, prochlorperazine, senna-docusate 8.6-50 mg, simethicone, sodium chloride 0.9%       Assessment/Plan:  Hypervolemic Hyponatremia -- ? excess free water intake/polydipsia  AMPARO on CKD IV- to start hemodialysis today  Renal related anasarca/volume overload  Metabolic acidosis and hyperphosphatemia  Hypertension  HX T2DM, hypertension, hyperlipidemia, anemia of chronic disease, hyperuricemia, and anxiety          Nephrology on board, appreciate recommendations  Sodium improved to 126  Continue tolvaptan 15 mg daily for 3 days-day 3 of 3  Continue sodium bicarb 1300 mg t.i.d.  Nephrologist Dr. Eric anderson consulted for tunneled catheter placement, plan for starting hemodialysis today  Save left arm, no BP is on needle sticks  Carly also on the case to arrange for dialysis chair time  Echocardiogram reviewed, EF 55%, normal ventricular size and function.  Mild TR, small left pleural effusion  Home medication reviewed and resumed  Morning CBC, CMP ordered    VTE prophylaxis:  SCDs for now    Patient condition:  Stable    Anticipated discharge and Disposition:   TBD      All diagnosis and differential diagnosis have been reviewed; assessment and plan has been documented; I have personally reviewed the labs and test results that are presently available; I have reviewed the patients medication list; I have reviewed the consulting providers response and recommendations. I have reviewed or attempted to review medical records based upon their availability    All of the patient's questions have been  addressed and answered. Patient's is agreeable to the above stated plan. I will continue to monitor closely and make adjustments to medical management as  needed.  _____________________________________________________________________    Nutrition Status:    Radiology:  Echo  · The left ventricle is normal in size with concentric hypertrophy and   normal systolic function.  · The estimated ejection fraction is 55%.  · Normal right ventricular size with normal right ventricular systolic   function.  · Mild tricuspid regurgitation.  · There is a small left pleural effusion.     X-Ray Chest 1 View  Narrative: EXAMINATION:  XR CHEST 1 VIEW    CPT 15768    CLINICAL HISTORY:  hypoxia;    COMPARISON:  May 31st 2022    FINDINGS:  Examination reveals mediastinal silhouette to be within normal limits cardiac silhouette is not enlarged there might be slight blunting to both costophrenic angles which might be related to small pleural effusions in addition there is increased left retrocardiac density and silhouetting of the left hemidiaphragm although these might be related to pleural fluid it may also represent an infiltrate/atelectasis.    No other focal consolidative changes  Impression: Changes suggestive of bilateral pleural effusions.    Increased left retrocardiac density and silhouetting of the left hemidiaphragm as above    Electronically signed by: Driss Macias  Date:    01/31/2023  Time:    08:40      Gerardo Bianchi MD  Department of Hospital Medicine   [facility]   02/02/2023

## 2023-02-02 NOTE — PROGRESS NOTES
[facility]  Nephrology Progress Note  Patient Name: Rayna Haider  Age: 87 y.o.  : 1935  MRN: [id number]  Admission Date: 2023    Date of Consultation: 23     Consultation Requested By: ED     Reason for Consultation: AMPARO on CKD     Chief Complaint: Abnormal Lab (Pt instructed to go to ED by MATT Cheung for further evaluation and admission relating ongoing abnormal lab values with Dr Hylton as nephrologist.)      History of Present Illness:  Ms Rayna Haider is a 87 y.o. White female with past medical history of DM type 2, hypertension, hyperlipidemia, anemia of chronic disease and known iron deficiency, CKD stage 4.  In the past year creatinine seemed to be around 2.0 with GFR around 27.  She was seen by our service in the middle of the month when she was admitted for vague symptoms of generalized ill feeling.  Labs have been drawn showing acute kidney injury with BUN 88, creatinine 5.1 with metabolic acidosis and hyperkalemia.  Patient was discharged on  in stable condition.  She started to gain weight comment 1st 3 lb over 24 hour.  Diuretics were prescribed intake and some days with increased urine output though not dramatic increase.  And total she is gained 17 lb over the past week.  She denies fever, chills, malaise.  Denies sick contacts.  Denies lapse in medications or low-sodium diet.  She is put much effort into drinking at least 2 L of water per  Day.     23: Patient awake, alert and oriented sitting in bed without issue. Documented 800 mL UOP over 24 hours though I suspect she urinated more as she reports 600 cc just during the night.     23: No significant change overnight. UOP decreased to 800 mL.       Physical Exam:   BP (!) 135/55   Pulse (!) 57   Temp 97.7 °F (36.5 °C) (Oral)   Resp 18   Ht 5' (1.524 m)   Wt 83 kg (182 lb 15.7 oz)   LMP  (LMP Unknown)   SpO2 (!) 81%   Breastfeeding No   BMI 35.74 kg/m²  Body mass index is  35.74 kg/m².    Physical Exam  Constitutional:       Appearance: She is obese. She is ill-appearing.   HENT:      Head: Normocephalic and atraumatic.      Nose: Nose normal.      Mouth/Throat:      Mouth: Mucous membranes are moist.   Eyes:      Extraocular Movements: Extraocular movements intact.      Conjunctiva/sclera: Conjunctivae normal.   Cardiovascular:      Rate and Rhythm: Normal rate and regular rhythm.   Pulmonary:      Effort: Pulmonary effort is normal.      Comments: CTA upper lobes otherwise diminished, NC at 2L  Abdominal:      General: There is no distension.      Palpations: Abdomen is soft.   Musculoskeletal:         General: Swelling present.      Cervical back: Neck supple.   Skin:     General: Skin is warm and dry.   Neurological:      Mental Status: She is alert and oriented to person, place, and time.   Psychiatric:         Mood and Affect: Mood normal.         Behavior: Behavior normal.         Inpatient Medications:     Current Facility-Administered Medications:     acetaminophen tablet 1,000 mg, 1,000 mg, Oral, Q6H PRN, Jennifer Guzman MD, 1,000 mg at 02/02/23 0619    acetaminophen tablet 650 mg, 650 mg, Oral, Q4H PRN, Jennifer Guzman MD    allopurinoL tablet 100 mg, 100 mg, Oral, Daily, Rufina Valencia MD, 100 mg at 02/01/23 0849    aluminum-magnesium hydroxide-simethicone 200-200-20 mg/5 mL suspension 30 mL, 30 mL, Oral, QID PRN, Jennifer Guzman MD    amLODIPine tablet 10 mg, 10 mg, Oral, Daily, Jennifer Guzman MD, 10 mg at 02/01/23 0848    aspirin chewable tablet 81 mg, 81 mg, Oral, Daily, Rufina Valencia MD, 81 mg at 02/01/23 0848    carvediloL tablet 25 mg, 25 mg, Oral, BID, Rufina Valencia MD, 25 mg at 02/01/23 2051    cloNIDine tablet 0.2 mg, 0.2 mg, Oral, BID, Rufina Valencia MD, 0.2 mg at 02/01/23 2052    dextrose 10% bolus 125 mL 125 mL, 12.5 g, Intravenous, PRN, Jennifer Guzman MD    dextrose 10% bolus 250 mL 250 mL, 25 g, Intravenous, PRN, Jennifer Guzman MD    doxazosin tablet 4 mg, 4 mg, Oral, Daily,  Rufina Valencia MD, 4 mg at 02/01/23 0849    enoxaparin injection 30 mg, 30 mg, Subcutaneous, Daily, Owen Stokes MD, 30 mg at 02/01/23 1708    ferrous sulfate tablet 1 each, 1 tablet, Oral, Daily, Rufina Valencia MD, 1 each at 02/01/23 0848    glucagon (human recombinant) injection 1 mg, 1 mg, Intramuscular, PRN, Jennifer Guzman MD    glucose chewable tablet 16 g, 16 g, Oral, PRN, Jennifer Guzman MD    glucose chewable tablet 24 g, 24 g, Oral, PRN, Jennifer Guzman MD    insulin aspart U-100 injection 0-5 Units, 0-5 Units, Subcutaneous, QID (AC + HS) PRN, Jennifer Guzman MD    LORazepam tablet 0.5 mg, 0.5 mg, Oral, Daily PRN, Jennifer Guzman MD, 0.5 mg at 02/01/23 2055    melatonin tablet 6 mg, 6 mg, Oral, Nightly PRN, Owen Stokes MD, 6 mg at 02/01/23 2055    ondansetron injection 4 mg, 4 mg, Intravenous, Q4H PRN, Jennifer Guzman MD    polyethylene glycol packet 17 g, 17 g, Oral, BID PRN, Jennifer Guzman MD    prochlorperazine injection Soln 5 mg, 5 mg, Intravenous, Q6H PRN, Jennifer Guzman MD    senna-docusate 8.6-50 mg per tablet 2 tablet, 2 tablet, Oral, BID PRN, Jennifer Guzman MD    simethicone chewable tablet 80 mg, 1 tablet, Oral, QID PRN, Jennifer Guzman MD    sodium chloride 0.9% flush 10 mL, 10 mL, Intravenous, PRN, Owen Stokes MD     Imaging:  X-Ray Chest 1 View   Final Result      Changes suggestive of bilateral pleural effusions.      Increased left retrocardiac density and silhouetting of the left hemidiaphragm as above         Electronically signed by: Driss Macias   Date:    01/31/2023   Time:    08:40          Laboratory Data:  Recent Labs   Lab 01/31/23  0427 02/01/23  0433 02/02/23  0346   *   < > 126*   K 3.9   < > 4.2   CO2 18*   < > 22*   BUN 58.1*   < > 61.7*   CREATININE 4.45*   < > 4.35*   GLUCOSE 131*   < > 119*   CALCIUM 8.7   < > 9.1   PHOS 5.6*  --   --     < > = values in this interval not displayed.       Recent Labs   Lab 02/01/23  0433   WBC 3.8*   HGB 8.7*   HCT 26.1*           Impression:   AMPARO on CKD stage IV versus progression to CKD V   CHF exacerbation - - documented 17# weight gain in one week despite compliance with medication and diet restrictions   Hyponatremia likely secondary to hypervolemia due to CHF exacerbation needing further workup  DM II   HTN  Anemia with known iron deficiency. She was given Ferrlecit IV iron replacement for 5 days starting 1/14     Plan:   -Discussed with patient, daughter and granddaughter and plan today is to start HD.   -Dr Angela will place tunneled catheter today. Patient will then start HD and dialyze three consecutive days.   -CM has been consulted for outpatient dialysis placement    Admit weight 83.5kg --> 83 kg-->81.6 kg. None today.       Rhonda Terrazas NP  Nephrology  2/2/2023 9:22 AM
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
## 2023-02-02 NOTE — PLAN OF CARE
Received message from MATT Terrazas that pt will require continued HD services upon DC. AMPARO vs ESRD. Pt is awaiting tunneled cath and initiaition of HD. Dr. Zavaleta noted pt may require rehab placement upon DC, but PT/OT have yet to work with pt during admit. MONICA ruff. Will continue to follow.

## 2023-02-02 NOTE — PROCEDURES
INTERVENTIONAL NEPHROLOGY PROCEDURE NOTE:   TUNNELED DIALYSIS CATHETER (TDC) INSERTION         Performing Physician:   Dr. Angela    Access History: Pt is with ESRD requiring HD.    Pre-Op Diagnosis: N18.6 End Stage Renal Disease (ESRD).  Post-Op Diagnosis: Same    Procedure: Tunneled dialysis catheter insertion.    Indication: ESRD requiring HD.    Anatomical Site: right internal jugular (RIJ)/right chest wall (RCW)    Informed Consent:  The patient was evaluated in the pre-operative area with assessment including the American Society of Anesthesia risk classification. The procedure is discussed in detail including risks, benefits alternatives and options and the patient agrees to proceed. Informed consent was obtained from the patient.     Maximum sterile barrier technique: The patient was prepped and draped using chlorhexidine prep and maximum sterile barrier technique.    Sedation Note:  Risks and benefits of sedation were reviewed with the patient or surrogate, including bleeding, infection, nausea, vomiting, dizziness, instability, damage to a nerve, damage to a blood vessel, cellulitis, reaction to medications, amnesia, loss of consciousness, respiratory arrest, cardiac arrest.     The patient received the following medications: Versed 1 mg IV and Fentanyl 25 mg IV; patient did remain alert, responsive, and conversational throughout the procedure. I was personally responsible for supervising the administration of moderate sedation services during the procedure performed and I affirm all the guidelines and requirements described in the CPT 2023 section on moderate sedation were followed, including the use of an independent trained observer who had no other duties during the procedure. The total face-to-face time was 30 minutes.    Procedure Steps:   The patient was prepped and draped in sterile fashion. Procedure ultrasound revealed patent right internal jugular vein. Local anesthesia was administered by  "injecting 1% lidocaine at the intended site of cannulation. With use of live ultrasonographic visualization, the vessel was cannulated with a  21 g mini-stick needle. The 0.018" mini-stick guide wire was introduced being careful not to bend the tip. Then by using Seldinger technique, the needle was exchanged for the mini-stick sheath. The inner cannula and mini-stick wire were removed and a J wire was inserted through the sheath. The wire was guided with fluoroscopic guidance, with the tip parked in the inferior vena cava. At this time a cut down on the vessel was made extending the venotomy laterally to the expected tunnel.    The catheter exit site and tunnel was approximated and infiltrated with lidocaine. A stab incision was made at the exit site. The 19 cm (cuff-to-tip) tunneled dialysis catheter assembly with tunneler was then tunneled up through the exit site to the lateral aspect of the venotomy and the catheter pulled through the tunnel. The cuff was placed approximately 1.5 centimeters inside the tunnel. The 12 Fr dilator was passed freely over the wire; it was removed and replaced sequentially with 14 Fr and 16 Fr dilators. The permacath was then inserted via pull-away sheath method. The placement of the catheter tip (at the junction of the SVC and right atrium) and the top of the permacath was confirmed with fluoroscopy.     Catheter appeared to function well with various tests utilizing a 10 cc syringe. The catheter limbs were then flushed with saline, followed by instillation of appropriate amounts of heparin as marked by the catheter hub. Venotomy site and tunnel exit site was closed with monoderm suture, being careful to not mancuso the catheter. Catheter hub was fixed to the chest wall using silk suture.      ASSESSMENT/PLAN:  - Successful placement of right internal jugular (RIJ)/right chest wall (RCW) 19 cm (cuff-to-tip) TDC (BioFlo Duramax).    EBL: <10 cc    Complications: None    Orders to the " dialysis unit: OK to use tunneled dialysis catheter for HD.    Thank you for allowing me the opportunity in taking care of this patient. Please reach me with any questions.    Eric Angela DO  Interventional Nephrology  Cell: [phone]

## 2023-02-03 LAB
ALBUMIN SERPL-MCNC: 3.2 G/DL (ref 3.4–4.8)
ALBUMIN/GLOB SERPL: 1.3 RATIO (ref 1.1–2)
ALP SERPL-CCNC: 41 UNIT/L (ref 40–150)
ALT SERPL-CCNC: 8 UNIT/L (ref 0–55)
AST SERPL-CCNC: 11 UNIT/L (ref 5–34)
BASOPHILS # BLD AUTO: 0.03 X10(3)/MCL (ref 0–0.2)
BASOPHILS NFR BLD AUTO: 0.8 %
BILIRUBIN DIRECT+TOT PNL SERPL-MCNC: 0.4 MG/DL
BUN SERPL-MCNC: 38.6 MG/DL (ref 9.8–20.1)
CALCIUM SERPL-MCNC: 9.1 MG/DL (ref 8.4–10.2)
CHLORIDE SERPL-SCNC: 96 MMOL/L (ref 98–107)
CO2 SERPL-SCNC: 25 MMOL/L (ref 23–31)
CREAT SERPL-MCNC: 3.43 MG/DL (ref 0.55–1.02)
EOSINOPHIL # BLD AUTO: 0.09 X10(3)/MCL (ref 0–0.9)
EOSINOPHIL NFR BLD AUTO: 2.4 %
ERYTHROCYTE [DISTWIDTH] IN BLOOD BY AUTOMATED COUNT: 15.8 % (ref 11.5–17)
GFR SERPLBLD CREATININE-BSD FMLA CKD-EPI: 13 MLS/MIN/1.73/M2
GLOBULIN SER-MCNC: 2.4 GM/DL (ref 2.4–3.5)
GLUCOSE SERPL-MCNC: 148 MG/DL (ref 82–115)
HCT VFR BLD AUTO: 27 % (ref 37–47)
HGB BLD-MCNC: 8.9 GM/DL (ref 12–16)
IMM GRANULOCYTES # BLD AUTO: 0.01 X10(3)/MCL (ref 0–0.04)
IMM GRANULOCYTES NFR BLD AUTO: 0.3 %
LYMPHOCYTES # BLD AUTO: 0.64 X10(3)/MCL (ref 0.6–4.6)
LYMPHOCYTES NFR BLD AUTO: 17.2 %
MCH RBC QN AUTO: 29.1 PG
MCHC RBC AUTO-ENTMCNC: 33 MG/DL (ref 33–36)
MCV RBC AUTO: 88.2 FL (ref 80–94)
MONOCYTES # BLD AUTO: 0.56 X10(3)/MCL (ref 0.1–1.3)
MONOCYTES NFR BLD AUTO: 15.1 %
NEUTROPHILS # BLD AUTO: 2.39 X10(3)/MCL (ref 2.1–9.2)
NEUTROPHILS NFR BLD AUTO: 64.2 %
NRBC BLD AUTO-RTO: 0 %
PLATELET # BLD AUTO: 216 X10(3)/MCL (ref 130–400)
PMV BLD AUTO: 11.3 FL (ref 7.4–10.4)
POCT GLUCOSE: 163 MG/DL (ref 70–110)
POTASSIUM SERPL-SCNC: 4 MMOL/L (ref 3.5–5.1)
PROT SERPL-MCNC: 5.6 GM/DL (ref 5.8–7.6)
RBC # BLD AUTO: 3.06 X10(6)/MCL (ref 4.2–5.4)
SODIUM SERPL-SCNC: 132 MMOL/L (ref 136–145)
WBC # SPEC AUTO: 3.7 X10(3)/MCL (ref 4.5–11.5)

## 2023-02-03 PROCEDURE — 25000003 PHARM REV CODE 250: Performed by: INTERNAL MEDICINE

## 2023-02-03 PROCEDURE — 21400001 HC TELEMETRY ROOM

## 2023-02-03 PROCEDURE — 80100016 HC MAINTENANCE HEMODIALYSIS

## 2023-02-03 PROCEDURE — 27000221 HC OXYGEN, UP TO 24 HOURS

## 2023-02-03 PROCEDURE — 85025 COMPLETE CBC W/AUTO DIFF WBC: CPT | Performed by: INTERNAL MEDICINE

## 2023-02-03 PROCEDURE — 97162 PT EVAL MOD COMPLEX 30 MIN: CPT

## 2023-02-03 PROCEDURE — 63600175 PHARM REV CODE 636 W HCPCS: Performed by: EMERGENCY MEDICINE

## 2023-02-03 PROCEDURE — 80053 COMPREHEN METABOLIC PANEL: CPT | Performed by: INTERNAL MEDICINE

## 2023-02-03 RX ORDER — DICLOFENAC SODIUM 10 MG/G
2 GEL TOPICAL DAILY
Status: DISCONTINUED | OUTPATIENT
Start: 2023-02-03 | End: 2023-02-10 | Stop reason: HOSPADM

## 2023-02-03 RX ORDER — TRAMADOL HYDROCHLORIDE 50 MG/1
50 TABLET ORAL EVERY 8 HOURS PRN
Status: DISCONTINUED | OUTPATIENT
Start: 2023-02-03 | End: 2023-02-10 | Stop reason: HOSPADM

## 2023-02-03 RX ADMIN — AMLODIPINE BESYLATE 10 MG: 5 TABLET ORAL at 08:02

## 2023-02-03 RX ADMIN — TRAMADOL HYDROCHLORIDE 50 MG: 50 TABLET, COATED ORAL at 09:02

## 2023-02-03 RX ADMIN — ENOXAPARIN SODIUM 30 MG: 30 INJECTION SUBCUTANEOUS at 06:02

## 2023-02-03 RX ADMIN — CARVEDILOL 25 MG: 25 TABLET, FILM COATED ORAL at 01:02

## 2023-02-03 RX ADMIN — ALLOPURINOL 100 MG: 100 TABLET ORAL at 08:02

## 2023-02-03 RX ADMIN — Medication 81 MG: at 08:02

## 2023-02-03 RX ADMIN — DOXAZOSIN 4 MG: 4 TABLET ORAL at 08:02

## 2023-02-03 RX ADMIN — CARVEDILOL 25 MG: 25 TABLET, FILM COATED ORAL at 09:02

## 2023-02-03 RX ADMIN — CLONIDINE HYDROCHLORIDE 0.2 MG: 0.2 TABLET ORAL at 09:02

## 2023-02-03 RX ADMIN — CLONIDINE HYDROCHLORIDE 0.2 MG: 0.2 TABLET ORAL at 08:02

## 2023-02-03 RX ADMIN — FERROUS SULFATE TAB 325 MG (65 MG ELEMENTAL FE) 1 EACH: 325 (65 FE) TAB at 08:02

## 2023-02-03 NOTE — PROGRESS NOTES
NEPHROLOGY: Progress     Pleasant 87-year-old white female with type 2 diabetes, hypertension, dyslipidemia, anemia of chronic disease, previous iron deficiency and advanced stage 4 kidney disease.  Baseline GFR approximately 27.  Admitted earlier in January with creatinine in excess of 5, metabolic acidosis and hyperkalemia but discharged relatively asymptomatic and in stable condition.  She is readmitted with progressive weight and worsening edema.    At this point in time patient appears to be end-stage renal disease and therefore she underwent placement of right IJ tunneled dialysis catheter by Dr. Stover and underwent her 1st dialysis on the evening of February 2nd.  Patient is currently tolerating her 2nd consecutive treatment and feels well.  UF goal has been dropped to 2 L today.  Plans to repeat dialysis Saturday February 4th.          Current Facility-Administered Medications:     acetaminophen tablet 1,000 mg, 1,000 mg, Oral, Q6H PRN, Jennifer Guzman MD, 1,000 mg at 02/02/23 2334    acetaminophen tablet 650 mg, 650 mg, Oral, Q4H PRN, Jennifer Guzman MD    allopurinoL tablet 100 mg, 100 mg, Oral, Daily, Rufina Valencia MD, 100 mg at 02/03/23 0810    aluminum-magnesium hydroxide-simethicone 200-200-20 mg/5 mL suspension 30 mL, 30 mL, Oral, QID PRN, Jennifer Guzman MD    amLODIPine tablet 10 mg, 10 mg, Oral, Daily, Jennifer Guzman MD, 10 mg at 02/03/23 0809    aspirin chewable tablet 81 mg, 81 mg, Oral, Daily, Rufina Valencia MD, 81 mg at 02/03/23 0810    carvediloL tablet 25 mg, 25 mg, Oral, BID, Rufina Valencia MD, 25 mg at 02/02/23 2007    cloNIDine tablet 0.2 mg, 0.2 mg, Oral, BID, Rufina Valencia MD, 0.2 mg at 02/03/23 0810    dextrose 10% bolus 125 mL 125 mL, 12.5 g, Intravenous, PRN, Jennifer Guzman MD    dextrose 10% bolus 250 mL 250 mL, 25 g, Intravenous, PRN, Jennifer Guzman MD    doxazosin tablet 4 mg, 4  mg, Oral, Daily, Rufina Valencia MD, 4 mg at 02/03/23 0810    enoxaparin injection 30 mg, 30 mg, Subcutaneous, Daily, Owen Stokes MD, 30 mg at 02/01/23 1708    fentaNYL injection, , , PRN, Eric Hasclarisse, DO, 25 mcg at 02/02/23 1527    ferrous sulfate tablet 1 each, 1 tablet, Oral, Daily, Rufina Valencia MD, 1 each at 02/03/23 0810    glucagon (human recombinant) injection 1 mg, 1 mg, Intramuscular, PRN, Jennifer Guzman MD    glucose chewable tablet 16 g, 16 g, Oral, PRN, Jennifer Guzman MD    glucose chewable tablet 24 g, 24 g, Oral, PRN, Jennifer Guzman MD    heparin (porcine) injection, , , PRN, Eric Hasclarisse, DO, 4,000 Units at 02/02/23 1540    insulin aspart U-100 injection 0-5 Units, 0-5 Units, Subcutaneous, QID (AC + HS) PRN, Jennifer Guzman MD    iopamidoL (ISOVUE-370) injection, , , PRN, Eric Angela, DO, 5 mL at 02/02/23 1550    LIDOcaine HCL 20 mg/ml (2%) injection, , , PRN, Eric Hasclarisse, DO, 20 mL at 02/02/23 1517    LORazepam tablet 0.5 mg, 0.5 mg, Oral, Daily PRN, Jennifer Guzman MD, 0.5 mg at 02/01/23 2055    melatonin tablet 6 mg, 6 mg, Oral, Nightly PRN, Owen Stokes MD, 6 mg at 02/01/23 2055    midazolam (VERSED) 1 mg/mL injection, , , PRN, Eric Angela DO, 1 mg at 02/02/23 1512    ondansetron injection 4 mg, 4 mg, Intravenous, Q4H PRN, Jennifer Guzman MD    polyethylene glycol packet 17 g, 17 g, Oral, BID PRN, Jennifer Guzman MD    prochlorperazine injection Soln 5 mg, 5 mg, Intravenous, Q6H PRN, Jennifer Guzman MD    senna-docusate 8.6-50 mg per tablet 2 tablet, 2 tablet, Oral, BID PRN, Jennifer Guzman MD    simethicone chewable tablet 80 mg, 1 tablet, Oral, QID PRN, Jennifer Guzman MD    sodium chloride 0.9% flush 10 mL, 10 mL, Intravenous, PRN, Owen Stokes MD        BP (!) 145/53   Pulse 67   Temp 98.7 °F (37.1 °C) (Oral)   Resp 18   Ht 5' (1.524 m)   Wt 83 kg (182 lb 15.7 oz)   LMP  (LMP Unknown)   SpO2 (!) 94%   Breastfeeding No   BMI 35.74 kg/m²     Physical Exam:    GEN: Awake and  appropriate.  States he is feeling better already.  HEENT: Atraumatic. EOMI, no icterus  NECK :  2 cm JVD, right chest wall tunneled catheter functioning well following cath-victor m  CARD : RRR s rub or gallop  LUNGS : Clear to auscultation anteriorly with diminished breath sounds at the bases  ABD : Soft,non-tender. BS active  EXT :  Trace pretibial pitting edema.  Upper extremity edema worse on the left than on the right          Intake/Output Summary (Last 24 hours) at 2/3/2023 0912  Last data filed at 2/3/2023 0819  Gross per 24 hour   Intake --   Output 550 ml   Net -550 ml         Laboratory:  Recent Results (from the past 24 hour(s))   POCT glucose    Collection Time: 02/02/23 11:41 AM   Result Value Ref Range    POCT Glucose 116 (H) 70 - 110 mg/dL   POCT glucose    Collection Time: 02/02/23  4:11 PM   Result Value Ref Range    POCT Glucose 120 (H) 70 - 110 mg/dL   Comprehensive Metabolic Panel    Collection Time: 02/03/23  4:48 AM   Result Value Ref Range    Sodium Level 132 (L) 136 - 145 mmol/L    Potassium Level 4.0 3.5 - 5.1 mmol/L    Chloride 96 (L) 98 - 107 mmol/L    Carbon Dioxide 25 23 - 31 mmol/L    Glucose Level 148 (H) 82 - 115 mg/dL    Blood Urea Nitrogen 38.6 (H) 9.8 - 20.1 mg/dL    Creatinine 3.43 (H) 0.55 - 1.02 mg/dL    Calcium Level Total 9.1 8.4 - 10.2 mg/dL    Protein Total 5.6 (L) 5.8 - 7.6 gm/dL    Albumin Level 3.2 (L) 3.4 - 4.8 g/dL    Globulin 2.4 2.4 - 3.5 gm/dL    Albumin/Globulin Ratio 1.3 1.1 - 2.0 ratio    Bilirubin Total 0.4 <=1.5 mg/dL    Alkaline Phosphatase 41 40 - 150 unit/L    Alanine Aminotransferase 8 0 - 55 unit/L    Aspartate Aminotransferase 11 5 - 34 unit/L    eGFR 13 mls/min/1.73/m2   CBC with Differential    Collection Time: 02/03/23  4:48 AM   Result Value Ref Range    WBC 3.7 (L) 4.5 - 11.5 x10(3)/mcL    RBC 3.06 (L) 4.20 - 5.40 x10(6)/mcL    Hgb 8.9 (L) 12.0 - 16.0 gm/dL    Hct 27.0 (L) 37.0 - 47.0 %    MCV 88.2 80.0 - 94.0 fL    MCH 29.1 pg    MCHC 33.0 33.0 - 36.0  mg/dL    RDW 15.8 11.5 - 17.0 %    Platelet 216 130 - 400 x10(3)/mcL    MPV 11.3 (H) 7.4 - 10.4 fL    Neut % 64.2 %    Lymph % 17.2 %    Mono % 15.1 %    Eos % 2.4 %    Basophil % 0.8 %    Lymph # 0.64 0.6 - 4.6 x10(3)/mcL    Neut # 2.39 2.1 - 9.2 x10(3)/mcL    Mono # 0.56 0.1 - 1.3 x10(3)/mcL    Eos # 0.09 0 - 0.9 x10(3)/mcL    Baso # 0.03 0 - 0.2 x10(3)/mcL    IG# 0.01 0 - 0.04 x10(3)/mcL    IG% 0.3 %    NRBC% 0.0 %         Assessment/Plan:  CKD 5-likely ESRD  Congestive heart failure exacerbation secondary to above  Type 2 diabetes  Hypertension  Anemia of iron deficiency previously received IV iron          Case management is already working on outpatient dialysis.  Patient will dialyze again Saturday and then held on Sunday.  Mustapha Zavaleta MD, DAVIDN

## 2023-02-03 NOTE — PLAN OF CARE
Problem: Physical Therapy  Goal: Physical Therapy Goal  Description: Goals to be met by: 3/3/23     Patient will increase functional independence with mobility by performin. Supine to sit with [place]  2. Sit to supine with [place]  3. Gait  x 300 feet with Modified [place] using Rolling Walker.   4. Increased functional strength to WNL in BLE's.    Outcome: Ongoing, Progressing

## 2023-02-03 NOTE — PROGRESS NOTES
02/02/23 6779   Post-Hemodialysis Assessment   Blood Volume Processed (Liters) 30 L   Dialyzer Clearance Lightly streaked   Duration of Treatment 120 minutes   Total UF (mL) 0 mL   Patient Response to Treatment Pt tolerated tx well

## 2023-02-03 NOTE — NURSING
02/03/23 1209   Tunneled Central Line Insertion/Assessment - Double Lumen  02/02/23 1535 right internal jugular   Placement Date/Time: 02/02/23 1535   Inserted by: MD  Hand Hygiene: Performed  Barrier Precautions: Performed  Skin Antisepsis: ChloraPrep  Location: right internal jugular  Insertion attempts (enter comment if more than 2 attempts): 1  Guidewire [employer]...   Line Necessity Review CRRT/HD   Site Assessment No drainage;No redness;No swelling;No warmth   Line Securement Device Secured with sutures   Dressing Type Biopatch in place;Central line dressing;Transparent (Tegaderm)   Dressing Status Clean;Dry;Intact   Dressing Intervention Integrity maintained   Distal Patency/Care normal saline locked   Proximal 1 Patency/Care normal saline locked   Post-Hemodialysis Assessment   Blood Volume Processed (Liters) 53.6 L   Dialyzer Clearance Lightly streaked   Duration of Treatment 180 minutes   Total UF (mL) 2500 mL   Net Fluid Removal 2000   Patient Response to Treatment Tolerated well.  No c/o.   Post-Hemodialysis Comments Dialyzed x 3 hours.  Tolerated well.  No c/o or issues during tx.  Net fluid removal of 2 liters.  Deaccessed per p and p.  Clearguard caps applied.

## 2023-02-03 NOTE — PT/OT/SLP EVAL
Physical Therapy Evaluation    Patient Name:  Rayna Haider   MRN:  [id number]    Recommendations:     Discharge Recommendations: home health PT   Discharge Equipment Recommendations: none   Barriers to discharge: None    Assessment:     Rayna Haider is a 87 y.o. female admitted with a medical diagnosis of Hyponatremia with excess extracellular fluid volume. Patient reports living alone in [facility]. She is usually independent. Daughter lives across the street and checks on patient daily.  She presents with the following impairments/functional limitations: weakness, impaired endurance, gait instability, impaired balance, decreased safety awareness. She required MIN A for bed mobility. Ambulated 15 ft with RW & CGA. Slowed pace. Limited by fatigue. She will need HH PT at discharge. Progress patient as tolerated.     Rehab Prognosis: Good; patient would benefit from acute skilled PT services to address these deficits and reach maximum level of function.    Recent Surgery: Procedure(s) (LRB):  INSERTION, CATHETER, CENTRAL VENOUS, HEMODIALYSIS, TUNNELED (N/A) 1 Day Post-Op    Plan:     During this hospitalization, patient to be seen 5 x/week to address the identified rehab impairments via gait training, therapeutic activities, therapeutic exercises, neuromuscular re-education and progress toward the following goals:    Plan of Care Expires:  03/03/23    Subjective     Chief Complaint: none  Patient/Family Comments/goals: none  Pain/Comfort:  Pain Rating 1: 0/10    Patients cultural, spiritual, Amish conflicts given the current situation: no    Living Environment:  Lives alone in [facility].   Prior to admission, patients level of function was independent.  Equipment used at home: bedside commode, cane, straight, shower chair, walker, rolling.  DME owned (not currently used): rolling walker.  Upon discharge, patient will have assistance from elia.    Objective:     Communicated with nurse prior to session.  Patient  found HOB elevated with telemetry  upon PT entry to room.    General Precautions: Standard, fall  Orthopedic Precautions:N/A   Braces: N/A  Respiratory Status: Room air    Exams:  Cognitive Exam:  Patient is oriented to Person, Place, Time, and Situation  Sensation:    -       Intact  RLE ROM: WFL  RLE Strength: -4/5 grossly  LLE ROM: WFL  LLE Strength: -4/5 grossly    Functional Mobility:  Bed Mobility:     Supine to Sit: minimum assistance  Transfers:     Sit to Stand:  contact guard assistance with rolling walker  Gait: 15 ft with RW & CGA  Balance: fair      AM-PAC 6 CLICK MOBILITY  Total Score:19     Patient left up in chair with all lines intact, call button in reach, and daughter present.    GOALS:   Multidisciplinary Problems       Physical Therapy Goals          Problem: Physical Therapy    Goal Priority Disciplines Outcome Goal Variances Interventions   Physical Therapy Goal     PT, PT/OT Ongoing, Progressing     Description: Goals to be met by: 3/3/23     Patient will increase functional independence with mobility by performin. Supine to sit with [place]  2. Sit to supine with [place]  3. Gait  x 300 feet with Modified [place] using Rolling Walker.   4. Increased functional strength to WNL in BLE's.                         History:     Past Medical History:   Diagnosis Date    Diabetes mellitus, type 2     Hyperkalemia     Hypertension     Vitamin D deficiency        Past Surgical History:   Procedure Laterality Date    EYE SURGERY      HYSTERECTOMY      INSERTION OF TUNNELED CENTRAL VENOUS HEMODIALYSIS CATHETER N/A 2023    Procedure: INSERTION, CATHETER, CENTRAL VENOUS, HEMODIALYSIS, TUNNELED;  Surgeon: Eric Angela DO;  Location: [facility] CATH LAB;  Service: Nephrology;  Laterality: N/A;    RETINAL DETACHMENT SURGERY Left     SKIN GRAFT Right     burns, scald       Time Tracking:     PT Received On: 23  PT Start Time: 1305     PT Stop Time: 1325  PT Total Time (min): 20 min      Billable Minutes: Evaluation 20 minutes      02/03/2023

## 2023-02-03 NOTE — PROGRESS NOTES
[facility]  Hospital Medicine Progress Note        Chief Complaint: Inpatient Follow-up for weakness, AMPARO on CKD V    HPI: This is an 87-year-old female with medical history notable for CKD stage 5 with recent admission for AMPARO on 01/13/2023 treated conservatively and kidney function stabilized and did not require hemodialysis.  Had a follow-up labs with her nephrologist and was noted to be hyponatremic for which she was sent to [facility] ED for further evaluation.  Patient reports since her discharge she was instructed to drink a lot of water and has been drinking half a gallon of water daily and also has stopped her Lasix.  Report increased swelling in her bilateral lower extremities.  Reports generalized weakness and fatigue, denies shortness breath or chest pain, fever or chills.  On arrival to ED, she was afebrile and hemodynamically stable.  Saturating 92% on room air.  Labs notable for sodium 121, creatinine 4.69, BUN 58, CO2 20, potassium 4.2, , serum osmolarity pending, urine sodium 26.  Urine osmolarity and urine sodium after receiving Lasix 40 mg IV is 217 and 34, serum osmolarity not ordered.  She was given another Lasix 80 mg IV.  Subsequently transferred to [facility] and referred to hospital medicine service for further evaluation and management.    Interval Hx:   Undergoing hemodialysis.  Reports she feels much better.  Reported that her 1st hemodialysis also went well.  Patient is very calm now.  No complaints.    Case discussed with patient's nurse and  on the floor    Objective/physical exam:  General: In no acute distress, afebrile, obese  Chest:  Decreased breath sounds bilateral bases, good air entry otherwise  Heart: RRR, +S1, S2, no appreciable murmur  Abdomen: Soft, nontender, BS +  MSK: Warm, 1+ pitting edema bilateral lower extremities  Neurologic: Alert and oriented x4, Cranial nerve II-XII intact, Strength 5/5 in all 4 extremities    VITAL SIGNS: 24  HRS MIN & MAX LAST   Temp  Min: 97.4 °F (36.3 °C)  Max: 98.7 °F (37.1 °C) 98.7 °F (37.1 °C)   BP  Min: 127/55  Max: 153/65 (!) 145/53   Pulse  Min: 56  Max: 72  67     Resp  Min: 18  Max: 20 18   SpO2  Min: 93 %  Max: 95 % (!) 94 %         Recent Labs   Lab 01/31/23  0427 02/01/23  0433 02/03/23  0448   WBC 4.8 3.8* 3.7*   RBC 3.02* 3.02* 3.06*   HGB 8.6* 8.7* 8.9*   HCT 25.8* 26.1* 27.0*   MCV 85.4 86.4 88.2   MCH 28.5 28.8 29.1   MCHC 33.3 33.3 33.0   RDW 15.1 15.2 15.8    211 216   MPV 11.6* 11.4* 11.3*       Recent Labs   Lab 01/30/23  1319 01/30/23  2140 01/31/23 0427 02/01/23 0433 02/02/23  0346 02/03/23  0448   *   < > 119* 123* 126* 132*   K 4.2   < > 3.9 3.7 4.2 4.0   CO2 21*   < > 18* 22* 22* 25   BUN 57.9*   < > 58.1* 62.7* 61.7* 38.6*   CREATININE 4.39*   < > 4.45* 4.51* 4.35* 3.43*   CALCIUM 9.7   < > 8.7 9.0 9.1 9.1   MG 1.90  --  1.80  --   --   --    ALBUMIN 3.4  --  2.8* 3.1* 3.4 3.2*   ALKPHOS 49  --  47 43 44 41   ALT 11  --  10 10 10 8   AST 15  --  13 10 10 11   BILITOT 0.4  --  0.4 0.5 0.5 0.4    < > = values in this interval not displayed.          Microbiology Results (last 7 days)       ** No results found for the last 168 hours. **             See below for Radiology    Scheduled Med:   allopurinoL  100 mg Oral Daily    amLODIPine  10 mg Oral Daily    aspirin  81 mg Oral Daily    carvediloL  25 mg Oral BID    cloNIDine  0.2 mg Oral BID    doxazosin  4 mg Oral Daily    enoxaparin  30 mg Subcutaneous Daily    ferrous sulfate  1 tablet Oral Daily        Continuous Infusions:       PRN Meds:  acetaminophen, acetaminophen, aluminum-magnesium hydroxide-simethicone, dextrose 10%, dextrose 10%, fentaNYL, glucagon (human recombinant), glucose, glucose, heparin (porcine), insulin aspart U-100, iopamidoL, LIDOcaine HCL 20 mg/ml (2%), LORazepam, melatonin, midazolam, ondansetron, polyethylene glycol, prochlorperazine, senna-docusate 8.6-50 mg, simethicone, sodium chloride 0.9%        Assessment/Plan:  Hypervolemic Hyponatremia -- ? excess free water intake/polydipsia  AMPARO on CKD IV- started HD 2/2  Renal related anasarca/volume overload  Metabolic acidosis and hyperphosphatemia  Hypertension  HX T2DM, hypertension, hyperlipidemia, anemia of chronic disease, hyperuricemia, and anxiety          Nephrology on board, appreciate recommendations  Sodium improved to 132  Completed 3 days therapy of tolvaptan 15 mg daily  Nephrologist Dr. Eric Angela placed tunneled catheter 2/2  Initiated hemodialysis on 02/02  Save left arm, no BP is on needle sticks  Carly also on the case to arrange for dialysis chair time  Echocardiogram reviewed, EF 55%, normal ventricular size and function.  Mild TR, small left pleural effusion  Home medication reviewed and resumed  Morning CBC, CMP ordered    VTE prophylaxis:  Lovenox 30 mg subQ    Patient condition:  Stable    Anticipated discharge and Disposition:   TBD      All diagnosis and differential diagnosis have been reviewed; assessment and plan has been documented; I have personally reviewed the labs and test results that are presently available; I have reviewed the patients medication list; I have reviewed the consulting providers response and recommendations. I have reviewed or attempted to review medical records based upon their availability    All of the patient's questions have been  addressed and answered. Patient's is agreeable to the above stated plan. I will continue to monitor closely and make adjustments to medical management as needed.  _____________________________________________________________________    Nutrition Status:    Radiology:  Cardiac catheterization  Procedure performed in the Invasive Lab    - See Procedure Log link below for nursing documentation    - See OpNote on Surgeries Tab for physician findings    - See Imaging Tab for radiologist dictation      Gerardo Bianchi MD  Department of Hospital Medicine   [facility]  [facility]   02/03/2023

## 2023-02-04 LAB
ABS NEUT (OLG): 2.62 X10(3)/MCL (ref 2.1–9.2)
ALBUMIN SERPL-MCNC: 3.1 G/DL (ref 3.4–4.8)
ALBUMIN/GLOB SERPL: 1.2 RATIO (ref 1.1–2)
ALP SERPL-CCNC: 39 UNIT/L (ref 40–150)
ALT SERPL-CCNC: 9 UNIT/L (ref 0–55)
ANISOCYTOSIS BLD QL SMEAR: ABNORMAL
AST SERPL-CCNC: 11 UNIT/L (ref 5–34)
BASOPHILS NFR BLD MANUAL: 0.08 X10(3)/MCL (ref 0–0.2)
BASOPHILS NFR BLD MANUAL: 2 %
BILIRUBIN DIRECT+TOT PNL SERPL-MCNC: 0.4 MG/DL
BUN SERPL-MCNC: 22.1 MG/DL (ref 9.8–20.1)
CALCIUM SERPL-MCNC: 9.2 MG/DL (ref 8.4–10.2)
CHLORIDE SERPL-SCNC: 98 MMOL/L (ref 98–107)
CO2 SERPL-SCNC: 25 MMOL/L (ref 23–31)
CREAT SERPL-MCNC: 2.64 MG/DL (ref 0.55–1.02)
EOSINOPHIL NFR BLD MANUAL: 0.15 X10(3)/MCL (ref 0–0.9)
EOSINOPHIL NFR BLD MANUAL: 4 %
ERYTHROCYTE [DISTWIDTH] IN BLOOD BY AUTOMATED COUNT: 16.2 % (ref 11.5–17)
GFR SERPLBLD CREATININE-BSD FMLA CKD-EPI: 17 MLS/MIN/1.73/M2
GLOBULIN SER-MCNC: 2.5 GM/DL (ref 2.4–3.5)
GLUCOSE SERPL-MCNC: 116 MG/DL (ref 82–115)
HCT VFR BLD AUTO: 27.7 % (ref 37–47)
HGB BLD-MCNC: 8.8 GM/DL (ref 12–16)
IMM GRANULOCYTES # BLD AUTO: 0.01 X10(3)/MCL (ref 0–0.04)
IMM GRANULOCYTES NFR BLD AUTO: 0.3 %
INSTRUMENT WBC (OLG): 3.8 X10(3)/MCL
LYMPHOCYTES NFR BLD MANUAL: 0.8 X10(3)/MCL
LYMPHOCYTES NFR BLD MANUAL: 21 %
MACROCYTES BLD QL SMEAR: ABNORMAL
MCH RBC QN AUTO: 28.8 PG
MCHC RBC AUTO-ENTMCNC: 31.8 MG/DL (ref 33–36)
MCV RBC AUTO: 90.5 FL (ref 80–94)
MONOCYTES NFR BLD MANUAL: 0.19 X10(3)/MCL (ref 0.1–1.3)
MONOCYTES NFR BLD MANUAL: 5 %
NEUTROPHILS NFR BLD MANUAL: 69 %
NRBC BLD AUTO-RTO: 0 %
PLATELET # BLD AUTO: 214 X10(3)/MCL (ref 130–400)
PLATELET # BLD EST: ADEQUATE 10*3/UL
PMV BLD AUTO: 11.4 FL (ref 7.4–10.4)
POCT GLUCOSE: 113 MG/DL (ref 70–110)
POCT GLUCOSE: 120 MG/DL (ref 70–110)
POCT GLUCOSE: 144 MG/DL (ref 70–110)
POTASSIUM SERPL-SCNC: 3.9 MMOL/L (ref 3.5–5.1)
PROT SERPL-MCNC: 5.6 GM/DL (ref 5.8–7.6)
RBC # BLD AUTO: 3.06 X10(6)/MCL (ref 4.2–5.4)
RBC MORPH BLD: ABNORMAL
SODIUM SERPL-SCNC: 134 MMOL/L (ref 136–145)
WBC # SPEC AUTO: 3.8 X10(3)/MCL (ref 4.5–11.5)

## 2023-02-04 PROCEDURE — 90935 HEMODIALYSIS ONE EVALUATION: CPT

## 2023-02-04 PROCEDURE — 80053 COMPREHEN METABOLIC PANEL: CPT | Performed by: INTERNAL MEDICINE

## 2023-02-04 PROCEDURE — 21400001 HC TELEMETRY ROOM

## 2023-02-04 PROCEDURE — 63600175 PHARM REV CODE 636 W HCPCS: Mod: JG | Performed by: STUDENT IN AN ORGANIZED HEALTH CARE EDUCATION/TRAINING PROGRAM

## 2023-02-04 PROCEDURE — 63600175 PHARM REV CODE 636 W HCPCS: Performed by: EMERGENCY MEDICINE

## 2023-02-04 PROCEDURE — 25000003 PHARM REV CODE 250: Performed by: INTERNAL MEDICINE

## 2023-02-04 PROCEDURE — 85027 COMPLETE CBC AUTOMATED: CPT | Performed by: INTERNAL MEDICINE

## 2023-02-04 PROCEDURE — 27000221 HC OXYGEN, UP TO 24 HOURS

## 2023-02-04 RX ADMIN — TRAMADOL HYDROCHLORIDE 50 MG: 50 TABLET, COATED ORAL at 06:02

## 2023-02-04 RX ADMIN — ENOXAPARIN SODIUM 30 MG: 30 INJECTION SUBCUTANEOUS at 04:02

## 2023-02-04 RX ADMIN — CLONIDINE HYDROCHLORIDE 0.2 MG: 0.2 TABLET ORAL at 08:02

## 2023-02-04 RX ADMIN — ALLOPURINOL 100 MG: 100 TABLET ORAL at 02:02

## 2023-02-04 RX ADMIN — FERROUS SULFATE TAB 325 MG (65 MG ELEMENTAL FE) 1 EACH: 325 (65 FE) TAB at 02:02

## 2023-02-04 RX ADMIN — EPOETIN ALFA-EPBX 10000 UNITS: 10000 INJECTION, SOLUTION INTRAVENOUS; SUBCUTANEOUS at 02:02

## 2023-02-04 RX ADMIN — TRAMADOL HYDROCHLORIDE 50 MG: 50 TABLET, COATED ORAL at 08:02

## 2023-02-04 RX ADMIN — CARVEDILOL 25 MG: 25 TABLET, FILM COATED ORAL at 08:02

## 2023-02-04 RX ADMIN — Medication 81 MG: at 02:02

## 2023-02-04 NOTE — PROGRESS NOTES
[facility]  Hospital Medicine Progress Note        Chief Complaint: Inpatient Follow-up for weakness, AMPARO on CKD V    HPI: This is an 87-year-old female with medical history notable for CKD stage 5 with recent admission for AMPARO on 01/13/2023 treated conservatively and kidney function stabilized and did not require hemodialysis.  Had a follow-up labs with her nephrologist and was noted to be hyponatremic for which she was sent to [facility] ED for further evaluation.  Patient reports since her discharge she was instructed to drink a lot of water and has been drinking half a gallon of water daily and also has stopped her Lasix.  Report increased swelling in her bilateral lower extremities.  Reports generalized weakness and fatigue, denies shortness breath or chest pain, fever or chills.  On arrival to ED, she was afebrile and hemodynamically stable.  Saturating 92% on room air.  Labs notable for sodium 121, creatinine 4.69, BUN 58, CO2 20, potassium 4.2, , serum osmolarity pending, urine sodium 26.  Urine osmolarity and urine sodium after receiving Lasix 40 mg IV is 217 and 34, serum osmolarity not ordered.  She was given another Lasix 80 mg IV.  Subsequently transferred to [facility] and referred to hospital medicine service for further evaluation and management.    Interval Hx:   Sitting at the side of the bed, no complaints.  Reports she feels very good.  Discussed that she will undergo her 3rd dialysis today.  Verbalized understanding  Daughter is at bedside    Case discussed with patient's nurse on the floor    Objective/physical exam:  General: In no acute distress, afebrile, obese  Chest:  Decreased breath sounds bilateral bases, good air entry otherwise  Heart: RRR, +S1, S2, no appreciable murmur  Abdomen: Soft, nontender, BS +  MSK: Warm, 1+ pitting edema bilateral lower extremities  Neurologic: Alert and oriented x4, Cranial nerve II-XII intact, Strength 5/5 in all 4  extremities    VITAL SIGNS: 24 HRS MIN & MAX LAST   Temp  Min: 97.9 °F (36.6 °C)  Max: 98.7 °F (37.1 °C) 98.4 °F (36.9 °C)   BP  Min: 126/60  Max: 145/53 136/67   Pulse  Min: 60  Max: 69  60     Resp  Min: 18  Max: 18 18   SpO2  Min: 92 %  Max: 95 % 95 %         Recent Labs   Lab 01/31/23  0427 02/01/23  0433 02/03/23 0448   WBC 4.8 3.8* 3.7*   RBC 3.02* 3.02* 3.06*   HGB 8.6* 8.7* 8.9*   HCT 25.8* 26.1* 27.0*   MCV 85.4 86.4 88.2   MCH 28.5 28.8 29.1   MCHC 33.3 33.3 33.0   RDW 15.1 15.2 15.8    211 216   MPV 11.6* 11.4* 11.3*       Recent Labs   Lab 01/30/23  1319 01/30/23  2140 01/31/23 0427 02/01/23 0433 02/02/23  0346 02/03/23  0448   *   < > 119* 123* 126* 132*   K 4.2   < > 3.9 3.7 4.2 4.0   CO2 21*   < > 18* 22* 22* 25   BUN 57.9*   < > 58.1* 62.7* 61.7* 38.6*   CREATININE 4.39*   < > 4.45* 4.51* 4.35* 3.43*   CALCIUM 9.7   < > 8.7 9.0 9.1 9.1   MG 1.90  --  1.80  --   --   --    ALBUMIN 3.4  --  2.8* 3.1* 3.4 3.2*   ALKPHOS 49  --  47 43 44 41   ALT 11  --  10 10 10 8   AST 15  --  13 10 10 11   BILITOT 0.4  --  0.4 0.5 0.5 0.4    < > = values in this interval not displayed.          Microbiology Results (last 7 days)       ** No results found for the last 168 hours. **             See below for Radiology    Scheduled Med:   allopurinoL  100 mg Oral Daily    amLODIPine  10 mg Oral Daily    aspirin  81 mg Oral Daily    carvediloL  25 mg Oral BID    cloNIDine  0.2 mg Oral BID    diclofenac sodium  2 g Topical (Top) Daily    doxazosin  4 mg Oral Daily    enoxaparin  30 mg Subcutaneous Daily    ferrous sulfate  1 tablet Oral Daily        Continuous Infusions:       PRN Meds:  acetaminophen, acetaminophen, aluminum-magnesium hydroxide-simethicone, dextrose 10%, dextrose 10%, fentaNYL, glucagon (human recombinant), glucose, glucose, heparin (porcine), insulin aspart U-100, iopamidoL, LIDOcaine HCL 20 mg/ml (2%), LORazepam, melatonin, midazolam, ondansetron, polyethylene glycol, prochlorperazine,  senna-docusate 8.6-50 mg, simethicone, sodium chloride 0.9%, traMADoL       Assessment/Plan:  Hypervolemic Hyponatremia -due to volume overload- resolved  AMPARO on CKD IV- emily ESRD, started HD 2/2  Renal related anasarca/volume overload  Metabolic acidosis - resolved  Hypertension  HX T2DM, hypertension, hyperlipidemia, anemia of chronic disease, hyperuricemia, and anxiety          Nephrology on board, appreciate recommendations  Sodium improved to 134  Completed 3 days therapy of tolvaptan 15 mg daily  Nephrologist Dr. Eric Angela placed tunneled catheter 2/2, we greatly appreciate him  Initiated hemodialysis on 02/02, underwent again on 2/3, 2/4  Save left arm, no BP is on needle sticks  Carly also on the case to arrange for dialysis chair time  Echocardiogram reviewed, EF 55%, normal ventricular size and function.  Mild TR, small left pleural effusion  Home medication reviewed and resumed  PT evaluated the patient, recommended home health PT  Repeat labs Monday    VTE prophylaxis:  Lovenox 30 mg subQ    Patient condition:  Stable    Anticipated discharge and Disposition:   TBD, will need dialysis chair time prior to discharge      All diagnosis and differential diagnosis have been reviewed; assessment and plan has been documented; I have personally reviewed the labs and test results that are presently available; I have reviewed the patients medication list; I have reviewed the consulting providers response and recommendations. I have reviewed or attempted to review medical records based upon their availability    All of the patient's questions have been  addressed and answered. Patient's is agreeable to the above stated plan. I will continue to monitor closely and make adjustments to medical management as needed.  _____________________________________________________________________    Nutrition Status:    Radiology:  Cardiac catheterization  Procedure performed in the Invasive Lab    - See Procedure Log link  below for nursing documentation    - See OpNote on Surgeries Tab for physician findings    - See Imaging Tab for radiologist dictation      Gerardo Bianchi MD  Department of Hospital Medicine   [facility]   02/04/2023

## 2023-02-04 NOTE — PROGRESS NOTES
Nephrology consult follow up note    HPI:      Rayna Haider is a 87 y.o. female with type 2 diabetes, hypertension, dyslipidemia, anemia of chronic disease, previous iron deficiency and advanced stage 4 kidney disease.  Baseline GFR approximately 27.  Admitted earlier in January with creatinine in excess of 5, metabolic acidosis and hyperkalemia but discharged relatively asymptomatic and in stable condition.  She is readmitted with progressive weight and worsening edema.     At this point in time patient appears to be end-stage renal disease and therefore she underwent placement of right IJ tunneled dialysis catheter by Dr. Stover and underwent her 1st dialysis on the evening of February 2nd.    Interval history:     No acute events overnight. Seen on HD. Tolerating well. Still has LE edema, but no SOB.      Review of Systems:     Past medical, family, surgical, and social history reviewed and unchanged from initial consult note.     Objective:       VITAL SIGNS: 24 HR MIN & MAX LAST    Temp  Min: 97.9 °F (36.6 °C)  Max: 98.5 °F (36.9 °C)  98.1 °F (36.7 °C)        BP  Min: 126/60  Max: 145/53  134/70     Pulse  Min: 60  Max: 69  66     Resp  Min: 18  Max: 18  18    SpO2  Min: 92 %  Max: 95 %  (!) 92 %      GEN: Elderly WF in NAD  CV: RRR +S1,S2 without murmur  PULM: CTAB, unlabored  ABD: Soft, NT/ND abdomen with NABS  EXT: 2+ BLE edea.   SKIN: Warm and dry  PSYCH: Awake, alert and appropriately conversant.   Vascular access: RIJ permcath            Component Value Date/Time     (L) 02/04/2023 0706     (L) 02/03/2023 0448    K 3.9 02/04/2023 0706    K 4.0 02/03/2023 0448    CHLORIDE 98 02/04/2023 0706    CHLORIDE 96 (L) 02/03/2023 0448    CO2 25 02/04/2023 0706    CO2 25 02/03/2023 0448    BUN 22.1 (H) 02/04/2023 0706    BUN 38.6 (H) 02/03/2023 0448    CREATININE 2.64 (H) 02/04/2023 0706    CREATININE 3.43 (H) 02/03/2023 0448    CALCIUM 9.2 02/04/2023 0706    CALCIUM 9.1 02/03/2023 0448    PHOS 5.6  (H) 01/31/2023 0427            Component Value Date/Time    WBC 3.8 (L) 02/04/2023 0706    WBC 3.7 (L) 02/03/2023 0448    HGB 8.8 (L) 02/04/2023 0706    HGB 8.9 (L) 02/03/2023 0448    HCT 27.7 (L) 02/04/2023 0706    HCT 27.0 (L) 02/03/2023 0448     02/04/2023 0706     02/03/2023 0448         Imaging reviewed      Assessment / Plan:       Active Hospital Problems    Diagnosis  POA    *Hyponatremia with excess extracellular fluid volume [E87.1]  Yes      Resolved Hospital Problems   No resolved problems to display.       CKD 5-likely ESRD  Congestive heart failure exacerbation secondary to above  Type 2 diabetes  Hypertension  Anemia of iron deficiency previously received IV iron    Plan:  Seen on HD at 9:25am. Tolerating well. 2-3L UF as tolerated. This is her 3rd HD session. Will plan to get her on a regular schedule next week, tentatively plan for TTS HD. Will follow.       Jonathan Danielson DO  Nephrology  [facility]  Clinic number: [phone]

## 2023-02-04 NOTE — NURSING
02/04/23 1134   Post-Hemodialysis Assessment   Blood Volume Processed (Liters) 61.1 L   Dialyzer Clearance Lightly streaked   Duration of Treatment 180 minutes   Total UF (mL) 3000 mL   Patient Response to Treatment Tolerated well   Post-Hemodialysis Comments Treatment completed. No issues during tx. NET fluid removed = 3 liters. No complaints. Right CVC functioned well

## 2023-02-05 LAB
POCT GLUCOSE: 118 MG/DL (ref 70–110)
POCT GLUCOSE: 142 MG/DL (ref 70–110)
POCT GLUCOSE: 158 MG/DL (ref 70–110)
POCT GLUCOSE: 184 MG/DL (ref 70–110)

## 2023-02-05 PROCEDURE — 21400001 HC TELEMETRY ROOM

## 2023-02-05 PROCEDURE — 25000003 PHARM REV CODE 250: Performed by: INTERNAL MEDICINE

## 2023-02-05 PROCEDURE — 63600175 PHARM REV CODE 636 W HCPCS: Performed by: EMERGENCY MEDICINE

## 2023-02-05 PROCEDURE — 27000221 HC OXYGEN, UP TO 24 HOURS

## 2023-02-05 PROCEDURE — 94761 N-INVAS EAR/PLS OXIMETRY MLT: CPT

## 2023-02-05 RX ORDER — CARVEDILOL 12.5 MG/1
12.5 TABLET ORAL 2 TIMES DAILY
Status: DISCONTINUED | OUTPATIENT
Start: 2023-02-05 | End: 2023-02-07

## 2023-02-05 RX ADMIN — FERROUS SULFATE TAB 325 MG (65 MG ELEMENTAL FE) 1 EACH: 325 (65 FE) TAB at 08:02

## 2023-02-05 RX ADMIN — ALLOPURINOL 100 MG: 100 TABLET ORAL at 08:02

## 2023-02-05 RX ADMIN — DOXAZOSIN 4 MG: 4 TABLET ORAL at 08:02

## 2023-02-05 RX ADMIN — TRAMADOL HYDROCHLORIDE 50 MG: 50 TABLET, COATED ORAL at 09:02

## 2023-02-05 RX ADMIN — CLONIDINE HYDROCHLORIDE 0.2 MG: 0.2 TABLET ORAL at 09:02

## 2023-02-05 RX ADMIN — CLONIDINE HYDROCHLORIDE 0.2 MG: 0.2 TABLET ORAL at 08:02

## 2023-02-05 RX ADMIN — CARVEDILOL 25 MG: 25 TABLET, FILM COATED ORAL at 08:02

## 2023-02-05 RX ADMIN — LORAZEPAM 0.5 MG: 0.5 TABLET ORAL at 12:02

## 2023-02-05 RX ADMIN — AMLODIPINE BESYLATE 10 MG: 5 TABLET ORAL at 08:02

## 2023-02-05 RX ADMIN — Medication 81 MG: at 08:02

## 2023-02-05 RX ADMIN — ENOXAPARIN SODIUM 30 MG: 30 INJECTION SUBCUTANEOUS at 03:02

## 2023-02-05 RX ADMIN — LORAZEPAM 0.5 MG: 0.5 TABLET ORAL at 09:02

## 2023-02-05 NOTE — PROGRESS NOTES
[facility]  Hospital Medicine Progress Note        Chief Complaint: Inpatient Follow-up for weakness, AMPARO on CKD V    HPI: This is an 87-year-old female with medical history notable for CKD stage 5 with recent admission for AMPARO on 01/13/2023 treated conservatively and kidney function stabilized and did not require hemodialysis.  Had a follow-up labs with her nephrologist and was noted to be hyponatremic for which she was sent to [facility] ED for further evaluation.  Patient reports since her discharge she was instructed to drink a lot of water and has been drinking half a gallon of water daily and also has stopped her Lasix.  Report increased swelling in her bilateral lower extremities.  Reports generalized weakness and fatigue, denies shortness breath or chest pain, fever or chills.  On arrival to ED, she was afebrile and hemodynamically stable.  Saturating 92% on room air.  Labs notable for sodium 121, creatinine 4.69, BUN 58, CO2 20, potassium 4.2, , serum osmolarity pending, urine sodium 26.  Urine osmolarity and urine sodium after receiving Lasix 40 mg IV is 217 and 34, serum osmolarity not ordered.  She was given another Lasix 80 mg IV.  Subsequently transferred to [facility] and referred to hospital medicine service for further evaluation and management.    Interval Hx:   Sitting at the side of the bed, no complaints.  Reports she feels very good.  Discussed she had hemodialysis on 3 consecutive days, now probably will start hemodialysis schedule.  Patient ask about discharge plan.  Discuss that we are waiting for dialysis chair time prior to discharge  Daughter is at bedside    Case discussed with patient's nurse on the floor    Objective/physical exam:  General: In no acute distress, afebrile, obese  Chest:  Decreased breath sounds bilateral bases, good air entry otherwise  Heart: RRR, +S1, S2, no appreciable murmur  Abdomen: Soft, nontender, BS +  MSK: Warm, 1+ pitting edema  bilateral lower extremities  Neurologic: Alert and oriented x4, Cranial nerve II-XII intact, Strength 5/5 in all 4 extremities    VITAL SIGNS: 24 HRS MIN & MAX LAST   Temp  Min: 97.8 °F (36.6 °C)  Max: 98.2 °F (36.8 °C) 98 °F (36.7 °C)   BP  Min: 134/65  Max: 157/65 (!) 146/68   Pulse  Min: 61  Max: 74  64     Resp  Min: 17  Max: 22 18   SpO2  Min: 92 %  Max: 97 % 96 %         Recent Labs   Lab 02/01/23  0433 02/03/23  0448 02/04/23  0706   WBC 3.8* 3.7* 3.8*   RBC 3.02* 3.06* 3.06*   HGB 8.7* 8.9* 8.8*   HCT 26.1* 27.0* 27.7*   MCV 86.4 88.2 90.5   MCH 28.8 29.1 28.8   MCHC 33.3 33.0 31.8*   RDW 15.2 15.8 16.2    216 214   MPV 11.4* 11.3* 11.4*       Recent Labs   Lab 01/30/23  1319 01/30/23  2140 01/31/23  0427 02/01/23  0433 02/02/23  0346 02/03/23  0448 02/04/23  0706   *   < > 119*   < > 126* 132* 134*   K 4.2   < > 3.9   < > 4.2 4.0 3.9   CO2 21*   < > 18*   < > 22* 25 25   BUN 57.9*   < > 58.1*   < > 61.7* 38.6* 22.1*   CREATININE 4.39*   < > 4.45*   < > 4.35* 3.43* 2.64*   CALCIUM 9.7   < > 8.7   < > 9.1 9.1 9.2   MG 1.90  --  1.80  --   --   --   --    ALBUMIN 3.4  --  2.8*   < > 3.4 3.2* 3.1*   ALKPHOS 49  --  47   < > 44 41 39*   ALT 11  --  10   < > 10 8 9   AST 15  --  13   < > 10 11 11   BILITOT 0.4  --  0.4   < > 0.5 0.4 0.4    < > = values in this interval not displayed.          Microbiology Results (last 7 days)       ** No results found for the last 168 hours. **             See below for Radiology    Scheduled Med:   allopurinoL  100 mg Oral Daily    amLODIPine  10 mg Oral Daily    aspirin  81 mg Oral Daily    carvediloL  25 mg Oral BID    cloNIDine  0.2 mg Oral BID    diclofenac sodium  2 g Topical (Top) Daily    doxazosin  4 mg Oral Daily    enoxaparin  30 mg Subcutaneous Daily    epoetin cierra-ebpx (RETACRIT) injection  10,000 Units Subcutaneous Every Tues, Thurs, Sat    ferrous sulfate  1 tablet Oral Daily        Continuous Infusions:       PRN Meds:  acetaminophen, acetaminophen,  aluminum-magnesium hydroxide-simethicone, dextrose 10%, dextrose 10%, fentaNYL, glucagon (human recombinant), glucose, glucose, heparin (porcine), insulin aspart U-100, iopamidoL, LIDOcaine HCL 20 mg/ml (2%), LORazepam, melatonin, midazolam, ondansetron, polyethylene glycol, prochlorperazine, senna-docusate 8.6-50 mg, simethicone, sodium chloride 0.9%, traMADoL       Assessment/Plan:  Hypervolemic Hyponatremia -due to volume overload- resolved  AMPARO on CKD IV- emily ESRD, started HD 2/2  Renal related anasarca/volume overload  Metabolic acidosis - resolved  Hypertension  HX T2DM, hypertension, hyperlipidemia, anemia of chronic disease, hyperuricemia, and anxiety          Nephrology on board, appreciate recommendations  Sodium improved to 134  Completed 3 days therapy of tolvaptan 15 mg daily  Nephrologist Dr. Eric Angela placed tunneled catheter 2/2, we greatly appreciate him  Initiated hemodialysis on 02/02, underwent again on 2/3, 2/4  Save left arm, no BP is on needle sticks  Carly also on the case to arrange for dialysis chair time  Echocardiogram reviewed, EF 55%, normal ventricular size and function.  Mild TR, small left pleural effusion  Home medication reviewed and resumed, nurse informed me she gave all her morning meds together today and pt became bradycardic in 40s, patient remained asymptomatic.  Probably does of in her chair.  Heart rate improved to 56.  I will decrease her Coreg to 12.5 mg b.i.d.  PT evaluated the patient, recommended home health PT  Morning cbc,  renal function ordered    VTE prophylaxis:  Lovenox 30 mg subQ    Patient condition:  Stable    Anticipated discharge and Disposition:   TBD, will need dialysis chair time prior to discharge home with       All diagnosis and differential diagnosis have been reviewed; assessment and plan has been documented; I have personally reviewed the labs and test results that are presently available; I have reviewed the patients medication list; I  have reviewed the consulting providers response and recommendations. I have reviewed or attempted to review medical records based upon their availability    All of the patient's questions have been  addressed and answered. Patient's is agreeable to the above stated plan. I will continue to monitor closely and make adjustments to medical management as needed.  _____________________________________________________________________    Nutrition Status:    Radiology:  Cardiac catheterization  Procedure performed in the Invasive Lab    - See Procedure Log link below for nursing documentation    - See OpNote on Surgeries Tab for physician findings    - See Imaging Tab for radiologist dictation      Gerardo Bianchi MD  Department of Hospital Medicine   [facility]   02/05/2023

## 2023-02-06 LAB
ALBUMIN SERPL-MCNC: 3 G/DL (ref 3.4–4.8)
BASOPHILS # BLD AUTO: 0.05 X10(3)/MCL (ref 0–0.2)
BASOPHILS NFR BLD AUTO: 1.1 %
BUN SERPL-MCNC: 26.7 MG/DL (ref 9.8–20.1)
CALCIUM SERPL-MCNC: 9.4 MG/DL (ref 8.4–10.2)
CHLORIDE SERPL-SCNC: 102 MMOL/L (ref 98–107)
CO2 SERPL-SCNC: 25 MMOL/L (ref 23–31)
CREAT SERPL-MCNC: 3.19 MG/DL (ref 0.55–1.02)
EOSINOPHIL # BLD AUTO: 0.2 X10(3)/MCL (ref 0–0.9)
EOSINOPHIL NFR BLD AUTO: 4.5 %
ERYTHROCYTE [DISTWIDTH] IN BLOOD BY AUTOMATED COUNT: 16.5 % (ref 11.5–17)
GFR SERPLBLD CREATININE-BSD FMLA CKD-EPI: 14 MLS/MIN/1.73/M2
GLUCOSE SERPL-MCNC: 117 MG/DL (ref 82–115)
HCT VFR BLD AUTO: 27.7 % (ref 37–47)
HGB BLD-MCNC: 8.6 GM/DL (ref 12–16)
IMM GRANULOCYTES # BLD AUTO: 0.02 X10(3)/MCL (ref 0–0.04)
IMM GRANULOCYTES NFR BLD AUTO: 0.4 %
LYMPHOCYTES # BLD AUTO: 1.32 X10(3)/MCL (ref 0.6–4.6)
LYMPHOCYTES NFR BLD AUTO: 29.5 %
MCH RBC QN AUTO: 29.1 PG
MCHC RBC AUTO-ENTMCNC: 31 MG/DL (ref 33–36)
MCV RBC AUTO: 93.6 FL (ref 80–94)
MONOCYTES # BLD AUTO: 0.62 X10(3)/MCL (ref 0.1–1.3)
MONOCYTES NFR BLD AUTO: 13.9 %
NEUTROPHILS # BLD AUTO: 2.26 X10(3)/MCL (ref 2.1–9.2)
NEUTROPHILS NFR BLD AUTO: 50.6 %
NRBC BLD AUTO-RTO: 0 %
PHOSPHATE SERPL-MCNC: 2.9 MG/DL (ref 2.3–4.7)
PLATELET # BLD AUTO: 228 X10(3)/MCL (ref 130–400)
PMV BLD AUTO: 11 FL (ref 7.4–10.4)
POCT GLUCOSE: 114 MG/DL (ref 70–110)
POCT GLUCOSE: 136 MG/DL (ref 70–110)
POCT GLUCOSE: 145 MG/DL (ref 70–110)
POTASSIUM SERPL-SCNC: 4.1 MMOL/L (ref 3.5–5.1)
RBC # BLD AUTO: 2.96 X10(6)/MCL (ref 4.2–5.4)
SODIUM SERPL-SCNC: 137 MMOL/L (ref 136–145)
WBC # SPEC AUTO: 4.5 X10(3)/MCL (ref 4.5–11.5)

## 2023-02-06 PROCEDURE — 93005 ELECTROCARDIOGRAM TRACING: CPT

## 2023-02-06 PROCEDURE — 27000221 HC OXYGEN, UP TO 24 HOURS

## 2023-02-06 PROCEDURE — 63600175 PHARM REV CODE 636 W HCPCS: Performed by: EMERGENCY MEDICINE

## 2023-02-06 PROCEDURE — 93010 ELECTROCARDIOGRAM REPORT: CPT | Mod: ,,, | Performed by: STUDENT IN AN ORGANIZED HEALTH CARE EDUCATION/TRAINING PROGRAM

## 2023-02-06 PROCEDURE — 80069 RENAL FUNCTION PANEL: CPT | Performed by: STUDENT IN AN ORGANIZED HEALTH CARE EDUCATION/TRAINING PROGRAM

## 2023-02-06 PROCEDURE — 85025 COMPLETE CBC W/AUTO DIFF WBC: CPT | Performed by: STUDENT IN AN ORGANIZED HEALTH CARE EDUCATION/TRAINING PROGRAM

## 2023-02-06 PROCEDURE — 21400001 HC TELEMETRY ROOM

## 2023-02-06 PROCEDURE — 25000003 PHARM REV CODE 250: Performed by: INTERNAL MEDICINE

## 2023-02-06 PROCEDURE — 97530 THERAPEUTIC ACTIVITIES: CPT | Mod: CQ

## 2023-02-06 PROCEDURE — 97116 GAIT TRAINING THERAPY: CPT | Mod: CQ

## 2023-02-06 PROCEDURE — 93010 EKG 12-LEAD: ICD-10-PCS | Mod: ,,, | Performed by: STUDENT IN AN ORGANIZED HEALTH CARE EDUCATION/TRAINING PROGRAM

## 2023-02-06 PROCEDURE — 97166 OT EVAL MOD COMPLEX 45 MIN: CPT

## 2023-02-06 RX ADMIN — CLONIDINE HYDROCHLORIDE 0.2 MG: 0.2 TABLET ORAL at 09:02

## 2023-02-06 RX ADMIN — ENOXAPARIN SODIUM 30 MG: 30 INJECTION SUBCUTANEOUS at 04:02

## 2023-02-06 RX ADMIN — AMLODIPINE BESYLATE 10 MG: 5 TABLET ORAL at 09:02

## 2023-02-06 RX ADMIN — TRAMADOL HYDROCHLORIDE 50 MG: 50 TABLET, COATED ORAL at 09:02

## 2023-02-06 RX ADMIN — FERROUS SULFATE TAB 325 MG (65 MG ELEMENTAL FE) 1 EACH: 325 (65 FE) TAB at 09:02

## 2023-02-06 RX ADMIN — Medication 81 MG: at 09:02

## 2023-02-06 RX ADMIN — ALLOPURINOL 100 MG: 100 TABLET ORAL at 09:02

## 2023-02-06 RX ADMIN — DOXAZOSIN 4 MG: 4 TABLET ORAL at 09:02

## 2023-02-06 NOTE — PT/OT/SLP EVAL
"Occupational Therapy   Evaluation    Name: Rayna Haider  MRN: [id number]  Admitting Diagnosis: Hyponatremia with excess extracellular fluid volume  Recent Surgery: Procedure(s) (LRB):  INSERTION, CATHETER, CENTRAL VENOUS, HEMODIALYSIS, TUNNELED (N/A) 4 Days Post-Op    Recommendations:     Discharge Recommendations: other (see comments) (home with HH and daugther vs SNF)  Discharge Equipment Recommendations:     Barriers to discharge:       Assessment:     Rayna Haider is a 87 y.o. female with a medical diagnosis of Hyponatremia with excess extracellular fluid volume.  She presents with the following performance deficits affecting function: weakness, impaired endurance, impaired self care skills, impaired functional mobility, gait instability, decreased upper extremity function.    Pt reports that she was living alone but now her daughter who lives across the street, lives with her. Pt reports that at home she was indep but used a RW for "security". On 3 liters at 96% today. Pt was able to ambulate to bathroom and perform pericare with CGA using RW. Pt reports hx of RUE shoulder rotator cuff injury x2 years ago.  Recommend HH with daughter vs snf.     Rehab Prognosis: Good; patient would benefit from acute skilled OT services to address these deficits and reach maximum level of function.       Plan:     Patient to be seen 3 x/week, 5 x/week to address the above listed problems via self-care/home management, therapeutic activities, therapeutic exercises  Plan of Care Expires:    Plan of Care Reviewed with: patient, daughter    Subjective     Chief Complaint: -  Patient/Family Comments/goals: -    Occupational Profile:  Living Environment: lives with daughter   Previous level of function:  indep with RW   Roles and Routines: -  Equipment Used at Home: walker, rolling, bedside commode, shower chair  Assistance upon Discharge: daughter     Pain/Comfort:  Pain Rating 1: 7/10  Location - Side 1: Right  Location 1: " shoulder  Pain Addressed 1: Reposition    Patients cultural, spiritual, Orthodox conflicts given the current situation:      Objective:     Communicated with: nrsg prior to session.  Patient found HOB elevated with   upon OT entry to room.    General Precautions: Standard,    Orthopedic Precautions:    Braces:    Respiratory Status: Nasal cannula, flow 3 L/min    Occupational Performance:    Bed Mobility:    Patient completed Supine to Sit with contact guard assistance    Functional Mobility/Transfers:  Patient completed Toilet Transfer Step Transfer technique with contact guard assistance with  rolling walker  Functional Mobility: no LOB    Activities of Daily Living:  Lower Body Dressing: contact guard assistance    Toileting: contact guard assistance      Cognitive/Visual Perceptual:  Cognitive/Psychosocial Skills:     -       Follows Commands/attention:Follows multistep  commands    Physical Exam:  Upper Extremity Strength:    -       Right Upper Extremity: 2/5  -       Left Upper Extremity: 3/5    AMPAC 6 Click ADL:  AMPAC Total Score:          Patient left up in chair with all lines intact, call button in reach, nrsg & MD notified, and daughter present    GOALS:   Multidisciplinary Problems       Occupational Therapy Goals          Problem: Occupational Therapy    Goal Priority Disciplines Outcome Interventions   Occupational Therapy Goal     OT, PT/OT Ongoing, Progressing    Description: Goals to be met by: 2/20/2023     Patient will increase functional independence with ADLs by performing:    UE Dressing with Modified [place].  LE Dressing with Modified [place].  Grooming while standing with Modified [place].  Toileting from toilet with Modified [place] for hygiene and clothing management.   Toilet transfer to toilet with Modified [place].                         History:     Past Medical History:   Diagnosis Date    Diabetes mellitus, type 2     Hyperkalemia     Hypertension      Vitamin D deficiency          Past Surgical History:   Procedure Laterality Date    EYE SURGERY      HYSTERECTOMY      INSERTION OF TUNNELED CENTRAL VENOUS HEMODIALYSIS CATHETER N/A 2/2/2023    Procedure: INSERTION, CATHETER, CENTRAL VENOUS, HEMODIALYSIS, TUNNELED;  Surgeon: Eric Angela DO;  Location: [facility] CATH LAB;  Service: Nephrology;  Laterality: N/A;    RETINAL DETACHMENT SURGERY Left     SKIN GRAFT Right     burns, scald       Time Tracking:     OT Date of Treatment:    OT Start Time: 0957  OT Stop Time: 1025  OT Total Time (min): 28 min    Billable Minutes:Evaluation Moderate Complexity     2/6/2023

## 2023-02-06 NOTE — PROGRESS NOTES
[facility]  Nephrology Progress Note  Patient Name: Rayna Haider  Age: 87 y.o.  : 1935  MRN: [id number]  Admission Date: 2023    Date of Consultation: 23     Consultation Requested By: ED     Reason for Consultation: AMPARO on CKD     Chief Complaint: Abnormal Lab (Pt instructed to go to ED by MATT Cheung for further evaluation and admission relating ongoing abnormal lab values with Dr Hylton as nephrologist.)      History of Present Illness:  Ms Rayna Haider is a 87 y.o. White female with past medical history of DM type 2, hypertension, hyperlipidemia, anemia of chronic disease and known iron deficiency, CKD stage 4.  In the past year creatinine seemed to be around 2.0 with GFR around 27.  She was seen by our service in the middle of the month when she was admitted for vague symptoms of generalized ill feeling.  Labs have been drawn showing acute kidney injury with BUN 88, creatinine 5.1 with metabolic acidosis and hyperkalemia.  Patient was discharged on  in stable condition.  She started to gain weight comment 1st 3 lb over 24 hour.  Diuretics were prescribed intake and some days with increased urine output though not dramatic increase.  And total she is gained 17 lb over the past week.  She denies fever, chills, malaise.  Denies sick contacts.  Denies lapse in medications or low-sodium diet.  She is put much effort into drinking at least 2 L of water per  Day.     Patient has now completed 3 consecutive dialysis treatments Th, Fr and Sa. She has improvement in appetite and appears less edematous.       Physical Exam:   BP (!) 146/76   Pulse (!) 59   Temp 97.6 °F (36.4 °C) (Oral)   Resp 18   Ht 5' (1.524 m)   Wt 83 kg (182 lb 15.7 oz)   LMP  (LMP Unknown)   SpO2 95%   Breastfeeding No   BMI 35.74 kg/m²  Body mass index is 35.74 kg/m².    Physical Exam  Constitutional:       Appearance: Normal appearance. She is obese.   HENT:      Head: Atraumatic.       Nose: Nose normal.      Mouth/Throat:      Mouth: Mucous membranes are moist.   Eyes:      Extraocular Movements: Extraocular movements intact.      Conjunctiva/sclera: Conjunctivae normal.   Neck:      Comments: R chest wall tunneled catheter   Cardiovascular:      Rate and Rhythm: Regular rhythm. Bradycardia present.   Pulmonary:      Effort: Pulmonary effort is normal.      Comments: CTAB, NC at 3  Abdominal:      General: There is no distension.      Palpations: Abdomen is soft.   Musculoskeletal:         General: Swelling present.      Cervical back: Normal range of motion and neck supple.   Skin:     General: Skin is warm and dry.   Neurological:      General: No focal deficit present.      Mental Status: She is alert.   Psychiatric:         Mood and Affect: Mood normal.         Behavior: Behavior normal.           Inpatient Medications:     Current Facility-Administered Medications:     acetaminophen tablet 1,000 mg, 1,000 mg, Oral, Q6H PRN, Jennifer Guzman MD, 1,000 mg at 02/02/23 2334    acetaminophen tablet 650 mg, 650 mg, Oral, Q4H PRN, Jennifer Guzman MD    allopurinoL tablet 100 mg, 100 mg, Oral, Daily, Rufina Valencia MD, 100 mg at 02/06/23 0909    aluminum-magnesium hydroxide-simethicone 200-200-20 mg/5 mL suspension 30 mL, 30 mL, Oral, QID PRN, Jennifer Guzman MD    amLODIPine tablet 10 mg, 10 mg, Oral, Daily, Jennifer Guzman MD, 10 mg at 02/05/23 0812    aspirin chewable tablet 81 mg, 81 mg, Oral, Daily, Rufina Valencia MD, 81 mg at 02/06/23 0909    carvediloL tablet 12.5 mg, 12.5 mg, Oral, BID, Gerardo Bianchi MD    cloNIDine tablet 0.2 mg, 0.2 mg, Oral, BID, Rufina Valencia MD, 0.2 mg at 02/05/23 2123    dextrose 10% bolus 125 mL 125 mL, 12.5 g, Intravenous, PRN, Jennifer Guzman MD    dextrose 10% bolus 250 mL 250 mL, 25 g, Intravenous, PRN, Jennifer Guzman MD    diclofenac sodium 1 % gel 2 g, 2 g, Topical (Top), Daily, Gerardo Bianchi MD    doxazosin tablet 4 mg, 4 mg, Oral, Daily, Rufina Valencia MD, 4 mg at 02/06/23 0909     enoxaparin injection 30 mg, 30 mg, Subcutaneous, Daily, Owen Stokes MD, 30 mg at 02/05/23 1549    epoetin cierra-epbx injection 10,000 Units, 10,000 Units, Subcutaneous, Every Tues, Thurs, Sat, Jonathan DUSTIN Danielson, DO, 10,000 Units at 02/04/23 1408    fentaNYL injection, , , PRN, Eric Hasan, DO, 25 mcg at 02/02/23 1527    ferrous sulfate tablet 1 each, 1 tablet, Oral, Daily, Rufina Valencia MD, 1 each at 02/06/23 0909    glucagon (human recombinant) injection 1 mg, 1 mg, Intramuscular, PRN, Jennifer Guzman MD    glucose chewable tablet 16 g, 16 g, Oral, PRN, Jennifer Guzman MD    glucose chewable tablet 24 g, 24 g, Oral, PRN, Jennifer Guzman MD    heparin (porcine) injection, , , PRN, Eric Hasclarisse, DO, 4,000 Units at 02/02/23 1540    insulin aspart U-100 injection 0-5 Units, 0-5 Units, Subcutaneous, QID (AC + HS) PRN, Jennifer Guzman MD    iopamidoL (ISOVUE-370) injection, , , PRN, Eric Hasclarisse, DO, 5 mL at 02/02/23 1550    LIDOcaine HCL 20 mg/ml (2%) injection, , , PRN, Eric Hasclarisse, DO, 20 mL at 02/02/23 1517    LORazepam tablet 0.5 mg, 0.5 mg, Oral, Daily PRN, Jennifer Guzman MD, 0.5 mg at 02/05/23 2121    melatonin tablet 6 mg, 6 mg, Oral, Nightly PRN, Owen Stokes MD, 6 mg at 02/01/23 2055    midazolam (VERSED) 1 mg/mL injection, , , PRN, Eric Hasclarisse, DO, 1 mg at 02/02/23 1512    ondansetron injection 4 mg, 4 mg, Intravenous, Q4H PRN, Jennifer Guzman MD    polyethylene glycol packet 17 g, 17 g, Oral, BID PRN, Jennifer Guzman MD    prochlorperazine injection Soln 5 mg, 5 mg, Intravenous, Q6H PRN, Jennifer Guzman MD    senna-docusate 8.6-50 mg per tablet 2 tablet, 2 tablet, Oral, BID PRN, Jennifer Guzman MD    simethicone chewable tablet 80 mg, 1 tablet, Oral, QID PRN, Jennifer Guzman MD    sodium chloride 0.9% flush 10 mL, 10 mL, Intravenous, PRN, Owen Stokes MD    traMADoL tablet 50 mg, 50 mg, Oral, Q8H PRN, Gerardo Bianhci MD, 50 mg at 02/06/23 0908     Imaging:  X-Ray Chest 1 View   Final Result      Changes  suggestive of bilateral pleural effusions.      Increased left retrocardiac density and silhouetting of the left hemidiaphragm as above         Electronically signed by: Driss Macias   Date:    01/31/2023   Time:    08:40          Laboratory Data:  Recent Labs   Lab 02/06/23  0402      K 4.1   CO2 25   BUN 26.7*   CREATININE 3.19*   GLUCOSE 117*   CALCIUM 9.4   PHOS 2.9       Recent Labs   Lab 02/06/23  0402   WBC 4.5   HGB 8.6*   HCT 27.7*          Impression:     ESRD requiring initiation of hemodialysis via tunneled catheter   CHF exacerbation - - documented 17# weight gain in one week despite compliance with medication and diet restrictions   Hyponatremia likely secondary to hypervolemia due to CHF exacerbation needing further workup  DM II   HTN  Anemia with known iron deficiency. She was given Ferrlecit IV iron replacement for 5 days starting 1/14     Plan:   -Patient will now dialyze on TTS schedule. Dialysis coordinator working on placement.   -Patient and daughter would like physical rehab post dc. PT to reassess today  -Wean oxygen as tolerated. She is now on oxygen  -Discussed with nurse. Patient will please be weighed with PT today and every day.   -Consult cardiology regarding bradycardia. Coreg has been adjusted by primary through the weekend       Rhonda Terrazas NP  Nephrology  2/6/2023 9:22 AM

## 2023-02-06 NOTE — CONSULTS
Inpatient consult to Cardiology  Consult performed by: DEON Arroyo  Consult ordered by: Gerardo Bianchi MD  Reason for consult: Bradycardia    [facility] - 4th Floor Medical Telemetry  Cardiology  Consult Note    Patient Name: Rayna Haider  MRN: [id number]  Admission Date: 1/30/2023  Hospital Length of Stay: 7 days  Code Status: Full Code   Attending Provider: Gerardo Bianchi MD   Consulting Provider: DEON Arroyo  Primary Care Physician: David Castañeda MD  Principal Problem:Hyponatremia with excess extracellular fluid volume    Patient information was obtained from patient, past medical records, and ER records.     Subjective:     Chief Complaint:  Reason for consult: bradycardia     HPI:   Ms. Haider is an 87 year old female who is known to [facility], Dr. Gibbons. She was sent to the ER from her nephrologist for abnormal labs, noted to be hyponatremic. Of note, she was admitted to the hospital on 1.13.23 for AMPARO & was treated conservatively. She reports that since her discharge, she was instructed to drink a lot of water and stopped her lasix. She reports that she gained a total of 17 lbs in 1 week. She denies CP, palpitations, SOB, fever, or chills. Significant labs on arrival include B/Cr 58.4/Cr 4.69, Na 121, & Cl 85. During her stay, was noted to be bradycardic post her BB administration, which is the reason [facility] has been consulted.    PMH: PVD, HTN, dyslipidemia, Dm 2, HLD, CKD stage 5  PSH: eye surgery, hysterectomy, retinal detachment surgery, skin graft  Family History: Father - DM 2  Social History: Denies alcohol, tobacco, or illicit drug use.     Previous Cardiac Diagnostics:   TTE 1.31.23:  The left ventricle is normal in size with concentric hypertrophy and normal systolic function.  The estimated ejection fraction is 55%.  Normal right ventricular size with normal right ventricular systolic function.  Mild tricuspid regurgitation.  There is a small left pleural  effusion.    TTE 8.16.22:  The study quality is good.   The left ventricle is normal in size. Global left ventricular systolic function is normal. The left ventricular ejection fraction is 60%. The left ventricle diastolic function is impaired (Grade I) with normal left atrial pressure. Noted left ventricular hypertrophy. Concentric left ventricular hypertrophy is present. It is mild.   The left atrial diameter is mildly increased. Left atrial diameter is 4.2 cms.  Mild calcification of the aortic valve is noted with adequate cuspal excursion.   Trace mitral regurgitation. Trace tricuspid regurgitation.   The pulmonary artery systolic pressure is 26 mmHg.   Small pericardial effusion vs. fat pad noted in the parasternal window.    Review of patient's allergies indicates:   Allergen Reactions    Hydralazide Nausea And Vomiting     Pt also gets Tremors when taking this med    Hydralazine analogues Nausea And Vomiting       No current facility-administered medications on file prior to encounter.     Current Outpatient Medications on File Prior to Encounter   Medication Sig    allopurinoL (ZYLOPRIM) 100 MG tablet Take 1 tablet (100 mg total) by mouth once daily.    amLODIPine (NORVASC) 10 MG tablet Take 1 tablet (10 mg total) by mouth once daily.    aspirin 81 MG Chew Take 81 mg by mouth once daily.    carvediloL (COREG) 25 MG tablet Take 1 tablet (25 mg total) by mouth 2 (two) times daily.    cloNIDine (CATAPRES) 0.2 MG tablet Take 1 tablet (0.2 mg total) by mouth 3 (three) times daily.    doxazosin (CARDURA) 4 MG tablet Take 4 mg by mouth once daily.    ferrous sulfate, dried (SLOW FE) 160 mg (50 mg iron) TbSR Take 160 mg by mouth once daily.    LORazepam (ATIVAN) 0.5 MG tablet Take 1 tablet (0.5 mg total) by mouth daily as needed for Anxiety.    furosemide (LASIX) 20 MG tablet Take 1 tablet (20 mg total) by mouth once daily. (Patient not taking: Reported on 1/30/2023)     Review of Systems   Respiratory:  Negative  for shortness of breath.    Cardiovascular:  Negative for chest pain and palpitations.   All other systems reviewed and are negative.    Objective:     Vital Signs (Most Recent):  Temp: 97.6 °F (36.4 °C) (02/06/23 0708)  Pulse: (!) 59 (02/06/23 0708)  Resp: 18 (02/06/23 0908)  BP: (!) 146/76 (02/06/23 0708)  SpO2: 95 % (02/06/23 0708)   Vital Signs (24h Range):  Temp:  [97.6 °F (36.4 °C)-99.2 °F (37.3 °C)] 97.6 °F (36.4 °C)  Pulse:  [58-82] 59  Resp:  [17-18] 18  SpO2:  [93 %-97 %] 95 %  BP: (125-154)/(59-76) 146/76     Weight: 83 kg (182 lb 15.7 oz)  Body mass index is 35.74 kg/m².    SpO2: 95 %         Intake/Output Summary (Last 24 hours) at 2/6/2023 1121  Last data filed at 2/6/2023 0808  Gross per 24 hour   Intake 1184 ml   Output --   Net 1184 ml       Lines/Drains/Airways       Central Venous Catheter Line  Duration             Tunneled Central Line Insertion/Assessment - Double Lumen  02/02/23 1535 right internal jugular 3 days              Peripheral Intravenous Line  Duration                  Peripheral IV - Single Lumen 01/31/23 2135 20 G Anterior;Right Forearm 5 days                    Significant Labs:  Recent Results (from the past 72 hour(s))   POCT glucose    Collection Time: 02/03/23  3:37 PM   Result Value Ref Range    POCT Glucose 163 (H) 70 - 110 mg/dL   Comprehensive Metabolic Panel    Collection Time: 02/04/23  7:06 AM   Result Value Ref Range    Sodium Level 134 (L) 136 - 145 mmol/L    Potassium Level 3.9 3.5 - 5.1 mmol/L    Chloride 98 98 - 107 mmol/L    Carbon Dioxide 25 23 - 31 mmol/L    Glucose Level 116 (H) 82 - 115 mg/dL    Blood Urea Nitrogen 22.1 (H) 9.8 - 20.1 mg/dL    Creatinine 2.64 (H) 0.55 - 1.02 mg/dL    Calcium Level Total 9.2 8.4 - 10.2 mg/dL    Protein Total 5.6 (L) 5.8 - 7.6 gm/dL    Albumin Level 3.1 (L) 3.4 - 4.8 g/dL    Globulin 2.5 2.4 - 3.5 gm/dL    Albumin/Globulin Ratio 1.2 1.1 - 2.0 ratio    Bilirubin Total 0.4 <=1.5 mg/dL    Alkaline Phosphatase 39 (L) 40 - 150  unit/L    Alanine Aminotransferase 9 0 - 55 unit/L    Aspartate Aminotransferase 11 5 - 34 unit/L    eGFR 17 mls/min/1.73/m2   CBC with Differential    Collection Time: 02/04/23  7:06 AM   Result Value Ref Range    WBC 3.8 (L) 4.5 - 11.5 x10(3)/mcL    RBC 3.06 (L) 4.20 - 5.40 x10(6)/mcL    Hgb 8.8 (L) 12.0 - 16.0 gm/dL    Hct 27.7 (L) 37.0 - 47.0 %    MCV 90.5 80.0 - 94.0 fL    MCH 28.8 pg    MCHC 31.8 (L) 33.0 - 36.0 mg/dL    RDW 16.2 11.5 - 17.0 %    Platelet 214 130 - 400 x10(3)/mcL    MPV 11.4 (H) 7.4 - 10.4 fL    IG# 0.01 0 - 0.04 x10(3)/mcL    IG% 0.3 %    NRBC% 0.0 %   Manual Differential    Collection Time: 02/04/23  7:06 AM   Result Value Ref Range    Neut Man 69 %    Lymph Man 21 %    Monocyte Man 5 %    Eos Man 4 %    Basophil Man 2 %    Instr WBC 3.8 x10(3)/mcL    Abs Mono 0.19 0.1 - 1.3 x10(3)/mcL    Abs Eos  0.152 0 - 0.9 x10(3)/mcL    Abs Baso 0.076 0 - 0.2 x10(3)/mcL    Abs Lymp 0.798 0.6 - 4.6 x10(3)/mcL    Abs Neut 2.622 2.1 - 9.2 x10(3)/mcL    RBC Morph Abnormal (A) Normal    Anisocyte 1+ (A) (none)    Macrocyte 1+ (A) (none)    Platelet Est Adequate Normal, Adequate   POCT glucose    Collection Time: 02/04/23 12:06 PM   Result Value Ref Range    POCT Glucose 113 (H) 70 - 110 mg/dL   POCT glucose    Collection Time: 02/04/23  4:54 PM   Result Value Ref Range    POCT Glucose 120 (H) 70 - 110 mg/dL   POCT glucose    Collection Time: 02/04/23  7:50 PM   Result Value Ref Range    POCT Glucose 144 (H) 70 - 110 mg/dL   POCT glucose    Collection Time: 02/05/23  6:02 AM   Result Value Ref Range    POCT Glucose 118 (H) 70 - 110 mg/dL   POCT glucose    Collection Time: 02/05/23 10:31 AM   Result Value Ref Range    POCT Glucose 184 (H) 70 - 110 mg/dL   POCT glucose    Collection Time: 02/05/23  2:54 PM   Result Value Ref Range    POCT Glucose 158 (H) 70 - 110 mg/dL   POCT glucose    Collection Time: 02/05/23  9:27 PM   Result Value Ref Range    POCT Glucose 142 (H) 70 - 110 mg/dL   Renal Function Panel     Collection Time: 02/06/23  4:02 AM   Result Value Ref Range    Sodium Level 137 136 - 145 mmol/L    Potassium Level 4.1 3.5 - 5.1 mmol/L    Chloride 102 98 - 107 mmol/L    Carbon Dioxide 25 23 - 31 mmol/L    Glucose Level 117 (H) 82 - 115 mg/dL    Blood Urea Nitrogen 26.7 (H) 9.8 - 20.1 mg/dL    Creatinine 3.19 (H) 0.55 - 1.02 mg/dL    Calcium Level Total 9.4 8.4 - 10.2 mg/dL    Albumin Level 3.0 (L) 3.4 - 4.8 g/dL    Phosphorus Level 2.9 2.3 - 4.7 mg/dL    eGFR 14 mls/min/1.73/m2   CBC with Differential    Collection Time: 02/06/23  4:02 AM   Result Value Ref Range    WBC 4.5 4.5 - 11.5 x10(3)/mcL    RBC 2.96 (L) 4.20 - 5.40 x10(6)/mcL    Hgb 8.6 (L) 12.0 - 16.0 gm/dL    Hct 27.7 (L) 37.0 - 47.0 %    MCV 93.6 80.0 - 94.0 fL    MCH 29.1 pg    MCHC 31.0 (L) 33.0 - 36.0 mg/dL    RDW 16.5 11.5 - 17.0 %    Platelet 228 130 - 400 x10(3)/mcL    MPV 11.0 (H) 7.4 - 10.4 fL    Neut % 50.6 %    Lymph % 29.5 %    Mono % 13.9 %    Eos % 4.5 %    Basophil % 1.1 %    Lymph # 1.32 0.6 - 4.6 x10(3)/mcL    Neut # 2.26 2.1 - 9.2 x10(3)/mcL    Mono # 0.62 0.1 - 1.3 x10(3)/mcL    Eos # 0.20 0 - 0.9 x10(3)/mcL    Baso # 0.05 0 - 0.2 x10(3)/mcL    IG# 0.02 0 - 0.04 x10(3)/mcL    IG% 0.4 %    NRBC% 0.0 %   POCT glucose    Collection Time: 02/06/23 10:47 AM   Result Value Ref Range    POCT Glucose 136 (H) 70 - 110 mg/dL       Significant Imaging:  Imaging Results    None         EKG:       Telemetry:  SR 70's    Physical Exam  HENT:      Head: Normocephalic.      Nose: Nose normal.      Mouth/Throat:      Mouth: Mucous membranes are dry.   Eyes:      Extraocular Movements: Extraocular movements intact.   Cardiovascular:      Rate and Rhythm: Normal rate and regular rhythm.      Pulses: Normal pulses.      Heart sounds: Normal heart sounds.   Pulmonary:      Effort: Pulmonary effort is normal.      Breath sounds: Normal breath sounds.   Abdominal:      Palpations: Abdomen is soft.   Musculoskeletal:         General: Normal range of motion.       Right lower leg: Edema present.      Left lower leg: Edema present.   Skin:     General: Skin is warm and dry.   Neurological:      Mental Status: She is alert and oriented to person, place, and time.   Psychiatric:         Behavior: Behavior normal.       Home Medications:   No current facility-administered medications on file prior to encounter.     Current Outpatient Medications on File Prior to Encounter   Medication Sig Dispense Refill    allopurinoL (ZYLOPRIM) 100 MG tablet Take 1 tablet (100 mg total) by mouth once daily. 30 tablet 0    amLODIPine (NORVASC) 10 MG tablet Take 1 tablet (10 mg total) by mouth once daily. 30 tablet 11    aspirin 81 MG Chew Take 81 mg by mouth once daily.      carvediloL (COREG) 25 MG tablet Take 1 tablet (25 mg total) by mouth 2 (two) times daily. 60 tablet 11    cloNIDine (CATAPRES) 0.2 MG tablet Take 1 tablet (0.2 mg total) by mouth 3 (three) times daily. 90 tablet 11    doxazosin (CARDURA) 4 MG tablet Take 4 mg by mouth once daily.      ferrous sulfate, dried (SLOW FE) 160 mg (50 mg iron) TbSR Take 160 mg by mouth once daily.      LORazepam (ATIVAN) 0.5 MG tablet Take 1 tablet (0.5 mg total) by mouth daily as needed for Anxiety. 30 tablet 5    furosemide (LASIX) 20 MG tablet Take 1 tablet (20 mg total) by mouth once daily. (Patient not taking: Reported on 1/30/2023) 7 tablet 0       Current Inpatient Medications:    Current Facility-Administered Medications:     acetaminophen tablet 1,000 mg, 1,000 mg, Oral, Q6H PRN, Jennifer Guzman MD, 1,000 mg at 02/02/23 2334    acetaminophen tablet 650 mg, 650 mg, Oral, Q4H PRN, Jennifer Guzman MD    allopurinoL tablet 100 mg, 100 mg, Oral, Daily, Rufina Valencia MD, 100 mg at 02/06/23 0909    aluminum-magnesium hydroxide-simethicone 200-200-20 mg/5 mL suspension 30 mL, 30 mL, Oral, QID PRN, Jennifer Guzman MD    amLODIPine tablet 10 mg, 10 mg, Oral, Daily, Jennifer Guzman MD, 10 mg at 02/06/23 0909    aspirin chewable tablet 81 mg, 81 mg, Oral,  Daily, Rufina Valencia MD, 81 mg at 02/06/23 0909    carvediloL tablet 12.5 mg, 12.5 mg, Oral, BID, Gerardo Bianchi MD    cloNIDine tablet 0.2 mg, 0.2 mg, Oral, BID, Rufina Valencia MD, 0.2 mg at 02/06/23 0909    dextrose 10% bolus 125 mL 125 mL, 12.5 g, Intravenous, PRN, Jennifer Guzman MD    dextrose 10% bolus 250 mL 250 mL, 25 g, Intravenous, PRN, Jennifer Guzman MD    diclofenac sodium 1 % gel 2 g, 2 g, Topical (Top), Daily, Gerardo Bianchi MD    doxazosin tablet 4 mg, 4 mg, Oral, Daily, Rufina Valencia MD, 4 mg at 02/06/23 0909    enoxaparin injection 30 mg, 30 mg, Subcutaneous, Daily, Owen Stokes MD, 30 mg at 02/05/23 1549    epoetin cierra-epbx injection 10,000 Units, 10,000 Units, Subcutaneous, Every Tues, Thurs, Sat, Jonathan CHAN Danielson, DO, 10,000 Units at 02/04/23 1408    fentaNYL injection, , , PRN, Eric Angela DO, 25 mcg at 02/02/23 1527    ferrous sulfate tablet 1 each, 1 tablet, Oral, Daily, Rufina Valencia MD, 1 each at 02/06/23 0909    glucagon (human recombinant) injection 1 mg, 1 mg, Intramuscular, PRN, Jennifer Guzman MD    glucose chewable tablet 16 g, 16 g, Oral, PRN, Jennifer Guzman MD    glucose chewable tablet 24 g, 24 g, Oral, PRN, Jennifer Guzman MD    heparin (porcine) injection, , , PRN, Eric Angela, DO, 4,000 Units at 02/02/23 1540    insulin aspart U-100 injection 0-5 Units, 0-5 Units, Subcutaneous, QID (AC + HS) PRN, Jennifer Guzman MD    iopamidoL (ISOVUE-370) injection, , , PRN, Eric Angela, DO, 5 mL at 02/02/23 1550    LIDOcaine HCL 20 mg/ml (2%) injection, , , PRN, Eric Angela DO, 20 mL at 02/02/23 1517    LORazepam tablet 0.5 mg, 0.5 mg, Oral, Daily PRN, Jennifer Guzman MD, 0.5 mg at 02/05/23 2121    melatonin tablet 6 mg, 6 mg, Oral, Nightly PRN, Owen Stokes MD, 6 mg at 02/01/23 2055    midazolam (VERSED) 1 mg/mL injection, , , PRN, Eric Angela DO, 1 mg at 02/02/23 1512    ondansetron injection 4 mg, 4 mg, Intravenous, Q4H PRN, Jennifer Guzman MD    polyethylene glycol packet 17 g, 17 g,  Oral, BID PRN, Jennifer Guzman MD    prochlorperazine injection Soln 5 mg, 5 mg, Intravenous, Q6H PRN, Jennifer Guzman MD    senna-docusate 8.6-50 mg per tablet 2 tablet, 2 tablet, Oral, BID PRN, Jennifer Guzman MD    simethicone chewable tablet 80 mg, 1 tablet, Oral, QID PRN, Jennifer Guzman MD    sodium chloride 0.9% flush 10 mL, 10 mL, Intravenous, PRN, Owen Stokes MD    traMADoL tablet 50 mg, 50 mg, Oral, Q8H PRN, Gerardo Bianchi MD, 50 mg at 02/06/23 0908         VTE Risk Mitigation (From admission, onward)           Ordered     heparin (porcine) injection  As needed (PRN)         02/02/23 1537     enoxaparin injection 30 mg  Daily         01/30/23 1803     IP VTE HIGH RISK PATIENT  Once         01/30/23 1803     Place sequential compression device  Until discontinued         01/30/23 1803                    Assessment:   Sinus bradycardia  - now SR  AMPARO on CKD IV - now ESRD on HD (2.2.23)  Hypnatremia - resolved  Anasarca/volume overload  Acute on chronic HFpEF    - LVEF 55%    - 17 lb weight gain over 1 week  Metabolic acidosis - resolved  HTN  DM II  Anemia  HLD    Plan:   Agree with decrease in dose of Coreg. May need to decrease again if remains bradycardic. Continue to monitor on tele.  Pt reports that she does snore occasionally. Recommend outpatient sleep study.  Fluid management per nephro.   Work w/ PT & OT.  Will f/u tomorrow.     Thank you for your consult.     Pt. seen and examined by me and plan made under my supervision.

## 2023-02-06 NOTE — PROGRESS NOTES
[facility]  Hospital Medicine Progress Note        Chief Complaint: Inpatient Follow-up for weakness, AMPARO on CKD V    HPI: This is an 87-year-old female with medical history notable for CKD stage 5 with recent admission for AMPARO on 01/13/2023 treated conservatively and kidney function stabilized and did not require hemodialysis.  Had a follow-up labs with her nephrologist and was noted to be hyponatremic for which she was sent to [facility] ED for further evaluation.  Patient reports since her discharge she was instructed to drink a lot of water and has been drinking half a gallon of water daily and also has stopped her Lasix.  Report increased swelling in her bilateral lower extremities.  Reports generalized weakness and fatigue, denies shortness breath or chest pain, fever or chills.  On arrival to ED, she was afebrile and hemodynamically stable.  Saturating 92% on room air.  Labs notable for sodium 121, creatinine 4.69, BUN 58, CO2 20, potassium 4.2, , serum osmolarity pending, urine sodium 26.  Urine osmolarity and urine sodium after receiving Lasix 40 mg IV is 217 and 34, serum osmolarity not ordered.  She was given another Lasix 80 mg IV.  Subsequently transferred to [facility] and referred to hospital medicine service for further evaluation and management.    Interval Hx:   Sitting in bed, reports her right shoulder is again hurting, can not lift her arm today because she has not taken her tramadol yet.  Reports after tramadol she can move her arm.  Daughter again asked for discharge plan, informed PT and OT recommended home with home health.  Patient and daughter both seems surprised stating she can not go home because she lives alone but the daughter lives next door.  Informed patient and daughter that she can not live alone right now.  They are requesting SNF.  I personally spoke to the occupational therapist as well and she feels patient is cleared to go home with home health.    Daughter is at bedside    Case discussed with patient's nurse and  on the floor    Objective/physical exam:  General: In no acute distress, afebrile, obese  Chest:  Decreased breath sounds bilateral bases, good air entry otherwise  Heart: RRR, +S1, S2, no appreciable murmur  Abdomen: Soft, nontender, BS +  MSK: Warm, trace pitting edema bilateral lower extremities  Neurologic: Alert and oriented x4, Cranial nerve II-XII intact, Strength 5/5 in all 4 extremities    VITAL SIGNS: 24 HRS MIN & MAX LAST   Temp  Min: 97.6 °F (36.4 °C)  Max: 99.2 °F (37.3 °C) 97.6 °F (36.4 °C)   BP  Min: 125/62  Max: 154/65 (!) 146/76   Pulse  Min: 58  Max: 82  (!) 59     Resp  Min: 17  Max: 18 18   SpO2  Min: 93 %  Max: 97 % 95 %         Recent Labs   Lab 02/03/23  0448 02/04/23  0706 02/06/23  0402   WBC 3.7* 3.8* 4.5   RBC 3.06* 3.06* 2.96*   HGB 8.9* 8.8* 8.6*   HCT 27.0* 27.7* 27.7*   MCV 88.2 90.5 93.6   MCH 29.1 28.8 29.1   MCHC 33.0 31.8* 31.0*   RDW 15.8 16.2 16.5    214 228   MPV 11.3* 11.4* 11.0*       Recent Labs   Lab 01/30/23  1319 01/30/23  2140 01/31/23  0427 02/01/23  0433 02/02/23  0346 02/03/23  0448 02/04/23  0706 02/06/23  0402   *   < > 119*   < > 126* 132* 134* 137   K 4.2   < > 3.9   < > 4.2 4.0 3.9 4.1   CO2 21*   < > 18*   < > 22* 25 25 25   BUN 57.9*   < > 58.1*   < > 61.7* 38.6* 22.1* 26.7*   CREATININE 4.39*   < > 4.45*   < > 4.35* 3.43* 2.64* 3.19*   CALCIUM 9.7   < > 8.7   < > 9.1 9.1 9.2 9.4   MG 1.90  --  1.80  --   --   --   --   --    ALBUMIN 3.4  --  2.8*   < > 3.4 3.2* 3.1* 3.0*   ALKPHOS 49  --  47   < > 44 41 39*  --    ALT 11  --  10   < > 10 8 9  --    AST 15  --  13   < > 10 11 11  --    BILITOT 0.4  --  0.4   < > 0.5 0.4 0.4  --     < > = values in this interval not displayed.          Microbiology Results (last 7 days)       ** No results found for the last 168 hours. **             See below for Radiology    Scheduled Med:   allopurinoL  100 mg Oral Daily    amLODIPine   10 mg Oral Daily    aspirin  81 mg Oral Daily    carvediloL  12.5 mg Oral BID    cloNIDine  0.2 mg Oral BID    diclofenac sodium  2 g Topical (Top) Daily    doxazosin  4 mg Oral Daily    enoxaparin  30 mg Subcutaneous Daily    epoetin cierra-ebpx (RETACRIT) injection  10,000 Units Subcutaneous Every Tues, Thurs, Sat    ferrous sulfate  1 tablet Oral Daily        Continuous Infusions:       PRN Meds:  acetaminophen, acetaminophen, aluminum-magnesium hydroxide-simethicone, dextrose 10%, dextrose 10%, fentaNYL, glucagon (human recombinant), glucose, glucose, heparin (porcine), insulin aspart U-100, iopamidoL, LIDOcaine HCL 20 mg/ml (2%), LORazepam, melatonin, midazolam, ondansetron, polyethylene glycol, prochlorperazine, senna-docusate 8.6-50 mg, simethicone, sodium chloride 0.9%, traMADoL       Assessment/Plan:  Hypervolemic Hyponatremia -due to volume overload- resolved  AMPARO on CKD IV- emily ESRD, started HD 2/2  Renal related anasarca/volume overload  Metabolic acidosis - resolved  Hypertension  HX T2DM, hypertension, hyperlipidemia, anemia of chronic disease, hyperuricemia, and anxiety          Nephrology on board, appreciate recommendations  Sodium normalized to 137  Completed 3 days therapy of tolvaptan 15 mg daily  Nephrologist Dr. Eric Angela placed tunneled catheter 2/2, we greatly appreciate him  Initiated hemodialysis on 02/02, underwent again on 2/3, 2/4--> plan for T, Th, sat schedule  Chair time pending given PT OT recommended home with home health but patient and family wants to go to rehab/SNF.  Save left arm, no BP is on needle sticks  Echocardiogram reviewed, EF 55%, normal ventricular size and function.  Mild TR, small left pleural effusion  Home medication reviewed and resumed, nurse informed me she gave all her morning meds together today and pt became bradycardic in 40s, patient remained asymptomatic.  Probably does of in her chair.  Heart rate improved to 56.  I will decrease her Coreg to 12.5 mg  lianikarol  Nephrology did consulted Cardiology for concern of bradycardia.  Appreciate their recommendation, recommended to decrease Coreg more if persistently going into bradycardia.  Also recommended outpatient sleep study given patient reported she snores sometimes  PT evaluated the patient, recommended home health PT  Morning cbc,  renal function ordered    VTE prophylaxis:  Lovenox 30 mg subQ    Patient condition:  Stable    Anticipated discharge and Disposition:   TBD, will need dialysis chair time prior to discharge home with HH vs SNF, sleep study as out pt       All diagnosis and differential diagnosis have been reviewed; assessment and plan has been documented; I have personally reviewed the labs and test results that are presently available; I have reviewed the patients medication list; I have reviewed the consulting providers response and recommendations. I have reviewed or attempted to review medical records based upon their availability    All of the patient's questions have been  addressed and answered. Patient's is agreeable to the above stated plan. I will continue to monitor closely and make adjustments to medical management as needed.  _____________________________________________________________________    Nutrition Status:    Radiology:  Cardiac catheterization  Procedure performed in the Invasive Lab    - See Procedure Log link below for nursing documentation    - See OpNote on Surgeries Tab for physician findings    - See Imaging Tab for radiologist dictation      Gerardo Bianchi MD  Department of Hospital Medicine   [facility]   02/06/2023

## 2023-02-06 NOTE — PLAN OF CARE
Case discussed with MATT Terrazas. Pt is now ESRD. PT recs home w/HH. Pt and daughter wish to pursue rehab placement upon DC. Will continue to follow.

## 2023-02-06 NOTE — PT/OT/SLP PROGRESS
Physical Therapy Treatment    Patient Name:  Rayna Haider   MRN:  [id number]    Recommendations:     Discharge Recommendations: home health PT  Discharge Equipment Recommendations: none  Barriers to discharge: None    Assessment:     Rayna Haider is a 87 y.o. female admitted with a medical diagnosis of Hyponatremia with excess extracellular fluid volume.  She presents with the following impairments/functional limitations: weakness, impaired endurance, impaired functional mobility, impaired cardiopulmonary response to activity .    Rehab Prognosis: Good; patient would benefit from acute skilled PT services to address these deficits and reach maximum level of function.    Recent Surgery: Procedure(s) (LRB):  INSERTION, CATHETER, CENTRAL VENOUS, HEMODIALYSIS, TUNNELED (N/A) 4 Days Post-Op    Plan:     During this hospitalization, patient to be seen 5 x/week to address the identified rehab impairments via gait training, therapeutic activities and progress toward the following goals:    Plan of Care Expires:  03/03/23    Subjective     Chief Complaint:   Patient/Family Comments/goals:   Pain/Comfort:         Objective:     Communicated with nurse prior to session.  Patient found up in chair with   upon PT entry to room.     General Precautions: Standard, fall  Orthopedic Precautions: N/A  Braces: N/A  Respiratory Status: Nasal cannula, flow 3 L/min     Functional Mobility:  Transfers:     Sit to Stand:  stand by assistance with rolling walker  Gait: pt amb x3 trials for 25ft each trial with RW and SBA with assistance for line management, pt requested to return to sit secondary to fatigue. Pt with o2 within normal limits.   Discussed at length with daughter and pt about discharge recommendations         Patient left up in chair with all lines intact and call button in reach..    GOALS:   Multidisciplinary Problems       Physical Therapy Goals          Problem: Physical Therapy    Goal Priority Disciplines Outcome  Goal Variances Interventions   Physical Therapy Goal     PT, PT/OT Ongoing, Progressing     Description: Goals to be met by: 3/3/23     Patient will increase functional independence with mobility by performin. Supine to sit with [place]  2. Sit to supine with [place]  3. Gait  x 300 feet with Modified [place] using Rolling Walker.   4. Increased functional strength to WNL in BLE's.                         Time Tracking:     PT Received On: 23  PT Start Time: 1454     PT Stop Time: 1525  PT Total Time (min): 31 min     Billable Minutes: Gait Training 15 and Therapeutic Activity 16    Treatment Type: Treatment  PT/PTA: PTA     PTA Visit Number: 1     2023

## 2023-02-07 LAB — POCT GLUCOSE: 134 MG/DL (ref 70–110)

## 2023-02-07 PROCEDURE — 93010 EKG 12-LEAD: ICD-10-PCS | Mod: ,,, | Performed by: INTERNAL MEDICINE

## 2023-02-07 PROCEDURE — 25000003 PHARM REV CODE 250: Performed by: INTERNAL MEDICINE

## 2023-02-07 PROCEDURE — 63600175 PHARM REV CODE 636 W HCPCS: Mod: JG | Performed by: STUDENT IN AN ORGANIZED HEALTH CARE EDUCATION/TRAINING PROGRAM

## 2023-02-07 PROCEDURE — 80100016 HC MAINTENANCE HEMODIALYSIS

## 2023-02-07 PROCEDURE — 25000003 PHARM REV CODE 250

## 2023-02-07 PROCEDURE — 97530 THERAPEUTIC ACTIVITIES: CPT | Mod: CQ

## 2023-02-07 PROCEDURE — 93005 ELECTROCARDIOGRAM TRACING: CPT

## 2023-02-07 PROCEDURE — 93010 ELECTROCARDIOGRAM REPORT: CPT | Mod: 77,,, | Performed by: INTERNAL MEDICINE

## 2023-02-07 PROCEDURE — 63600175 PHARM REV CODE 636 W HCPCS: Performed by: EMERGENCY MEDICINE

## 2023-02-07 PROCEDURE — 21400001 HC TELEMETRY ROOM

## 2023-02-07 PROCEDURE — 97116 GAIT TRAINING THERAPY: CPT | Mod: CQ

## 2023-02-07 PROCEDURE — 93010 EKG 12-LEAD: ICD-10-PCS | Mod: 77,,, | Performed by: INTERNAL MEDICINE

## 2023-02-07 PROCEDURE — 93010 ELECTROCARDIOGRAM REPORT: CPT | Mod: ,,, | Performed by: INTERNAL MEDICINE

## 2023-02-07 RX ORDER — CARVEDILOL 3.12 MG/1
6.25 TABLET ORAL 2 TIMES DAILY WITH MEALS
Status: DISCONTINUED | OUTPATIENT
Start: 2023-02-07 | End: 2023-02-07

## 2023-02-07 RX ORDER — CARVEDILOL 12.5 MG/1
12.5 TABLET ORAL 2 TIMES DAILY WITH MEALS
Status: DISCONTINUED | OUTPATIENT
Start: 2023-02-07 | End: 2023-02-10 | Stop reason: HOSPADM

## 2023-02-07 RX ADMIN — POLYETHYLENE GLYCOL 3350 17 G: 17 POWDER, FOR SOLUTION ORAL at 05:02

## 2023-02-07 RX ADMIN — AMLODIPINE BESYLATE 10 MG: 5 TABLET ORAL at 01:02

## 2023-02-07 RX ADMIN — ENOXAPARIN SODIUM 30 MG: 30 INJECTION SUBCUTANEOUS at 05:02

## 2023-02-07 RX ADMIN — CLONIDINE HYDROCHLORIDE 0.2 MG: 0.2 TABLET ORAL at 08:02

## 2023-02-07 RX ADMIN — CARVEDILOL 12.5 MG: 12.5 TABLET, FILM COATED ORAL at 05:02

## 2023-02-07 RX ADMIN — TRAMADOL HYDROCHLORIDE 50 MG: 50 TABLET, COATED ORAL at 01:02

## 2023-02-07 RX ADMIN — LORAZEPAM 0.5 MG: 0.5 TABLET ORAL at 08:02

## 2023-02-07 RX ADMIN — EPOETIN ALFA-EPBX 10000 UNITS: 10000 INJECTION, SOLUTION INTRAVENOUS; SUBCUTANEOUS at 10:02

## 2023-02-07 NOTE — PROGRESS NOTES
[facility] - 4th Floor Medical Telemetry  Cardiology  Progress Note    Patient Name: Rayna Haider  MRN: [id number]  Admission Date: 1/30/2023  Hospital Length of Stay: 8 days  Code Status: Full Code   Attending Provider: Gerardo Bianchi MD   Consulting Provider: DEON Arroyo  Primary Care Physician: David Castañeda MD  Principal Problem:Hyponatremia with excess extracellular fluid volume    Patient information was obtained from patient, past medical records, and ER records.     Subjective:     Chief Complaint:  Reason for consult: bradycardia     HPI:   Ms. Haider is an 87 year old female who is known to [facility], Dr. Gibbons. She was sent to the ER from her nephrologist for abnormal labs, noted to be hyponatremic. Of note, she was admitted to the hospital on 1.13.23 for AMPARO & was treated conservatively. She reports that since her discharge, she was instructed to drink a lot of water and stopped her lasix. She reports that she gained a total of 17 lbs in 1 week. She denies CP, palpitations, SOB, fever, or chills. Significant labs on arrival include B/Cr 58.4/Cr 4.69, Na 121, & Cl 85. During her stay, was noted to be bradycardic post her BB administration, which is the reason [facility] has been consulted.    2.7.23: NAD. Denies current CP, SOB, or palps. Intermittent bradycardia noted on tele.     PMH: PVD, HTN, dyslipidemia, Dm 2, HLD, CKD stage 5  PSH: eye surgery, hysterectomy, retinal detachment surgery, skin graft  Family History: Father - DM 2  Social History: Denies alcohol, tobacco, or illicit drug use.     Previous Cardiac Diagnostics:   TTE 1.31.23:  The left ventricle is normal in size with concentric hypertrophy and normal systolic function.  The estimated ejection fraction is 55%.  Normal right ventricular size with normal right ventricular systolic function.  Mild tricuspid regurgitation.  There is a small left pleural effusion.    TTE 8.16.22:  The study quality is good.   The left  ventricle is normal in size. Global left ventricular systolic function is normal. The left ventricular ejection fraction is 60%. The left ventricle diastolic function is impaired (Grade I) with normal left atrial pressure. Noted left ventricular hypertrophy. Concentric left ventricular hypertrophy is present. It is mild.   The left atrial diameter is mildly increased. Left atrial diameter is 4.2 cms.  Mild calcification of the aortic valve is noted with adequate cuspal excursion.   Trace mitral regurgitation. Trace tricuspid regurgitation.   The pulmonary artery systolic pressure is 26 mmHg.   Small pericardial effusion vs. fat pad noted in the parasternal window.    Review of patient's allergies indicates:   Allergen Reactions    Hydralazide Nausea And Vomiting     Pt also gets Tremors when taking this med    Hydralazine analogues Nausea And Vomiting       No current facility-administered medications on file prior to encounter.     Current Outpatient Medications on File Prior to Encounter   Medication Sig    allopurinoL (ZYLOPRIM) 100 MG tablet Take 1 tablet (100 mg total) by mouth once daily.    amLODIPine (NORVASC) 10 MG tablet Take 1 tablet (10 mg total) by mouth once daily.    aspirin 81 MG Chew Take 81 mg by mouth once daily.    carvediloL (COREG) 25 MG tablet Take 1 tablet (25 mg total) by mouth 2 (two) times daily.    cloNIDine (CATAPRES) 0.2 MG tablet Take 1 tablet (0.2 mg total) by mouth 3 (three) times daily.    doxazosin (CARDURA) 4 MG tablet Take 4 mg by mouth once daily.    ferrous sulfate, dried (SLOW FE) 160 mg (50 mg iron) TbSR Take 160 mg by mouth once daily.    LORazepam (ATIVAN) 0.5 MG tablet Take 1 tablet (0.5 mg total) by mouth daily as needed for Anxiety.    furosemide (LASIX) 20 MG tablet Take 1 tablet (20 mg total) by mouth once daily. (Patient not taking: Reported on 1/30/2023)     Review of Systems   Respiratory:  Negative for shortness of breath.    Cardiovascular:  Negative for chest  pain and palpitations.   All other systems reviewed and are negative.    Objective:     Vital Signs (Most Recent):  Temp: 97 °F (36.1 °C) (02/07/23 1029)  Pulse: (!) 59 (02/07/23 1029)  Resp: 18 (02/07/23 1029)  BP: (!) 146/71 (02/07/23 1029)  SpO2: (!) 93 % (02/07/23 1029)   Vital Signs (24h Range):  Temp:  [97 °F (36.1 °C)-98.3 °F (36.8 °C)] 97 °F (36.1 °C)  Pulse:  [48-76] 59  Resp:  [18] 18  SpO2:  [88 %-95 %] 93 %  BP: (132-156)/(67-73) 146/71     Weight: 76.1 kg (167 lb 12.3 oz)  Body mass index is 32.77 kg/m².    SpO2: (!) 93 %         Intake/Output Summary (Last 24 hours) at 2/7/2023 1232  Last data filed at 2/7/2023 1153  Gross per 24 hour   Intake --   Output 3000 ml   Net -3000 ml         Lines/Drains/Airways       Central Venous Catheter Line  Duration             Tunneled Central Line Insertion/Assessment - Double Lumen  02/02/23 1535 right internal jugular 4 days              Peripheral Intravenous Line  Duration                  Peripheral IV - Single Lumen 01/31/23 2135 20 G Anterior;Right Forearm 6 days                    Significant Labs:  Recent Results (from the past 72 hour(s))   POCT glucose    Collection Time: 02/04/23  4:54 PM   Result Value Ref Range    POCT Glucose 120 (H) 70 - 110 mg/dL   POCT glucose    Collection Time: 02/04/23  7:50 PM   Result Value Ref Range    POCT Glucose 144 (H) 70 - 110 mg/dL   POCT glucose    Collection Time: 02/05/23  6:02 AM   Result Value Ref Range    POCT Glucose 118 (H) 70 - 110 mg/dL   POCT glucose    Collection Time: 02/05/23 10:31 AM   Result Value Ref Range    POCT Glucose 184 (H) 70 - 110 mg/dL   POCT glucose    Collection Time: 02/05/23  2:54 PM   Result Value Ref Range    POCT Glucose 158 (H) 70 - 110 mg/dL   POCT glucose    Collection Time: 02/05/23  9:27 PM   Result Value Ref Range    POCT Glucose 142 (H) 70 - 110 mg/dL   Renal Function Panel    Collection Time: 02/06/23  4:02 AM   Result Value Ref Range    Sodium Level 137 136 - 145 mmol/L     Potassium Level 4.1 3.5 - 5.1 mmol/L    Chloride 102 98 - 107 mmol/L    Carbon Dioxide 25 23 - 31 mmol/L    Glucose Level 117 (H) 82 - 115 mg/dL    Blood Urea Nitrogen 26.7 (H) 9.8 - 20.1 mg/dL    Creatinine 3.19 (H) 0.55 - 1.02 mg/dL    Calcium Level Total 9.4 8.4 - 10.2 mg/dL    Albumin Level 3.0 (L) 3.4 - 4.8 g/dL    Phosphorus Level 2.9 2.3 - 4.7 mg/dL    eGFR 14 mls/min/1.73/m2   CBC with Differential    Collection Time: 02/06/23  4:02 AM   Result Value Ref Range    WBC 4.5 4.5 - 11.5 x10(3)/mcL    RBC 2.96 (L) 4.20 - 5.40 x10(6)/mcL    Hgb 8.6 (L) 12.0 - 16.0 gm/dL    Hct 27.7 (L) 37.0 - 47.0 %    MCV 93.6 80.0 - 94.0 fL    MCH 29.1 pg    MCHC 31.0 (L) 33.0 - 36.0 mg/dL    RDW 16.5 11.5 - 17.0 %    Platelet 228 130 - 400 x10(3)/mcL    MPV 11.0 (H) 7.4 - 10.4 fL    Neut % 50.6 %    Lymph % 29.5 %    Mono % 13.9 %    Eos % 4.5 %    Basophil % 1.1 %    Lymph # 1.32 0.6 - 4.6 x10(3)/mcL    Neut # 2.26 2.1 - 9.2 x10(3)/mcL    Mono # 0.62 0.1 - 1.3 x10(3)/mcL    Eos # 0.20 0 - 0.9 x10(3)/mcL    Baso # 0.05 0 - 0.2 x10(3)/mcL    IG# 0.02 0 - 0.04 x10(3)/mcL    IG% 0.4 %    NRBC% 0.0 %   POCT glucose    Collection Time: 02/06/23  4:29 AM   Result Value Ref Range    POCT Glucose 114 (H) 70 - 110 mg/dL   POCT glucose    Collection Time: 02/06/23 10:47 AM   Result Value Ref Range    POCT Glucose 136 (H) 70 - 110 mg/dL   POCT glucose    Collection Time: 02/06/23  4:56 PM   Result Value Ref Range    POCT Glucose 145 (H) 70 - 110 mg/dL       Significant Imaging:  Imaging Results    None         EKG:       Telemetry:  SR 70's    Physical Exam  HENT:      Head: Normocephalic.      Nose: Nose normal.      Mouth/Throat:      Mouth: Mucous membranes are dry.   Eyes:      Extraocular Movements: Extraocular movements intact.   Cardiovascular:      Rate and Rhythm: Normal rate and regular rhythm.      Pulses: Normal pulses.      Heart sounds: Murmur (2/6 systolic murmur) heard.   Pulmonary:      Effort: Pulmonary effort is normal.       Breath sounds: Normal breath sounds.   Abdominal:      Palpations: Abdomen is soft.   Musculoskeletal:         General: Normal range of motion.      Right lower leg: Edema present.      Left lower leg: Edema present.   Skin:     General: Skin is warm and dry.   Neurological:      Mental Status: She is alert and oriented to person, place, and time.   Psychiatric:         Mood and Affect: Mood normal.         Behavior: Behavior normal.         Judgment: Judgment normal.       Home Medications:   No current facility-administered medications on file prior to encounter.     Current Outpatient Medications on File Prior to Encounter   Medication Sig Dispense Refill    allopurinoL (ZYLOPRIM) 100 MG tablet Take 1 tablet (100 mg total) by mouth once daily. 30 tablet 0    amLODIPine (NORVASC) 10 MG tablet Take 1 tablet (10 mg total) by mouth once daily. 30 tablet 11    aspirin 81 MG Chew Take 81 mg by mouth once daily.      carvediloL (COREG) 25 MG tablet Take 1 tablet (25 mg total) by mouth 2 (two) times daily. 60 tablet 11    cloNIDine (CATAPRES) 0.2 MG tablet Take 1 tablet (0.2 mg total) by mouth 3 (three) times daily. 90 tablet 11    doxazosin (CARDURA) 4 MG tablet Take 4 mg by mouth once daily.      ferrous sulfate, dried (SLOW FE) 160 mg (50 mg iron) TbSR Take 160 mg by mouth once daily.      LORazepam (ATIVAN) 0.5 MG tablet Take 1 tablet (0.5 mg total) by mouth daily as needed for Anxiety. 30 tablet 5    furosemide (LASIX) 20 MG tablet Take 1 tablet (20 mg total) by mouth once daily. (Patient not taking: Reported on 1/30/2023) 7 tablet 0       Current Inpatient Medications:    Current Facility-Administered Medications:     acetaminophen tablet 1,000 mg, 1,000 mg, Oral, Q6H PRN, Jennifer Guzman MD, 1,000 mg at 02/02/23 2334    acetaminophen tablet 650 mg, 650 mg, Oral, Q4H PRN, Jennifer Guzman MD    allopurinoL tablet 100 mg, 100 mg, Oral, Daily, Rufina Valencia MD, 100 mg at 02/06/23 0909    aluminum-magnesium  hydroxide-simethicone 200-200-20 mg/5 mL suspension 30 mL, 30 mL, Oral, QID PRN, Jennifer Guzman MD    amLODIPine tablet 10 mg, 10 mg, Oral, Daily, Jennifer Guzman MD, 10 mg at 02/06/23 0909    aspirin chewable tablet 81 mg, 81 mg, Oral, Daily, Rufina Valencia MD, 81 mg at 02/06/23 0909    carvediloL tablet 6.25 mg, 6.25 mg, Oral, BID , Carly Sepulveda, FNP    cloNIDine tablet 0.2 mg, 0.2 mg, Oral, BID, Rufina Valencia MD, 0.2 mg at 02/06/23 2125    dextrose 10% bolus 125 mL 125 mL, 12.5 g, Intravenous, PRN, Jennifer Guzman MD    dextrose 10% bolus 250 mL 250 mL, 25 g, Intravenous, PRN, Jennifer Guzman MD    diclofenac sodium 1 % gel 2 g, 2 g, Topical (Top), Daily, Gerardo Bianchi MD    doxazosin tablet 4 mg, 4 mg, Oral, Daily, Rufina Valencia MD, 4 mg at 02/06/23 0909    enoxaparin injection 30 mg, 30 mg, Subcutaneous, Daily, Owen Stokes MD, 30 mg at 02/06/23 1655    epoetin cierra-epbx injection 10,000 Units, 10,000 Units, Subcutaneous, Every Tues, Thurs, Sat, Jonathan Danielson, DO, 10,000 Units at 02/07/23 1042    fentaNYL injection, , , PRN, Eric Angela DO, 25 mcg at 02/02/23 1527    ferrous sulfate tablet 1 each, 1 tablet, Oral, Daily, Rufina Valencia MD, 1 each at 02/06/23 0909    glucagon (human recombinant) injection 1 mg, 1 mg, Intramuscular, PRN, Jennifer Guzman MD    glucose chewable tablet 16 g, 16 g, Oral, PRN, Jennifer Guzman MD    glucose chewable tablet 24 g, 24 g, Oral, PRN, Jennifer Guzman MD    heparin (porcine) injection, , , PRN, Eric Angela DO, 4,000 Units at 02/02/23 1540    insulin aspart U-100 injection 0-5 Units, 0-5 Units, Subcutaneous, QID (AC + HS) PRN, Jennifer Guzman MD    iopamidoL (ISOVUE-370) injection, , , PRN, Eric Angela DO, 5 mL at 02/02/23 1550    LIDOcaine HCL 20 mg/ml (2%) injection, , , PRN, Eric Angela DO, 20 mL at 02/02/23 1517    LORazepam tablet 0.5 mg, 0.5 mg, Oral, Daily PRN, Jennifer Guzman MD, 0.5 mg at 02/05/23 2121    melatonin tablet 6 mg, 6 mg, Oral, Nightly PRN, Owen SETHI  MD Kike, 6 mg at 02/01/23 2055    midazolam (VERSED) 1 mg/mL injection, , , PRN, Eric Angela DO, 1 mg at 02/02/23 1512    ondansetron injection 4 mg, 4 mg, Intravenous, Q4H PRN, Jennifer Guzman MD    polyethylene glycol packet 17 g, 17 g, Oral, BID PRN, Jennifer Guzman MD    prochlorperazine injection Soln 5 mg, 5 mg, Intravenous, Q6H PRN, Jennifer Guzman MD    senna-docusate 8.6-50 mg per tablet 2 tablet, 2 tablet, Oral, BID PRN, Jennifer Guzman MD    simethicone chewable tablet 80 mg, 1 tablet, Oral, QID PRN, Jennifer Guzman MD    sodium chloride 0.9% flush 10 mL, 10 mL, Intravenous, PRN, Owen Stokes MD    traMADoL tablet 50 mg, 50 mg, Oral, Q8H PRN, Gerardo Bianchi MD, 50 mg at 02/06/23 2125         VTE Risk Mitigation (From admission, onward)           Ordered     heparin (porcine) injection  As needed (PRN)         02/02/23 1537     enoxaparin injection 30 mg  Daily         01/30/23 1803     IP VTE HIGH RISK PATIENT  Once         01/30/23 1803     Place sequential compression device  Until discontinued         01/30/23 1803                    Assessment:   Sinus bradycardia  - now SR  AMPARO on CKD IV - now ESRD on HD (2.2.23)  Hypnatremia - resolved  Anasarca/volume overload  Acute on chronic HFpEF    - LVEF 55%    - 17 lb weight gain over 1 week  Metabolic acidosis - resolved  HTN  DM II  Anemia  HLD    Plan:   Continue Coreg 12.5 mg BID. May need to decrease again if remains bradycardic. Continue to monitor on tele.  Suspect bradycardia is s/t BB and clonidine use. Also suspect sleep apnea as compenent as bradycardia often occurs during sleep hours.   Fluid management per nephro.   Work w/ PT & OT.  No further recommendations.  F/U with Dr. Gibbons in 1 week.    Will be available as needed.     I have seen the patient, reviewed the Nurse Practitioner's note, assessment and plan. I have personally interviewed and examined the patient at bedside and agree with the findings. Medical decision making listed above  were done under my guidance.

## 2023-02-07 NOTE — PROGRESS NOTES
[facility]  Hospital Medicine Progress Note        Chief Complaint: Inpatient Follow-up for weakness, AMPARO on CKD V    HPI: This is an 87-year-old female with medical history notable for CKD stage 5 with recent admission for AMPARO on 01/13/2023 treated conservatively and kidney function stabilized and did not require hemodialysis.  Had a follow-up labs with her nephrologist and was noted to be hyponatremic for which she was sent to [facility] ED for further evaluation.  Patient reports since her discharge she was instructed to drink a lot of water and has been drinking half a gallon of water daily and also has stopped her Lasix.  Report increased swelling in her bilateral lower extremities.  Reports generalized weakness and fatigue, denies shortness breath or chest pain, fever or chills.  On arrival to ED, she was afebrile and hemodynamically stable.  Saturating 92% on room air.  Labs notable for sodium 121, creatinine 4.69, BUN 58, CO2 20, potassium 4.2, , serum osmolarity pending, urine sodium 26.  Urine osmolarity and urine sodium after receiving Lasix 40 mg IV is 217 and 34, serum osmolarity not ordered.  She was given another Lasix 80 mg IV.  Subsequently transferred to [facility] and referred to hospital medicine service for further evaluation and management.    Interval Hx:   No complaints today.  Discussed working with case management for SNF.  Case discussed with patient's nurse and  on the floor    Objective/physical exam:  General: In no acute distress, afebrile, obese  Chest:  Decreased breath sounds bilateral bases, good air entry otherwise  Heart: RRR, +S1, S2, no appreciable murmur  Abdomen: Soft, nontender, BS +  MSK: Warm, trace pitting edema bilateral lower extremities  Neurologic: Alert and oriented x4, Cranial nerve II-XII intact, Strength 5/5 in all 4 extremities    VITAL SIGNS: 24 HRS MIN & MAX LAST   Temp  Min: 97.9 °F (36.6 °C)  Max: 98.3 °F (36.8 °C) 97.9 °F  (36.6 °C)   BP  Min: 132/67  Max: 156/73 (!) 143/67   Pulse  Min: 47  Max: 76  76     Resp  Min: 18  Max: 18 18   SpO2  Min: 88 %  Max: 98 % (!) 94 %         Recent Labs   Lab 02/03/23 0448 02/04/23  0706 02/06/23  0402   WBC 3.7* 3.8* 4.5   RBC 3.06* 3.06* 2.96*   HGB 8.9* 8.8* 8.6*   HCT 27.0* 27.7* 27.7*   MCV 88.2 90.5 93.6   MCH 29.1 28.8 29.1   MCHC 33.0 31.8* 31.0*   RDW 15.8 16.2 16.5    214 228   MPV 11.3* 11.4* 11.0*       Recent Labs   Lab 02/02/23  0346 02/03/23 0448 02/04/23 0706 02/06/23  0402   * 132* 134* 137   K 4.2 4.0 3.9 4.1   CO2 22* 25 25 25   BUN 61.7* 38.6* 22.1* 26.7*   CREATININE 4.35* 3.43* 2.64* 3.19*   CALCIUM 9.1 9.1 9.2 9.4   ALBUMIN 3.4 3.2* 3.1* 3.0*   ALKPHOS 44 41 39*  --    ALT 10 8 9  --    AST 10 11 11  --    BILITOT 0.5 0.4 0.4  --           Microbiology Results (last 7 days)       ** No results found for the last 168 hours. **             See below for Radiology    Scheduled Med:   allopurinoL  100 mg Oral Daily    amLODIPine  10 mg Oral Daily    aspirin  81 mg Oral Daily    carvediloL  12.5 mg Oral BID    cloNIDine  0.2 mg Oral BID    diclofenac sodium  2 g Topical (Top) Daily    doxazosin  4 mg Oral Daily    enoxaparin  30 mg Subcutaneous Daily    epoetin cierra-ebpx (RETACRIT) injection  10,000 Units Subcutaneous Every Tues, Thurs, Sat    ferrous sulfate  1 tablet Oral Daily        Continuous Infusions:       PRN Meds:  acetaminophen, acetaminophen, aluminum-magnesium hydroxide-simethicone, dextrose 10%, dextrose 10%, fentaNYL, glucagon (human recombinant), glucose, glucose, heparin (porcine), insulin aspart U-100, iopamidoL, LIDOcaine HCL 20 mg/ml (2%), LORazepam, melatonin, midazolam, ondansetron, polyethylene glycol, prochlorperazine, senna-docusate 8.6-50 mg, simethicone, sodium chloride 0.9%, traMADoL       Assessment/Plan:  Hypervolemic Hyponatremia -due to volume overload- resolved  AMPARO on CKD IV- emily ESRD, started HD 2/2  Renal related anasarca/volume  overload  Metabolic acidosis - resolved  Hypertension  HX T2DM, hypertension, hyperlipidemia, anemia of chronic disease, hyperuricemia, and anxiety          Nephrology on board, appreciate recommendations  Sodium normalized   Completed 3 days therapy of tolvaptan 15 mg daily  Nephrologist Dr. Eric Angela placed tunneled catheter 2/2, we greatly appreciate him  Initiated hemodialysis on 02/02, underwent again on 2/3, 2/4--> plan for T, Th, sat schedule  Chair time pending given PT OT recommended home with home health but patient and family wants to go to rehab/SNF.  Save left arm, no BP is on needle sticks  Echocardiogram reviewed, EF 55%, normal ventricular size and function.  Mild TR, small left pleural effusion  Home medication reviewed and resumed, nurse informed me she gave all her morning meds together today and pt became bradycardic in 40s, patient remained asymptomatic.  Probably does of in her chair.  Heart rate improved to 56.  I will decrease her Coreg to 12.5 mg b.i.d.  Nephrology did consulted Cardiology for concern of bradycardia.  Appreciate their recommendation, recommended to decrease Coreg more if persistently going into bradycardia.  Also recommended outpatient sleep study given patient reported she snores sometimes  PT evaluated the patient, recommended home health PT  Repeat labs Thursday for hemodialysis day    VTE prophylaxis:  Lovenox 30 mg subQ    Patient condition:  Stable    Anticipated discharge and Disposition:   TBD, will need dialysis chair time prior to discharge to SNF, sleep study as out pt       All diagnosis and differential diagnosis have been reviewed; assessment and plan has been documented; I have personally reviewed the labs and test results that are presently available; I have reviewed the patients medication list; I have reviewed the consulting providers response and recommendations. I have reviewed or attempted to review medical records based upon their availability    All  of the patient's questions have been  addressed and answered. Patient's is agreeable to the above stated plan. I will continue to monitor closely and make adjustments to medical management as needed.  _____________________________________________________________________    Nutrition Status:    Radiology:  Cardiac catheterization  Procedure performed in the Invasive Lab    - See Procedure Log link below for nursing documentation    - See OpNote on Surgeries Tab for physician findings    - See Imaging Tab for radiologist dictation      Gerardo Bianchi MD  Department of Hospital Medicine   [facility]   02/07/2023

## 2023-02-07 NOTE — PLAN OF CARE
Spoke to patient and daughter about skilled nursing facility (SNF). Freedom of choice obtained. Referral sent to Benjie at the [facility]. PASRR completed. [facility] notified.

## 2023-02-07 NOTE — PROGRESS NOTES
[facility]  Nephrology Progress Note  Patient Name: Rayna Haider  Age: 87 y.o.  : 1935  MRN: [id number]  Admission Date: 2023    Date of Consultation: 23     Consultation Requested By: ED     Reason for Consultation: AMPARO on CKD     Chief Complaint: Abnormal Lab (Pt instructed to go to ED by MATT Cheung for further evaluation and admission relating ongoing abnormal lab values with Dr Hylton as nephrologist.)      History of Present Illness:  Ms Rayna Haider is a 87 y.o. White female with past medical history of DM type 2, hypertension, hyperlipidemia, anemia of chronic disease and known iron deficiency, CKD stage 4.  In the past year creatinine seemed to be around 2.0 with GFR around 27.  She was seen by our service in the middle of the month when she was admitted for vague symptoms of generalized ill feeling.  Labs have been drawn showing acute kidney injury with BUN 88, creatinine 5.1 with metabolic acidosis and hyperkalemia.  Patient was discharged on  in stable condition.  She started to gain weight comment 1st 3 lb over 24 hour.  Diuretics were prescribed intake and some days with increased urine output though not dramatic increase.  And total she is gained 17 lb over the past week.  She denies fever, chills, malaise.  Denies sick contacts.  Denies lapse in medications or low-sodium diet.  She is put much effort into drinking at least 2 L of water per  Day.   Patient has now completed 3 consecutive dialysis treatments Th, Fr and Sa. She has improvement in appetite and appears less edematous.    23- Patient resting comfortably in bed on dialysis. Tolerating dialysis well. Denies complaints of SOB and chest pain. Now off of supplemental O2 with O2 saturation 94%. She reports feeling better with an increased appetite and ambulating with PT.      Physical Exam:   BP (!) 146/71   Pulse (!) 59   Temp 97 °F (36.1 °C)   Resp 18   Ht 5' (1.524 m)   Wt 76.1  kg (167 lb 12.3 oz)   LMP  (LMP Unknown)   SpO2 (!) 93%   Breastfeeding No   BMI 32.77 kg/m²  Body mass index is 32.77 kg/m².    Physical Exam  Constitutional:       General: She is not in acute distress.     Appearance: Normal appearance. She is obese.   Eyes:      Conjunctiva/sclera: Conjunctivae normal.   Neck:      Comments: R chest wall tunneled catheter   Cardiovascular:      Rate and Rhythm: Regular rhythm. Bradycardia present.      Heart sounds: Normal heart sounds.   Pulmonary:      Effort: Pulmonary effort is normal.      Breath sounds: Normal breath sounds.   Abdominal:      General: There is no distension.      Palpations: Abdomen is soft.   Musculoskeletal:         General: Swelling present.      Cervical back: Normal range of motion.      Comments: RIJ tunneled dialysis catheter.   Skin:     General: Skin is warm and dry.   Neurological:      General: No focal deficit present.      Mental Status: She is alert.   Psychiatric:         Mood and Affect: Mood normal.         Behavior: Behavior normal.           Inpatient Medications:     Current Facility-Administered Medications:     acetaminophen tablet 1,000 mg, 1,000 mg, Oral, Q6H PRN, Jennifer Guzman MD, 1,000 mg at 02/02/23 2334    acetaminophen tablet 650 mg, 650 mg, Oral, Q4H PRN, Jennifer Guzman MD    allopurinoL tablet 100 mg, 100 mg, Oral, Daily, Rufina Valencia MD, 100 mg at 02/06/23 0909    aluminum-magnesium hydroxide-simethicone 200-200-20 mg/5 mL suspension 30 mL, 30 mL, Oral, QID PRN, Jennifer Guzman MD    amLODIPine tablet 10 mg, 10 mg, Oral, Daily, Jennifer Guzman MD, 10 mg at 02/06/23 0909    aspirin chewable tablet 81 mg, 81 mg, Oral, Daily, Rufina Valencia MD, 81 mg at 02/06/23 0909    carvediloL tablet 12.5 mg, 12.5 mg, Oral, BID, Gerardo Bianchi MD    cloNIDine tablet 0.2 mg, 0.2 mg, Oral, BID, Rufina Valencia MD, 0.2 mg at 02/06/23 2125    dextrose 10% bolus 125 mL 125 mL, 12.5 g, Intravenous, PRN, Jennifer Guzman MD    dextrose 10% bolus 250 mL 250 mL,  25 g, Intravenous, PRN, Jennifer Guzman MD    diclofenac sodium 1 % gel 2 g, 2 g, Topical (Top), Daily, Gerardo Bianchi MD    doxazosin tablet 4 mg, 4 mg, Oral, Daily, Rufina Valencia MD, 4 mg at 02/06/23 0909    enoxaparin injection 30 mg, 30 mg, Subcutaneous, Daily, Owen Stokes MD, 30 mg at 02/06/23 1655    epoetin cierra-epbx injection 10,000 Units, 10,000 Units, Subcutaneous, Every Tues, Thurs, Sat, Jonathan T Danielson, DO, 10,000 Units at 02/04/23 1408    fentaNYL injection, , , PRN, Eric Angela DO, 25 mcg at 02/02/23 1527    ferrous sulfate tablet 1 each, 1 tablet, Oral, Daily, Rufina Valencia MD, 1 each at 02/06/23 0909    glucagon (human recombinant) injection 1 mg, 1 mg, Intramuscular, PRN, Jennifer Guzman MD    glucose chewable tablet 16 g, 16 g, Oral, PRN, Jennifer Guzman MD    glucose chewable tablet 24 g, 24 g, Oral, PRN, Jennifer Guzman MD    heparin (porcine) injection, , , PRN, Eric Angela DO, 4,000 Units at 02/02/23 1540    insulin aspart U-100 injection 0-5 Units, 0-5 Units, Subcutaneous, QID (AC + HS) PRN, Jennifer Guzman MD    iopamidoL (ISOVUE-370) injection, , , PRN, Eric Angela DO, 5 mL at 02/02/23 1550    LIDOcaine HCL 20 mg/ml (2%) injection, , , PRN, Eric Angela DO, 20 mL at 02/02/23 1517    LORazepam tablet 0.5 mg, 0.5 mg, Oral, Daily PRN, Jennifer Guzman MD, 0.5 mg at 02/05/23 2121    melatonin tablet 6 mg, 6 mg, Oral, Nightly PRN, Owen Stokes MD, 6 mg at 02/01/23 2055    midazolam (VERSED) 1 mg/mL injection, , , PRN, Eric Angela DO, 1 mg at 02/02/23 1512    ondansetron injection 4 mg, 4 mg, Intravenous, Q4H PRN, Jennifer Guzman MD    polyethylene glycol packet 17 g, 17 g, Oral, BID PRN, Jennifer Guzman MD    prochlorperazine injection Soln 5 mg, 5 mg, Intravenous, Q6H PRN, Jennifer Guzman MD    senna-docusate 8.6-50 mg per tablet 2 tablet, 2 tablet, Oral, BID PRN, Jennifer Guzman MD    simethicone chewable tablet 80 mg, 1 tablet, Oral, QID PRN, Jennifer Guzman MD    sodium chloride 0.9% flush 10 mL,  10 mL, Intravenous, PRN, Owen Stokes MD    traMADoL tablet 50 mg, 50 mg, Oral, Q8H PRN, Gerardo Bianchi MD, 50 mg at 02/06/23 2125     Imaging:  X-Ray Chest 1 View   Final Result      Changes suggestive of bilateral pleural effusions.      Increased left retrocardiac density and silhouetting of the left hemidiaphragm as above         Electronically signed by: Driss Macias   Date:    01/31/2023   Time:    08:40          Laboratory Data:  Recent Labs   Lab 02/06/23  0402      K 4.1   CO2 25   BUN 26.7*   CREATININE 3.19*   GLUCOSE 117*   CALCIUM 9.4   PHOS 2.9       Recent Labs   Lab 02/06/23  0402   WBC 4.5   HGB 8.6*   HCT 27.7*          Impression:     ESRD requiring initiation of hemodialysis via tunneled catheter   CHF exacerbation - - documented 17# weight gain in one week despite compliance with medication and diet restrictions   Hyponatremia likely secondary to hypervolemia due to CHF exacerbation needing further workup  DM II   HTN  Anemia with known iron deficiency. She was given Ferrlecit IV iron replacement for 5 days starting 1/14  Bradycardia- coreg decreased; per cardiology recommendations on 2/6     Plan:   -Patient will now dialyze on TTS schedule. Dialysis coordinator working on placement.  -Discharge placement being worked on by case management  -oxygen has been weaned off  -Patient will need outpatient appointment with Dr. Roberson for AV graft placement.

## 2023-02-07 NOTE — NURSING
02/07/23 1153   Post-Hemodialysis Assessment   Rinseback Volume (mL) 250 mL   Blood Volume Processed (Liters) 54.2 L   Dialyzer Clearance Moderately streaked   Duration of Treatment 180 minutes   Total UF (mL) 3000 mL   Net Fluid Removal 2500   Patient Response to Treatment Tolerated well   Post-Hemodialysis Comments Tx completed, pt reinfused. CVC deaccessed per P&P, flushed and locked with NS and new Clear Guard caps applied. During tx Dr Hylton rounded, d\c'd O2 per NC and pt Sao2 maintained 93-94% without O2. Pt ran 3 hours, NET removed= 2500ml.

## 2023-02-07 NOTE — PT/OT/SLP PROGRESS
Physical Therapy Treatment    Patient Name:  Rayna Haider   MRN:  [id number]    Recommendations:     Discharge Recommendations: home health PT, nursing facility, skilled (with spv)  Discharge Equipment Recommendations: none  Barriers to discharge: None    Assessment:     Rayna Haider is a 87 y.o. female admitted with a medical diagnosis of Hyponatremia with excess extracellular fluid volume.  She presents with the following impairments/functional limitations: weakness, impaired endurance, impaired functional mobility .    Rehab Prognosis: Good; patient would benefit from acute skilled PT services to address these deficits and reach maximum level of function.    Recent Surgery: Procedure(s) (LRB):  INSERTION, CATHETER, CENTRAL VENOUS, HEMODIALYSIS, TUNNELED (N/A) 5 Days Post-Op    Plan:     During this hospitalization, patient to be seen 5 x/week to address the identified rehab impairments via gait training, therapeutic activities and progress toward the following goals:    Plan of Care Expires:  03/03/23    Subjective     Chief Complaint:   Patient/Family Comments/goals:   Pain/Comfort:         Objective:     Communicated with nurse prior to session.  Patient found up in chair with telemetry, peripheral IV, pulse ox (continuous) upon PT entry to room.     General Precautions: Standard, fall  Orthopedic Precautions: N/A  Braces: N/A  Respiratory Status: Nasal cannula, flow 1 L/min     Functional Mobility:  Transfers:     Sit to Stand:  contact guard assistance with rolling walker  Gait: pt amb x3 trials for 50ft with RW and CGA and seated rest breaks between trials      AM-PAC 6 CLICK MOBILITY          Treatment & Education:  Pt performed standing hip flexion, calf raises and sit to stand transfers x10 trials with seated rest breaks required.     Patient left up in chair with all lines intact and call button in reach..    GOALS:   Multidisciplinary Problems       Physical Therapy Goals          Problem:  Physical Therapy    Goal Priority Disciplines Outcome Goal Variances Interventions   Physical Therapy Goal     PT, PT/OT Ongoing, Progressing     Description: Goals to be met by: 3/3/23     Patient will increase functional independence with mobility by performin. Supine to sit with [place]  2. Sit to supine with [place]  3. Gait  x 300 feet with Modified [place] using Rolling Walker.   4. Increased functional strength to WNL in BLE's.                         Time Tracking:     PT Received On: 23  PT Start Time: 1415     PT Stop Time: 1438  PT Total Time (min): 23 min     Billable Minutes: Gait Training 13 and Therapeutic Activity 10    Treatment Type: Treatment  PT/PTA: PTA     PTA Visit Number: 2     2023

## 2023-02-07 NOTE — PLAN OF CARE
Patient has  been denied by [facility] at the [facility]. Patient and daughter updated and request referral be sent to [facility]. Referral sent via [facility]. She also has list to review other facilities.

## 2023-02-08 LAB — POCT GLUCOSE: 141 MG/DL (ref 70–110)

## 2023-02-08 PROCEDURE — 63600175 PHARM REV CODE 636 W HCPCS: Performed by: EMERGENCY MEDICINE

## 2023-02-08 PROCEDURE — 21400001 HC TELEMETRY ROOM

## 2023-02-08 PROCEDURE — 25000003 PHARM REV CODE 250: Performed by: INTERNAL MEDICINE

## 2023-02-08 PROCEDURE — 97535 SELF CARE MNGMENT TRAINING: CPT | Mod: CO

## 2023-02-08 PROCEDURE — 97116 GAIT TRAINING THERAPY: CPT | Mod: CQ

## 2023-02-08 PROCEDURE — 27000221 HC OXYGEN, UP TO 24 HOURS

## 2023-02-08 PROCEDURE — 25000003 PHARM REV CODE 250

## 2023-02-08 PROCEDURE — 97530 THERAPEUTIC ACTIVITIES: CPT | Mod: CQ

## 2023-02-08 RX ORDER — BISACODYL 10 MG
10 SUPPOSITORY, RECTAL RECTAL DAILY PRN
Status: DISCONTINUED | OUTPATIENT
Start: 2023-02-08 | End: 2023-02-10 | Stop reason: HOSPADM

## 2023-02-08 RX ADMIN — CARVEDILOL 12.5 MG: 12.5 TABLET, FILM COATED ORAL at 10:02

## 2023-02-08 RX ADMIN — CLONIDINE HYDROCHLORIDE 0.2 MG: 0.2 TABLET ORAL at 10:02

## 2023-02-08 RX ADMIN — DOXAZOSIN 4 MG: 4 TABLET ORAL at 10:02

## 2023-02-08 RX ADMIN — BISACODYL 10 MG: 10 SUPPOSITORY RECTAL at 03:02

## 2023-02-08 RX ADMIN — POLYETHYLENE GLYCOL 3350 17 G: 17 POWDER, FOR SOLUTION ORAL at 07:02

## 2023-02-08 RX ADMIN — LORAZEPAM 0.5 MG: 0.5 TABLET ORAL at 10:02

## 2023-02-08 RX ADMIN — ENOXAPARIN SODIUM 30 MG: 30 INJECTION SUBCUTANEOUS at 05:02

## 2023-02-08 RX ADMIN — ALLOPURINOL 100 MG: 100 TABLET ORAL at 10:02

## 2023-02-08 RX ADMIN — Medication 81 MG: at 10:02

## 2023-02-08 RX ADMIN — AMLODIPINE BESYLATE 10 MG: 5 TABLET ORAL at 10:02

## 2023-02-08 RX ADMIN — SENNOSIDES AND DOCUSATE SODIUM 2 TABLET: 50; 8.6 TABLET ORAL at 07:02

## 2023-02-08 RX ADMIN — TRAMADOL HYDROCHLORIDE 50 MG: 50 TABLET, COATED ORAL at 07:02

## 2023-02-08 RX ADMIN — FERROUS SULFATE TAB 325 MG (65 MG ELEMENTAL FE) 1 EACH: 325 (65 FE) TAB at 09:02

## 2023-02-08 NOTE — PROGRESS NOTES
[facility]  Hospital Medicine Progress Note        Chief Complaint: Inpatient Follow-up for weakness, AMPARO on CKD V    HPI: This is an 87-year-old female with medical history notable for CKD stage 5 with recent admission for AMPARO on 01/13/2023 treated conservatively and kidney function stabilized and did not require hemodialysis.  Had a follow-up labs with her nephrologist and was noted to be hyponatremic for which she was sent to [facility] ED for further evaluation.  Patient reports since her discharge she was instructed to drink a lot of water and has been drinking half a gallon of water daily and also has stopped her Lasix.  Report increased swelling in her bilateral lower extremities.  Reports generalized weakness and fatigue, denies shortness breath or chest pain, fever or chills.  On arrival to ED, she was afebrile and hemodynamically stable.  Saturating 92% on room air.  Labs notable for sodium 121, creatinine 4.69, BUN 58, CO2 20, potassium 4.2, , serum osmolarity pending, urine sodium 26.  Urine osmolarity and urine sodium after receiving Lasix 40 mg IV is 217 and 34, serum osmolarity not ordered.  She was given another Lasix 80 mg IV.  Subsequently transferred to [facility] and referred to hospital medicine service for further evaluation and management.    Interval Hx:   No complaints today.  Reports she feels great.  Discussed that she has been denied by SNF because she is high functional.  Reported her family is looking into home sitters in case she does not get into SNF.   No family at bedside   Case discussed with patient's nurse and  on the floor    Objective/physical exam:  General: In no acute distress, afebrile, obese  Chest:  Decreased breath sounds bilateral bases, good air entry otherwise  Heart: RRR, +S1, S2, no appreciable murmur  Abdomen: Soft, nontender, BS +  MSK: Warm, trace pitting edema bilateral lower extremities  Neurologic: Alert and oriented x4,  Cranial nerve II-XII intact, Strength 5/5 in all 4 extremities    VITAL SIGNS: 24 HRS MIN & MAX LAST   Temp  Min: 97 °F (36.1 °C)  Max: 99.3 °F (37.4 °C) 98 °F (36.7 °C)   BP  Min: 131/54  Max: 158/61 132/69   Pulse  Min: 57  Max: 75  (!) 57     Resp  Min: 17  Max: 20 18   SpO2  Min: 86 %  Max: 98 % 95 %         Recent Labs   Lab 02/03/23 0448 02/04/23  0706 02/06/23  0402   WBC 3.7* 3.8* 4.5   RBC 3.06* 3.06* 2.96*   HGB 8.9* 8.8* 8.6*   HCT 27.0* 27.7* 27.7*   MCV 88.2 90.5 93.6   MCH 29.1 28.8 29.1   MCHC 33.0 31.8* 31.0*   RDW 15.8 16.2 16.5    214 228   MPV 11.3* 11.4* 11.0*       Recent Labs   Lab 02/02/23  0346 02/03/23 0448 02/04/23 0706 02/06/23  0402   * 132* 134* 137   K 4.2 4.0 3.9 4.1   CO2 22* 25 25 25   BUN 61.7* 38.6* 22.1* 26.7*   CREATININE 4.35* 3.43* 2.64* 3.19*   CALCIUM 9.1 9.1 9.2 9.4   ALBUMIN 3.4 3.2* 3.1* 3.0*   ALKPHOS 44 41 39*  --    ALT 10 8 9  --    AST 10 11 11  --    BILITOT 0.5 0.4 0.4  --           Microbiology Results (last 7 days)       ** No results found for the last 168 hours. **             See below for Radiology    Scheduled Med:   allopurinoL  100 mg Oral Daily    amLODIPine  10 mg Oral Daily    aspirin  81 mg Oral Daily    carvediloL  12.5 mg Oral BID WM    cloNIDine  0.2 mg Oral BID    diclofenac sodium  2 g Topical (Top) Daily    doxazosin  4 mg Oral Daily    enoxaparin  30 mg Subcutaneous Daily    epoetin cierra-ebpx (RETACRIT) injection  10,000 Units Subcutaneous Every Tues, Thurs, Sat    ferrous sulfate  1 tablet Oral Daily        Continuous Infusions:       PRN Meds:  acetaminophen, acetaminophen, aluminum-magnesium hydroxide-simethicone, dextrose 10%, dextrose 10%, fentaNYL, glucagon (human recombinant), glucose, glucose, heparin (porcine), insulin aspart U-100, iopamidoL, LIDOcaine HCL 20 mg/ml (2%), LORazepam, melatonin, midazolam, ondansetron, polyethylene glycol, prochlorperazine, senna-docusate 8.6-50 mg, simethicone, sodium chloride 0.9%,  traMADoL       Assessment/Plan:  Hypervolemic Hyponatremia -due to volume overload- resolved  AMPARO on CKD IV- emily ESRD, started HD 2/2  Renal related anasarca/volume overload  Metabolic acidosis - resolved  Hypertension  HX T2DM, hypertension, hyperlipidemia, anemia of chronic disease, hyperuricemia, and anxiety          Nephrology on board, appreciate recommendations  Sodium normalized   Completed 3 days therapy of tolvaptan 15 mg daily  Nephrologist Dr. Eric Angela placed tunneled catheter 2/2, we greatly appreciate him  Initiated hemodialysis on 02/02, underwent again on 2/3, 2/4--> plan for T, Th, sat schedule  Chair time pending given PT OT recommended home with home health but patient and family wants to go to rehab/SNF.  Save left arm, no BP is on needle sticks  Echocardiogram reviewed, EF 55%, normal ventricular size and function.  Mild TR, small left pleural effusion  Home medication reviewed and resumed, nurse informed me she gave all her morning meds together today and pt became bradycardic in 40s, patient remained asymptomatic.  Probably does of in her chair.  Heart rate improved to 56.  I will decrease her Coreg to 12.5 mg b.i.d.  Nephrology did consulted Cardiology for concern of bradycardia.  Appreciate their recommendation, recommended to decrease Coreg more if persistently going into bradycardia.  Also recommended outpatient sleep study given patient reported she snores sometimes  PT evaluated the patient, recommended home health PT  Morning CBC, renal functions ordered for hemodialysis day    VTE prophylaxis:  Lovenox 30 mg subQ    Patient condition:  Stable    Anticipated discharge and Disposition:   TBD, will need dialysis chair time prior to discharge to SNF, sleep study as out pt , Dr Gibbons in 1 week      All diagnosis and differential diagnosis have been reviewed; assessment and plan has been documented; I have personally reviewed the labs and test results that are presently available; I  have reviewed the patients medication list; I have reviewed the consulting providers response and recommendations. I have reviewed or attempted to review medical records based upon their availability    All of the patient's questions have been  addressed and answered. Patient's is agreeable to the above stated plan. I will continue to monitor closely and make adjustments to medical management as needed.  _____________________________________________________________________    Nutrition Status:    Radiology:  Cardiac catheterization  Procedure performed in the Invasive Lab    - See Procedure Log link below for nursing documentation    - See OpNote on Surgeries Tab for physician findings    - See Imaging Tab for radiologist dictation      Gerardo Bianchi MD  Department of Hospital Medicine   [facility]   02/08/2023

## 2023-02-08 NOTE — PT/OT/SLP PROGRESS
Occupational Therapy  Treatment    Rayna Haider   MRN: [id number]   Admitting Diagnosis: Hyponatremia with excess extracellular fluid volume    OT Date of Treatment: 02/08/23   OT Start Time: 0951  OT Stop Time: 1015  OT Total Time (min): 24 min     Billable Minutes:  Self Care/Home Management 24  Total Minutes: 24     OT/RACHEAL: RACHEAL     RACHEAL Visit Number: 1    General Precautions: Standard, fall  Orthopedic Precautions:    Braces:      Spiritual, Cultural Beliefs, Buddhist Practices, Values that Affect Care: no    Subjective:  Pt sitting EOB upon entry and requesting to go to bathroom. Agreeable to OT session.      Objective:  Patient found with: telemetry, pulse ox (continuous), peripheral IV    Functional Mobility:  Bed Mobility:   Supine to sit: Standby Assistance   Sit to supine: Activity did not occur   Rolling: Activity did not occur   Scooting: Activity did not occur    Transfer Training:   Stand step t/f to BS recliner with SBA.     Grooming:  Pt washed hands and combed hair standing at sink with SBA. Pt able to use BUE during tasks without support and no LOB noted.     Toilet Training:  Functional mobility to bathroom with SBA and RW. Completed toilet t/f with SBA. Independent with clothing management and pericare in standing.     Balance:   Static Sit: NORMAL: No deviations seen in posture held statically  Dynamic Sit:  GOOD+: Maintains balance through MAXIMAL excursions of active trunk motion  Static Stand: GOOD+: Takes MAXIMAL challenges from all directions  Dynamic stand: GOOD: Needs SUPERVISION only during gait and able to self right with moderate LOB    Additional Treatment:      Patient left up in chair with all lines intact, call button in reach, and daughter present    ASSESSMENT:  Rayna Haider is a 87 y.o. female with a medical diagnosis of Hyponatremia with excess extracellular fluid volume. Tolerated session well with good mobility and standing balance noted during ADL tasks. Provided  education to daughter regarding pt status, POC, and scope of OT. Verbalized fair understanding.     Rehab potential is excellent    Activity tolerance: Good    Discharge recommendations: home with home health, nursing facility, skilled     Equipment recommendations:       GOALS:   Multidisciplinary Problems       Occupational Therapy Goals          Problem: Occupational Therapy    Goal Priority Disciplines Outcome Interventions   Occupational Therapy Goal     OT, PT/OT Ongoing, Progressing    Description: Goals to be met by: 2/20/2023     Patient will increase functional independence with ADLs by performing:    UE Dressing with Modified [place].  LE Dressing with Modified [place].  Grooming while standing with Modified [place].  Toileting from toilet with Modified [place] for hygiene and clothing management.   Toilet transfer to toilet with Modified [place].                         Plan:  Patient to be seen 3 x/week, 5 x/week to address the above listed problems via self-care/home management, therapeutic activities, therapeutic exercises  Plan of Care expires:    Plan of Care reviewed with: patient, daughter         02/08/2023

## 2023-02-08 NOTE — PROGRESS NOTES
Inpatient Nutrition Evaluation    Admit Date: 1/30/2023   Total duration of encounter: 9 days    Nutrition Recommendation/Prescription     Continue oral diet as tolerated.  Discontinue Novasource Renal (provides 475 kcal, 22 g protein per serving) once daily as needed.  Encouraged intake.    Nutrition Assessment     Chart Review    Reason Seen: continuous nutrition monitoring and physician consult for family request for needs regarding CKD    Malnutrition Screening Tool Results   Have you recently lost weight without trying?: No  Have you been eating poorly because of a decreased appetite?: No   MST Score: 0     Diagnosis:  AMPARO on CKD stage IV versus progression to CKD V  CHF exacerbation  Hyponatremia likely secondary to hypervolemia due to CHF exacerbation needing further workup  DM II   HTN  Anemia with known iron deficiency    Relevant Medical History:  DM type 2, hypertension, hyperlipidemia, anemia of chronic disease and known iron deficiency, CKD stage 4    Nutrition-Related Medications: ferrous sulfate    Nutrition-Related Labs:  2/1/23 Na 123, Cl 88, GFR 9, Glu 117, Alb 3.1  2/6/23 GFR 14, Glu 117    Diet Order: DIET RENAL ON DIALYSIS Diabetic  Oral Supplement Order: Novasource Renal  Appetite/Oral Intake: good/% of meals  Factors Affecting Nutritional Intake: decreased appetite intermittently  Food/Hoahaoism/Cultural Preferences: none reported  Food Allergies: none reported       Wound(s):   redness to buttocks noted    Comments    2/1/23 Patient reports a good appetite that is intermittently decreased, denies weight loss, reports fluid weight gain at present, agreeable to vanilla/strawberry oral supplement; patient/family requesting nutrition/diet information regarding CKD (provided).    2/8/23 Patient reports appetite has been great.    Anthropometrics    Height: 5' (152.4 cm) Height Method: Stated  Last Weight: 76.1 kg (167 lb 12.3 oz) (02/06/23 1300) Weight Method: Standard Scale  BMI  (Calculated): 32.8  BMI Classification: obese grade II (BMI 35-39.9)     Ideal Body Weight (IBW), Female: 100 lb     % Ideal Body Weight, Female (lb): 182.98 %                    Usual Body Weight (UBW), k.3 kg  % Usual Body Weight: 118.31     Usual Weight Provided By: patient, family/caregiver, and patient denies unintentional weight loss    Wt Readings from Last 3 Encounters:   23 1300 76.1 kg (167 lb 12.3 oz)   23 0000 83 kg (182 lb 15.7 oz)   23 1545 83.5 kg (184 lb)   23 1309 83.6 kg (184 lb 6.4 oz)   23 1002 81.8 kg (180 lb 6.4 oz)      Weight Change(s) Since Admission:  Admit Weight: 83.5 kg (184 lb) (23 1545)  76.1 kg (23)    Patient Education (23 and 23)    Education Provided: renal diet  Teaching Method: explanation and printed materials  Comprehension: verbalizes understanding  Barriers to Learning: none evident  Expected Compliance: good  Comments: All questions were answered.    Monitoring & Evaluation     Dietitian will monitor food and beverage intake, weight, and electrolyte/renal panel.  Nutrition Risk/Follow-Up: low (follow-up in 5-7 days)  Patients assigned 'low nutrition risk' status do not qualify for a full nutritional assessment but will be monitored and re-evaluated in a 5-7 day time period. Please consult if re-evaluation needed sooner.

## 2023-02-08 NOTE — PLAN OF CARE
After discussion with Dr Hylton. Patient and daughter have decided to go home. Notified VINNY Brown. She will arranged outpatient dialysis.

## 2023-02-08 NOTE — PT/OT/SLP PROGRESS
Physical Therapy Treatment    Patient Name:  Rayna Haider   MRN:  [id number]    Recommendations:     Discharge Recommendations: home health PT, nursing facility, skilled (with spv)  Discharge Equipment Recommendations: none  Barriers to discharge: None    Assessment:     Rayna Haider is a 87 y.o. female admitted with a medical diagnosis of Hyponatremia with excess extracellular fluid volume.  She presents with the following impairments/functional limitations: impaired endurance, impaired functional mobility .    Rehab Prognosis: Good; patient would benefit from acute skilled PT services to address these deficits and reach maximum level of function.    Recent Surgery: Procedure(s) (LRB):  INSERTION, CATHETER, CENTRAL VENOUS, HEMODIALYSIS, TUNNELED (N/A) 6 Days Post-Op    Plan:     During this hospitalization, patient to be seen 5 x/week to address the identified rehab impairments via gait training, therapeutic activities and progress toward the following goals:    Plan of Care Expires:  03/03/23    Subjective     Chief Complaint:   Patient/Family Comments/goals: Daughter expressed she does not feel that therapy has been extensive enough, states she is unsure if patient is ready to return home. Discussed at length acute care therapy POC, goals, etc. As well as education about pt's mobility and assist levels.   Pain/Comfort:         Objective:     Communicated with nurse prior to session.  Patient found up in chair with telemetry, peripheral IV upon PT entry to room.     General Precautions: Standard, fall  Orthopedic Precautions: N/A  Braces: N/A  Respiratory Status: Nasal cannula, flow 1.5 L/min     Functional Mobility:  Transfers:     Sit to Stand:  stand by assistance with rolling walker  Gait: Pt amb x50ft for 4 trials with 30 second seated rest breaks between trials  Pt O2 remained WNL throughout mobility.         Patient left up in chair with all lines intact and call button in reach..    GOALS:    Multidisciplinary Problems       Physical Therapy Goals          Problem: Physical Therapy    Goal Priority Disciplines Outcome Goal Variances Interventions   Physical Therapy Goal     PT, PT/OT Ongoing, Progressing     Description: Goals to be met by: 3/3/23     Patient will increase functional independence with mobility by performin. Supine to sit with [place]  2. Sit to supine with [place]  3. Gait  x 300 feet with Modified [place] using Rolling Walker.   4. Increased functional strength to WNL in BLE's.                         Time Tracking:     PT Received On: 23  PT Start Time: 1128     PT Stop Time: 1200  PT Total Time (min): 32 min     Billable Minutes: Gait Training 17 and Therapeutic Activity 15    Treatment Type: Treatment  PT/PTA: PTA     PTA Visit Number: 3     2023
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
## 2023-02-08 NOTE — PROGRESS NOTES
[facility]  Nephrology Progress Note  Patient Name: Rayna Haider  Age: 87 y.o.  : 1935  MRN: [id number]  Admission Date: 2023    Date of Consultation: 23     Consultation Requested By: ED     Reason for Consultation: AMPARO on CKD     Chief Complaint: Abnormal Lab (Pt instructed to go to ED by MATT Cheung for further evaluation and admission relating ongoing abnormal lab values with Dr Hylton as nephrologist.)      History of Present Illness:  Ms Rayna Haider is a 87 y.o. White female with past medical history of DM type 2, hypertension, hyperlipidemia, anemia of chronic disease and known iron deficiency, CKD stage 4.  In the past year creatinine seemed to be around 2.0 with GFR around 27.  She was seen by our service in the middle of the month when she was admitted for vague symptoms of generalized ill feeling.  Labs have been drawn showing acute kidney injury with BUN 88, creatinine 5.1 with metabolic acidosis and hyperkalemia.  Patient was discharged on  in stable condition.  She started to gain weight comment 1st 3 lb over 24 hour.  Diuretics were prescribed intake and some days with increased urine output though not dramatic increase.  And total she is gained 17 lb over the past week.  She denies fever, chills, malaise.  Denies sick contacts.  Denies lapse in medications or low-sodium diet.  She is put much effort into drinking at least 2 L of water per  Day.   Patient has now completed 3 consecutive dialysis treatments Th, Fr and Sa. She has improvement in appetite and appears less edematous.    23- Patient resting comfortably in bed on dialysis. Tolerating dialysis well. Denies complaints of SOB and chest pain. Now off of supplemental O2 with O2 saturation 94%. She reports feeling better with an increased appetite and ambulating with PT.    23 feels much better.  Tolerating physical therapy.  Tolerating oral nutrition.  Tolerating dialysis.   Skilled nursing home transfer has been in progress and patient is not very excited for that so finally they decided to go home and arrange for some sitters  to take care of her.  No chest pain no abdominal pains no shortness of breath with oxygen.    Physical Exam:   /69   Pulse 62   Temp 98 °F (36.7 °C) (Oral)   Resp 18   Ht 5' (1.524 m)   Wt 76.1 kg (167 lb 12.3 oz)   LMP  (LMP Unknown)   SpO2 95%   Breastfeeding No   BMI 32.77 kg/m²  Body mass index is 32.77 kg/m².  Very much awake alert oriented to time person place sitting up in the recliner no acute distress and her daughter Ms. Restrepo is at the bedside.  Physical Exam  Constitutional:       General: She is not in acute distress.     Appearance: Normal appearance. She is obese.   HENT:      Nose: Nose normal.      Mouth/Throat:      Mouth: Mucous membranes are moist.   Eyes:      Extraocular Movements: Extraocular movements intact.      Conjunctiva/sclera: Conjunctivae normal.   Neck:      Comments: R chest wall tunneled catheter   Cardiovascular:      Rate and Rhythm: Regular rhythm.      Heart sounds: Normal heart sounds.   Pulmonary:      Effort: Pulmonary effort is normal.      Breath sounds: Normal breath sounds.   Abdominal:      General: There is no distension.      Palpations: Abdomen is soft.   Musculoskeletal:      Cervical back: Normal range of motion.      Comments: RIJ tunneled dialysis catheter.   Skin:     General: Skin is warm and dry.   Neurological:      General: No focal deficit present.      Mental Status: She is alert and oriented to person, place, and time.   Psychiatric:         Mood and Affect: Mood normal.         Behavior: Behavior normal.           Inpatient Medications:     Current Facility-Administered Medications:     acetaminophen tablet 1,000 mg, 1,000 mg, Oral, Q6H PRN, Jennifer Guzman MD, 1,000 mg at 02/02/23 2334    acetaminophen tablet 650 mg, 650 mg, Oral, Q4H PRN, Jennifer Guzman MD    allopurinoL tablet 100 mg,  100 mg, Oral, Daily, Rufina Valencia MD, 100 mg at 02/08/23 1031    aluminum-magnesium hydroxide-simethicone 200-200-20 mg/5 mL suspension 30 mL, 30 mL, Oral, QID PRN, Jennifer Guzman MD    amLODIPine tablet 10 mg, 10 mg, Oral, Daily, Jennifer Guzman MD, 10 mg at 02/08/23 1031    aspirin chewable tablet 81 mg, 81 mg, Oral, Daily, Rufina Valencia MD, 81 mg at 02/08/23 1031    carvediloL tablet 12.5 mg, 12.5 mg, Oral, BID WM, Carly Sepulveda, FNP, 12.5 mg at 02/08/23 1031    cloNIDine tablet 0.2 mg, 0.2 mg, Oral, BID, Rufina Valencia MD, 0.2 mg at 02/08/23 1031    dextrose 10% bolus 125 mL 125 mL, 12.5 g, Intravenous, PRN, Jennifer Guzman MD    dextrose 10% bolus 250 mL 250 mL, 25 g, Intravenous, PRN, Jennifer Guzman MD    diclofenac sodium 1 % gel 2 g, 2 g, Topical (Top), Daily, Gerardo Bianchi MD    doxazosin tablet 4 mg, 4 mg, Oral, Daily, Rufina Valencia MD, 4 mg at 02/08/23 1031    enoxaparin injection 30 mg, 30 mg, Subcutaneous, Daily, Owen Stokes MD, 30 mg at 02/07/23 1707    epoetin cierra-epbx injection 10,000 Units, 10,000 Units, Subcutaneous, Every Tues, Thurs, Sat, Jonathan Danielson, DO, 10,000 Units at 02/07/23 1042    fentaNYL injection, , , PRN, Eric Angela DO, 25 mcg at 02/02/23 1527    ferrous sulfate tablet 1 each, 1 tablet, Oral, Daily, Rufina Valencia MD, 1 each at 02/06/23 0909    glucagon (human recombinant) injection 1 mg, 1 mg, Intramuscular, PRN, Jennifer Guzman MD    glucose chewable tablet 16 g, 16 g, Oral, PRN, Jennifer Guzman MD    glucose chewable tablet 24 g, 24 g, Oral, PRN, Jennifer Guzman MD    heparin (porcine) injection, , , PRN, Eric Angela DO, 4,000 Units at 02/02/23 1540    insulin aspart U-100 injection 0-5 Units, 0-5 Units, Subcutaneous, QID (AC + HS) PRN, Jennifer Guzman MD    iopamidoL (ISOVUE-370) injection, , , PRN, Eric Angela DO, 5 mL at 02/02/23 1550    LIDOcaine HCL 20 mg/ml (2%) injection, , , PRN, Eric Angela DO, 20 mL at 02/02/23 1517    LORazepam tablet 0.5 mg, 0.5 mg, Oral, Daily PRN,  Jennifer Guzman MD, 0.5 mg at 02/07/23 2034    melatonin tablet 6 mg, 6 mg, Oral, Nightly PRN, Owen Stokes MD, 6 mg at 02/01/23 2055    midazolam (VERSED) 1 mg/mL injection, , , PRN, Eric CoreenDO, 1 mg at 02/02/23 1512    ondansetron injection 4 mg, 4 mg, Intravenous, Q4H PRN, Jennifer Guzman MD    polyethylene glycol packet 17 g, 17 g, Oral, BID PRN, Jennifer Guzman MD, 17 g at 02/07/23 1712    prochlorperazine injection Soln 5 mg, 5 mg, Intravenous, Q6H PRN, Jennifer Guzman MD    senna-docusate 8.6-50 mg per tablet 2 tablet, 2 tablet, Oral, BID PRN, Jennifer Guzman MD    simethicone chewable tablet 80 mg, 1 tablet, Oral, QID PRN, Jennifer Guzman MD    sodium chloride 0.9% flush 10 mL, 10 mL, Intravenous, PRN, Owen Stokes MD    traMADoL tablet 50 mg, 50 mg, Oral, Q8H PRN, Gerardo Bianchi MD, 50 mg at 02/07/23 1327     Imaging:  X-Ray Chest 1 View   Final Result      Changes suggestive of bilateral pleural effusions.      Increased left retrocardiac density and silhouetting of the left hemidiaphragm as above         Electronically signed by: Driss Macias   Date:    01/31/2023   Time:    08:40          Laboratory Data:  Recent Labs   Lab 02/06/23  0402      K 4.1   CO2 25   BUN 26.7*   CREATININE 3.19*   GLUCOSE 117*   CALCIUM 9.4   PHOS 2.9       Recent Labs   Lab 02/06/23  0402   WBC 4.5   HGB 8.6*   HCT 27.7*          Impression:     ESRD requiring initiation of hemodialysis via tunneled catheter   CHF exacerbation, improving.  Hyponatremia likely secondary to hypervolemia due to CHF exacerbation, improved.  DM II   HTN  Anemia   Bradycardia- coreg decreased; per cardiology recommendations on 2/6     Plan:   Initially workup was being done to send her to skilled nursing facility.  Now patient and the daughter decided that she rather go home and they will manage her transportation to and from the dialysis unit.  Outpatient dialysis setup is still being worked up.

## 2023-02-09 LAB
ALBUMIN SERPL-MCNC: 3 G/DL (ref 3.4–4.8)
BUN SERPL-MCNC: 36 MG/DL (ref 9.8–20.1)
CALCIUM SERPL-MCNC: 9.3 MG/DL (ref 8.4–10.2)
CHLORIDE SERPL-SCNC: 101 MMOL/L (ref 98–107)
CO2 SERPL-SCNC: 23 MMOL/L (ref 23–31)
CREAT SERPL-MCNC: 4.05 MG/DL (ref 0.55–1.02)
ERYTHROCYTE [DISTWIDTH] IN BLOOD BY AUTOMATED COUNT: 16.8 % (ref 11.5–17)
GFR SERPLBLD CREATININE-BSD FMLA CKD-EPI: 10 MLS/MIN/1.73/M2
GLUCOSE SERPL-MCNC: 127 MG/DL (ref 82–115)
HCT VFR BLD AUTO: 28.6 % (ref 37–47)
HGB BLD-MCNC: 8.9 GM/DL (ref 12–16)
MCH RBC QN AUTO: 29.1 PG
MCHC RBC AUTO-ENTMCNC: 31.1 MG/DL (ref 33–36)
MCV RBC AUTO: 93.5 FL (ref 80–94)
NRBC BLD AUTO-RTO: 0.4 %
PHOSPHATE SERPL-MCNC: 3.1 MG/DL (ref 2.3–4.7)
PLATELET # BLD AUTO: 212 X10(3)/MCL (ref 130–400)
PMV BLD AUTO: 10.4 FL (ref 7.4–10.4)
POTASSIUM SERPL-SCNC: 4.1 MMOL/L (ref 3.5–5.1)
RBC # BLD AUTO: 3.06 X10(6)/MCL (ref 4.2–5.4)
SODIUM SERPL-SCNC: 133 MMOL/L (ref 136–145)
WBC # SPEC AUTO: 5.6 X10(3)/MCL (ref 4.5–11.5)

## 2023-02-09 PROCEDURE — 25000003 PHARM REV CODE 250: Performed by: INTERNAL MEDICINE

## 2023-02-09 PROCEDURE — 25000003 PHARM REV CODE 250: Performed by: EMERGENCY MEDICINE

## 2023-02-09 PROCEDURE — 27000221 HC OXYGEN, UP TO 24 HOURS

## 2023-02-09 PROCEDURE — 80100016 HC MAINTENANCE HEMODIALYSIS

## 2023-02-09 PROCEDURE — 25000003 PHARM REV CODE 250

## 2023-02-09 PROCEDURE — 21400001 HC TELEMETRY ROOM

## 2023-02-09 PROCEDURE — 85027 COMPLETE CBC AUTOMATED: CPT | Performed by: INTERNAL MEDICINE

## 2023-02-09 PROCEDURE — 80069 RENAL FUNCTION PANEL: CPT | Performed by: INTERNAL MEDICINE

## 2023-02-09 PROCEDURE — 63600175 PHARM REV CODE 636 W HCPCS: Mod: JG | Performed by: STUDENT IN AN ORGANIZED HEALTH CARE EDUCATION/TRAINING PROGRAM

## 2023-02-09 RX ORDER — BISACODYL 10 MG
10 SUPPOSITORY, RECTAL RECTAL DAILY
Status: COMPLETED | OUTPATIENT
Start: 2023-02-09 | End: 2023-02-09

## 2023-02-09 RX ORDER — SYRING-NEEDL,DISP,INSUL,0.3 ML 29 G X1/2"
148 SYRINGE, EMPTY DISPOSABLE MISCELLANEOUS ONCE
Status: DISCONTINUED | OUTPATIENT
Start: 2023-02-09 | End: 2023-02-09

## 2023-02-09 RX ADMIN — FERROUS SULFATE TAB 325 MG (65 MG ELEMENTAL FE) 1 EACH: 325 (65 FE) TAB at 12:02

## 2023-02-09 RX ADMIN — Medication 81 MG: at 12:02

## 2023-02-09 RX ADMIN — SENNOSIDES AND DOCUSATE SODIUM 2 TABLET: 50; 8.6 TABLET ORAL at 09:02

## 2023-02-09 RX ADMIN — Medication 6 MG: at 09:02

## 2023-02-09 RX ADMIN — DOXAZOSIN 4 MG: 4 TABLET ORAL at 12:02

## 2023-02-09 RX ADMIN — EPOETIN ALFA-EPBX 10000 UNITS: 10000 INJECTION, SOLUTION INTRAVENOUS; SUBCUTANEOUS at 10:02

## 2023-02-09 RX ADMIN — Medication 10 MG: at 09:02

## 2023-02-09 RX ADMIN — CLONIDINE HYDROCHLORIDE 0.2 MG: 0.2 TABLET ORAL at 12:02

## 2023-02-09 RX ADMIN — ACETAMINOPHEN 1000 MG: 500 TABLET ORAL at 09:02

## 2023-02-09 RX ADMIN — CLONIDINE HYDROCHLORIDE 0.2 MG: 0.2 TABLET ORAL at 09:02

## 2023-02-09 RX ADMIN — AMLODIPINE BESYLATE 10 MG: 5 TABLET ORAL at 12:02

## 2023-02-09 RX ADMIN — ALLOPURINOL 100 MG: 100 TABLET ORAL at 12:02

## 2023-02-09 RX ADMIN — POLYETHYLENE GLYCOL 3350 17 G: 17 POWDER, FOR SOLUTION ORAL at 09:02

## 2023-02-09 RX ADMIN — CARVEDILOL 12.5 MG: 12.5 TABLET, FILM COATED ORAL at 06:02

## 2023-02-09 NOTE — PLAN OF CARE
Pt has been accepted to [facility] @ 3:00 PM. She can start Friday, February 10th @ 2:30 PM. Transportation resources provided per request. ANNE Walsh & MONICA Estrella updated. Pt will DC this evening if she has a bowel movement.

## 2023-02-09 NOTE — PT/OT/SLP PROGRESS
Physical Therapy      Patient Name:  Rayna Haider   MRN:  [id number]    Patient not seen today secondary to Dialysis. CM reports pt set to DC this PM.     [place]alt[place] Counseling: I discussed with the patient the risks of ixekizumab including but not limited to immunosuppression, serious infections, worsening of inflammatory bowel disease and drug reactions.  The patient understands that monitoring is required including a PPD at baseline and must alert us or the primary physician if symptoms of infection or other concerning signs are noted.

## 2023-02-09 NOTE — NURSING
02/09/23 1110   Post-Hemodialysis Assessment   Blood Volume Processed (Liters) 72.1 L   Dialyzer Clearance Moderately streaked   Duration of Treatment 180 minutes   Total UF (mL) 3000 mL   Patient Response to Treatment PT responded to tx well.   Post-Hemodialysis Comments 3hr tx, 3L net fluid removal. No complaints or distress related to tx. Post tx VS at 1117: BP-148/79, HR-60, temp-98.1 (oral), resp-16.

## 2023-02-09 NOTE — PT/OT/SLP PROGRESS
Occupational Therapy      Patient Name:  Rayna Haider   MRN:  [id number]    Patient not seen today secondary to off floor for dialysis  . Will follow-up as schedule permits.    2/9/2023

## 2023-02-09 NOTE — PLAN OF CARE
Consent for [facility]. Situation form completed. Referral sent [facility].

## 2023-02-09 NOTE — PROGRESS NOTES
[facility]  Nephrology Progress Note  Patient Name: Rayna Haider  Age: 87 y.o.  : 1935  MRN: [id number]  Admission Date: 2023    Date of Consultation: 23     Consultation Requested By: ED     Reason for Consultation: AMPARO on CKD     Chief Complaint: Abnormal Lab (Pt instructed to go to ED by MATT Cheung for further evaluation and admission relating ongoing abnormal lab values with Dr Hylton as nephrologist.)      History of Present Illness:  Ms Rayna Haider is a 87 y.o. White female with past medical history of DM type 2, hypertension, hyperlipidemia, anemia of chronic disease and known iron deficiency, CKD stage 4.  In the past year creatinine seemed to be around 2.0 with GFR around 27.  She was seen by our service in the middle of the month when she was admitted for vague symptoms of generalized ill feeling.  Labs have been drawn showing acute kidney injury with BUN 88, creatinine 5.1 with metabolic acidosis and hyperkalemia.  Patient was discharged on  in stable condition.  She started to gain weight comment 1st 3 lb over 24 hour.  Diuretics were prescribed intake and some days with increased urine output though not dramatic increase.  And total she is gained 17 lb over the past week.  She denies fever, chills, malaise.  Denies sick contacts.  Denies lapse in medications or low-sodium diet.  She is put much effort into drinking at least 2 L of water per  Day.   Patient has now completed 3 consecutive dialysis treatments Th, Fr and Sa. She has improvement in appetite and appears less edematous.    23- Patient resting comfortably in bed on dialysis. Tolerating dialysis well. Denies complaints of SOB and chest pain. Now off of supplemental O2 with O2 saturation 94%. She reports feeling better with an increased appetite and ambulating with PT.    23 feels much better.  Tolerating physical therapy.  Tolerating oral nutrition.  Tolerating dialysis.   Skilled nursing home transfer has been in progress and patient is not very excited for that so finally they decided to go home and arrange for some sitters  to take care of her.  No chest pain no abdominal pains no shortness of breath with oxygen.    2/9/23-patient is currently on dialysis.  She is feeling very well.  Her appetite has improved significantly.    Physical Exam:   BP (!) 144/67   Pulse 60   Temp 98.4 °F (36.9 °C) (Oral)   Resp 18   Ht 5' (1.524 m)   Wt 76.1 kg (167 lb 12.3 oz)   LMP  (LMP Unknown)   SpO2 (!) 94%   Breastfeeding No   BMI 32.77 kg/m²  Body mass index is 32.77 kg/m².  Very much awake alert oriented to time person place sitting up in the recliner no acute distress and her daughter Ms. Restrepo is at the bedside.  Physical Exam  Constitutional:       General: She is not in acute distress.     Appearance: Normal appearance. She is obese.   HENT:      Nose: Nose normal.      Mouth/Throat:      Mouth: Mucous membranes are moist.   Eyes:      Extraocular Movements: Extraocular movements intact.      Conjunctiva/sclera: Conjunctivae normal.   Neck:      Comments: R chest wall tunneled catheter   Cardiovascular:      Rate and Rhythm: Regular rhythm.      Heart sounds: Normal heart sounds.   Pulmonary:      Effort: Pulmonary effort is normal.      Breath sounds: Normal breath sounds.   Abdominal:      General: There is no distension.      Palpations: Abdomen is soft.   Musculoskeletal:      Cervical back: Normal range of motion.      Comments: RIJ tunneled dialysis catheter.   Skin:     General: Skin is warm and dry.   Neurological:      General: No focal deficit present.      Mental Status: She is alert and oriented to person, place, and time.   Psychiatric:         Mood and Affect: Mood normal.         Behavior: Behavior normal.           Inpatient Medications:     Current Facility-Administered Medications:     acetaminophen tablet 1,000 mg, 1,000 mg, Oral, Q6H PRN, Jennifer Guzman MD, 1,000  mg at 02/02/23 2334    acetaminophen tablet 650 mg, 650 mg, Oral, Q4H PRN, Jennifer Guzman MD    allopurinoL tablet 100 mg, 100 mg, Oral, Daily, Rufina Valencia MD, 100 mg at 02/08/23 1031    aluminum-magnesium hydroxide-simethicone 200-200-20 mg/5 mL suspension 30 mL, 30 mL, Oral, QID PRN, Jennifer Guzman MD    amLODIPine tablet 10 mg, 10 mg, Oral, Daily, Jennifer Guzman MD, 10 mg at 02/08/23 1031    aspirin chewable tablet 81 mg, 81 mg, Oral, Daily, Rufina Valencia MD, 81 mg at 02/08/23 1031    bisacodyL suppository 10 mg, 10 mg, Rectal, Daily PRN, Gerardo Bianchi MD, 10 mg at 02/08/23 1509    carvediloL tablet 12.5 mg, 12.5 mg, Oral, BID WM, Carly Sepulveda, DEON, 12.5 mg at 02/08/23 1031    cloNIDine tablet 0.2 mg, 0.2 mg, Oral, BID, Rufina Valencia MD, 0.2 mg at 02/08/23 2243    dextrose 10% bolus 125 mL 125 mL, 12.5 g, Intravenous, PRN, Jennifer Guzman MD    dextrose 10% bolus 250 mL 250 mL, 25 g, Intravenous, PRN, Jennifer Guzman MD    diclofenac sodium 1 % gel 2 g, 2 g, Topical (Top), Daily, Gerardo Bianchi MD    doxazosin tablet 4 mg, 4 mg, Oral, Daily, Rufina Valencia MD, 4 mg at 02/08/23 1031    enoxaparin injection 30 mg, 30 mg, Subcutaneous, Daily, Owen Stokes MD, 30 mg at 02/08/23 1700    epoetin cierra-epbx injection 10,000 Units, 10,000 Units, Subcutaneous, Every Tues, Thurs, Sat, Jonathan Danielson, DO, 10,000 Units at 02/07/23 1042    fentaNYL injection, , , PRN, Eric Angela DO, 25 mcg at 02/02/23 1527    ferrous sulfate tablet 1 each, 1 tablet, Oral, Daily, Rufina Valencia MD, 1 each at 02/08/23 0900    glucagon (human recombinant) injection 1 mg, 1 mg, Intramuscular, PRN, Jennifer Guzman MD    glucose chewable tablet 16 g, 16 g, Oral, PRN, Jennifer Guzman MD    glucose chewable tablet 24 g, 24 g, Oral, PRN, Jennifer Guzman MD    heparin (porcine) injection, , , PRN, Eric Angela DO, 4,000 Units at 02/02/23 1540    insulin aspart U-100 injection 0-5 Units, 0-5 Units, Subcutaneous, QID (AC + HS) PRN, Jennifer Guzman MD    iopamidoL  (ISOVUE-370) injection, , , PRN, Eric Hasan, DO, 5 mL at 02/02/23 1550    LIDOcaine HCL 20 mg/ml (2%) injection, , , PRN, Eric Hasan, DO, 20 mL at 02/02/23 1517    LORazepam tablet 0.5 mg, 0.5 mg, Oral, Daily PRN, Jennifer Guzman MD, 0.5 mg at 02/08/23 2244    melatonin tablet 6 mg, 6 mg, Oral, Nightly PRN, Owen Stokes MD, 6 mg at 02/01/23 2055    midazolam (VERSED) 1 mg/mL injection, , , PRN, Eric Hasan, DO, 1 mg at 02/02/23 1512    ondansetron injection 4 mg, 4 mg, Intravenous, Q4H PRN, Jennifer Guzman MD    polyethylene glycol packet 17 g, 17 g, Oral, BID PRN, Jennifer Guzman MD, 17 g at 02/08/23 1916    prochlorperazine injection Soln 5 mg, 5 mg, Intravenous, Q6H PRN, Jennifer Guzman MD    senna-docusate 8.6-50 mg per tablet 2 tablet, 2 tablet, Oral, BID PRN, Jennifer Guzman MD, 2 tablet at 02/08/23 1915    simethicone chewable tablet 80 mg, 1 tablet, Oral, QID PRN, Jennifer Guzman MD    sodium chloride 0.9% flush 10 mL, 10 mL, Intravenous, PRN, Owen Stokes MD    traMADoL tablet 50 mg, 50 mg, Oral, Q8H PRN, Gerardo Bianchi MD, 50 mg at 02/08/23 1915     Imaging:  X-Ray Chest 1 View   Final Result      Changes suggestive of bilateral pleural effusions.      Increased left retrocardiac density and silhouetting of the left hemidiaphragm as above         Electronically signed by: Driss Macias   Date:    01/31/2023   Time:    08:40          Laboratory Data:  Recent Labs   Lab 02/09/23 0512   *   K 4.1   CO2 23   BUN 36.0*   CREATININE 4.05*   GLUCOSE 127*   CALCIUM 9.3   PHOS 3.1       Recent Labs   Lab 02/09/23 0512   WBC 5.6   HGB 8.9*   HCT 28.6*          Impression:     ESRD requiring initiation of hemodialysis via tunneled catheter   CHF exacerbation, improving.  Hyponatremia likely secondary to hypervolemia due to CHF exacerbation, improved.  DM II   HTN  Anemia   Bradycardia- coreg decreased; per cardiology recommendations on 2/6     Plan:   Patient will likely be discharged soon to  home with her daughter to help her.  She will likely need some sitters the rest of the time.  Outpatient dialysis has been arranged.  We will continue to follow if not discharged.

## 2023-02-10 VITALS
OXYGEN SATURATION: 86 % | SYSTOLIC BLOOD PRESSURE: 144 MMHG | WEIGHT: 167.75 LBS | TEMPERATURE: 98 F | HEIGHT: 60 IN | RESPIRATION RATE: 17 BRPM | BODY MASS INDEX: 32.93 KG/M2 | DIASTOLIC BLOOD PRESSURE: 60 MMHG | HEART RATE: 52 BPM

## 2023-02-10 PROBLEM — N18.6 ESRD (END STAGE RENAL DISEASE): Status: ACTIVE | Noted: 2023-02-10

## 2023-02-10 LAB
ANION GAP SERPL CALC-SCNC: 11 MEQ/L
BUN SERPL-MCNC: 19.1 MG/DL (ref 9.8–20.1)
CALCIUM SERPL-MCNC: 9.4 MG/DL (ref 8.4–10.2)
CHLORIDE SERPL-SCNC: 103 MMOL/L (ref 98–107)
CO2 SERPL-SCNC: 20 MMOL/L (ref 23–31)
CREAT SERPL-MCNC: 3.18 MG/DL (ref 0.55–1.02)
CREAT/UREA NIT SERPL: 6
GFR SERPLBLD CREATININE-BSD FMLA CKD-EPI: 14 MLS/MIN/1.73/M2
GLUCOSE SERPL-MCNC: 124 MG/DL (ref 82–115)
POCT GLUCOSE: 133 MG/DL (ref 70–110)
POTASSIUM SERPL-SCNC: 4.1 MMOL/L (ref 3.5–5.1)
SODIUM SERPL-SCNC: 134 MMOL/L (ref 136–145)

## 2023-02-10 PROCEDURE — 80100016 HC MAINTENANCE HEMODIALYSIS

## 2023-02-10 PROCEDURE — 80048 BASIC METABOLIC PNL TOTAL CA: CPT | Performed by: INTERNAL MEDICINE

## 2023-02-10 PROCEDURE — 25000003 PHARM REV CODE 250

## 2023-02-10 PROCEDURE — 25000003 PHARM REV CODE 250: Performed by: INTERNAL MEDICINE

## 2023-02-10 PROCEDURE — 97530 THERAPEUTIC ACTIVITIES: CPT | Mod: CQ

## 2023-02-10 RX ORDER — CLONIDINE HYDROCHLORIDE 0.2 MG/1
0.2 TABLET ORAL 2 TIMES DAILY
Qty: 60 TABLET | Refills: 11 | Status: ON HOLD | OUTPATIENT
Start: 2023-02-10 | End: 2023-03-22 | Stop reason: HOSPADM

## 2023-02-10 RX ORDER — TRAMADOL HYDROCHLORIDE 50 MG/1
50 TABLET ORAL
Qty: 15 TABLET | Refills: 0 | Status: SHIPPED | OUTPATIENT
Start: 2023-02-10 | End: 2023-02-25

## 2023-02-10 RX ORDER — POLYETHYLENE GLYCOL 3350 17 G/17G
17 POWDER, FOR SOLUTION ORAL 2 TIMES DAILY PRN
Qty: 30 EACH | Refills: 0 | Status: ON HOLD | OUTPATIENT
Start: 2023-02-10 | End: 2023-03-22 | Stop reason: HOSPADM

## 2023-02-10 RX ORDER — CARVEDILOL 6.25 MG/1
6.25 TABLET ORAL 2 TIMES DAILY WITH MEALS
Qty: 60 TABLET | Refills: 11 | Status: SHIPPED | OUTPATIENT
Start: 2023-02-10 | End: 2024-02-10

## 2023-02-10 RX ADMIN — CARVEDILOL 12.5 MG: 12.5 TABLET, FILM COATED ORAL at 10:02

## 2023-02-10 RX ADMIN — TRAMADOL HYDROCHLORIDE 50 MG: 50 TABLET, COATED ORAL at 06:02

## 2023-02-10 RX ADMIN — Medication 81 MG: at 10:02

## 2023-02-10 RX ADMIN — ALLOPURINOL 100 MG: 100 TABLET ORAL at 10:02

## 2023-02-10 RX ADMIN — CLONIDINE HYDROCHLORIDE 0.2 MG: 0.2 TABLET ORAL at 10:02

## 2023-02-10 RX ADMIN — TRAMADOL HYDROCHLORIDE 50 MG: 50 TABLET, COATED ORAL at 12:02

## 2023-02-10 RX ADMIN — DOXAZOSIN 4 MG: 4 TABLET ORAL at 10:02

## 2023-02-10 RX ADMIN — AMLODIPINE BESYLATE 10 MG: 5 TABLET ORAL at 10:02

## 2023-02-10 NOTE — DISCHARGE SUMMARY
[facility] Hospital Medicine Discharge Summary    Admit Date: 1/30/2023  Discharge Date and Time: 2/10/17046:37 PM  Admitting Physician:  Team  Discharging Physician: Laith Hooper MD.  Primary Care Physician: David Castañeda MD  Consults: Cardiology and Nephrology    Discharge Diagnoses:  Hypervolemic hypotonic Hyponatremia -due to volume overload    Acute on chronic Hfpef exacerbation     Bilateral pleural effusions    AMPARO on CKD IV- now ESRD, started HD 2/2    Renal related anasarca/volume overload    Metabolic acidosis - resolved      HX T2DM, hypertension, hyperlipidemia, anemia of chronic disease, hyperuricemia, and anxiety    Fecal impaction    Acute hypoxemic respiratory failure    Anemia of chronic disease/ iron deficiency anemia         Hospital Course:   This is an 87-year-old female with medical history notable for CKD stage 4 with recent admission for AMPARO on 01/13/2023 treated conservatively and kidney function stabilized and did not require hemodialysis.    Had a follow-up labs with her nephrologist and was noted to be hyponatremic for which she was sent to [facility] ED for further evaluation.  Patient reports since her discharge she was instructed to drink a lot of water and has been drinking half a gallon of water daily and also has stopped her Lasix.  Report increased swelling in her bilateral lower extremities.  Reports generalized weakness and fatigue, denies shortness breath or chest pain, fever or chills.    On arrival to ED, she was afebrile and hemodynamically stable.  Saturating 92% on room air.  Labs notable for sodium 121, creatinine 4.69, BUN 58, CO2 20, potassium 4.2, , serum osmolarity 217, urine sodium 26.  Urine osmolarity and urine sodium after receiving Lasix 40 mg IV is 217 and 34.  She was given another Lasix 80 mg IV.  Subsequently transferred to [facility] and referred to hospital medicine service for further evaluation and management.    Upon  arrival to [facility] was consulted.  Patient was placed on tolvaptan 15 mg daily.  Dr. Angela with Interventional Nephrology was consulted for tunneled catheter.  Hemodialysis was initiated in 2/2, 2/3 and 2/4.  Serum sodium normalized.  PT/OT consulted and they recommended home with home health.  Echocardiogram was done with EF of 55%/mild TR, small left pleural effusion.  Medications were adjusted for bradycardia.  Further consult to [facility] for bradycardia with further decrease of Coreg.  Patient will be dialyzed MWF.  Complaining of constipation with fecal impaction that resolved with soapsuds enemas/Dulcolax suppositories/MiraLax. .  Etcetera.  Patient has remained on nasal cannula to to 2 L. case was discussed with renal and we have decided to ultrafiltrate today 02/10/2023.  Patient tolerated ultrafiltration without problems.Discussed that she has been denied by SNF because she is high functional.  Reported her family is looking into home sitters .  We still had to set her up for home O2 at 2 L.  Patient could benefit from sleep study.      Patient will follow-up with Dr. Zavaleta.  Patient was referred to vascular surgery Dr. Roberson.  Patient will be discharged with home health/[facility]      Pt was seen and examined on the day of discharge  Vitals:  VITAL SIGNS: 24 HRS MIN & MAX LAST   Temp  Min: 97.8 °F (36.6 °C)  Max: 98.6 °F (37 °C) 97.8 °F (36.6 °C)   BP  Min: 133/48  Max: 149/70 (!) 144/60     Pulse  Min: 52  Max: 70  (!) 52     Resp  Min: 17  Max: 18 17   SpO2  Min: 86 %  Max: 98 % (!) 86 %         Physical Exam:  General: In no acute distress, afebrile, obese    Chest:  Decreased breath sounds bilateral bases, good air entry otherwise tunneled cath     Heart: RRR, +S1, S2, no appreciable murmur    Abdomen: Soft, nontender, BS +    MSK: Warm, trace pitting edema bilateral lower extremities    Neurologic: Alert and oriented x4, Cranial nerve II-XII intact, Strength 5/5 in all 4  extremities    Procedures Performed: No admission procedures for hospital encounter.     Significant Diagnostic Studies: See Full reports for all details    Recent Labs   Lab 02/04/23 0706 02/06/23 0402 02/09/23 0512   WBC 3.8* 4.5 5.6   RBC 3.06* 2.96* 3.06*   HGB 8.8* 8.6* 8.9*   HCT 27.7* 27.7* 28.6*   MCV 90.5 93.6 93.5   MCH 28.8 29.1 29.1   MCHC 31.8* 31.0* 31.1*   RDW 16.2 16.5 16.8    228 212   MPV 11.4* 11.0* 10.4       Recent Labs   Lab 02/04/23  0706 02/06/23  0402 02/09/23  0512 02/10/23  0458   * 137 133* 134*   K 3.9 4.1 4.1 4.1   CO2 25 25 23 20*   BUN 22.1* 26.7* 36.0* 19.1   CREATININE 2.64* 3.19* 4.05* 3.18*   CALCIUM 9.2 9.4 9.3 9.4   ALBUMIN 3.1* 3.0* 3.0*  --    ALKPHOS 39*  --   --   --    ALT 9  --   --   --    AST 11  --   --   --    BILITOT 0.4  --   --   --         Microbiology Results (last 7 days)       ** No results found for the last 168 hours. **             Cardiac catheterization  Procedure performed in the Invasive Lab    - See Procedure Log link below for nursing documentation    - See OpNote on Surgeries Tab for physician findings    - See Imaging Tab for radiologist dictation         Medication List        ASK your doctor about these medications      allopurinoL 100 MG tablet  Commonly known as: ZYLOPRIM  Take 1 tablet (100 mg total) by mouth once daily.     amLODIPine 10 MG tablet  Commonly known as: NORVASC  Take 1 tablet (10 mg total) by mouth once daily.     aspirin 81 MG Chew     carvediloL 25 MG tablet  Commonly known as: COREG  Take 1 tablet (25 mg total) by mouth 2 (two) times daily.     cloNIDine 0.2 MG tablet  Commonly known as: CATAPRES  Take 1 tablet (0.2 mg total) by mouth 3 (three) times daily.     doxazosin 4 MG tablet  Commonly known as: CARDURA     ferrous sulfate, dried 160 mg (50 mg iron) Tbsr  Commonly known as: SLOW FE     furosemide 20 MG tablet  Commonly known as: LASIX  Take 1 tablet (20 mg total) by mouth once daily.     LORazepam 0.5 MG  tablet  Commonly known as: ATIVAN  Take 1 tablet (0.5 mg total) by mouth daily as needed for Anxiety.               Explained in detail to the patient about the discharge plan, medications, and follow-up visits. Pt understands and agrees with the treatment plan  Discharge Disposition: home w home health   Discharged Condition: stable  Diet- cardiac /renal   Dietary Orders (From admission, onward)       Start     Ordered    02/03/23 0753  DIET RENAL ON DIALYSIS Diabetic  Diet effective now        Question:  Diet Modifier:  Answer:  Diabetic    02/03/23 0753                   Medications Per DC med rec  Activities as tolerated   Follow-up Information       [facility] Follow up.    Specialties: Home Health Services, Home Therapy Services, Home Living Aide Services  Why: This is your home health provider. You may contact the office for any questions or concerns regarding your home health services.  Contact information:  [address]  [facility], [address]  [place] [address]  [phone]             Daren Roberson MD. Go on 3/7/2023.    Specialty: Vascular Surgery  Why: for av fistula placement follow up appointment on march 7 at 1:30 pm  Contact information:  [address]  [address]  [facility] [address]  [phone]               Destiny Gibbons MD. Go on 2/17/2023.    Specialties: Cardiovascular Disease, Cardiology  Why: 1 week- Follow up  appointment on Feb. 17 at 10:15 am  Contact information:  [address]  [facility] [address]  [phone]               [facility] Follow up.    Why: This is your  home equipment. You may contact your provider for any questions or concerns regarding your equipment.  Contact information:  [address]  [place] [address]  [phone]                           For further questions contact hospitalist office    Discharge time 33 minutes    For worsening symptoms, chest pain, shortness of breath, increased abdominal  pain, high grade fever, stroke or stroke like symptoms, immediately go to the nearest Emergency Room or call 911 as soon as possible.      Laith Chase M.D, on 2/10/2023. at 3:37 PM.

## 2023-02-10 NOTE — NURSING
02/10/23 1507   Post-Hemodialysis Assessment   Blood Volume Processed (Liters) 52.3 L   Dialyzer Clearance Lightly streaked   Duration of Treatment 180 minutes   Total UF (mL) 2500 mL   Patient Response to Treatment tolerated treatment well

## 2023-02-10 NOTE — DISCHARGE INSTRUCTIONS
MWF - dialysis  Follow up with dr motley  You have been referred to DR treviño with vascular surgery for permanent hemodialysis access

## 2023-02-10 NOTE — PROGRESS NOTES
[facility]  Nephrology Progress Note  Patient Name: Rayna Haider  Age: 87 y.o.  : 1935  MRN: [id number]  Admission Date: 2023    Date of Consultation: 23     Consultation Requested By: ED     Reason for Consultation: AMPARO on CKD     Chief Complaint: Abnormal Lab (Pt instructed to go to ED by MATT Cheung for further evaluation and admission relating ongoing abnormal lab values with Dr Hylton as nephrologist.)      History of Present Illness:  Ms Rayna Haider is a 87 y.o. White female with past medical history of DM type 2, hypertension, hyperlipidemia, anemia of chronic disease and known iron deficiency, CKD stage 4.  In the past year creatinine seemed to be around 2.0 with GFR around 27.  She was seen by our service in the middle of the month when she was admitted for vague symptoms of generalized ill feeling.  Labs have been drawn showing acute kidney injury with BUN 88, creatinine 5.1 with metabolic acidosis and hyperkalemia.  Patient was discharged on  in stable condition.  She started to gain weight comment 1st 3 lb over 24 hour.  Diuretics were prescribed intake and some days with increased urine output though not dramatic increase.  And total she is gained 17 lb over the past week.  She denies fever, chills, malaise.  Denies sick contacts.  Denies lapse in medications or low-sodium diet.  She is put much effort into drinking at least 2 L of water per  Day.   Patient has now completed 3 consecutive dialysis treatments Th, Fr and Sa. She has improvement in appetite and appears less edematous.    23- Patient resting comfortably in bed on dialysis. Tolerating dialysis well. Denies complaints of SOB and chest pain. Now off of supplemental O2 with O2 saturation 94%. She reports feeling better with an increased appetite and ambulating with PT.    23 feels much better.  Tolerating physical therapy.  Tolerating oral nutrition.  Tolerating dialysis.   Skilled nursing home transfer has been in progress and patient is not very excited for that so finally they decided to go home and arrange for some sitters  to take care of her.  No chest pain no abdominal pains no shortness of breath with oxygen.    2/9/23-patient is currently on dialysis.  She is feeling very well.  Her appetite has improved significantly.    02/10/2023-although patient is on 1 L nasal O2 she becomes quickly desaturated off the oxygen.  We will plan on isolated ultrafiltration today and if stable she may be discharged afterwards or in the morning.  Outpatient dialysis arrangements have been made for Monday Wednesday Friday at 3 in the afternoon    Physical Exam:   /67   Pulse (!) 56   Temp 97.9 °F (36.6 °C) (Oral)   Resp 17   Ht 5' (1.524 m)   Wt 76.1 kg (167 lb 12.3 oz)   LMP  (LMP Unknown)   SpO2 97%   Breastfeeding No   BMI 32.77 kg/m²  Body mass index is 32.77 kg/m².  Very much awake alert oriented to time person place sitting up in the recliner no acute distress and her daughter Ms. Restrepo is at the bedside.  Physical Exam  Constitutional:       General: She is not in acute distress.     Appearance: Normal appearance. She is obese.   HENT:      Nose: Nose normal.      Mouth/Throat:      Mouth: Mucous membranes are moist.   Eyes:      Extraocular Movements: Extraocular movements intact.      Conjunctiva/sclera: Conjunctivae normal.   Neck:      Comments: R chest wall tunneled catheter   Cardiovascular:      Rate and Rhythm: Regular rhythm.      Heart sounds: Normal heart sounds.   Pulmonary:      Effort: Pulmonary effort is normal.      Breath sounds: Rales (Both posterior bases) present.   Abdominal:      General: There is no distension.      Palpations: Abdomen is soft.   Musculoskeletal:         General: Swelling (mild pitting edema bilateral legs but some wrinkling of the skin consistent with significant improvement in swelling) present.      Cervical back: Normal range of  motion.      Comments: RIJ tunneled dialysis catheter.   Skin:     General: Skin is warm and dry.   Neurological:      General: No focal deficit present.      Mental Status: She is alert and oriented to person, place, and time.   Psychiatric:         Mood and Affect: Mood normal.         Behavior: Behavior normal.           Inpatient Medications:     Current Facility-Administered Medications:     acetaminophen tablet 1,000 mg, 1,000 mg, Oral, Q6H PRN, Jennifer Guzman MD, 1,000 mg at 02/09/23 2149    acetaminophen tablet 650 mg, 650 mg, Oral, Q4H PRN, Jennifer Guzman MD    allopurinoL tablet 100 mg, 100 mg, Oral, Daily, Rufina Valencia MD, 100 mg at 02/09/23 1252    aluminum-magnesium hydroxide-simethicone 200-200-20 mg/5 mL suspension 30 mL, 30 mL, Oral, QID PRN, Jennifer Guzman MD    amLODIPine tablet 10 mg, 10 mg, Oral, Daily, Jennifer Guzman MD, 10 mg at 02/09/23 1252    aspirin chewable tablet 81 mg, 81 mg, Oral, Daily, Rufina Valencia MD, 81 mg at 02/09/23 1252    bisacodyL suppository 10 mg, 10 mg, Rectal, Daily PRN, Gerardo Bianchi MD, 10 mg at 02/08/23 1509    carvediloL tablet 12.5 mg, 12.5 mg, Oral, BID WM, Carly Sepulveda, FNP, 12.5 mg at 02/09/23 1834    cloNIDine tablet 0.2 mg, 0.2 mg, Oral, BID, Rufina Valencia MD, 0.2 mg at 02/09/23 2138    dextrose 10% bolus 125 mL 125 mL, 12.5 g, Intravenous, PRN, Jennifer Guzman MD    dextrose 10% bolus 250 mL 250 mL, 25 g, Intravenous, PRN, Jennifer Guzman MD    diclofenac sodium 1 % gel 2 g, 2 g, Topical (Top), Daily, Gerardo Bianchi MD    doxazosin tablet 4 mg, 4 mg, Oral, Daily, Rufina Valencia MD, 4 mg at 02/09/23 1252    enoxaparin injection 30 mg, 30 mg, Subcutaneous, Daily, Owen Stokes MD, 30 mg at 02/08/23 1700    epoetin cierra-epbx injection 10,000 Units, 10,000 Units, Subcutaneous, Every Tues, Thurs, Sat, Jonathan Danielson DO, 10,000 Units at 02/09/23 1052    fentaNYL injection, , , PRN, Eric DO Coreen, 25 mcg at 02/02/23 1527    ferrous sulfate tablet 1 each, 1 tablet,  Oral, Daily, Rufina Valencia MD, 1 each at 02/09/23 1252    glucagon (human recombinant) injection 1 mg, 1 mg, Intramuscular, PRN, Jennifer Guzman MD    glucose chewable tablet 16 g, 16 g, Oral, PRN, Jennifer Guzman MD    glucose chewable tablet 24 g, 24 g, Oral, PRN, Jennifer Guzman MD    heparin (porcine) injection, , , PRN, Eric Angela DO, 4,000 Units at 02/02/23 1540    insulin aspart U-100 injection 0-5 Units, 0-5 Units, Subcutaneous, QID (AC + HS) PRN, Jennifer Guzman MD    iopamidoL (ISOVUE-370) injection, , , PRN, Eric Angela DO, 5 mL at 02/02/23 1550    LIDOcaine HCL 20 mg/ml (2%) injection, , , PRN, Eric Angela, DO, 20 mL at 02/02/23 1517    LORazepam tablet 0.5 mg, 0.5 mg, Oral, Daily PRN, Jennifer Guzman MD, 0.5 mg at 02/08/23 2244    melatonin tablet 6 mg, 6 mg, Oral, Nightly PRN, Owen Stokes MD, 6 mg at 02/09/23 2138    midazolam (VERSED) 1 mg/mL injection, , , PRN, Eric Angela DO, 1 mg at 02/02/23 1512    ondansetron injection 4 mg, 4 mg, Intravenous, Q4H PRN, Jennifer Guzman MD    polyethylene glycol packet 17 g, 17 g, Oral, BID PRN, Jennifer Guzman MD, 17 g at 02/09/23 2138    prochlorperazine injection Soln 5 mg, 5 mg, Intravenous, Q6H PRN, Jennifer Guzman MD    senna-docusate 8.6-50 mg per tablet 2 tablet, 2 tablet, Oral, BID PRN, Jennifer Guzman MD, 2 tablet at 02/09/23 2139    simethicone chewable tablet 80 mg, 1 tablet, Oral, QID PRN, Jennifer Guzman MD    sodium chloride 0.9% flush 10 mL, 10 mL, Intravenous, PRN, Owen Stokes MD    traMADoL tablet 50 mg, 50 mg, Oral, Q8H PRN, Gerardo Bianchi MD, 50 mg at 02/10/23 0007     Imaging:  X-Ray Chest 1 View   Final Result      Changes suggestive of bilateral pleural effusions.      Increased left retrocardiac density and silhouetting of the left hemidiaphragm as above         Electronically signed by: Driss Macias   Date:    01/31/2023   Time:    08:40          Laboratory Data:  Recent Labs   Lab 02/09/23  0512 02/10/23  0458   * 134*   K 4.1  4.1   CO2 23 20*   BUN 36.0* 19.1   CREATININE 4.05* 3.18*   GLUCOSE 127* 124*   CALCIUM 9.3 9.4   PHOS 3.1  --        Recent Labs   Lab 02/09/23  0512   WBC 5.6   HGB 8.9*   HCT 28.6*          Impression:     ESRD requiring initiation of hemodialysis via tunneled catheter   CHF exacerbation, improving.  Hyponatremia likely secondary to hypervolemia due to CHF exacerbation, improved.  DM II   HTN  Anemia   Bradycardia- coreg decreased; per cardiology recommendations on 2/6     Plan:   I think it would be safer to do isolated ultrafiltration today for fluid removal and discharge her if she does not  desaturate off oxygen.  Outpatient arrangements have been done from Monday Wednesday Friday at [facility].

## 2023-02-10 NOTE — PT/OT/SLP PROGRESS
Physical Therapy Treatment    Patient Name:  Rayna Haider   MRN:  [id number]    Recommendations:     Discharge Recommendations: home health PT  Discharge Equipment Recommendations: none  Barriers to discharge: None    Assessment:     Rayna Haider is a 87 y.o. female admitted with a medical diagnosis of Hyponatremia with excess extracellular fluid volume.  She presents with the following impairments/functional limitations: impaired endurance, impaired functional mobility, impaired cardiopulmonary response to activity .    Rehab Prognosis: Good; patient would benefit from acute skilled PT services to address these deficits and reach maximum level of function.    Recent Surgery: Procedure(s) (LRB):  INSERTION, CATHETER, CENTRAL VENOUS, HEMODIALYSIS, TUNNELED (N/A) 8 Days Post-Op    Plan:     During this hospitalization, patient to be seen 5 x/week to address the identified rehab impairments via gait training, therapeutic activities and progress toward the following goals:    Plan of Care Expires:  03/03/23    Subjective     Chief Complaint:   Patient/Family Comments/goals:   Pain/Comfort:         Objective:     Communicated with nurse prior to session.  Patient found HOB elevated with telemetry, pulse ox (continuous), peripheral IV upon PT entry to room.     General Precautions: Standard, fall  Orthopedic Precautions: N/A  Braces: N/A  Respiratory Status: Nasal cannula, flow 1 L/min     Functional Mobility:  Bed Mobility:     Supine to Sit: supervision  Sit to Supine: supervision  Transfers:     Sit to Stand:  stand by assistance with rolling walker  Bed to Chair: stand by assistance with  rolling walker  using  Step Transfer  Performed sup to sit and EOB to chair tf with bed elevated to home height to practice for home safety. Pt educated on safety and correct technique and able to demonstrate with spv         Patient left up in chair with all lines intact and call button in reach..    GOALS:   Multidisciplinary  Problems       Physical Therapy Goals          Problem: Physical Therapy    Goal Priority Disciplines Outcome Goal Variances Interventions   Physical Therapy Goal     PT, PT/OT Ongoing, Progressing     Description: Goals to be met by: 3/3/23     Patient will increase functional independence with mobility by performin. Supine to sit with Thayer  2. Sit to supine with Thayer  3. Gait  x 300 feet with Modified Thayer using Rolling Walker.   4. Increased functional strength to WNL in BLE's.                         Time Tracking:     PT Received On: 02/10/23  PT Start Time: 848     PT Stop Time: 917  PT Total Time (min): 29 min     Billable Minutes: Therapeutic Activity 29    Treatment Type: Treatment  PT/PTA: PTA     PTA Visit Number: 4     02/10/2023

## 2023-02-10 NOTE — PROGRESS NOTES
[facility]  Hospital Medicine Progress Note        Chief Complaint: Inpatient Follow-up for weakness, AMPARO on CKD V    HPI: This is an 87-year-old female with medical history notable for CKD stage 5 with recent admission for AMPARO on 01/13/2023 treated conservatively and kidney function stabilized and did not require hemodialysis.  Had a follow-up labs with her nephrologist and was noted to be hyponatremic for which she was sent to [facility] ED for further evaluation.  Patient reports since her discharge she was instructed to drink a lot of water and has been drinking half a gallon of water daily and also has stopped her Lasix.  Report increased swelling in her bilateral lower extremities.  Reports generalized weakness and fatigue, denies shortness breath or chest pain, fever or chills.  On arrival to ED, she was afebrile and hemodynamically stable.  Saturating 92% on room air.  Labs notable for sodium 121, creatinine 4.69, BUN 58, CO2 20, potassium 4.2, , serum osmolarity pending, urine sodium 26.  Urine osmolarity and urine sodium after receiving Lasix 40 mg IV is 217 and 34, serum osmolarity not ordered.  She was given another Lasix 80 mg IV.  Subsequently transferred to [facility] and referred to hospital medicine service for further evaluation and management.    Interval Hx:   2/8/23 No complaints today.  Reports she feels great.  Discussed that she has been denied by SNF because she is high functional.  Reported her family is looking into home sitters in case she does not get into SNF.   No family at bedside   Case discussed with patient's nurse and  on the floor    2/9/23 attempted  soapsuds enemas x 2 for constipation but nothing. Discharged  place on hold. Will give dulcolax supos and HD tomorrow. Yes, will probably have to to manually clean impaction tomorrow.    Objective/physical exam:  General: In no acute distress, afebrile, obese  Chest:  Decreased breath sounds  bilateral bases, good air entry otherwise  Heart: RRR, +S1, S2, no appreciable murmur  Abdomen: Soft, nontender, BS +  MSK: Warm, trace pitting edema bilateral lower extremities  Neurologic: Alert and oriented x4, Cranial nerve II-XII intact, Strength 5/5 in all 4 extremities    VITAL SIGNS: 24 HRS MIN & MAX LAST   Temp  Min: 97.5 °F (36.4 °C)  Max: 98.6 °F (37 °C) 98.6 °F (37 °C)   BP  Min: 123/62  Max: 148/62 (!) 133/48   Pulse  Min: 56  Max: 69  63     Resp  Min: 18  Max: 20 18   SpO2  Min: 93 %  Max: 96 % 95 %         Recent Labs   Lab 02/04/23  0706 02/06/23  0402 02/09/23  0512   WBC 3.8* 4.5 5.6   RBC 3.06* 2.96* 3.06*   HGB 8.8* 8.6* 8.9*   HCT 27.7* 27.7* 28.6*   MCV 90.5 93.6 93.5   MCH 28.8 29.1 29.1   MCHC 31.8* 31.0* 31.1*   RDW 16.2 16.5 16.8    228 212   MPV 11.4* 11.0* 10.4         Recent Labs   Lab 02/03/23  0448 02/04/23  0706 02/06/23  0402 02/09/23  0512   * 134* 137 133*   K 4.0 3.9 4.1 4.1   CO2 25 25 25 23   BUN 38.6* 22.1* 26.7* 36.0*   CREATININE 3.43* 2.64* 3.19* 4.05*   CALCIUM 9.1 9.2 9.4 9.3   ALBUMIN 3.2* 3.1* 3.0* 3.0*   ALKPHOS 41 39*  --   --    ALT 8 9  --   --    AST 11 11  --   --    BILITOT 0.4 0.4  --   --             Microbiology Results (last 7 days)       ** No results found for the last 168 hours. **             See below for Radiology    Scheduled Med:   allopurinoL  100 mg Oral Daily    amLODIPine  10 mg Oral Daily    aspirin  81 mg Oral Daily    carvediloL  12.5 mg Oral BID WM    cloNIDine  0.2 mg Oral BID    diclofenac sodium  2 g Topical (Top) Daily    doxazosin  4 mg Oral Daily    enoxaparin  30 mg Subcutaneous Daily    epoetin cierra-ebpx (RETACRIT) injection  10,000 Units Subcutaneous Every Tues, Thurs, Sat    ferrous sulfate  1 tablet Oral Daily        Continuous Infusions:       PRN Meds:  acetaminophen, acetaminophen, aluminum-magnesium hydroxide-simethicone, bisacodyL, dextrose 10%, dextrose 10%, fentaNYL, glucagon (human recombinant), glucose,  glucose, heparin (porcine), insulin aspart U-100, iopamidoL, LIDOcaine HCL 20 mg/ml (2%), LORazepam, melatonin, midazolam, ondansetron, polyethylene glycol, prochlorperazine, senna-docusate 8.6-50 mg, simethicone, sodium chloride 0.9%, traMADoL       Assessment/Plan:  Hypervolemic Hyponatremia -due to volume overload- resolved  AMPARO on CKD IV- emily ESRD, started HD 2/2  Renal related anasarca/volume overload  Metabolic acidosis - resolved  Hypertension  HX T2DM, hypertension, hyperlipidemia, anemia of chronic disease, hyperuricemia, and anxiety  Fecal impaction          Nephrology on board, appreciate recommendations  Sodium normalized   Completed 3 days therapy of tolvaptan 15 mg daily  Nephrologist Dr. Eric Angela placed tunneled catheter 2/2, we greatly appreciate him  Initiated hemodialysis on 02/02, underwent again on 2/3, 2/4--> plan for T, Th, sat schedule  Chair time pending given PT OT recommended home with home health but patient and family wants to go to rehab/SNF.  Save left arm, no BP is on needle sticks  Echocardiogram reviewed, EF 55%, normal ventricular size and function.  Mild TR, small left pleural effusion  Home medication reviewed and resumed, nurse informed me she gave all her morning meds together today and pt became bradycardic in 40s, patient remained asymptomatic.  Probably does of in her chair.  Heart rate improved to 56.  I will decrease her Coreg to 12.5 mg b.i.d.  Nephrology did consulted Cardiology for concern of bradycardia.  Appreciate their recommendation, recommended to decrease Coreg more if persistently going into bradycardia.  Also recommended outpatient sleep study given patient reported she snores sometimes  PT evaluated the patient, recommended home health PT  Morning CBC, renal functions ordered for hemodialysis tomorrow(MWF)  Will have to manually disimpact . Dulcolax supos now    VTE prophylaxis:  Lovenox 30 mg subQ    Patient condition:  Stable    Anticipated discharge and  Disposition:   TBD, will need dialysis chair time prior to discharge to SNF, sleep study as out pt , Dr Gibbons in 1 week      All diagnosis and differential diagnosis have been reviewed; assessment and plan has been documented; I have personally reviewed the labs and test results that are presently available; I have reviewed the patients medication list; I have reviewed the consulting providers response and recommendations. I have reviewed or attempted to review medical records based upon their availability    All of the patient's questions have been  addressed and answered. Patient's is agreeable to the above stated plan. I will continue to monitor closely and make adjustments to medical management as needed.  _____________________________________________________________________    Nutrition Status:    Radiology:  Cardiac catheterization  Procedure performed in the Invasive Lab    - See Procedure Log link below for nursing documentation    - See OpNote on Surgeries Tab for physician findings    - See Imaging Tab for radiologist dictation      Laith Hooper MD  Department of Hospital Medicine   [facility]   02/09/2023

## 2023-02-10 NOTE — PLAN OF CARE
No bowel movement yesterday. Will get HD today and start at clinic on Monday. ANNE Handley @ [facility] aware.

## 2023-02-11 NOTE — PLAN OF CARE
02/11/23 1054   Final Note   Assessment Type Final Discharge Note   Anticipated Discharge Disposition Home-Health  ([place] )   Hospital Resources/Appts/Education Provided Post-Acute resouces added to AVS;Dilaysis schedule provided   Post-Acute Status   Post-Acute Authorization Home Health;Dialysis;HME   HME Status Set-up Complete/Auth obtained   Home Health Status Set-up Complete/Auth obtained   Diaylsis Status Set-up Complete/Auth obtained

## 2023-02-26 ENCOUNTER — EXTERNAL HOME HEALTH (OUTPATIENT)
Dept: HOME HEALTH SERVICES | Facility: HOSPITAL | Age: 88
End: 2023-02-26
Payer: MEDICARE

## 2023-03-07 NOTE — PLAN OF CARE
Problem: Occupational Therapy  Goal: Occupational Therapy Goal  Description: Goals to be met by: 2/20/2023     Patient will increase functional independence with ADLs by performing:    UE Dressing with Modified [place].  LE Dressing with Modified [place].  Grooming while standing with Modified [place].  Toileting from toilet with Modified [place] for hygiene and clothing management.   Toilet transfer to toilet with Modified [place].    Outcome: Ongoing, Progressing      Addended by: MAEVE BUENO on: 3/7/2023 04:24 PM     Modules accepted: Orders

## 2023-03-10 ENCOUNTER — HOSPITAL ENCOUNTER (OUTPATIENT)
Dept: RADIOLOGY | Facility: HOSPITAL | Age: 88
Discharge: HOME | End: 2023-03-10
Attending: SPECIALIST
Payer: MEDICARE

## 2023-03-10 DIAGNOSIS — N18.6 END STAGE RENAL DISEASE: ICD-10-CM

## 2023-03-10 PROCEDURE — 71046 X-RAY EXAM CHEST 2 VIEWS: CPT | Mod: TC

## 2023-03-14 RX ORDER — SENNOSIDES 8.6 MG/1
1 TABLET ORAL
COMMUNITY

## 2023-03-21 ENCOUNTER — ANESTHESIA EVENT (OUTPATIENT)
Dept: SURGERY | Facility: HOSPITAL | Age: 88
End: 2023-03-21
Payer: MEDICARE

## 2023-03-21 PROCEDURE — G0179 PR HOME HEALTH MD RECERTIFICATION: ICD-10-PCS | Mod: ,,, | Performed by: INTERNAL MEDICINE

## 2023-03-21 PROCEDURE — G0179 MD RECERTIFICATION HHA PT: HCPCS | Mod: ,,, | Performed by: INTERNAL MEDICINE

## 2023-03-22 ENCOUNTER — HOSPITAL ENCOUNTER (OUTPATIENT)
Facility: HOSPITAL | Age: 88
Discharge: HOME | End: 2023-03-22
Attending: SPECIALIST | Admitting: SPECIALIST
Payer: MEDICARE

## 2023-03-22 ENCOUNTER — ANESTHESIA (OUTPATIENT)
Dept: SURGERY | Facility: HOSPITAL | Age: 88
End: 2023-03-22
Payer: MEDICARE

## 2023-03-22 DIAGNOSIS — N18.6 ESRD (END STAGE RENAL DISEASE): Primary | ICD-10-CM

## 2023-03-22 LAB
ANION GAP SERPL CALC-SCNC: 11 MEQ/L
BUN SERPL-MCNC: 23.8 MG/DL (ref 9.8–20.1)
CALCIUM SERPL-MCNC: 10.3 MG/DL (ref 8.4–10.2)
CHLORIDE SERPL-SCNC: 97 MMOL/L (ref 98–107)
CO2 SERPL-SCNC: 29 MMOL/L (ref 23–31)
CREAT SERPL-MCNC: 2.81 MG/DL (ref 0.55–1.02)
CREAT/UREA NIT SERPL: 8
GFR SERPLBLD CREATININE-BSD FMLA CKD-EPI: 16 MLS/MIN/1.73/M2
GLUCOSE SERPL-MCNC: 115 MG/DL (ref 82–115)
POCT GLUCOSE: 111 MG/DL (ref 70–110)
POTASSIUM SERPL-SCNC: 4 MMOL/L (ref 3.5–5.1)
SODIUM SERPL-SCNC: 137 MMOL/L (ref 136–145)

## 2023-03-22 PROCEDURE — 63600175 PHARM REV CODE 636 W HCPCS

## 2023-03-22 PROCEDURE — 36000706: Performed by: SPECIALIST

## 2023-03-22 PROCEDURE — 37000008 HC ANESTHESIA 1ST 15 MINUTES: Performed by: SPECIALIST

## 2023-03-22 PROCEDURE — 25000003 PHARM REV CODE 250: Performed by: SPECIALIST

## 2023-03-22 PROCEDURE — 63600175 PHARM REV CODE 636 W HCPCS: Performed by: SPECIALIST

## 2023-03-22 PROCEDURE — 76942 ECHO GUIDE FOR BIOPSY: CPT | Performed by: ANESTHESIOLOGY

## 2023-03-22 PROCEDURE — 71000015 HC POSTOP RECOV 1ST HR: Performed by: SPECIALIST

## 2023-03-22 PROCEDURE — 25000003 PHARM REV CODE 250: Performed by: NURSE ANESTHETIST, CERTIFIED REGISTERED

## 2023-03-22 PROCEDURE — 63600175 PHARM REV CODE 636 W HCPCS: Performed by: NURSE ANESTHETIST, CERTIFIED REGISTERED

## 2023-03-22 PROCEDURE — 36000707: Performed by: SPECIALIST

## 2023-03-22 PROCEDURE — 80048 BASIC METABOLIC PNL TOTAL CA: CPT | Performed by: SPECIALIST

## 2023-03-22 PROCEDURE — 37000009 HC ANESTHESIA EA ADD 15 MINS: Performed by: SPECIALIST

## 2023-03-22 PROCEDURE — C1894 INTRO/SHEATH, NON-LASER: HCPCS | Performed by: SPECIALIST

## 2023-03-22 PROCEDURE — 82962 GLUCOSE BLOOD TEST: CPT | Performed by: SPECIALIST

## 2023-03-22 PROCEDURE — 63600175 PHARM REV CODE 636 W HCPCS: Performed by: ANESTHESIOLOGY

## 2023-03-22 PROCEDURE — 71000016 HC POSTOP RECOV ADDL HR: Performed by: SPECIALIST

## 2023-03-22 RX ORDER — PROTAMINE SULFATE 10 MG/ML
INJECTION, SOLUTION INTRAVENOUS
Status: DISCONTINUED | OUTPATIENT
Start: 2023-03-22 | End: 2023-03-22

## 2023-03-22 RX ORDER — PROPOFOL 10 MG/ML
VIAL (ML) INTRAVENOUS CONTINUOUS PRN
Status: DISCONTINUED | OUTPATIENT
Start: 2023-03-22 | End: 2023-03-22

## 2023-03-22 RX ORDER — ROPIVACAINE HYDROCHLORIDE 5 MG/ML
INJECTION, SOLUTION EPIDURAL; INFILTRATION; PERINEURAL
Status: COMPLETED
Start: 2023-03-22 | End: 2023-03-22

## 2023-03-22 RX ORDER — LIDOCAINE HYDROCHLORIDE 10 MG/ML
INJECTION INFILTRATION; PERINEURAL
Status: DISCONTINUED | OUTPATIENT
Start: 2023-03-22 | End: 2023-03-22 | Stop reason: HOSPADM

## 2023-03-22 RX ORDER — CEFAZOLIN SODIUM 1 G/3ML
INJECTION, POWDER, FOR SOLUTION INTRAMUSCULAR; INTRAVENOUS
Status: DISCONTINUED | OUTPATIENT
Start: 2023-03-22 | End: 2023-03-22 | Stop reason: HOSPADM

## 2023-03-22 RX ORDER — PHENYLEPHRINE HYDROCHLORIDE 10 MG/ML
INJECTION INTRAVENOUS
Status: DISCONTINUED | OUTPATIENT
Start: 2023-03-22 | End: 2023-03-22

## 2023-03-22 RX ORDER — HEPARIN SODIUM 5000 [USP'U]/ML
INJECTION, SOLUTION INTRAVENOUS; SUBCUTANEOUS
Status: DISCONTINUED | OUTPATIENT
Start: 2023-03-22 | End: 2023-03-22 | Stop reason: HOSPADM

## 2023-03-22 RX ORDER — HEPARIN SODIUM 1000 [USP'U]/ML
INJECTION, SOLUTION INTRAVENOUS; SUBCUTANEOUS
Status: DISCONTINUED | OUTPATIENT
Start: 2023-03-22 | End: 2023-03-22

## 2023-03-22 RX ORDER — BUPIVACAINE HYDROCHLORIDE 5 MG/ML
INJECTION, SOLUTION EPIDURAL; INTRACAUDAL
Status: DISCONTINUED | OUTPATIENT
Start: 2023-03-22 | End: 2023-03-22 | Stop reason: HOSPADM

## 2023-03-22 RX ORDER — MIDAZOLAM HYDROCHLORIDE 1 MG/ML
INJECTION INTRAMUSCULAR; INTRAVENOUS
Status: COMPLETED
Start: 2023-03-22 | End: 2023-03-22

## 2023-03-22 RX ORDER — ROPIVACAINE HYDROCHLORIDE 5 MG/ML
INJECTION, SOLUTION EPIDURAL; INFILTRATION; PERINEURAL
Status: COMPLETED | OUTPATIENT
Start: 2023-03-22 | End: 2023-03-22

## 2023-03-22 RX ORDER — ONDANSETRON 2 MG/ML
INJECTION INTRAMUSCULAR; INTRAVENOUS
Status: DISCONTINUED | OUTPATIENT
Start: 2023-03-22 | End: 2023-03-22

## 2023-03-22 RX ORDER — CEFAZOLIN SODIUM 2 G/50ML
2 SOLUTION INTRAVENOUS
Status: DISCONTINUED | OUTPATIENT
Start: 2023-03-22 | End: 2023-03-22 | Stop reason: HOSPADM

## 2023-03-22 RX ADMIN — ROPIVACAINE HYDROCHLORIDE 30 ML: 5 INJECTION, SOLUTION EPIDURAL; INFILTRATION; PERINEURAL at 09:03

## 2023-03-22 RX ADMIN — MIDAZOLAM HYDROCHLORIDE 2 MG: 1 INJECTION, SOLUTION INTRAMUSCULAR; INTRAVENOUS at 09:03

## 2023-03-22 RX ADMIN — PROPOFOL 50 MCG/KG/MIN: 10 INJECTION, EMULSION INTRAVENOUS at 10:03

## 2023-03-22 RX ADMIN — HEPARIN SODIUM 4000 UNITS: 1000 INJECTION, SOLUTION INTRAVENOUS; SUBCUTANEOUS at 11:03

## 2023-03-22 RX ADMIN — PHENYLEPHRINE HYDROCHLORIDE 50 MCG: 10 INJECTION INTRAVENOUS at 11:03

## 2023-03-22 RX ADMIN — PHENYLEPHRINE HYDROCHLORIDE 100 MCG: 10 INJECTION INTRAVENOUS at 10:03

## 2023-03-22 RX ADMIN — ONDANSETRON 4 MG: 2 INJECTION INTRAMUSCULAR; INTRAVENOUS at 10:03

## 2023-03-22 RX ADMIN — PHENYLEPHRINE HYDROCHLORIDE 50 MCG: 10 INJECTION INTRAVENOUS at 10:03

## 2023-03-22 RX ADMIN — SODIUM CHLORIDE: 9 INJECTION, SOLUTION INTRAVENOUS at 10:03

## 2023-03-22 RX ADMIN — CEFAZOLIN SODIUM 2 G: 2 SOLUTION INTRAVENOUS at 10:03

## 2023-03-22 RX ADMIN — PROTAMINE SULFATE 20 MG: 10 INJECTION, SOLUTION INTRAVENOUS at 11:03

## 2023-03-22 NOTE — ANESTHESIA PREPROCEDURE EVALUATION
03/22/2023  Rayna Haider is a 88 y.o., female with a history of hypertension and end-stage renal failure, recently put on hemodialysis with chest tunneled catheter.  Also history of type 2 diabetes.  Had episode of hyperkalemia prior to initiating hemodialysis in January.  Followed by cardiology for some bradycardia after initiating bypass which responded to decreasing beta-blocker and clonidine.  She has been very stable and feeling well for the last 2 weeks.  She does walk with a walker when she is not in her home.  She also uses oxygen at night only does not describe shortness of breath when she does some light activities in the house.  No history of fall.  No history of significant coronary artery disease or cerebrovascular accident.  Has mild peripheral arterial disease.  Presenting today for basilic vein transposition on the left versus AV graft.    EF on prior evaluation is 55% and EKG is normal sinus rhythm.    Potassium pending but has ranged 3.8-4.1  Pre-op Assessment    I have reviewed the Patient Summary Reports.     I have reviewed the Nursing Notes. I have reviewed the NPO Status.   I have reviewed the Medications.     Review of Systems      Physical Exam  General: Well nourished, Cooperative, Alert and Oriented    Airway:  Mallampati: II   Mouth Opening: Normal  TM Distance: Normal  Tongue: Normal  Neck ROM: Normal ROM    Dental:  Intact        Anesthesia Plan  Type of Anesthesia, risks & benefits discussed:    Anesthesia Type: Gen Natural Airway  Intra-op Monitoring Plan: Standard ASA Monitors  Post Op Pain Control Plan: multimodal analgesia and peripheral nerve block  Induction:  IV  Informed Consent: Informed consent signed with the Patient and all parties understand the risks and agree with anesthesia plan.  All questions answered. Patient consented to blood products? Yes  ASA Score:  3  Day of Surgery Review of History & Physical: H&P Update referred to the surgeon/provider.    Ready For Surgery From Anesthesia Perspective.     .  I discussed in depth the risks of supraclavicular block particular for this patient, namely being unable to use the left arm for several hours.  She voices that she will have assistance at home, and will not ambulate without supervision.  She feels comfortable ambulating at home without her walker so I feel that a block would be acceptable for this patient.

## 2023-03-22 NOTE — ANESTHESIA POSTPROCEDURE EVALUATION
Anesthesia Post Evaluation    Patient: Rayna Haider    Procedure(s) Performed: Procedure(s) (LRB):  INSERTION, GRAFT, ARTERIOVENOUS (Left)    Final Anesthesia Type: general      Patient location during evaluation: PACU  Patient participation: Yes- Able to Participate  Level of consciousness: awake and alert and oriented  Post-procedure vital signs: reviewed and stable  Pain management: adequate  Airway patency: patent    PONV status at discharge: No PONV  Anesthetic complications: no      Cardiovascular status: hemodynamically stable  Respiratory status: room air  Hydration status: euvolemic  Follow-up not needed.          Vitals Value Taken Time   BP 93/56 03/22/23 1236   Temp 3639 03/22/23 1236   Pulse 69 03/22/23 1235   Resp 16 03/22/23 1236   SpO2 95 % 03/22/23 1235         No case tracking events are documented in the log.      Pain/Rivka Score: No data recorded

## 2023-03-22 NOTE — DISCHARGE INSTRUCTIONS
-NO driving and no alcohol consumption for 24 hours and while taking narcotic pain medications.    -ELEVATE affected extremity as needed to aid in pain and inflammation.    -Keep site clean and dry for 48 hours. OK to shower afterwards. Do not submerge incision under water. Dermabond/steri-strips will fall off on its own time. Do not peel off.    -No heavy lifting with affected extremity. Do not lift objects greater than 10lbs.    -Monitor extremity for good circulation. If you received block, it can last 8-12 hours.    -Monitor incision for signs of infection: redness, swelling, drainage/pus/foul odor, fever, chills.    -Report to your nearest ER and/or notify your provider if you experience any SUDDEN/SEVERE chest/abdominal pain, weakness, trouble breathing, uncontrolled pain or bleeding

## 2023-03-22 NOTE — ANESTHESIA PROCEDURE NOTES
Peripheral Block    Patient location during procedure: pre-op    Reason for block: primary anesthetic    Diagnosis: Left AV Fistula/Graft placement   Start time: 3/22/2023 9:08 AM  Timeout: 3/22/2023 9:08 AM   End time: 3/22/2023 9:10 AM    Staffing  Authorizing Provider: Kimberly Queen MD  Performing Provider: Kimberly Queen MD    Preanesthetic Checklist  Completed: patient identified, IV checked, site marked, risks and benefits discussed, surgical consent, monitors and equipment checked, pre-op evaluation and timeout performed  Peripheral Block  Patient position: supine  Prep: ChloraPrep  Patient monitoring: heart rate, cardiac monitor, continuous pulse ox, continuous capnometry and frequent blood pressure checks  Block type: supraclavicular  Laterality: left  Injection technique: single shot  Needle  Needle type: Stimuplex   Needle gauge: 22 G  Needle length: 2 in  Needle localization: anatomical landmarks and ultrasound guidance   -ultrasound image captured on disc.  Assessment  Injection assessment: negative aspiration, negative parasthesia and local visualized surrounding nerve  Paresthesia pain: none  Heart rate change: no  Slow fractionated injection: yes  Pain Tolerance: no complaints and comfortable throughout block  Medications:    Medications: ropivacaine (NAROPIN) injection 0.5% - Perineural   30 mL - 3/22/2023 9:10:00 AM    Additional Notes  VSS.  RN monitoring vitals throughout procedure.  Patient tolerated procedure well.    Block placed for surgical anesthesia   IV Sedation used and titrated for patient comfort-versed 2mg IV  Ultrasound guidance used to visualize the nerve bundle and sheath as well as confirm needle placement and deposition of the local anesthetic.  (1) Under ultrasound guidance, needle was inserted and placed in close proximity to the nerve bundle  (2) Ultrasound was also used to visualize the spread of the anesthetic in close proximity to the brachial plexus  (3) The  nerves appeared anatomically normal  (4) There were no apparent abnormal pathological findings    Ultrasound photos archived.  Pt tolerated procedure well. There were no immediate complications.

## 2023-03-22 NOTE — OP NOTE
Surgeon: Daren Roberson MD    Date: 03/22/2023     Diagnosis: Pre-Op Diagnosis Codes:     * End stage renal disease [N18.6]     Procedure:  Left Basilic fistula creation    History of Presenting Illness: Ms. Haider is a 88 y.o. year old female who has end stage renal disease and needs long term hemodialysis access.      Procedure:  The patient was taken to the operating room and placed in a supine position.  The Left arm was prepped and draped in the usual sterile fashion.  Antibiotics were administered and appropriate time-out was performed.  B-mode ultrasound was performed on the extremity.  The upper arm basilic vein was identified and the brachial artery was identified.  Both appeared appropriate for fistula creation.  Incision was made over the course of the basilic vein.  Dissection was performed through subcutaneous tissues down to the level of the vessel.  Antecubital brachial nerve was identified and kept free from harm.  Basilic vein was mobilized throughout our upper arm incision and multiple side branches were ligated with 3-0 silk ties and surgical clips.  The distal brachial artery was identified and controlled with vessel loops.  We ligated the basilic vein distally with a 2-0 silk suture.  The vein easily accepted a 4 dilator. The vein was then pressurized and tested.  We heparinized the patient and tunneling was performed.  The vein was brought through this tunnel and then we performed an end-to-side anastomosis between the brachial artery and basilic vein.  This was performed with a running 6 0 Prolene suture.  Flow was restored through the artery into the fistula.  There was a thrill in the fistula and a palpable radial pulse.  Protamine was administered and hemostasis was obtained.  Further dissection was performed to avoid tension on the fistula.  The wound was closed with 3-0 Vicryl suture and 4-0 Monocryl suture.  Dermabond was applied.    Complications:  none    Findings: suitable  basilic vein and brachial artery.  Good thrill to fistula and palpable left radial pulse at completion

## 2023-03-22 NOTE — TRANSFER OF CARE
Anesthesia Transfer of Care Note    Patient: Rayna Haider    Procedure(s) Performed: Procedure(s) (LRB):  INSERTION, GRAFT, ARTERIOVENOUS (Left)    Patient location: PACU    Anesthesia Type: general    Transport from OR: Transported from OR on room air with adequate spontaneous ventilation    Post pain: adequate analgesia    Post assessment: no apparent anesthetic complications    Post vital signs: stable    Level of consciousness: awake and responds to stimulation    Nausea/Vomiting: no nausea/vomiting    Complications: none    Transfer of care protocol was followed      Last vitals:   Visit Vitals  BP (!) 126/55 (BP Location: Right arm, Patient Position: Sitting)   Pulse 69   Temp 36.3 °C (97.4 °F) (Oral)   Resp 20   Ht 5' (1.524 m)   Wt 64 kg (141 lb)   LMP  (LMP Unknown)   SpO2 95%   Breastfeeding No   BMI 27.54 kg/m²

## 2023-03-23 VITALS
BODY MASS INDEX: 27.68 KG/M2 | OXYGEN SATURATION: 100 % | RESPIRATION RATE: 20 BRPM | DIASTOLIC BLOOD PRESSURE: 62 MMHG | WEIGHT: 141 LBS | TEMPERATURE: 97 F | SYSTOLIC BLOOD PRESSURE: 106 MMHG | HEIGHT: 60 IN | HEART RATE: 55 BPM

## 2023-03-26 ENCOUNTER — DOCUMENT SCAN (OUTPATIENT)
Dept: HOME HEALTH SERVICES | Facility: HOSPITAL | Age: 88
End: 2023-03-26
Payer: MEDICARE

## 2023-04-11 ENCOUNTER — PATIENT OUTREACH (OUTPATIENT)
Dept: HOME HEALTH SERVICES | Facility: HOSPITAL | Age: 88
End: 2023-04-11
Payer: MEDICARE

## 2023-04-11 ENCOUNTER — EXTERNAL HOME HEALTH (OUTPATIENT)
Dept: HOME HEALTH SERVICES | Facility: HOSPITAL | Age: 88
End: 2023-04-11
Payer: MEDICARE

## 2023-04-19 RX ORDER — CLONIDINE HYDROCHLORIDE 0.2 MG/1
1 TABLET ORAL 2 TIMES DAILY
Status: ON HOLD | COMMUNITY
Start: 2023-02-11 | End: 2023-09-08

## 2023-04-19 RX ORDER — FUROSEMIDE 40 MG/1
TABLET ORAL
Status: ON HOLD | COMMUNITY
End: 2023-09-08

## 2023-04-19 RX ORDER — HYDROCODONE BITARTRATE AND ACETAMINOPHEN 7.5; 325 MG/1; MG/1
TABLET ORAL
Status: ON HOLD | COMMUNITY
End: 2023-07-19 | Stop reason: HOSPADM

## 2023-04-19 RX ORDER — FERROUS SULFATE 325(65) MG
1 TABLET ORAL DAILY
Status: ON HOLD | COMMUNITY
Start: 2023-01-20 | End: 2023-07-19 | Stop reason: HOSPADM

## 2023-04-24 PROBLEM — N17.9 ACUTE KIDNEY INJURY SUPERIMPOSED ON CKD: Chronic | Status: RESOLVED | Noted: 2023-01-14 | Resolved: 2023-04-24

## 2023-04-24 PROBLEM — N18.9 ACUTE KIDNEY INJURY SUPERIMPOSED ON CKD: Chronic | Status: RESOLVED | Noted: 2023-01-14 | Resolved: 2023-04-24

## 2023-04-27 DIAGNOSIS — E11.9 TYPE 2 DIABETES MELLITUS WITHOUT COMPLICATION, WITHOUT LONG-TERM CURRENT USE OF INSULIN: Primary | ICD-10-CM

## 2023-04-27 NOTE — DISCHARGE SUMMARY
[facility] - Periop Services  Discharge Note  Short Stay    Procedure(s) (LRB):  INSERTION, GRAFT, ARTERIOVENOUS (Left)      OUTCOME: Patient tolerated treatment/procedure well without complication and is now ready for discharge.    DISPOSITION: Home or Self Care    FINAL DIAGNOSIS:  End stage renal disease    FOLLOWUP: In clinic    DISCHARGE INSTRUCTIONS:    Discharge Procedure Orders   Diet Adult Regular     No driving until:   Order Comments: For 7 days     No dressing needed   Scheduling Instructions: Ok to shower. Cleans wound area with antibacterial soap and pat dry.  Leave dermabond in place (will fall off when appropriate).     Notify your health care provider if you experience any of the following:  severe uncontrolled pain     Notify your health care provider if you experience any of the following:  severe persistent headache     Notify your health care provider if you experience any of the following:  persistent dizziness, light-headedness, or visual disturbances     Notify your health care provider if you experience any of the following:  increased confusion or weakness     Activity as tolerated         Clinical Reference Documents Added to Patient Instructions         Document    DIALYSIS CATHETER (ENGLISH)    DIALYSIS CATHETER DISCHARGE INSTRUCTIONS (ENGLISH)            TIME SPENT ON DISCHARGE: 5 minutes

## 2023-04-29 PROBLEM — N18.6 ESRD (END STAGE RENAL DISEASE): Chronic | Status: ACTIVE | Noted: 2023-02-10

## 2023-04-29 PROBLEM — E11.3512 TYPE 2 DIABETES MELLITUS WITH LEFT EYE AFFECTED BY PROLIFERATIVE RETINOPATHY AND MACULAR EDEMA, WITHOUT LONG-TERM CURRENT USE OF INSULIN: Chronic | Status: ACTIVE | Noted: 2023-01-23

## 2023-05-01 NOTE — PROGRESS NOTES
[facility] Vascular - Clinic Note  Daren Roberson MD      Patient Name: Rayna Haider     MRN: [id number]   Visit Date: 2023    Patient Care Team:  David Moore MD as PCP - General (Internal Medicine)  Destiny Gibbons MD as Consulting Physician (Cardiovascular Disease)  Bebeto Reilly MD as Consulting Physician (Ophthalmology)  Mustapha Zavaleta MD as Consulting Physician (Nephrology)  [facility] (Dialysis Center)    Subjective:           Hemodialysis Access      HPI: Ms. Haider presents to the clinic for 6 week follow up s/p left basilic fistula creation 3/22/23.  She denies signs or symptoms of infection to her left upper arm incision. She denies left hand numbness or pain since surgery. She is currently on dialysis using a right chest wall tunneled catheter.     Previously reports that she wears 2 L NC at night. She states she sleeps on her left side.      Past Medical History:   Diagnosis Date    CHF (congestive heart failure)     Hyperkalemia     Hypertension     On home oxygen therapy     wears at night    Vitamin D deficiency      Past Surgical History:   Procedure Laterality Date    CATARACT EXTRACTION Bilateral      SECTION      EYE SURGERY Left     HYSTERECTOMY      has ovaries    INSERTION OF TUNNELED CENTRAL VENOUS HEMODIALYSIS CATHETER N/A 2023    Procedure: INSERTION, CATHETER, CENTRAL VENOUS, HEMODIALYSIS, TUNNELED;  Surgeon: Eric Angela DO;  Location: [facility] CATH LAB;  Service: Nephrology;  Laterality: N/A;    PLACEMENT OF ARTERIOVENOUS GRAFT Left 3/22/2023    Procedure: INSERTION, GRAFT, ARTERIOVENOUS;  Surgeon: Daren Roberson MD;  Location: [facility] OR;  Service: Peripheral Vascular;  Laterality: Left;  LEFT BASILIC TRANSPOSITION -VS- GRAFT PLACEMENT // XX  //  SUPINE //   SUPRACLAVICULAR BLOCK    RETINAL DETACHMENT SURGERY Left     SKIN GRAFT Right     burns, scald    wisdom Left      Family History   Problem Relation Age of Onset    Cancer Mother      Glaucoma Mother     Diabetes Father     Cancer Father     Cancer Brother     Diabetes Brother      Social History     Socioeconomic History    Marital status:    Tobacco Use    Smoking status: Never    Smokeless tobacco: Never   Substance and Sexual Activity    Alcohol use: Not Currently    Drug use: Never    Sexual activity: Not Currently     Current Outpatient Medications   Medication Instructions    amLODIPine (NORVASC) 10 mg, Oral, Daily    aspirin 81 mg, Oral, Daily    carvediloL (COREG) 6.25 mg, Oral, 2 times daily with meals    cloNIDine (CATAPRES) 0.2 MG tablet 1 tablet, Oral, 2 times daily    ferrous sulfate (FEOSOL) 325 mg (65 mg iron) Tab tablet 1 tablet, Oral, Daily    furosemide (LASIX) 40 MG tablet furosemide 40 mg tablet   Take 1 tablet every day by oral route as directed for 30 days.    HYDROcodone-acetaminophen (NORCO) 7.5-325 mg per tablet hydrocodone 7.5 mg-acetaminophen 325 mg tablet    INV allopurinol tablet 1 tablet, Oral, Daily    LORazepam (ATIVAN) 0.5 mg, Oral, Daily PRN    magnesium hydroxide 400 mg/5 ml (MILK OF MAGNESIA) 400 mg/5 mL Susp 30 mLs, Oral, Daily PRN    polyethylene glycol (GLYCOLAX) 17 gram PwPk Oral    senna (SENOKOT) 8.6 mg tablet 1 tablet, Oral, As needed (PRN)     Review of patient's allergies indicates:   Allergen Reactions    Hydralazide Nausea And Vomiting     Pt also gets Tremors when taking this med    Valsartan            REVIEW OF SYSTEMS:  ROS  12 point review of systems conducted, negative except as stated in the history of present illness. See HPI for details.      Objective:     PHYSICAL EXAM:   Visit Vitals  /65 (BP Location: Right arm)   Pulse 70   Ht 5' (1.524 m)   Wt 63.5 kg (140 lb)   LMP  (LMP Unknown)   BMI 27.34 kg/m²       Vascular Physical Exam  Vitals and nursing note reviewed.   Constitutional:       General: She is not in acute distress.     Comments: Ambulatory with walker   HENT:      Head: Normocephalic.      Nose: Nose normal.    Cardiovascular:      Rate and Rhythm: Normal rate and regular rhythm.      Pulses:           Radial pulses are 2+ on the left side.   Abdominal:      General: There is no distension.      Tenderness: There is no abdominal tenderness.   Musculoskeletal:      Right lower leg: No edema.      Left lower leg: No edema.   Lymphadenopathy:      Cervical: No cervical adenopathy.   Neurological:      General: No focal deficit present.      Mental Status: She is alert.      Cranial Nerves: No cranial nerve deficit.   Psychiatric:         Mood and Affect: Mood normal.   Arteriovenous access:     Catheters/Devices: Right chest tunneled dialysis catheter present.       Left arteriovenous access is present.        Left Access: Surgical AVF location(s): upper arm basilic vein. Upper arm basilic vein has thrill.   Small in size - sclerotic.          Imaging Obtained/Reviewed:         Assessment/Plan:     Ms. Haider is a 88 y.o. with end stage renal disease (ESRD) on dialysis who is s/p left brachiobasilic fistula creation on 3/22/23.  On exam fistula has a good thrill with a well-healed incision.  The fistula is small in size with concerns of sclerotic changes. The access would benefit from a fistulogram for balloon-assisted maturation. We discussed the risks and benefits of the procedure including bleeding, need for further procedures, and/or thrombosis of the access. She wishes to proceed.         1. Mechanical complication of arteriovenous fistula surgically created, initial encounter    2. ESRD (end stage renal disease)         No follow-ups on file. In addition to their scheduled follow up, the patient has also been instructed to follow up on as needed basis.     Future Appointments   Date Time Provider Department Center   9/12/2023 11:20 AM Rosie Flores MD [place] [address]

## 2023-05-02 ENCOUNTER — OFFICE VISIT (OUTPATIENT)
Dept: VASCULAR SURGERY | Facility: CLINIC | Age: 88
End: 2023-05-02
Payer: MEDICARE

## 2023-05-02 VITALS
BODY MASS INDEX: 27.48 KG/M2 | DIASTOLIC BLOOD PRESSURE: 65 MMHG | SYSTOLIC BLOOD PRESSURE: 115 MMHG | HEART RATE: 70 BPM | HEIGHT: 60 IN | WEIGHT: 140 LBS

## 2023-05-02 DIAGNOSIS — N18.6 ESRD (END STAGE RENAL DISEASE): ICD-10-CM

## 2023-05-02 DIAGNOSIS — T82.590A MECHANICAL COMPLICATION OF ARTERIOVENOUS FISTULA SURGICALLY CREATED, INITIAL ENCOUNTER: Primary | ICD-10-CM

## 2023-05-02 PROCEDURE — 99024 PR POST-OP FOLLOW-UP VISIT: ICD-10-PCS | Mod: 78,,, | Performed by: SPECIALIST

## 2023-05-02 PROCEDURE — 99024 POSTOP FOLLOW-UP VISIT: CPT | Mod: 78,,, | Performed by: SPECIALIST

## 2023-05-02 RX ORDER — POLYETHYLENE GLYCOL 3350 17 G/17G
POWDER, FOR SOLUTION ORAL
Status: ON HOLD | COMMUNITY
End: 2023-09-08

## 2023-05-02 RX ORDER — ADHESIVE BANDAGE
30 BANDAGE TOPICAL DAILY PRN
Status: ON HOLD | COMMUNITY
End: 2023-09-08

## 2023-05-19 ENCOUNTER — HOSPITAL ENCOUNTER (OUTPATIENT)
Facility: HOSPITAL | Age: 88
Discharge: HOME | End: 2023-05-19
Attending: SPECIALIST | Admitting: SPECIALIST
Payer: MEDICARE

## 2023-05-19 VITALS
OXYGEN SATURATION: 97 % | WEIGHT: 144.63 LBS | DIASTOLIC BLOOD PRESSURE: 71 MMHG | BODY MASS INDEX: 28.39 KG/M2 | HEART RATE: 63 BPM | HEIGHT: 60 IN | SYSTOLIC BLOOD PRESSURE: 126 MMHG | TEMPERATURE: 99 F

## 2023-05-19 DIAGNOSIS — N18.6 ESRD (END STAGE RENAL DISEASE): ICD-10-CM

## 2023-05-19 DIAGNOSIS — T82.590A MECHANICAL COMPLICATION OF ARTERIOVENOUS FISTULA SURGICALLY CREATED, INITIAL ENCOUNTER: ICD-10-CM

## 2023-05-19 LAB
ANION GAP SERPL CALC-SCNC: 11 MEQ/L
BUN SERPL-MCNC: 25.8 MG/DL (ref 9.8–20.1)
CALCIUM SERPL-MCNC: 9.8 MG/DL (ref 8.4–10.2)
CHLORIDE SERPL-SCNC: 98 MMOL/L (ref 98–107)
CO2 SERPL-SCNC: 29 MMOL/L (ref 23–31)
CREAT SERPL-MCNC: 3.77 MG/DL (ref 0.55–1.02)
CREAT/UREA NIT SERPL: 7
GFR SERPLBLD CREATININE-BSD FMLA CKD-EPI: 11 MLS/MIN/1.73/M2
GLUCOSE SERPL-MCNC: 105 MG/DL (ref 82–115)
POCT GLUCOSE: 95 MG/DL (ref 70–110)
POTASSIUM SERPL-SCNC: 4.7 MMOL/L (ref 3.5–5.1)
SODIUM SERPL-SCNC: 138 MMOL/L (ref 136–145)

## 2023-05-19 PROCEDURE — 25000003 PHARM REV CODE 250: Performed by: SPECIALIST

## 2023-05-19 PROCEDURE — C1769 GUIDE WIRE: HCPCS | Performed by: SPECIALIST

## 2023-05-19 PROCEDURE — C1725 CATH, TRANSLUMIN NON-LASER: HCPCS | Performed by: SPECIALIST

## 2023-05-19 PROCEDURE — 27201423 OPTIME MED/SURG SUP & DEVICES STERILE SUPPLY: Performed by: SPECIALIST

## 2023-05-19 PROCEDURE — 80048 BASIC METABOLIC PNL TOTAL CA: CPT | Performed by: SPECIALIST

## 2023-05-19 PROCEDURE — 36902 INTRO CATH DIALYSIS CIRCUIT: CPT | Mod: LT | Performed by: SPECIALIST

## 2023-05-19 PROCEDURE — 25500020 PHARM REV CODE 255: Performed by: SPECIALIST

## 2023-05-19 PROCEDURE — 63600175 PHARM REV CODE 636 W HCPCS: Performed by: SPECIALIST

## 2023-05-19 PROCEDURE — C1894 INTRO/SHEATH, NON-LASER: HCPCS | Performed by: SPECIALIST

## 2023-05-19 PROCEDURE — 99153 MOD SED SAME PHYS/QHP EA: CPT | Performed by: SPECIALIST

## 2023-05-19 PROCEDURE — 36902 INTRO CATH DIALYSIS CIRCUIT: CPT | Mod: LT,,, | Performed by: SPECIALIST

## 2023-05-19 PROCEDURE — 36902 PR INTRO CATH, DIALYSIS CIRCUIT W/TRANSLML BALLOON ANGIO: ICD-10-PCS | Mod: LT,,, | Performed by: SPECIALIST

## 2023-05-19 PROCEDURE — 99152 MOD SED SAME PHYS/QHP 5/>YRS: CPT | Performed by: SPECIALIST

## 2023-05-19 RX ORDER — HYDROCODONE BITARTRATE AND ACETAMINOPHEN 5; 325 MG/1; MG/1
1 TABLET ORAL EVERY 4 HOURS PRN
Status: DISCONTINUED | OUTPATIENT
Start: 2023-05-19 | End: 2023-05-19 | Stop reason: HOSPADM

## 2023-05-19 RX ORDER — FENTANYL CITRATE 50 UG/ML
INJECTION, SOLUTION INTRAMUSCULAR; INTRAVENOUS
Status: DISCONTINUED | OUTPATIENT
Start: 2023-05-19 | End: 2023-05-19 | Stop reason: HOSPADM

## 2023-05-19 RX ORDER — HEPARIN SODIUM 1000 [USP'U]/ML
INJECTION, SOLUTION INTRAVENOUS; SUBCUTANEOUS
Status: DISCONTINUED | OUTPATIENT
Start: 2023-05-19 | End: 2023-05-19 | Stop reason: HOSPADM

## 2023-05-19 RX ORDER — LIDOCAINE HYDROCHLORIDE 10 MG/ML
INJECTION INFILTRATION; PERINEURAL
Status: DISCONTINUED | OUTPATIENT
Start: 2023-05-19 | End: 2023-05-19 | Stop reason: HOSPADM

## 2023-05-19 RX ORDER — MIDAZOLAM HYDROCHLORIDE 1 MG/ML
INJECTION INTRAMUSCULAR; INTRAVENOUS
Status: DISCONTINUED | OUTPATIENT
Start: 2023-05-19 | End: 2023-05-19 | Stop reason: HOSPADM

## 2023-05-19 NOTE — OP NOTE
[facility] - Cath Lab Services  General Surgery  Operative Note    SUMMARY     Date of Procedure: 5/19/2023     Procedure: Left arm fistulogram with balloon angioplasty    Surgeon(s) and Role:     * Daren Roberson MD - Primary    Assisting Surgeon: None    Pre-Operative Diagnosis: Mechanical complication of arteriovenous fistula surgically created, initial encounter [T82.590A]  ESRD (end stage renal disease) [N18.6]    Post-Operative Diagnosis: Post-Op Diagnosis Codes:     * Mechanical complication of arteriovenous fistula surgically created, initial encounter [T82.590A]     * ESRD (end stage renal disease) [N18.6]    Anesthesia: RN IV Sedation    Operative Findings (including complications, if any):  The left arm basilic fistula was patent but had stenosis to the distal fistula was a proximally 85-90% stenosed.  Angioplasty was performed with successful resolution of stenosis and resolution of pulsatility.  Small pseudoaneurysm that remained which I am hopeful will seal given the low pressure in the fistula.  We will have her follow-up in about 3 weeks with ultrasound to re-evaluate the fistula.     Description of Technical Procedures: Mrs. Haider taken to the fluoroscopy suite placed in supine position left arm was prepped and draped in the usual sterile fashion appropriate time-out was done was utilized to identify the proximal fistula.  1% lidocaine was stick needle was utilized to cannulate the vein.  It may stick sheath was placed and then we exchanged for a 4 Hungarian sheath fistulogram was performed did demonstrate a patent superior vena cava left innominate vein and left subclavian axillary vein was patent as well.  The basilic vein fistula was patent but there was proximally 3-4 cm segment of high-grade stenosis of around 85% in the distal fistula.  The remainder of the although marginally small.  We initially placed Nitrex wire and a 6 mm x 6 cm balloon and performed angioplasty.   Not  able to get good effacement of our balloon at rated burst pressures.  We ultimately changed our 4 Ugandan sheath for a 6 Ugandan sheath placed an 035 wire and utilized a [place] balloon the 7 mm x 8 cm.  We this to address the area of stenosis while also performing some balloon assisted maturation to the cannulation zone of the fistula.  We were able to get resolution of the stenosis high pressures.  Subsequent angiography did show good resolution of stenosis with small pseudoaneurysm in the mid to distal fistula.  Held for some time and additional imaging was performed.  The small pseudoaneurysm did persist and there was no evidence of swelling or active extravasation.  4-0 Monocryl was placed about our sheath the sheath was removed held.  A good thrill station was maintained in the fistula.  This completed our procedure.    Significant Surgical Tasks Conducted by the Assistant(s), if Applicable: none    Estimated Blood Loss (EBL): minimal           Implants: * No implants in log *    Specimens:   Specimen (24h ago, onward)      None                    Condition: Good    Disposition: PACU - hemodynamically stable.    Attestation: I was present and scrubbed for the entire procedure.

## 2023-05-19 NOTE — Clinical Note
vein was performed. A percutaneous stick to the left  arm was performed. Ultrasound guidance was used to obtain access.

## 2023-05-19 NOTE — DISCHARGE SUMMARY
[facility] - Cath Lab Services  Discharge Note  Short Stay    Procedure(s) (LRB):  Fistulogram with Possible Intervention (Left)      OUTCOME: Patient tolerated treatment/procedure well without complication and is now ready for discharge.    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  Do not use antibiotics ointment.     Call MD for:  temperature >100.4     Call MD for:  redness, tenderness, or signs of infection (pain, swelling, redness, odor or green/yellow discharge around incision site)     Activity as tolerated        TIME SPENT ON DISCHARGE: 5 minutes

## 2023-05-19 NOTE — DISCHARGE INSTRUCTIONS
Call Dr. Roberson's office for follow-up appt.  Leave dermabond on until it falls off.  Can shower and pat dry after, do not submerge incision in water.  Do not use antibiotic ointment  Call MD for any signs of infection (redness, inflammation, drainage)

## 2023-05-19 NOTE — Clinical Note
The left radial and left brachial was prepped. The site was prepped with ChloraPrep. The patient was draped. The patient was positioned supine. The patient was secured with ulnar pads and to an armboard.  Left arm prepped.

## 2023-05-24 DIAGNOSIS — T82.590A MECHANICAL COMPLICATION OF ARTERIOVENOUS SHUNT SURGICALLY CREATED, INITIAL ENCOUNTER: Primary | ICD-10-CM

## 2023-05-29 ENCOUNTER — EXTERNAL HOME HEALTH (OUTPATIENT)
Dept: HOME HEALTH SERVICES | Facility: HOSPITAL | Age: 88
End: 2023-05-29
Payer: MEDICARE

## 2023-06-20 ENCOUNTER — TELEPHONE (OUTPATIENT)
Dept: INTERNAL MEDICINE | Facility: CLINIC | Age: 88
End: 2023-06-20
Payer: MEDICARE

## 2023-06-20 DIAGNOSIS — E11.3311 TYPE 2 DIABETES MELLITUS WITH RIGHT EYE AFFECTED BY MODERATE NONPROLIFERATIVE RETINOPATHY AND MACULAR EDEMA, WITHOUT LONG-TERM CURRENT USE OF INSULIN: Primary | Chronic | ICD-10-CM

## 2023-06-20 RX ORDER — INSULIN PUMP SYRINGE, 3 ML
EACH MISCELLANEOUS
Qty: 1 EACH | Refills: 0 | Status: SHIPPED | OUTPATIENT
Start: 2023-06-20 | End: 2023-09-12

## 2023-06-20 NOTE — TELEPHONE ENCOUNTER
----- Message from Antonette Carreon sent at 2023  2:41 PM CDT -----  Regarding: refill  MEDICATION REFILL REQUEST      PATIENT PHONE #:[phone]     :[birth year]     PHARMACY:[facility]     PHARMACY PHONE #: [phone]     ALLERGIES:     MESSAGE    Free style lite test strips                                           qd

## 2023-07-10 NOTE — PROGRESS NOTES
[facility] Vascular - Clinic Note  Daren Roberson MD      Patient Name: Rayna Haider                   : 1935     MRN: [id number]   Visit Date: 2023          History Present Illness     Reason for Visit: Hemodialysis Access      Ms. Haider presents to the clinic for evaluation of her basilic fistula s/p fistulogram on 23. Duplex of her access obtained today. Her access was originally created 3/22/23. She denies left hand numbness or fatigue. She is using a right chest wall catheter without functional issues.    She has been monitoring her fistula and reports it initially had a good thrill after angioplasty but has since become pulsatile.       REVIEW OF SYSTEMS:  ROS  12 point review of systems conducted, negative except as stated in the history of present illness. See HPI for details.        Physical Exam      Visit Vitals  /73 (BP Location: Right arm)   Pulse 64   LMP  (LMP Unknown)       General: well-nourished, no acute distress, and healthy appearing, ambulating normally, alert, pleasant, conversant, and oriented  Neurologic: cranial nerves are grossly intact, no neurologic deficits  HEENT: grossly normal and no scleral icterus  Neck/Chest: normal  and soft without lymphadenopathy  Respiratory: breathing easily and without respiratory distress  Abdomen: normal and soft  Cardiology: regular rate and rhythm    Upper Extremity Arterial Exam:   Left - radial is palpable    Dialysis Access:  left brachiobasilic fistula and right chest wall tunneled catheter  Assessment: pulsatility is appreciated throughout;  Seems slightly small through cannulation zone.    Musculoskeletal:   Upper Extremity: normal left hand function, left hand is warm   Lower Extremity: no edema present to bilateral lower extremities          Assessment and Plan     Ms. Haider is a 88 y.o. with with end stage renal disease (ESRD) on dialysis who is s/p fistulogram of her left basilic fistula with distal  fistula angioplasty on 23. On exam, there is pulsatility to her fistula. Duplex obtained today demonstrates stenosis just distal to previously treated distal fistula site - current stenosis appears to be at the swing segment. There is also marginal diameter to the middle fistula. The access would benefit from repeat fistulogram (with likely stenting) to the distal fistula/swing segment. We discussed the risks and benefits of the procedure including bleeding, infection, need for further procedures, injury to access, and/or limb ischemia. She wishes to proceed.    I am hopeful she will be OK to initiate cannulation following this procedure.         1. Mechanical complication of arteriovenous fistula surgically created, subsequent encounter    2. ESRD (end stage renal disease)           Imaging Obtained/Reviewed   Study: hemodialysis duplex  Date:   23           Medical History     Past Medical History:   Diagnosis Date    CHF (congestive heart failure)     Hyperkalemia     Hypertension     On home oxygen therapy     wears at night    Vitamin D deficiency      Past Surgical History:   Procedure Laterality Date    ANGIOGRAPHY OF ARTERIOVENOUS SHUNT Left 2023    Procedure: Fistulogram with Possible Intervention;  Surgeon: Daren Roberson MD;  Location: [facility] CATH LAB;  Service: Cardiology;  Laterality: Left;    CATARACT EXTRACTION Bilateral      SECTION      EYE SURGERY Left     HYSTERECTOMY      has ovaries    INSERTION OF TUNNELED CENTRAL VENOUS HEMODIALYSIS CATHETER N/A 2023    Procedure: INSERTION, CATHETER, CENTRAL VENOUS, HEMODIALYSIS, TUNNELED;  Surgeon: Eric Angela DO;  Location: [facility] CATH LAB;  Service: Nephrology;  Laterality: N/A;    PLACEMENT OF ARTERIOVENOUS GRAFT Left 3/22/2023    Procedure: INSERTION, GRAFT, ARTERIOVENOUS;  Surgeon: Daren Roberson MD;  Location: [facility] OR;  Service: Peripheral Vascular;  Laterality: Left;  LEFT BASILIC TRANSPOSITION -VS- GRAFT PLACEMENT  // XX  //  SUPINE //   SUPRACLAVICULAR BLOCK    RETINAL DETACHMENT SURGERY Left     SKIN GRAFT Right     burns, scald    wisdom Left      Family History   Problem Relation Age of Onset    Cancer Mother     Glaucoma Mother     Diabetes Father     Cancer Father     Cancer Brother     Diabetes Brother      Social History     Socioeconomic History    Marital status:    Tobacco Use    Smoking status: Never    Smokeless tobacco: Never   Substance and Sexual Activity    Alcohol use: Not Currently    Drug use: Never    Sexual activity: Not Currently     Current Outpatient Medications   Medication Instructions    amLODIPine (NORVASC) 10 mg, Oral, Daily    aspirin 81 mg, Oral, Daily    blood sugar diagnostic (FREESTYLE LITE STRIPS) Strp 200 each, Misc.(Non-Drug; Combo Route), Daily    blood-glucose meter (FREESTYLE LITE METER) kit Use as instructed    carvediloL (COREG) 6.25 mg, Oral, 2 times daily with meals    cloNIDine (CATAPRES) 0.2 MG tablet 1 tablet, Oral, 2 times daily    ferrous sulfate (FEOSOL) 325 mg (65 mg iron) Tab tablet 1 tablet, Oral, Daily    furosemide (LASIX) 40 MG tablet furosemide 40 mg tablet   Take 1 tablet every day by oral route as directed for 30 days.    HYDROcodone-acetaminophen (NORCO) 7.5-325 mg per tablet hydrocodone 7.5 mg-acetaminophen 325 mg tablet    INV allopurinol tablet 1 tablet, Oral, Daily    LORazepam (ATIVAN) 0.5 mg, Oral, Daily PRN    magnesium hydroxide 400 mg/5 ml (MILK OF MAGNESIA) 400 mg/5 mL Susp 30 mLs, Oral, Daily PRN    polyethylene glycol (GLYCOLAX) 17 gram PwPk Oral    senna (SENOKOT) 8.6 mg tablet 1 tablet, Oral, As needed (PRN)     Review of patient's allergies indicates:   Allergen Reactions    Hydralazide Nausea And Vomiting     Pt also gets Tremors when taking this med    Valsartan        Patient Care Team:  David Moore MD as PCP - General (Internal Medicine)  Destiny Gibbons MD as Consulting Physician (Cardiovascular Disease)  Bebeto Reilly MD as  Consulting Physician (Ophthalmology)  Mustapha Zavaleta MD as Consulting Physician (Nephrology)  [facility] (Dialysis Center)      No follow-ups on file. In addition to their scheduled follow up, the patient has also been instructed to follow up on as needed basis.     Future Appointments   Date Time Provider Department Center   9/12/2023 11:00 AM Rosie Flores MD [place] [address]

## 2023-07-11 ENCOUNTER — OFFICE VISIT (OUTPATIENT)
Dept: VASCULAR SURGERY | Facility: CLINIC | Age: 88
End: 2023-07-11
Attending: SPECIALIST
Payer: MEDICARE

## 2023-07-11 VITALS — HEART RATE: 64 BPM | SYSTOLIC BLOOD PRESSURE: 122 MMHG | DIASTOLIC BLOOD PRESSURE: 73 MMHG

## 2023-07-11 DIAGNOSIS — N18.6 ESRD (END STAGE RENAL DISEASE): ICD-10-CM

## 2023-07-11 DIAGNOSIS — T82.590D MECHANICAL COMPLICATION OF ARTERIOVENOUS FISTULA SURGICALLY CREATED, SUBSEQUENT ENCOUNTER: Primary | ICD-10-CM

## 2023-07-11 DIAGNOSIS — T82.590A MECHANICAL COMPLICATION OF ARTERIOVENOUS FISTULA SURGICALLY CREATED: ICD-10-CM

## 2023-07-11 PROCEDURE — 99213 OFFICE O/P EST LOW 20 MIN: CPT | Mod: ,,, | Performed by: SPECIALIST

## 2023-07-11 PROCEDURE — 99213 PR OFFICE/OUTPT VISIT, EST, LEVL III, 20-29 MIN: ICD-10-PCS | Mod: ,,, | Performed by: SPECIALIST

## 2023-07-11 RX ORDER — SODIUM CHLORIDE 9 MG/ML
INJECTION, SOLUTION INTRAVENOUS CONTINUOUS
Status: CANCELLED | OUTPATIENT
Start: 2023-07-19

## 2023-07-19 ENCOUNTER — HOSPITAL ENCOUNTER (OUTPATIENT)
Facility: HOSPITAL | Age: 88
Discharge: HOME | End: 2023-07-19
Attending: SPECIALIST | Admitting: SPECIALIST
Payer: MEDICARE

## 2023-07-19 VITALS
SYSTOLIC BLOOD PRESSURE: 130 MMHG | RESPIRATION RATE: 18 BRPM | BODY MASS INDEX: 28.39 KG/M2 | HEART RATE: 54 BPM | HEIGHT: 60 IN | TEMPERATURE: 99 F | WEIGHT: 144.63 LBS | OXYGEN SATURATION: 99 % | DIASTOLIC BLOOD PRESSURE: 53 MMHG

## 2023-07-19 DIAGNOSIS — T82.590D MECHANICAL COMPLICATION OF ARTERIOVENOUS FISTULA SURGICALLY CREATED, SUBSEQUENT ENCOUNTER: ICD-10-CM

## 2023-07-19 DIAGNOSIS — T82.590A MECHANICAL COMPLICATION OF ARTERIOVENOUS FISTULA SURGICALLY CREATED: ICD-10-CM

## 2023-07-19 DIAGNOSIS — N18.6 ESRD (END STAGE RENAL DISEASE): ICD-10-CM

## 2023-07-19 LAB
ANION GAP SERPL CALC-SCNC: 16 MEQ/L
BUN SERPL-MCNC: 37.9 MG/DL (ref 9.8–20.1)
CALCIUM SERPL-MCNC: 9.8 MG/DL (ref 8.4–10.2)
CHLORIDE SERPL-SCNC: 95 MMOL/L (ref 98–107)
CO2 SERPL-SCNC: 23 MMOL/L (ref 23–31)
CREAT SERPL-MCNC: 4.33 MG/DL (ref 0.55–1.02)
CREAT/UREA NIT SERPL: 9
GFR SERPLBLD CREATININE-BSD FMLA CKD-EPI: 9 MLS/MIN/1.73/M2
GLUCOSE SERPL-MCNC: 96 MG/DL (ref 82–115)
POCT GLUCOSE: 100 MG/DL (ref 70–110)
POTASSIUM SERPL-SCNC: 3.8 MMOL/L (ref 3.5–5.1)
SODIUM SERPL-SCNC: 134 MMOL/L (ref 136–145)

## 2023-07-19 PROCEDURE — 99152 MOD SED SAME PHYS/QHP 5/>YRS: CPT | Performed by: SPECIALIST

## 2023-07-19 PROCEDURE — 25000003 PHARM REV CODE 250: Performed by: SPECIALIST

## 2023-07-19 PROCEDURE — C1876 STENT, NON-COA/NON-COV W/DEL: HCPCS | Performed by: SPECIALIST

## 2023-07-19 PROCEDURE — C1887 CATHETER, GUIDING: HCPCS | Performed by: SPECIALIST

## 2023-07-19 PROCEDURE — C1725 CATH, TRANSLUMIN NON-LASER: HCPCS | Performed by: SPECIALIST

## 2023-07-19 PROCEDURE — 36903 INTRO CATH DIALYSIS CIRCUIT: CPT | Mod: LT,,, | Performed by: SPECIALIST

## 2023-07-19 PROCEDURE — 63600175 PHARM REV CODE 636 W HCPCS: Performed by: SPECIALIST

## 2023-07-19 PROCEDURE — 99153 MOD SED SAME PHYS/QHP EA: CPT | Performed by: SPECIALIST

## 2023-07-19 PROCEDURE — 99152 PR MOD CONSCIOUS SEDATION, SAME PHYS, 5+ YRS, FIRST 15 MIN: ICD-10-PCS | Mod: ,,, | Performed by: SPECIALIST

## 2023-07-19 PROCEDURE — 80048 BASIC METABOLIC PNL TOTAL CA: CPT | Performed by: SPECIALIST

## 2023-07-19 PROCEDURE — 36903 INTRO CATH DIALYSIS CIRCUIT: CPT | Mod: LT | Performed by: SPECIALIST

## 2023-07-19 PROCEDURE — C1894 INTRO/SHEATH, NON-LASER: HCPCS | Performed by: SPECIALIST

## 2023-07-19 PROCEDURE — C1769 GUIDE WIRE: HCPCS | Performed by: SPECIALIST

## 2023-07-19 PROCEDURE — 99152 MOD SED SAME PHYS/QHP 5/>YRS: CPT | Mod: ,,, | Performed by: SPECIALIST

## 2023-07-19 PROCEDURE — 36903 PR INTRO CATH, DIALYSIS CIRCUIT W/TRANSCATH PLCMNT, STENT: ICD-10-PCS | Mod: LT,,, | Performed by: SPECIALIST

## 2023-07-19 RX ORDER — MIDAZOLAM HYDROCHLORIDE 1 MG/ML
INJECTION INTRAMUSCULAR; INTRAVENOUS
Status: DISCONTINUED | OUTPATIENT
Start: 2023-07-19 | End: 2023-07-24 | Stop reason: HOSPADM

## 2023-07-19 RX ORDER — LIDOCAINE HYDROCHLORIDE 20 MG/ML
INJECTION, SOLUTION INFILTRATION; PERINEURAL
Status: DISCONTINUED | OUTPATIENT
Start: 2023-07-19 | End: 2023-07-24 | Stop reason: HOSPADM

## 2023-07-19 RX ORDER — HYDROCODONE BITARTRATE AND ACETAMINOPHEN 5; 325 MG/1; MG/1
1 TABLET ORAL EVERY 4 HOURS PRN
Status: CANCELLED | OUTPATIENT
Start: 2023-07-19

## 2023-07-19 RX ORDER — FENTANYL CITRATE 50 UG/ML
INJECTION, SOLUTION INTRAMUSCULAR; INTRAVENOUS
Status: DISCONTINUED | OUTPATIENT
Start: 2023-07-19 | End: 2023-07-24 | Stop reason: HOSPADM

## 2023-07-19 RX ORDER — SODIUM CHLORIDE 9 MG/ML
INJECTION, SOLUTION INTRAVENOUS CONTINUOUS
Status: DISCONTINUED | OUTPATIENT
Start: 2023-07-19 | End: 2023-07-24 | Stop reason: HOSPADM

## 2023-07-19 RX ORDER — HEPARIN SODIUM 1000 [USP'U]/ML
INJECTION, SOLUTION INTRAVENOUS; SUBCUTANEOUS
Status: DISCONTINUED | OUTPATIENT
Start: 2023-07-19 | End: 2023-07-24 | Stop reason: HOSPADM

## 2023-07-19 RX ADMIN — SODIUM CHLORIDE: 9 INJECTION, SOLUTION INTRAVENOUS at 05:07

## 2023-07-19 NOTE — Clinical Note
A percutaneous stick to the L AVF artery was performed. Ultrasound guidance was used to obtain access.

## 2023-07-19 NOTE — DISCHARGE INSTRUCTIONS
- Follow-up in 2 weeks with Dr. Roberson (office should contact you regarding)  - Remove dressing in 24hrs  - No driving for 24 hours  - Do not lift anything heavier than a gallon of milk for 5 days  - No lotions, powders, creams around site for 5 days  - Return to the nearest emergency room if you start running a fever; have any kind of discharge coming from the site, the site looks red or swollen.  - If site starts to bleed, lay flat on the ground, apply pressure to the site and call 911.

## 2023-07-19 NOTE — Clinical Note
A venogram was performed in the L AVF. The vessel was injected via hand injection  with 10 mL of contrast.

## 2023-07-19 NOTE — Clinical Note
The left radial and left brachial was prepped. The site was prepped with ChloraPrep. The site was clipped. The patient was draped. The patient was positioned supine. The patient was secured using safety straps, with ulnar pads and to an armboard.

## 2023-07-19 NOTE — Clinical Note
A venogram was performed in the LAVF. The vessel was injected via hand injection  with 10 mL of contrast.

## 2023-07-19 NOTE — OP NOTE
[facility] - Cath Lab Services  General Surgery  Operative Note    SUMMARY     Date of Procedure: 7/19/2023     Procedure:  Left arm fistulogram with balloon angioplasty and stenting (50405)    Surgeon(s) and Role:     * Daren Roberson MD - Primary    Assisting Surgeon: None    Pre-Operative Diagnosis: Mechanical complication of arteriovenous fistula surgically created, subsequent encounter [T82.590D]  ESRD (end stage renal disease) [N18.6]    Post-Operative Diagnosis: Post-Op Diagnosis Codes:     * Mechanical complication of arteriovenous fistula surgically created, subsequent encounter [T82.590D]     * ESRD (end stage renal disease) [N18.6]    Anesthesia: RN IV Sedation    Operative Findings (including complications, if any):  Central venous outflow to left arm basilic fistula is patent without stenosis.  There was a distal fistula stent that was about 95% stenosed.  This was distal to the previously angioplasty lesion in the distal fistula.  Successfully angioplastied and stented.  Ultimately a 9 mm x 5 cm viabahn stent was placed.   There was a 2nd stenosis found in the proximal fistula that was about 65% stenosed.  Balloon angioplasty was performed at that location.  There was a small site extravasation the did not maintain contrast staining.  Pressure was held procedural and subsequent images were taken without significant change in size.  Additional pressure was held after this.  There was no evidence of any change to the fistula at that site with palpation at case completion.    Description of Technical Procedures: Mrs. Haider was taken to the fluoroscopy suite and placed in a supine position.  Her left arm was prepped and draped in the usual sterile fashion.  An appropriate time-out was performed.  The sound was utilized to evaluate the fistula and a site of cannulation was selected in the mid fistula.  Stent lidocaine was injected a mini stick needle and wire were placed.  Mini stick  sheath was placed and then we exchanged for a short 6 Italian sheath.  Fistulogram was performed.  This did show a patent central venous outflow.  Additionally there was a high-grade stenosis at the swing segment that was about 95%.  There was a 2nd stenosis in the proximal fistula that was approximately 60% stenosed.  Heparin was administered.  Wire was crossed through the stenosis in the distal fistula at the swing segment.  A 7 mm balloon was used at this site some resolution of stenosis.  A 9 mm cm viabahn stent was then utilized at the location.  Upsizing to an 8 Italian sheath was required.  There was complete resolution of stenosis with no evidence of complication.  There was improvement in the significant pulsatility that was present in the fistula prior to this.  A 4-0 Monocryl was placed about the sheath site and the sheath was removed.  A 2nd 6 Italian sheath was placed directed in the arterial direction using a manner similar to our 1st cannulation.  We are able to cross the area of stenosis and utilized our 7 mm balloon at that site.  We got good resolution of stenosis but there was some irregularity to the vessel walls.  There was a hint of sedation that did not result in staining in we held pressure for some time in the procedure lab then shot additional pictures saw no change in the site either angiographically or on physical exam.  Our sheath was removed pressure was held.  We several more minutes of pressure over the site of concern.  There was still no evidence of change at case completion.  I did re-evaluate her about 20 minutes after the procedure and there was some mild bulging at the site. Additional pressure is being held.  Will watch her for some time to ensure no further progression.    Significant Surgical Tasks Conducted by the Assistant(s), if Applicable: none    Estimated Blood Loss (EBL): Less than 30ml           Implants: * No implants in log *    Specimens:   Specimen (24h ago, onward)       None                    Condition: Stable    Disposition: PACU - hemodynamically stable.    Attestation: I was present and scrubbed for the entire procedure.

## 2023-07-19 NOTE — Clinical Note
The balloon was inflated with indeflator in the  . The balloon max pressure was 20 joceline for 20 seconds

## 2023-07-31 NOTE — PROGRESS NOTES
[facility] Vascular - Clinic Note  Daren Roberson MD      Patient Name: Rayna Haider                   : 1935     MRN: [id number]   Visit Date: 2023          History Present Illness     Reason for Visit: Hemodialysis Access       Ms. Haider presents to the clinic for 2 week follow up s/p left basilic fistulogram 23. She denies drainage or worsening pain. She denies fevers. She denies left hand numbness or fatigue. She is on hemodialysis using a right chest wall tunneled catheter.     She reports recent swollen cornea (left) that she is currently taking eye drops for.     REVIEW OF SYSTEMS:  ROS  12 point review of systems conducted, negative except as stated in the history of present illness. See HPI for details.        Physical Exam      Visit Vitals  /69 (BP Location: Right arm)   Pulse 70   LMP  (LMP Unknown)       General: well-nourished, no acute distress, and healthy appearing, ambulating normally, alert, pleasant, conversant, and oriented  Neurologic: cranial nerves are grossly intact, no neurologic deficits  HEENT: grossly normal and no scleral icterus  Neck/Chest: normal  and soft without lymphadenopathy  Respiratory: breathing easily and without respiratory distress  Abdomen: normal and soft  Cardiology: regular rate and rhythm    Upper Extremity Arterial Exam:   Left - radial is palpable    Dialysis Access:  left brachiobasilic fistula and right chest wall tunneled catheter  Assessment: good thrill, without pulsatility  Sonosite: proximal diameter 6.0 mm, middle diameter 6.7 mm, distal fistula 7.7 mm with patent stent    Musculoskeletal:   Upper Extremity: normal left hand function, left hand is warm           Assessment and Plan     Ms. Haider is a 88 y.o. with end stage renal disease who is s/p left brachiobasilic fistulogram with distal fistula stenting and proximal angioplasty on 23. Sonostie imaging obtained today revealed adequate maturation of fistula with  good size and good thrill. On exam, access has a good thrill and no pulsatility. OK to begin cannulation on 23 starting with 17g needles for 2 weeks, then progress needle size as tolerated. Once her new access has functioned reliably for 2 weeks or more, OK to remove right chest wall  tunneled catheter.        1. Mechanical complication of arteriovenous fistula surgically created, subsequent encounter    2. ESRD (end stage renal disease)           Imaging Obtained/Reviewed   Study: none  Date:              Medical History     Past Medical History:   Diagnosis Date    CHF (congestive heart failure)     Hyperkalemia     Hypertension     On home oxygen therapy     wears at night    Vitamin D deficiency      Past Surgical History:   Procedure Laterality Date    ANGIOGRAPHY OF ARTERIOVENOUS SHUNT Left 2023    Procedure: Fistulogram with Possible Intervention;  Surgeon: Daren Roberson MD;  [place] [facility] [place];  Service: Cardiology;  Laterality: Left;    ANGIOGRAPHY OF ARTERIOVENOUS SHUNT Left 2023    Procedure: Fistulogram with Possible Intervention;  Surgeon: Daren Roberson MD;  Location: [facility];  Service: Cardiology;  Laterality: Left;  Requesting 7:00am start    CATARACT EXTRACTION Bilateral      SECTION      EYE SURGERY Left     HYSTERECTOMY      has ovaries    INSERTION OF TUNNELED CENTRAL VENOUS HEMODIALYSIS CATHETER N/A 2023    Procedure: INSERTION, CATHETER, CENTRAL VENOUS, HEMODIALYSIS, TUNNELED;  Surgeon: Eric Angela DO;  Location: [facility];  Service: Nephrology;  Laterality: N/A;    PLACEMENT OF ARTERIOVENOUS GRAFT Left 3/22/2023    Procedure: INSERTION, GRAFT, ARTERIOVENOUS;  Surgeon: Daren Roberson MD;  Location: [facility] [place];  Service: Peripheral Vascular;  Laterality: Left;  LEFT BASILIC TRANSPOSITION -VS- GRAFT PLACEMENT // XX  //  SUPINE //   SUPRACLAVICULAR BLOCK    RETINAL DETACHMENT SURGERY Left     SKIN GRAFT Right     burns, scald    wisdom Left       Family History   Problem Relation Age of Onset    Cancer Mother     Glaucoma Mother     Diabetes Father     Cancer Father     Cancer Brother     Diabetes Brother      Social History     Socioeconomic History    Marital status:    Tobacco Use    Smoking status: Never    Smokeless tobacco: Never   Substance and Sexual Activity    Alcohol use: Not Currently    Drug use: Never    Sexual activity: Not Currently     Current Outpatient Medications   Medication Instructions    aspirin 81 mg, Oral, Daily    blood sugar diagnostic (FREESTYLE LITE STRIPS) Strp 200 each, Misc.(Non-Drug; Combo Route), Daily    blood-glucose meter (FREESTYLE LITE METER) kit Use as instructed    carvediloL (COREG) 6.25 mg, Oral, 2 times daily with meals    cloNIDine (CATAPRES) 0.2 MG tablet 1 tablet, Oral, 2 times daily    ferric citrate (AURYXIA ORAL) Oral    furosemide (LASIX) 40 MG tablet furosemide 40 mg tablet   Take 1 tablet every day by oral route as directed for 30 days.    LORazepam (ATIVAN) 0.5 mg, Oral, Daily PRN    magnesium hydroxide 400 mg/5 ml (MILK OF MAGNESIA) 400 mg/5 mL Susp 30 mLs, Oral, Daily PRN    polyethylene glycol (GLYCOLAX) 17 gram PwPk Oral    senna (SENOKOT) 8.6 mg tablet 1 tablet, Oral, As needed (PRN)     Review of patient's allergies indicates:   Allergen Reactions    Hydralazide Nausea And Vomiting     Pt also gets Tremors when taking this med    Valsartan        Patient Care Team:  David Moore MD as PCP - General (Internal Medicine)  Destiny Gibbons MD as Consulting Physician (Cardiovascular Disease)  Bebeto Reilly MD as Consulting Physician (Ophthalmology)  Mustapha Zavaleta MD as Consulting Physician (Nephrology)  [facility] (Dialysis Center)      No follow-ups on file. In addition to their scheduled follow up, the patient has also been instructed to follow up on as needed basis.     Future Appointments   Date Time Provider Department Center   9/12/2023 11:00 AM Rosie GIBBONS  MD [place] [id number]

## 2023-08-01 ENCOUNTER — OFFICE VISIT (OUTPATIENT)
Dept: VASCULAR SURGERY | Facility: CLINIC | Age: 88
End: 2023-08-01
Payer: MEDICARE

## 2023-08-01 VITALS — SYSTOLIC BLOOD PRESSURE: 138 MMHG | HEART RATE: 70 BPM | DIASTOLIC BLOOD PRESSURE: 69 MMHG

## 2023-08-01 DIAGNOSIS — N18.6 ESRD (END STAGE RENAL DISEASE): ICD-10-CM

## 2023-08-01 DIAGNOSIS — T82.590D MECHANICAL COMPLICATION OF ARTERIOVENOUS FISTULA SURGICALLY CREATED, SUBSEQUENT ENCOUNTER: Primary | ICD-10-CM

## 2023-08-01 PROCEDURE — 99024 PR POST-OP FOLLOW-UP VISIT: ICD-10-PCS | Mod: ,,, | Performed by: SPECIALIST

## 2023-08-01 PROCEDURE — 99024 POSTOP FOLLOW-UP VISIT: CPT | Mod: ,,, | Performed by: SPECIALIST

## 2023-08-18 DIAGNOSIS — N18.6 END STAGE RENAL DISEASE: Primary | ICD-10-CM

## 2023-08-21 ENCOUNTER — TELEPHONE (OUTPATIENT)
Dept: VASCULAR SURGERY | Facility: CLINIC | Age: 88
End: 2023-08-21
Payer: MEDICARE

## 2023-08-21 NOTE — TELEPHONE ENCOUNTER
Catheter removal referral received with request to schedule out a few weeks due to cannulation issues. I contacted the nurse to further understand the difficult. She states some cannulation techs are able to successfully cannulate and others are not and require 1 needle to 1 port for treatments. She denies infiltrations. Advised to assess the progression of successful cannulation over the next week and alert us if there is not much improvement. She is s/p fistulogram 5/19/23 and 7/19/23. Advised it is reasonable to consider repeat evaluation if they are unable to gain 2 needle cannulation soon. She states understanding.

## 2023-08-23 DIAGNOSIS — T82.590A MECHANICAL COMPLICATION OF ARTERIOVENOUS FISTULA SURGICALLY CREATED, INITIAL ENCOUNTER: Primary | ICD-10-CM

## 2023-08-23 NOTE — PROGRESS NOTES
Continued issues with 2 needle cannulation. Reviewed with Dr. Roberson who would like her to follow up in the clinic with a duplex of the access. Communicated plan of care with her dialysis unit and the patient's daughter.

## 2023-08-28 NOTE — PROGRESS NOTES
[facility] Vascular - Clinic Note  Daren Roberson MD      Patient Name: Rayna Haider                   : 1935     MRN: [id number]   Visit Date: 2023          History Present Illness     Reason for Visit: Hemodialysis Access       Ms. Haider presents to the clinic for evaluation of her left arm fistula. She is s/p left basilic fistula creation with subsequent interventions. Duplex of her access obtained today. Her dialysis unit reports difficulty with 2 needle cannulation. She reports no trouble recently when using access Saturday and Tuesday. She reports bruising to her left upper arm was caused by an infiltration she felt was due to her movement during the treatment. She reports using catheter last week to run treatment and denies trouble with catheter use.  She denies any left limb pain or dysfunction. She denies any signs of infection to right wall catheter.      REVIEW OF SYSTEMS:  ROS  12 point review of systems conducted, negative except as stated in the history of present illness. See HPI for details.        Physical Exam      Visit Vitals  /71 (BP Location: Right arm)   Pulse 61   Ht 5' (1.524 m)   Wt 68.5 kg (151 lb)   LMP  (LMP Unknown)   BMI 29.49 kg/m²       General: well-nourished, no acute distress, and healthy appearing, ambulating with a walker, alert, pleasant, conversant, and oriented  Neurologic: cranial nerves are grossly intact, no neurologic deficits  HEENT: grossly normal and no scleral icterus  Neck/Chest: normal  and soft without lymphadenopathy  Respiratory: breathing easily and without respiratory distress  Abdomen: normal and soft  Cardiology: regular rate and rhythm    Upper Extremity Arterial Exam:   Left - radial is palpable    Dialysis Access:  left brachiobasilic fistula with evidence of recent infiltration bruising around the cannulation site.  The fistula has a thrill but also has pulsatility.  Assessment:     Musculoskeletal:   Upper Extremity:  normal left hand function, left hand is warm  Lower Extremity: no edema present to bilateral lower extremities    Skin: bruising to left upper extremity            Assessment and Plan     Ms. Haider is a 88 y.o. with with end stage renal disease (ESRD) on dialysis who is s/p left brachiobasilic fistula fistulogram on 7/14/23 with swing segment stenting and proximal angioplasty. She has remained with cannulation issues.  On exam she has bruising and some pulsatility. Duplex obtained today shows re-narrowing within the distal fistula. The access would benefit from repeat fistulogram with angioplasty of the distal fistula. We discussed abandoning this access with graft placement - however after lengthy discussion I feel there is still likelihood that we can resolve the current lesion and obtain sustained patency. She would like to proceed with fistulogram and postpone graft placement if the need arises in the future.    I would recommend she dialyze with her catheter and allow the fistula to rest.       1. Mechanical complication of arteriovenous fistula surgically created, initial encounter    2. End stage renal disease           Imaging Obtained/Reviewed   Study: hemodialysis duplex  Date:   8/29/23           Medical History     Past Medical History:   Diagnosis Date    CHF (congestive heart failure)     Hyperkalemia     Hypertension     On home oxygen therapy     wears at night    Vitamin D deficiency      Past Surgical History:   Procedure Laterality Date    ANGIOGRAPHY OF ARTERIOVENOUS SHUNT Left 5/19/2023    Procedure: Fistulogram with Possible Intervention;  Surgeon: Daren Roberson MD;  Location: [facility] CATH LAB;  Service: Cardiology;  Laterality: Left;    ANGIOGRAPHY OF ARTERIOVENOUS SHUNT Left 7/19/2023    Procedure: Fistulogram with Possible Intervention;  Surgeon: Daren Roberson MD;  Location: [facility] CATH LAB;  Service: Cardiology;  Laterality: Left;  Requesting 7:00am start    CATARACT EXTRACTION  Bilateral      SECTION      EYE SURGERY Left     HYSTERECTOMY      has ovaries    INSERTION OF TUNNELED CENTRAL VENOUS HEMODIALYSIS CATHETER N/A 2023    Procedure: INSERTION, CATHETER, CENTRAL VENOUS, HEMODIALYSIS, TUNNELED;  Surgeon: Eric Angela DO;  Location: [facility] CATH LAB;  Service: Nephrology;  Laterality: N/A;    PLACEMENT OF ARTERIOVENOUS GRAFT Left 3/22/2023    Procedure: INSERTION, GRAFT, ARTERIOVENOUS;  Surgeon: Daren Roberson MD;  Location: [facility] OR;  Service: Peripheral Vascular;  Laterality: Left;  LEFT BASILIC TRANSPOSITION -VS- GRAFT PLACEMENT // XX  //  SUPINE //   SUPRACLAVICULAR BLOCK    RETINAL DETACHMENT SURGERY Left     SKIN GRAFT Right     burns, scald    wisdom Left      Family History   Problem Relation Age of Onset    Cancer Mother     Glaucoma Mother     Diabetes Father     Cancer Father     Cancer Brother     Diabetes Brother      Social History     Socioeconomic History    Marital status:    Tobacco Use    Smoking status: Never    Smokeless tobacco: Never   Substance and Sexual Activity    Alcohol use: Not Currently    Drug use: Never    Sexual activity: Not Currently     Current Outpatient Medications   Medication Instructions    aspirin 81 mg, Oral, Daily    blood sugar diagnostic (FREESTYLE LITE STRIPS) Strp 200 each, Misc.(Non-Drug; Combo Route), Daily    blood-glucose meter (FREESTYLE LITE METER) kit Use as instructed    carvediloL (COREG) 6.25 mg, Oral, 2 times daily with meals    cloNIDine (CATAPRES) 0.2 MG tablet 1 tablet, Oral, 2 times daily    ferric citrate (AURYXIA ORAL) Oral    furosemide (LASIX) 40 MG tablet furosemide 40 mg tablet   Take 1 tablet every day by oral route as directed for 30 days.    LORazepam (ATIVAN) 0.5 mg, Oral, Daily PRN    magnesium hydroxide 400 mg/5 ml (MILK OF MAGNESIA) 400 mg/5 mL Susp 30 mLs, Oral, Daily PRN    polyethylene glycol (GLYCOLAX) 17 gram PwPk Oral    senna (SENOKOT) 8.6 mg tablet 1 tablet, Oral, As needed (PRN)      Review of patient's allergies indicates:   Allergen Reactions    Hydralazide Nausea And Vomiting     Pt also gets Tremors when taking this med    Valsartan        Patient Care Team:  David Moore MD as PCP - General (Internal Medicine)  Destiny Gibbons MD as Consulting Physician (Cardiovascular Disease)  Bebeto Reilly MD as Consulting Physician (Ophthalmology)  Mustapha Zavaleta MD as Consulting Physician (Nephrology)  [facility] (Dialysis Center)      No follow-ups on file. In addition to their scheduled follow up, the patient has also been instructed to follow up on as needed basis.     Future Appointments   Date Time Provider Department Center   9/12/2023 11:00 AM Rosie Flores MD [place] [id number]   9/14/2023  8:10 AM Daren Roberson MD [facility]

## 2023-08-28 NOTE — H&P (VIEW-ONLY)
[facility] Vascular - Clinic Note  Daren Roberson MD      Patient Name: Rayna Haider                   : 1935     MRN: [id number]   Visit Date: 2023          History Present Illness     Reason for Visit: Hemodialysis Access       Ms. Haider presents to the clinic for evaluation of her left arm fistula. She is s/p left basilic fistula creation with subsequent interventions. Duplex of her access obtained today. Her dialysis unit reports difficulty with 2 needle cannulation. She reports no trouble recently when using access Saturday and Tuesday. She reports bruising to her left upper arm was caused by an infiltration she felt was due to her movement during the treatment. She reports using catheter last week to run treatment and denies trouble with catheter use.  She denies any left limb pain or dysfunction. She denies any signs of infection to right wall catheter.      REVIEW OF SYSTEMS:  ROS  12 point review of systems conducted, negative except as stated in the history of present illness. See HPI for details.        Physical Exam      Visit Vitals  /71 (BP Location: Right arm)   Pulse 61   Ht 5' (1.524 m)   Wt 68.5 kg (151 lb)   LMP  (LMP Unknown)   BMI 29.49 kg/m²       General: well-nourished, no acute distress, and healthy appearing, ambulating with a walker, alert, pleasant, conversant, and oriented  Neurologic: cranial nerves are grossly intact, no neurologic deficits  HEENT: grossly normal and no scleral icterus  Neck/Chest: normal  and soft without lymphadenopathy  Respiratory: breathing easily and without respiratory distress  Abdomen: normal and soft  Cardiology: regular rate and rhythm    Upper Extremity Arterial Exam:   Left - radial is palpable    Dialysis Access:  left brachiobasilic fistula with evidence of recent infiltration bruising around the cannulation site.  The fistula has a thrill but also has pulsatility.  Assessment:     Musculoskeletal:   Upper Extremity:  normal left hand function, left hand is warm  Lower Extremity: no edema present to bilateral lower extremities    Skin: bruising to left upper extremity            Assessment and Plan     Ms. Haider is a 88 y.o. with with end stage renal disease (ESRD) on dialysis who is s/p left brachiobasilic fistula fistulogram on 7/14/23 with swing segment stenting and proximal angioplasty. She has remained with cannulation issues.  On exam she has bruising and some pulsatility. Duplex obtained today shows re-narrowing within the distal fistula. The access would benefit from repeat fistulogram with angioplasty of the distal fistula. We discussed abandoning this access with graft placement - however after lengthy discussion I feel there is still likelihood that we can resolve the current lesion and obtain sustained patency. She would like to proceed with fistulogram and postpone graft placement if the need arises in the future.    I would recommend she dialyze with her catheter and allow the fistula to rest.       1. Mechanical complication of arteriovenous fistula surgically created, initial encounter    2. End stage renal disease           Imaging Obtained/Reviewed   Study: hemodialysis duplex  Date:   8/29/23           Medical History     Past Medical History:   Diagnosis Date    CHF (congestive heart failure)     Hyperkalemia     Hypertension     On home oxygen therapy     wears at night    Vitamin D deficiency      Past Surgical History:   Procedure Laterality Date    ANGIOGRAPHY OF ARTERIOVENOUS SHUNT Left 5/19/2023    Procedure: Fistulogram with Possible Intervention;  Surgeon: Daren Roberson MD;  Location: [facility] CATH LAB;  Service: Cardiology;  Laterality: Left;    ANGIOGRAPHY OF ARTERIOVENOUS SHUNT Left 7/19/2023    Procedure: Fistulogram with Possible Intervention;  Surgeon: Daren Roberson MD;  Location: [facility] CATH LAB;  Service: Cardiology;  Laterality: Left;  Requesting 7:00am start    CATARACT EXTRACTION  Bilateral      SECTION      EYE SURGERY Left     HYSTERECTOMY      has ovaries    INSERTION OF TUNNELED CENTRAL VENOUS HEMODIALYSIS CATHETER N/A 2023    Procedure: INSERTION, CATHETER, CENTRAL VENOUS, HEMODIALYSIS, TUNNELED;  Surgeon: Eric Angela DO;  Location: [facility] CATH LAB;  Service: Nephrology;  Laterality: N/A;    PLACEMENT OF ARTERIOVENOUS GRAFT Left 3/22/2023    Procedure: INSERTION, GRAFT, ARTERIOVENOUS;  Surgeon: Daren Roberson MD;  Location: [facility] OR;  Service: Peripheral Vascular;  Laterality: Left;  LEFT BASILIC TRANSPOSITION -VS- GRAFT PLACEMENT // XX  //  SUPINE //   SUPRACLAVICULAR BLOCK    RETINAL DETACHMENT SURGERY Left     SKIN GRAFT Right     burns, scald    wisdom Left      Family History   Problem Relation Age of Onset    Cancer Mother     Glaucoma Mother     Diabetes Father     Cancer Father     Cancer Brother     Diabetes Brother      Social History     Socioeconomic History    Marital status:    Tobacco Use    Smoking status: Never    Smokeless tobacco: Never   Substance and Sexual Activity    Alcohol use: Not Currently    Drug use: Never    Sexual activity: Not Currently     Current Outpatient Medications   Medication Instructions    aspirin 81 mg, Oral, Daily    blood sugar diagnostic (FREESTYLE LITE STRIPS) Strp 200 each, Misc.(Non-Drug; Combo Route), Daily    blood-glucose meter (FREESTYLE LITE METER) kit Use as instructed    carvediloL (COREG) 6.25 mg, Oral, 2 times daily with meals    cloNIDine (CATAPRES) 0.2 MG tablet 1 tablet, Oral, 2 times daily    ferric citrate (AURYXIA ORAL) Oral    furosemide (LASIX) 40 MG tablet furosemide 40 mg tablet   Take 1 tablet every day by oral route as directed for 30 days.    LORazepam (ATIVAN) 0.5 mg, Oral, Daily PRN    magnesium hydroxide 400 mg/5 ml (MILK OF MAGNESIA) 400 mg/5 mL Susp 30 mLs, Oral, Daily PRN    polyethylene glycol (GLYCOLAX) 17 gram PwPk Oral    senna (SENOKOT) 8.6 mg tablet 1 tablet, Oral, As needed (PRN)      Review of patient's allergies indicates:   Allergen Reactions    Hydralazide Nausea And Vomiting     Pt also gets Tremors when taking this med    Valsartan        Patient Care Team:  David Moore MD as PCP - General (Internal Medicine)  Destiny Gibbons MD as Consulting Physician (Cardiovascular Disease)  Bebeto Reilly MD as Consulting Physician (Ophthalmology)  Mustapha Zavaleta MD as Consulting Physician (Nephrology)  [facility] (Dialysis Center)      No follow-ups on file. In addition to their scheduled follow up, the patient has also been instructed to follow up on as needed basis.     Future Appointments   Date Time Provider Department Center   9/12/2023 11:00 AM Rosie Flores MD [place] [id number]   9/14/2023  8:10 AM Daren Roberson MD [facility]

## 2023-08-29 ENCOUNTER — OFFICE VISIT (OUTPATIENT)
Dept: VASCULAR SURGERY | Facility: CLINIC | Age: 88
End: 2023-08-29
Payer: MEDICARE

## 2023-08-29 VITALS
WEIGHT: 151 LBS | BODY MASS INDEX: 29.64 KG/M2 | HEART RATE: 61 BPM | DIASTOLIC BLOOD PRESSURE: 71 MMHG | HEIGHT: 60 IN | SYSTOLIC BLOOD PRESSURE: 126 MMHG

## 2023-08-29 DIAGNOSIS — N18.6 END STAGE RENAL DISEASE: ICD-10-CM

## 2023-08-29 DIAGNOSIS — T82.590A MECHANICAL COMPLICATION OF ARTERIOVENOUS FISTULA SURGICALLY CREATED, INITIAL ENCOUNTER: Primary | ICD-10-CM

## 2023-08-29 PROCEDURE — 99213 PR OFFICE/OUTPT VISIT, EST, LEVL III, 20-29 MIN: ICD-10-PCS | Mod: ,,, | Performed by: SPECIALIST

## 2023-08-29 PROCEDURE — 99213 OFFICE O/P EST LOW 20 MIN: CPT | Mod: ,,, | Performed by: SPECIALIST

## 2023-08-29 RX ORDER — SODIUM CHLORIDE 9 MG/ML
INJECTION, SOLUTION INTRAVENOUS CONTINUOUS
Status: CANCELLED | OUTPATIENT
Start: 2023-09-08

## 2023-08-31 RX ORDER — FOLIC ACID 1 MG/1
1000 TABLET ORAL DAILY
COMMUNITY
Start: 2023-07-20

## 2023-08-31 RX ORDER — SEVELAMER CARBONATE 800 MG/1
800 TABLET, FILM COATED ORAL 3 TIMES DAILY
COMMUNITY
Start: 2023-04-13 | End: 2023-09-12

## 2023-08-31 RX ORDER — TRAMADOL HYDROCHLORIDE 50 MG/1
50 TABLET ORAL DAILY PRN
Status: ON HOLD | COMMUNITY
Start: 2023-03-13 | End: 2023-09-08

## 2023-08-31 RX ORDER — IBUPROFEN 800 MG
400 TABLET ORAL DAILY
COMMUNITY
Start: 2023-07-20

## 2023-08-31 RX ORDER — AMLODIPINE BESYLATE 10 MG/1
10 TABLET ORAL DAILY
COMMUNITY
Start: 2023-08-25 | End: 2023-11-29

## 2023-08-31 RX ORDER — CALCIUM CARBONATE 200(500)MG
1 TABLET,CHEWABLE ORAL DAILY PRN
COMMUNITY
Start: 2023-08-01 | End: 2023-09-12

## 2023-09-05 ENCOUNTER — TELEPHONE (OUTPATIENT)
Dept: INTERNAL MEDICINE | Facility: CLINIC | Age: 88
End: 2023-09-05
Payer: MEDICARE

## 2023-09-08 ENCOUNTER — HOSPITAL ENCOUNTER (OUTPATIENT)
Facility: HOSPITAL | Age: 88
Discharge: HOME | End: 2023-09-08
Attending: SPECIALIST | Admitting: SPECIALIST
Payer: MEDICARE

## 2023-09-08 VITALS
BODY MASS INDEX: 30.38 KG/M2 | HEART RATE: 59 BPM | WEIGHT: 154.75 LBS | TEMPERATURE: 99 F | HEIGHT: 60 IN | DIASTOLIC BLOOD PRESSURE: 46 MMHG | OXYGEN SATURATION: 95 % | SYSTOLIC BLOOD PRESSURE: 100 MMHG

## 2023-09-08 DIAGNOSIS — N18.6 END STAGE RENAL DISEASE: ICD-10-CM

## 2023-09-08 DIAGNOSIS — T82.590A MECHANICAL COMPLICATION OF ARTERIOVENOUS FISTULA SURGICALLY CREATED, INITIAL ENCOUNTER: ICD-10-CM

## 2023-09-08 DIAGNOSIS — T82.590D MECHANICAL COMPLICATION OF ARTERIOVENOUS FISTULA SURGICALLY CREATED, SUBSEQUENT ENCOUNTER: Primary | ICD-10-CM

## 2023-09-08 LAB
ANION GAP SERPL CALC-SCNC: 8 MEQ/L
BUN SERPL-MCNC: 23.7 MG/DL (ref 9.8–20.1)
CALCIUM SERPL-MCNC: 9.8 MG/DL (ref 8.4–10.2)
CHLORIDE SERPL-SCNC: 98 MMOL/L (ref 98–107)
CO2 SERPL-SCNC: 28 MMOL/L (ref 23–31)
CREAT SERPL-MCNC: 3.13 MG/DL (ref 0.55–1.02)
CREAT/UREA NIT SERPL: 8
GFR SERPLBLD CREATININE-BSD FMLA CKD-EPI: 14 MLS/MIN/1.73/M2
GLUCOSE SERPL-MCNC: 108 MG/DL (ref 82–115)
POTASSIUM SERPL-SCNC: 4.1 MMOL/L (ref 3.5–5.1)
SODIUM SERPL-SCNC: 134 MMOL/L (ref 136–145)

## 2023-09-08 PROCEDURE — C1894 INTRO/SHEATH, NON-LASER: HCPCS | Performed by: SPECIALIST

## 2023-09-08 PROCEDURE — 25000003 PHARM REV CODE 250: Performed by: SPECIALIST

## 2023-09-08 PROCEDURE — C1725 CATH, TRANSLUMIN NON-LASER: HCPCS | Performed by: SPECIALIST

## 2023-09-08 PROCEDURE — 80048 BASIC METABOLIC PNL TOTAL CA: CPT | Performed by: SPECIALIST

## 2023-09-08 PROCEDURE — C1769 GUIDE WIRE: HCPCS | Performed by: SPECIALIST

## 2023-09-08 PROCEDURE — 36902 INTRO CATH DIALYSIS CIRCUIT: CPT | Mod: LT,,, | Performed by: SPECIALIST

## 2023-09-08 PROCEDURE — 25500020 PHARM REV CODE 255: Performed by: SPECIALIST

## 2023-09-08 PROCEDURE — 99153 MOD SED SAME PHYS/QHP EA: CPT | Performed by: SPECIALIST

## 2023-09-08 PROCEDURE — 36902 INTRO CATH DIALYSIS CIRCUIT: CPT | Mod: LT | Performed by: SPECIALIST

## 2023-09-08 PROCEDURE — 63600175 PHARM REV CODE 636 W HCPCS: Performed by: SPECIALIST

## 2023-09-08 PROCEDURE — 36902 PR INTRO CATH, DIALYSIS CIRCUIT W/TRANSLML BALLOON ANGIO: ICD-10-PCS | Mod: LT,,, | Performed by: SPECIALIST

## 2023-09-08 PROCEDURE — 27201423 OPTIME MED/SURG SUP & DEVICES STERILE SUPPLY: Performed by: SPECIALIST

## 2023-09-08 PROCEDURE — 99152 MOD SED SAME PHYS/QHP 5/>YRS: CPT | Performed by: SPECIALIST

## 2023-09-08 RX ORDER — MIDAZOLAM HYDROCHLORIDE 1 MG/ML
INJECTION INTRAMUSCULAR; INTRAVENOUS
Status: DISCONTINUED | OUTPATIENT
Start: 2023-09-08 | End: 2023-09-08 | Stop reason: HOSPADM

## 2023-09-08 RX ORDER — HEPARIN SODIUM 1000 [USP'U]/ML
INJECTION, SOLUTION INTRAVENOUS; SUBCUTANEOUS
Status: DISCONTINUED | OUTPATIENT
Start: 2023-09-08 | End: 2023-09-08 | Stop reason: HOSPADM

## 2023-09-08 RX ORDER — LIDOCAINE HYDROCHLORIDE 10 MG/ML
INJECTION INFILTRATION; PERINEURAL
Status: DISCONTINUED | OUTPATIENT
Start: 2023-09-08 | End: 2023-09-08 | Stop reason: HOSPADM

## 2023-09-08 RX ORDER — FENTANYL CITRATE 50 UG/ML
INJECTION, SOLUTION INTRAMUSCULAR; INTRAVENOUS
Status: DISCONTINUED | OUTPATIENT
Start: 2023-09-08 | End: 2023-09-08 | Stop reason: HOSPADM

## 2023-09-08 RX ORDER — HYDROCODONE BITARTRATE AND ACETAMINOPHEN 5; 325 MG/1; MG/1
1 TABLET ORAL EVERY 4 HOURS PRN
Status: CANCELLED | OUTPATIENT
Start: 2023-09-08

## 2023-09-08 RX ORDER — SODIUM CHLORIDE 9 MG/ML
INJECTION, SOLUTION INTRAVENOUS CONTINUOUS
Status: DISCONTINUED | OUTPATIENT
Start: 2023-09-08 | End: 2023-09-08 | Stop reason: HOSPADM

## 2023-09-08 NOTE — Clinical Note
20 ml of contrast were injected throughout the case. 30 mL of contrast was the total wasted during the case. 50 mL was the total amount used during the case.

## 2023-09-08 NOTE — INTERVAL H&P NOTE
The patient has been examined and the H&P has been reviewed:    I concur with the findings and no changes have occurred since H&P was written.  Somewhat dehydrated after dialysis yesterday but feels better today.    Surgery risks, benefits and alternative options discussed and understood by patient/family.          There are no hospital problems to display for this patient.

## 2023-09-08 NOTE — Clinical Note
The balloon was inflated with indeflator in the  . The balloon max pressure was 9 joceline for 35 seconds

## 2023-09-08 NOTE — Clinical Note
The left brachial was prepped. The site was prepped with ChloraPrep. The site was clipped. The patient was draped.

## 2023-09-08 NOTE — OP NOTE
[facility] - Cath Lab Services  General Surgery  Operative Note    SUMMARY     Date of Procedure: 9/8/2023     Procedure: Left arm fistulogram with balloon angioplasty    Surgeon(s) and Role:     * Daren Roberson MD - Primary    Assisting Surgeon: None    Pre-Operative Diagnosis: Mechanical complication of arteriovenous fistula surgically created, initial encounter [T82.590A]  End stage renal disease [N18.6]    Post-Operative Diagnosis: Post-Op Diagnosis Codes:     * Mechanical complication of arteriovenous fistula surgically created, initial encounter [T82.590A]     * End stage renal disease [N18.6]    Anesthesia: RN IV Sedation    Operative Findings (including complications, if any):  Left arm basilic fistula did undergo fistulogram.  The fistula was patent with some proximal tortuosity and a high-grade distal stenosis just prior to the previously placed stent.  The stenosis was about 85%.  Plasty to that site with good resolution of stenosis and no evidence of complication.  The remainder of the fistula outflow including the axillary vein left subclavian vein left innominate vein and superior vena cava were all stenosis.    Hopefully she will heal in well at the site and we can reinitiate cannulation soon.  We will have her back in clinic in about 2 weeks with the fistula duplex.    Description of Technical Procedures: Mrs. Haider was taken to the fluoroscopy suite and placed in a supine position prepped and draped in the usual sterile fashion.  An appropriate time-out was performed and moderate sedation was initiated.  Ultrasound was utilized to evaluate the left arm fistula and a site was selected in the proximal fistula.  1% lidocaine was injected in the subcutaneous tissues and stick needle was utilized to cannulate the vessel in the venous direction.  We placed a mini stick sheath and then exchanged for a 6 Swiss sheath.  A fistulogram was then performed.  A high-grade stenosis was  identified in the distal fistula just prior to the previously placed stent graft in the swing segment.  Heparin was administered and two rounds of angioplasty were performed with an 8 cm x 6cm balloon.  We then utilized a 9 cm x 4 cm balloon.  There was good resolution of stenosis and I was satisfied with the result.  There was no evidence of complication.  4-0 Monocryl was placed about the sheath site and the sheath was removed.  There was an improved thrill to the fistula and good resolution    Significant Surgical Tasks Conducted by the Assistant(s), if Applicable: none    Estimated Blood Loss (EBL): * No values recorded between 9/8/2023  8:30 AM and 9/8/2023  8:58 AM *           Implants: * No implants in log *    Specimens:   Specimen (24h ago, onward)      None                    Condition: Good    Disposition: PACU - hemodynamically stable.    Attestation: I was present and scrubbed for the entire procedure.

## 2023-09-08 NOTE — Clinical Note
The balloon was inflated with indeflator in the  . The balloon max pressure was 20 joceline for 36 seconds

## 2023-09-08 NOTE — Clinical Note
The balloon was inflated with indeflator in the  . The balloon max pressure was 20 joceline for 70 seconds

## 2023-09-11 NOTE — PROGRESS NOTES
Subjective:      Patient ID: Rayna Haider is a 88 y.o. female.    Chief Complaint: Establish Care    Ms Way is a 88-year-old female who is here today to establish care.  Her medical comorbidities are significant for hypertension, anxiety, ESRD on hemodialysis on a TTS schedule, congestive heart failure and a previous history of diabetes mellitus type 2 that is now resolved since patient initiated on dialysis.  She has not been on any medications for over 6 months and hemoglobin A1c has remained under 6.    Overall doing well today and has no acute needs or complaints.    Accompanied by her daughter Ms. Rohith MUNGUIA CW:  Next visit       The patient's Health Maintenance was reviewed and the following appears to be due at this time:   Health Maintenance Due   Topic Date Due    TETANUS VACCINE  Never done    Shingles Vaccine (1 of 2) Never done    COVID-19 Vaccine (4 - Pfizer series) 12/07/2021    Lipid Panel  02/23/2023    Influenza Vaccine (1) 09/01/2023        Past Medical History:  Past Medical History:   Diagnosis Date    CHF (congestive heart failure)     CKD (chronic kidney disease)     Hyperkalemia     Hypertension     Mixed hyperlipidemia     On home oxygen therapy     wears at night    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, without long-term current use of insulin     Vitamin D deficiency      Past Surgical History:   Procedure Laterality Date    ANGIOGRAPHY OF ARTERIOVENOUS SHUNT Left 05/19/2023    Procedure: Fistulogram with Possible Intervention;  Surgeon: Daren Roberson MD;  Location: [facility] CATH LAB;  Service: Cardiology;  Laterality: Left;    ANGIOGRAPHY OF ARTERIOVENOUS SHUNT Left 07/19/2023    Procedure: Fistulogram with Possible Intervention;  Surgeon: Daren Roberson MD;  Location: [facility] CATH LAB;  Service: Cardiology;  Laterality: Left;  Requesting 7:00am start    ANGIOGRAPHY OF ARTERIOVENOUS SHUNT Left 09/08/2023     Procedure: Fistulogram with Possible Intervention;  Surgeon: Daren Roberson MD;  Location: [facility] CATH LAB;  Service: Cardiology;  Laterality: Left;    AV FISTULA PLACEMENT Left     CATARACT EXTRACTION Bilateral      SECTION      EYE SURGERY Left     HYSTERECTOMY      has ovaries    INSERTION OF TUNNELED CENTRAL VENOUS HEMODIALYSIS CATHETER N/A 2023    Procedure: INSERTION, CATHETER, CENTRAL VENOUS, HEMODIALYSIS, TUNNELED;  Surgeon: Eric Angela DO;  Location: [facility] CATH LAB;  Service: Nephrology;  Laterality: N/A;    PLACEMENT OF ARTERIOVENOUS GRAFT Left 2023    Procedure: INSERTION, GRAFT, ARTERIOVENOUS;  Surgeon: Daren Roberson MD;  Location: [facility] OR;  Service: Peripheral Vascular;  Laterality: Left;  LEFT BASILIC TRANSPOSITION -VS- GRAFT PLACEMENT // XX  //  SUPINE //   SUPRACLAVICULAR BLOCK    PLACEMENT, MEDIPORT      RETINAL DETACHMENT SURGERY Left     SKIN GRAFT Right     burns, scald    wisdom Left      Review of patient's allergies indicates:   Allergen Reactions    Hydralazide Nausea And Vomiting and Palpitations    Valsartan      Social History     Socioeconomic History    Marital status:    Tobacco Use    Smoking status: Never    Smokeless tobacco: Never   Substance and Sexual Activity    Alcohol use: Not Currently    Drug use: Never    Sexual activity: Not Currently     Partners: Male     Social Determinants of Health     Financial Resource Strain: Low Risk  (2023)    Overall Financial Resource Strain (CARDIA)     Difficulty of Paying Living Expenses: Not hard at all   Food Insecurity: No Food Insecurity (2023)    Hunger Vital Sign     Worried About Running Out of Food in the Last Year: Never true     Ran Out of Food in the Last Year: Never true   Transportation Needs: No Transportation Needs (2023)    PRAPARE - Transportation     Lack of Transportation (Medical): No     Lack of Transportation (Non-Medical): No   Physical Activity: Insufficiently Active  (9/12/2023)    Exercise Vital Sign     Days of Exercise per Week: 2 days     Minutes of Exercise per Session: 20 min   Stress: No Stress Concern Present (9/12/2023)    [employer] [place] [employer] - Occupational Stress Questionnaire     Feeling of Stress : Not at all   Social Connections: Moderately Isolated (9/12/2023)    Social Connection and Isolation Panel [NHANES]     Frequency of Communication with Friends and Family: More than three times a week     Frequency of Social Gatherings with Friends and Family: More than three times a week     Attends [employer] Services: More than 4 times per year     Active Member of Clubs or Organizations: No     Attends Club or Organization Meetings: Never     Marital Status:    Housing Stability: Low Risk  (9/12/2023)    Housing Stability Vital Sign     Unable to Pay for Housing in the Last Year: No     Number of Places Lived in the Last Year: 1     Unstable Housing in the Last Year: No     Family History   Problem Relation Age of Onset    Cancer Mother     Glaucoma Mother     Diabetes Father     Cancer Father     Cancer Brother     Diabetes Brother     Cancer Son        Review of Systems    A comprehensive review of systems was performed and is negative except for that stated above  Objective:   BP (!) (P) 124/54 (BP Location: Right arm, Patient Position: Sitting, BP Method: Small (Manual))   Pulse (P) 78   Temp (P) 98.4 °F (36.9 °C) (Temporal)   Ht 5' (1.524 m)   Wt 69.2 kg (152 lb 9.6 oz)   LMP  (LMP Unknown)   SpO2 (!) (P) 94%   BMI 29.80 kg/m²     Physical Exam  Constitutional:       Appearance: Normal appearance.   HENT:      Head: Normocephalic and atraumatic.      Nose: Nose normal.      Mouth/Throat:      Mouth: Mucous membranes are moist.      Pharynx: Oropharynx is clear.   Eyes:      Extraocular Movements: Extraocular movements intact.      Pupils: Pupils are equal, round, and reactive to light.   Cardiovascular:      Rate and Rhythm: Normal  rate and regular rhythm.      Pulses: Normal pulses.   Pulmonary:      Effort: Pulmonary effort is normal.      Breath sounds: Normal breath sounds.   Abdominal:      General: Bowel sounds are normal.      Palpations: Abdomen is soft.   Musculoskeletal:         General: Normal range of motion.      Cervical back: Normal range of motion and neck supple.   Skin:     General: Skin is warm.   Neurological:      General: No focal deficit present.      Mental Status: She is alert and oriented to person, place, and time. Mental status is at baseline.   Psychiatric:         Mood and Affect: Mood normal.       Assessment/ Plan:   1. Establishing care with new doctor, encounter for  Assessment & Plan:  -history has been reviewed and noted in detail   Medications have been reviewed and reconciled   -plan of care has been discussed with the patient as well as her daughter who is present alongside         2. Primary hypertension  Assessment & Plan:  Well controlled.   Continue amlodipine and carvedilol  Low Sodium Diet (DASH Diet - Less than 2 grams of sodium per day).  Monitor blood pressure daily and log. Report consistent numbers greater than 140/90.  Maintain healthy weight with goal BMI <30. Exercise 30 minutes per day, 5 days per week.  Smoking cessation encouraged to aid in BP reduction.            3. ESRD (end stage renal disease)  Assessment & Plan:  Estimated Creatinine Clearance: 11 mL/min (A) (based on SCr of 3.13 mg/dL (H)).  Last GFR - . Stable from renal standpoint.  Continue  Follow renoprotective measures including Renal Diet (reduce intake of nuts, peanut butter, milk, cheese, dried beans, peas) and Low Sodium Diet (less than 2 grams per day).  Avoid NSAIDs (Aleve, Mobic, Celebrex, Ibuprofen, Advil, Toradol and Diclofenac). May take Tylenol as needed for headache/pain.  Control DM with goal A1C <7. BP goal <130/80. LDL goal < 100.  Stay well hydrated. Avoid alcohol and soda. Limit tea and coffee.  Smoking  Cessation recommended.      4. Anemia of chronic disease  Assessment & Plan:  -monitor labs closely-monitor,      5. Vitamin D deficiency    6. Congestive heart failure, unspecified HF chronicity, unspecified heart failure type  Assessment & Plan:  Patient is identified as having Combined Systolic and Diastolic heart failure that is Chronic. CHF is currently controlled. Latest ECHO performed and demonstrates- Results for orders placed during the hospital encounter of 01/30/23    Echo    Interpretation Summary  · The left ventricle is normal in size with concentric hypertrophy and normal systolic function.  · The estimated ejection fraction is 55%.  · Normal right ventricular size with normal right ventricular systolic function.  · Mild tricuspid regurgitation.  · There is a small left pleural effusion.  . Continue Beta Blocker and monitor clinical status closely. Monitor on telemetry. Patient is on CHF pathway.  Monitor strict Is&Os and daily weights.  Place on fluid restriction of 1.5 L. Cardiology has been consulted. Continue to stress to patient importance of self efficacy and  on diet for CHF. Last BNP reviewed- and noted below @LABRCNTIP(BNP,BNPTRIAGEBLO)@.

## 2023-09-12 ENCOUNTER — OFFICE VISIT (OUTPATIENT)
Dept: INTERNAL MEDICINE | Facility: CLINIC | Age: 88
End: 2023-09-12
Payer: MEDICARE

## 2023-09-12 VITALS — HEIGHT: 60 IN | BODY MASS INDEX: 29.96 KG/M2 | WEIGHT: 152.63 LBS

## 2023-09-12 DIAGNOSIS — I10 PRIMARY HYPERTENSION: Chronic | ICD-10-CM

## 2023-09-12 DIAGNOSIS — D63.8 ANEMIA OF CHRONIC DISEASE: Chronic | ICD-10-CM

## 2023-09-12 DIAGNOSIS — Z76.89 ESTABLISHING CARE WITH NEW DOCTOR, ENCOUNTER FOR: Primary | ICD-10-CM

## 2023-09-12 DIAGNOSIS — N18.6 ESRD (END STAGE RENAL DISEASE): Chronic | ICD-10-CM

## 2023-09-12 DIAGNOSIS — I50.9 CONGESTIVE HEART FAILURE, UNSPECIFIED HF CHRONICITY, UNSPECIFIED HEART FAILURE TYPE: ICD-10-CM

## 2023-09-12 DIAGNOSIS — E55.9 VITAMIN D DEFICIENCY: Chronic | ICD-10-CM

## 2023-09-12 DIAGNOSIS — F41.9 ANXIETY: ICD-10-CM

## 2023-09-12 PROBLEM — E11.3512 TYPE 2 DIABETES MELLITUS WITH LEFT EYE AFFECTED BY PROLIFERATIVE RETINOPATHY AND MACULAR EDEMA, WITHOUT LONG-TERM CURRENT USE OF INSULIN: Chronic | Status: RESOLVED | Noted: 2023-01-23 | Resolved: 2023-09-12

## 2023-09-12 PROCEDURE — 99214 OFFICE O/P EST MOD 30 MIN: CPT | Mod: ,,, | Performed by: INTERNAL MEDICINE

## 2023-09-12 PROCEDURE — 99214 PR OFFICE/OUTPT VISIT, EST, LEVL IV, 30-39 MIN: ICD-10-PCS | Mod: ,,, | Performed by: INTERNAL MEDICINE

## 2023-09-12 RX ORDER — LORAZEPAM 0.5 MG/1
0.5 TABLET ORAL DAILY PRN
Qty: 30 TABLET | Refills: 5 | Status: SHIPPED | OUTPATIENT
Start: 2023-09-12 | End: 2024-03-18

## 2023-09-12 NOTE — ASSESSMENT & PLAN NOTE
-history has been reviewed and noted in detail   Medications have been reviewed and reconciled   -plan of care has been discussed with the patient as well as her daughter who is present alongside

## 2023-09-12 NOTE — ASSESSMENT & PLAN NOTE
Estimated Creatinine Clearance: 11 mL/min (A) (based on SCr of 3.13 mg/dL (H)).  Last GFR - . Stable from renal standpoint.  Continue  Follow renoprotective measures including Renal Diet (reduce intake of nuts, peanut butter, milk, cheese, dried beans, peas) and Low Sodium Diet (less than 2 grams per day).  Avoid NSAIDs (Aleve, Mobic, Celebrex, Ibuprofen, Advil, Toradol and Diclofenac). May take Tylenol as needed for headache/pain.  Control DM with goal A1C <7. BP goal <130/80. LDL goal < 100.  Stay well hydrated. Avoid alcohol and soda. Limit tea and coffee.  Smoking Cessation recommended.

## 2023-09-12 NOTE — LETTER
AUTHORIZATION FOR RELEASE OF   CONFIDENTIAL INFORMATION    Dear Dr Gibbons    We are seeing Rayna Haider, date of birth 1935, in the clinic at [place] [address] [facility]. Rosie Flores MD is the patient's PCP. Rayna Haider has an outstanding lab/procedure at the time we reviewed her chart. In order to help keep her health information updated, she has authorized us to request the following medical record(s):        (  )  MAMMOGRAM                                      (  )  COLONOSCOPY      (  )  PAP SMEAR                                          (X)  OUTSIDE LAB RESULTS     (  )  DEXA SCAN                                          (  )  EYE EXAM            (  )  FOOT EXAM                                          (X)  DIAGNOSTIC TEST     (  )  OUTSIDE IMMUNIZATIONS                 (X)  OFFICE NOTES         Please fax records to [facility], Bhanushali, Reshma A, MD, [phone]               Patient Name: Rayna Haider  : 1935  Patient Phone #: [phone]

## 2023-09-12 NOTE — ASSESSMENT & PLAN NOTE
Well controlled.   Continue amlodipine and carvedilol  Low Sodium Diet (DASH Diet - Less than 2 grams of sodium per day).  Monitor blood pressure daily and log. Report consistent numbers greater than 140/90.  Maintain healthy weight with goal BMI <30. Exercise 30 minutes per day, 5 days per week.  Smoking cessation encouraged to aid in BP reduction.

## 2023-09-12 NOTE — LETTER
AUTHORIZATION FOR RELEASE OF   CONFIDENTIAL INFORMATION    Dear Dr Roberson    We are seeing Rayna Haider, date of birth 1935, in the clinic at [place] [address] [facility]. Rosie Flores MD is the patient's PCP. Rayna Haider has an outstanding lab/procedure at the time we reviewed her chart. In order to help keep her health information updated, she has authorized us to request the following medical record(s):        (  )  MAMMOGRAM                                      (  )  COLONOSCOPY      (  )  PAP SMEAR                                          (X)  OUTSIDE LAB RESULTS     (  )  DEXA SCAN                                          (  )  EYE EXAM            (  )  FOOT EXAM                                          (X)  DIAGNOSTIC TEST     (  )  OUTSIDE IMMUNIZATIONS                 (X)  OFFICE NOTES       Please fax records to [facility], Bhanushali, Reshma A, MD, [phone]              Patient Name: Rayna Haider  : 1935  Patient Phone #: [phone]

## 2023-09-12 NOTE — ASSESSMENT & PLAN NOTE
Patient is identified as having Combined Systolic and Diastolic heart failure that is Chronic. CHF is currently controlled. Latest ECHO performed and demonstrates- Results for orders placed during the hospital encounter of 01/30/23    Echo    Interpretation Summary  · The left ventricle is normal in size with concentric hypertrophy and normal systolic function.  · The estimated ejection fraction is 55%.  · Normal right ventricular size with normal right ventricular systolic function.  · Mild tricuspid regurgitation.  · There is a small left pleural effusion.  . Continue Beta Blocker and monitor clinical status closely. Monitor on telemetry. Patient is on CHF pathway.  Monitor strict Is&Os and daily weights.  Place on fluid restriction of 1.5 L. Cardiology has been consulted. Continue to stress to patient importance of self efficacy and  on diet for CHF. Last BNP reviewed- and noted below @LABRCNTIP(BNP,BNPTRIAGEBLO)@.

## 2023-09-12 NOTE — LETTER
AUTHORIZATION FOR RELEASE OF   CONFIDENTIAL INFORMATION    Dear Dr Dow    We are seeing Rayna Haider, date of birth 1935, in the clinic at [place] [address] [facility]. Rosie Flores MD is the patient's PCP. Rayna Haider has an outstanding lab/procedure at the time we reviewed her chart. In order to help keep her health information updated, she has authorized us to request the following medical record(s):        (  )  MAMMOGRAM                                      (  )  COLONOSCOPY      (  )  PAP SMEAR                                          (X)  OUTSIDE LAB RESULTS     (  )  DEXA SCAN                                          (  )  EYE EXAM            (  )  FOOT EXAM                                          (X)  DIAGNOSTIC TEST     (  )  OUTSIDE IMMUNIZATIONS                 (X)  OFFICE NOTES         Please fax records to [facility], Bhanushali, Reshma A, MD, [phone]               Patient Name: Rayna Haider  : 1935  Patient Phone #: [phone]

## 2023-09-12 NOTE — TELEPHONE ENCOUNTER
----- Message from Antonette Carreon sent at 9/12/2023 11:20 AM CDT -----  Regarding: refill      Refill anxiety medicine.   [facility]

## 2023-09-14 ENCOUNTER — TELEPHONE (OUTPATIENT)
Dept: INTERNAL MEDICINE | Facility: CLINIC | Age: 88
End: 2023-09-14
Payer: MEDICARE

## 2023-09-14 RX ORDER — FERRIC CITRATE 210 MG/1
TABLET, COATED ORAL
COMMUNITY

## 2023-09-14 NOTE — TELEPHONE ENCOUNTER
----- Message from Ludivina Jhaveri sent at 9/14/2023  4:32 PM CDT -----  Regarding: Medication  .Type:  Patient Returning Call    Who Called:Kaye Daughter  Who Left Message for Patient:Daughter  Does the patient know what this is regarding?:Auryxia 210 mg   Would the patient rather a call back or a response via [facility]?   Best Call Back Number:[phone]  Additional Information: Auryxia 210 mg 2/ before each meal. Dr Dow prescribed this medication to the patient. Patient forgot to tell office about this.

## 2023-09-25 NOTE — DISCHARGE SUMMARY
[facility] - Cath Lab Services  Discharge Note  Short Stay    Procedure(s) (LRB):  Fistulogram with Possible Intervention (Left)      OUTCOME: Patient tolerated treatment/procedure well without complication and is now ready for discharge.    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  Do not use antibiotics ointment.     Call MD for:  temperature >100.4     Call MD for:  redness, tenderness, or signs of infection (pain, swelling, redness, odor or green/yellow discharge around incision site)     CV US Hemodialysis Access   Standing Status: Future Standing Exp. Date: 11/08/23     Order Specific Question Answer Comments   Physician to read study: EPHRAIM ZIMMERMAN [[id number]]    Reason for Exam: eval maturity L basilic    Release to patient Immediate      Activity as tolerated         Clinical Reference Documents Added to Patient Instructions         Document    FISTULOGRAM (ENGLISH)    MODERATE SEDATION IN ADULTS DISCHARGE INSTRUCTIONS (ENGLISH)            TIME SPENT ON DISCHARGE: 5 minutes

## 2023-10-03 ENCOUNTER — OFFICE VISIT (OUTPATIENT)
Dept: VASCULAR SURGERY | Facility: CLINIC | Age: 88
End: 2023-10-03
Payer: MEDICARE

## 2023-10-03 VITALS
HEART RATE: 65 BPM | HEIGHT: 60 IN | SYSTOLIC BLOOD PRESSURE: 115 MMHG | DIASTOLIC BLOOD PRESSURE: 70 MMHG | WEIGHT: 151 LBS | BODY MASS INDEX: 29.64 KG/M2

## 2023-10-03 DIAGNOSIS — N18.6 END STAGE RENAL DISEASE: ICD-10-CM

## 2023-10-03 DIAGNOSIS — T82.590A MECHANICAL COMPLICATION OF ARTERIOVENOUS FISTULA SURGICALLY CREATED, INITIAL ENCOUNTER: Primary | ICD-10-CM

## 2023-10-03 PROCEDURE — 99213 OFFICE O/P EST LOW 20 MIN: CPT | Mod: ,,, | Performed by: SPECIALIST

## 2023-10-03 PROCEDURE — 99213 PR OFFICE/OUTPT VISIT, EST, LEVL III, 20-29 MIN: ICD-10-PCS | Mod: ,,, | Performed by: SPECIALIST

## 2023-10-03 NOTE — PROGRESS NOTES
[facility] Vascular - Clinic Note  Daren Roberson MD      Patient Name: Rayna Haider                   : 1935     MRN: [id number]   Visit Date: 10/3/2023          History Present Illness     Reason for Visit: Dialysis Access Evaluation    Ms. Haider presents to the clinic for 3 week follow up s/p fistulogram of her left basilic fistula on 23. Duplex obtained today. She denies left hand numbness or pain. She denies fevers, drainage from the incision, worsening pain or swelling. She is using a right chest wall tunneled catheter without issues.      REVIEW OF SYSTEMS:  ROS  12 point review of systems conducted, negative except as stated in the history of present illness. See HPI for details.        Physical Exam      Visit Vitals  /70 (BP Location: Right arm)   Pulse 65   Ht 5' (1.524 m)   Wt 68.5 kg (151 lb)   LMP  (LMP Unknown)   BMI 29.49 kg/m²       General: well-nourished, no acute distress, and healthy appearing, ambulating normally, alert, pleasant, conversant, and oriented  Neurologic: cranial nerves are grossly intact, no neurologic deficits  HEENT: grossly normal and no scleral icterus  Neck/Chest: normal  and soft without lymphadenopathy  Respiratory: breathing easily and without respiratory distress  Abdomen: normal and soft  Cardiology: regular rate and rhythm    Upper Extremity Arterial Exam:   Left - radial is palpable    Dialysis Access:  left brachiobasilic fistula and right chest wall tunneled catheter  Assessment: good thrill    Musculoskeletal:   Upper Extremity: normal left hand function, hands are warm bilaterally  Lower Extremity: no edema present to bilateral lower extremities            Assessment and Plan     Ms. Haider is an 88 y.o. woman with end stage renal disease who is s/p left brachiobasilic fistulogram to address stenosis on 23. Duplex obtained today reveals good resolution of distal fistula stenosis and good size and depth to the vein through  cannulation however there is a mild/moderate narrowing in the proximal fistula. Cannulation zone marked today for reference. On exam, the access has a good thrill and no pulsatility.     OK to begin cannulation on 10/5/23 starting with 1:1 cannulation 17g needlesx 1 week then progress to two needle cannulation for 2 weeks, then progress needle size as tolerated. Once her new access has functioned reliably for 2 weeks or more, OK to remove right chest wall  tunneled catheter.              1. Mechanical complication of arteriovenous fistula surgically created, initial encounter    2. 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, without long-term current use of insulin     Vitamin D deficiency      Past Surgical History:   Procedure Laterality Date    ANGIOGRAPHY OF ARTERIOVENOUS SHUNT Left 2023    Procedure: Fistulogram with Possible Intervention;  Surgeon: Daren Roberson MD;  Location: [facility] CATH LAB;  Service: Cardiology;  Laterality: Left;    ANGIOGRAPHY OF ARTERIOVENOUS SHUNT Left 2023    Procedure: Fistulogram with Possible Intervention;  Surgeon: Daren Roberson MD;  Location: [facility] CATH LAB;  Service: Cardiology;  Laterality: Left;  Requesting 7:00am start    ANGIOGRAPHY OF ARTERIOVENOUS SHUNT Left 2023    Procedure: Fistulogram with Possible Intervention;  Surgeon: Daren Roberson MD;  Location: [facility] CATH LAB;  Service: Cardiology;  Laterality: Left;    AV FISTULA PLACEMENT Left     CATARACT EXTRACTION Bilateral      SECTION      EYE SURGERY Left     HYSTERECTOMY       has ovaries    INSERTION OF TUNNELED CENTRAL VENOUS HEMODIALYSIS CATHETER N/A 02/02/2023    Procedure: INSERTION, CATHETER, CENTRAL VENOUS, HEMODIALYSIS, TUNNELED;  Surgeon: Eric Angela DO;  Location: [facility] CATH LAB;  Service: Nephrology;  Laterality: N/A;    PLACEMENT OF ARTERIOVENOUS GRAFT Left 03/22/2023    Procedure: INSERTION, GRAFT, ARTERIOVENOUS;  Surgeon: Daren Roberson MD;  Location: [facility] OR;  Service: Peripheral Vascular;  Laterality: Left;  LEFT BASILIC TRANSPOSITION -VS- GRAFT PLACEMENT // XX  //  SUPINE //   SUPRACLAVICULAR BLOCK    PLACEMENT, MEDIPORT      RETINAL DETACHMENT SURGERY Left     SKIN GRAFT Right     burns, scald    wisdom Left      Family History   Problem Relation Age of Onset    Cancer Mother     Glaucoma Mother     Diabetes Father     Cancer Father     Cancer Brother     Diabetes Brother     Cancer Son      Social History     Socioeconomic History    Marital status:    Tobacco Use    Smoking status: Never    Smokeless tobacco: Never   Substance and Sexual Activity    Alcohol use: Not Currently    Drug use: Never    Sexual activity: Not Currently     Partners: Male     Social Determinants of Health     Financial Resource Strain: Low Risk  (9/12/2023)    Overall Financial Resource Strain (CARDIA)     Difficulty of Paying Living Expenses: Not hard at all   Food Insecurity: No Food Insecurity (9/12/2023)    Hunger Vital Sign     Worried About Running Out of Food in the Last Year: Never true     Ran Out of Food in the Last Year: Never true   Transportation Needs: No Transportation Needs (9/12/2023)    PRAPARE - Transportation     Lack of Transportation (Medical): No     Lack of Transportation (Non-Medical): No   Physical Activity: Insufficiently Active (9/12/2023)    Exercise Vital Sign     Days of Exercise per Week: 2 days     Minutes of Exercise per Session: 20 min   Stress: No Stress Concern Present (9/12/2023)    [place] [employer] - Occupational  Stress Questionnaire     Feeling of Stress : Not at all   Social Connections: Moderately Isolated (9/12/2023)    Social Connection and Isolation Panel [NHANES]     Frequency of Communication with Friends and Family: More than three times a week     Frequency of Social Gatherings with Friends and Family: More than three times a week     Attends Synagogue Services: More than 4 times per year     Active Member of Clubs or Organizations: No     Attends Club or Organization Meetings: Never     Marital Status:    Housing Stability: Low Risk  (9/12/2023)    Housing Stability Vital Sign     Unable to Pay for Housing in the Last Year: No     Number of Places Lived in the Last Year: 1     Unstable Housing in the Last Year: No     Current Outpatient Medications   Medication Instructions    amLODIPine (NORVASC) 10 mg, Oral, Daily    aspirin 81 mg, Oral, Daily    carvediloL (COREG) 6.25 mg, Oral, 2 times daily with meals    ferric citrate (AURYXIA) 210 mg iron Tab Oral, 2 tabs prior to meals    folic acid (FOLVITE) 1,000 mcg, Oral, Daily    LORazepam (ATIVAN) 0.5 mg, Oral, Daily PRN    methoxy peg-epoetin beta (MIRCERA INJ) 50 mcg    senna (SENOKOT) 8.6 mg tablet 1 tablet, Oral, As needed (PRN)    VITAMIN D3 400 Units, Oral, Daily     Review of patient's allergies indicates:   Allergen Reactions    Hydralazide Nausea And Vomiting and Palpitations    Valsartan        Patient Care Team:  Rosie Flores MD as PCP - General (Internal Medicine)  Destiny Gibbons MD as Consulting Physician (Cardiovascular Disease)  Bebeto Reilly MD as Consulting Physician (Ophthalmology)  Mustapha Zavaleta MD as Consulting Physician (Nephrology)  Delaney Lyn [place] (Dialysis Center)  Johnnie Dow MD as Consulting Physician (Nephrology)  Daren Roberson MD as Consulting Physician (Vascular Surgery)  Travon Junior MD as Consulting Physician (Ophthalmology)      No follow-ups on file. In addition to their scheduled follow  up, the patient has also been instructed to follow up on as needed basis.     Future Appointments   Date Time Provider Department Center   11/29/2023  1:40 PM Lupillo Lew, [place] [address]

## 2023-11-20 ENCOUNTER — TELEPHONE (OUTPATIENT)
Dept: INTERNAL MEDICINE | Facility: CLINIC | Age: 88
End: 2023-11-20
Payer: MEDICARE

## 2023-11-20 ENCOUNTER — TELEPHONE (OUTPATIENT)
Dept: CARDIOLOGY | Facility: HOSPITAL | Age: 88
End: 2023-11-20
Payer: MEDICARE

## 2023-11-20 DIAGNOSIS — E55.9 VITAMIN D DEFICIENCY: ICD-10-CM

## 2023-11-20 DIAGNOSIS — D63.8 ANEMIA OF CHRONIC DISEASE: ICD-10-CM

## 2023-11-20 DIAGNOSIS — I10 PRIMARY HYPERTENSION: Primary | ICD-10-CM

## 2023-11-20 DIAGNOSIS — E11.9 TYPE 2 DIABETES MELLITUS WITHOUT COMPLICATION, WITHOUT LONG-TERM CURRENT USE OF INSULIN: ICD-10-CM

## 2023-11-20 DIAGNOSIS — N18.6 ESRD (END STAGE RENAL DISEASE): ICD-10-CM

## 2023-11-20 NOTE — TELEPHONE ENCOUNTER
----- Message from Belkys Berry sent at 11/20/2023  3:27 PM CST -----  Regarding: labs  Patient has office visit 11/29 and no wellness labs ordered in epic

## 2023-11-20 NOTE — TELEPHONE ENCOUNTER
HD center sent referral for continued cannulation issues. Niya states that they were able to use two needles for cannulation but could never increase needle size. They are still using 17 gauge needles and have difficulty at times. Recent clearance was obtained via catheter. She has had multiple fistulograms with Dr. Roberson without success so the clinic nurse was consulted about plan of care. We will schedule her for a clinic visit next week with Dr. Roberson to discuss plan of care. Appointment date and time were given to HD nurse to relay to patient. Niya agrees with this plan. -ZT

## 2023-11-24 ENCOUNTER — LAB VISIT (OUTPATIENT)
Dept: LAB | Facility: HOSPITAL | Age: 88
End: 2023-11-24
Payer: MEDICARE

## 2023-11-24 DIAGNOSIS — N18.6 ESRD (END STAGE RENAL DISEASE): ICD-10-CM

## 2023-11-24 DIAGNOSIS — E55.9 VITAMIN D DEFICIENCY: ICD-10-CM

## 2023-11-24 DIAGNOSIS — I10 PRIMARY HYPERTENSION: ICD-10-CM

## 2023-11-24 DIAGNOSIS — E11.9 TYPE 2 DIABETES MELLITUS WITHOUT COMPLICATION, WITHOUT LONG-TERM CURRENT USE OF INSULIN: ICD-10-CM

## 2023-11-24 DIAGNOSIS — D63.8 ANEMIA OF CHRONIC DISEASE: ICD-10-CM

## 2023-11-24 LAB
ALBUMIN SERPL-MCNC: 3.5 G/DL (ref 3.4–4.8)
ALBUMIN/GLOB SERPL: 1.2 RATIO (ref 1.1–2)
ALP SERPL-CCNC: 64 UNIT/L (ref 40–150)
ALT SERPL-CCNC: 8 UNIT/L (ref 0–55)
AST SERPL-CCNC: 11 UNIT/L (ref 5–34)
BASOPHILS # BLD AUTO: 0.06 X10(3)/MCL
BASOPHILS NFR BLD AUTO: 1 %
BILIRUB SERPL-MCNC: 0.4 MG/DL
BUN SERPL-MCNC: 49.6 MG/DL (ref 9.8–20.1)
CALCIUM SERPL-MCNC: 9.4 MG/DL (ref 8.4–10.2)
CHLORIDE SERPL-SCNC: 98 MMOL/L (ref 98–107)
CHOLEST SERPL-MCNC: 157 MG/DL
CHOLEST/HDLC SERPL: 3 {RATIO} (ref 0–5)
CO2 SERPL-SCNC: 25 MMOL/L (ref 23–31)
CREAT SERPL-MCNC: 5.66 MG/DL (ref 0.55–1.02)
CREAT UR-MCNC: 99.3 MG/DL (ref 45–106)
DEPRECATED CALCIDIOL+CALCIFEROL SERPL-MC: 43.8 NG/ML (ref 30–80)
EOSINOPHIL # BLD AUTO: 0.18 X10(3)/MCL (ref 0–0.9)
EOSINOPHIL NFR BLD AUTO: 3 %
ERYTHROCYTE [DISTWIDTH] IN BLOOD BY AUTOMATED COUNT: 13.4 % (ref 11.5–17)
EST. AVERAGE GLUCOSE BLD GHB EST-MCNC: 116.9 MG/DL
GFR SERPLBLD CREATININE-BSD FMLA CKD-EPI: 7 MLS/MIN/1.73/M2
GLOBULIN SER-MCNC: 3 GM/DL (ref 2.4–3.5)
GLUCOSE SERPL-MCNC: 113 MG/DL (ref 82–115)
HBA1C MFR BLD: 5.7 %
HCT VFR BLD AUTO: 34.2 % (ref 37–47)
HDLC SERPL-MCNC: 48 MG/DL (ref 35–60)
HGB BLD-MCNC: 10.7 G/DL (ref 12–16)
IMM GRANULOCYTES # BLD AUTO: 0.04 X10(3)/MCL (ref 0–0.04)
IMM GRANULOCYTES NFR BLD AUTO: 0.7 %
LDLC SERPL CALC-MCNC: 88 MG/DL (ref 50–140)
LYMPHOCYTES # BLD AUTO: 1.93 X10(3)/MCL (ref 0.6–4.6)
LYMPHOCYTES NFR BLD AUTO: 32.2 %
MCH RBC QN AUTO: 32.9 PG (ref 27–31)
MCHC RBC AUTO-ENTMCNC: 31.3 G/DL (ref 33–36)
MCV RBC AUTO: 105.2 FL (ref 80–94)
MICROALBUMIN UR-MCNC: 211.2 UG/ML
MICROALBUMIN/CREAT RATIO PNL UR: 212.7 MG/GM CR (ref 0–30)
MONOCYTES # BLD AUTO: 0.66 X10(3)/MCL (ref 0.1–1.3)
MONOCYTES NFR BLD AUTO: 11 %
NEUTROPHILS # BLD AUTO: 3.12 X10(3)/MCL (ref 2.1–9.2)
NEUTROPHILS NFR BLD AUTO: 52.1 %
NRBC BLD AUTO-RTO: 0 %
PLATELET # BLD AUTO: 276 X10(3)/MCL (ref 130–400)
PMV BLD AUTO: 10 FL (ref 7.4–10.4)
POTASSIUM SERPL-SCNC: 3.8 MMOL/L (ref 3.5–5.1)
PROT SERPL-MCNC: 6.5 GM/DL (ref 5.8–7.6)
RBC # BLD AUTO: 3.25 X10(6)/MCL (ref 4.2–5.4)
SODIUM SERPL-SCNC: 135 MMOL/L (ref 136–145)
TRIGL SERPL-MCNC: 107 MG/DL (ref 37–140)
TSH SERPL-ACNC: 2.38 UIU/ML (ref 0.35–4.94)
VLDLC SERPL CALC-MCNC: 21 MG/DL
WBC # SPEC AUTO: 5.99 X10(3)/MCL (ref 4.5–11.5)

## 2023-11-24 PROCEDURE — 82043 UR ALBUMIN QUANTITATIVE: CPT

## 2023-11-24 PROCEDURE — 80061 LIPID PANEL: CPT

## 2023-11-24 PROCEDURE — 36415 COLL VENOUS BLD VENIPUNCTURE: CPT

## 2023-11-24 PROCEDURE — 84443 ASSAY THYROID STIM HORMONE: CPT

## 2023-11-24 PROCEDURE — 83036 HEMOGLOBIN GLYCOSYLATED A1C: CPT

## 2023-11-24 PROCEDURE — 82306 VITAMIN D 25 HYDROXY: CPT | Mod: AY

## 2023-11-24 PROCEDURE — 80053 COMPREHEN METABOLIC PANEL: CPT

## 2023-11-24 PROCEDURE — 85025 COMPLETE CBC W/AUTO DIFF WBC: CPT | Mod: AY

## 2023-11-27 ENCOUNTER — TELEPHONE (OUTPATIENT)
Dept: INTERNAL MEDICINE | Facility: CLINIC | Age: 88
End: 2023-11-27
Payer: MEDICARE

## 2023-11-27 NOTE — LETTER
AUTHORIZATION FOR RELEASE OF   CONFIDENTIAL INFORMATION    Dear Osmar     We are seeing Rayna Haider, date of birth 1935, in the clinic at [place] [address] INTERNAL MEDICINE. Rosie Flores MD is the patient's PCP. Rayna Haider has an outstanding lab/procedure at the time we reviewed her chart. In order to help keep her health information updated, she has authorized us to request the following medical record(s):        (  )  MAMMOGRAM                                      (  )  COLONOSCOPY      (  )  PAP SMEAR                                          (X)  OUTSIDE LAB RESULTS     (  )  DEXA SCAN                                          (  )  EYE EXAM            (  )  FOOT EXAM                                          (  )  ENTIRE RECORD     (  )  OUTSIDE IMMUNIZATIONS                 (  )  _______________         Please fax records to [facility], Bhanushali, Reshma A, MD, [phone]              Patient Name: Rayna Haider  : 1935  Patient Phone #: [phone]

## 2023-11-27 NOTE — TELEPHONE ENCOUNTER
Spoke with Pt daughter Kaye. Kaye stated her mother goes to dialysis at [facility], they do lab work frequently. She was questing if Lupillo would still like that repeat CMP, because she has been having irregular lab work for some time. Please advise if you would like that repeat CMP. Thank you

## 2023-11-27 NOTE — TELEPHONE ENCOUNTER
----- Message from DEON Sandoval sent at 11/27/2023  7:45 AM CST -----  Please have patient repeat CMP well-hydrated asap.  Her renal function is elevated.  (Please place order for repeat CMP)

## 2023-11-27 NOTE — PROGRESS NOTES
Please have patient repeat CMP well-hydrated asap.  Her renal function is elevated.  (Please place order for repeat CMP)

## 2023-11-28 NOTE — TELEPHONE ENCOUNTER
Per Lupillo  If these labs were done prior to dialysis run, then likely will improve after dialysis. While she has been high in the past, the kidney function was higher than her topical elevations in the past. No change or repeat labs required, just proceed with dialysis.

## 2023-11-28 NOTE — TELEPHONE ENCOUNTER
LVM letting Ms Restrepo know there is no need for a repeat lab. I will request labs from the dialysis center.

## 2023-11-29 ENCOUNTER — OFFICE VISIT (OUTPATIENT)
Dept: INTERNAL MEDICINE | Facility: CLINIC | Age: 88
End: 2023-11-29
Payer: MEDICARE

## 2023-11-29 VITALS
OXYGEN SATURATION: 98 % | BODY MASS INDEX: 30.43 KG/M2 | HEART RATE: 78 BPM | DIASTOLIC BLOOD PRESSURE: 56 MMHG | WEIGHT: 155 LBS | HEIGHT: 60 IN | SYSTOLIC BLOOD PRESSURE: 112 MMHG

## 2023-11-29 DIAGNOSIS — R54 FRAILTY: ICD-10-CM

## 2023-11-29 DIAGNOSIS — D63.8 ANEMIA OF CHRONIC DISEASE: Chronic | ICD-10-CM

## 2023-11-29 DIAGNOSIS — Z99.2 TYPE 2 DIABETES MELLITUS WITH CHRONIC KIDNEY DISEASE ON CHRONIC DIALYSIS, WITHOUT LONG-TERM CURRENT USE OF INSULIN: ICD-10-CM

## 2023-11-29 DIAGNOSIS — Z99.2 ESRD (END STAGE RENAL DISEASE) ON DIALYSIS: Chronic | ICD-10-CM

## 2023-11-29 DIAGNOSIS — E11.22 TYPE 2 DIABETES MELLITUS WITH CHRONIC KIDNEY DISEASE ON CHRONIC DIALYSIS, WITHOUT LONG-TERM CURRENT USE OF INSULIN: ICD-10-CM

## 2023-11-29 DIAGNOSIS — F41.9 ANXIETY: Chronic | ICD-10-CM

## 2023-11-29 DIAGNOSIS — N18.6 TYPE 2 DIABETES MELLITUS WITH CHRONIC KIDNEY DISEASE ON CHRONIC DIALYSIS, WITHOUT LONG-TERM CURRENT USE OF INSULIN: ICD-10-CM

## 2023-11-29 DIAGNOSIS — N18.6 ESRD (END STAGE RENAL DISEASE): Chronic | ICD-10-CM

## 2023-11-29 DIAGNOSIS — N18.6 ESRD (END STAGE RENAL DISEASE) ON DIALYSIS: Chronic | ICD-10-CM

## 2023-11-29 DIAGNOSIS — Z00.00 MEDICARE ANNUAL WELLNESS VISIT, SUBSEQUENT: ICD-10-CM

## 2023-11-29 DIAGNOSIS — I50.9 CONGESTIVE HEART FAILURE, UNSPECIFIED HF CHRONICITY, UNSPECIFIED HEART FAILURE TYPE: ICD-10-CM

## 2023-11-29 DIAGNOSIS — E66.09 CLASS 1 OBESITY DUE TO EXCESS CALORIES WITH SERIOUS COMORBIDITY AND BODY MASS INDEX (BMI) OF 30.0 TO 30.9 IN ADULT: Chronic | ICD-10-CM

## 2023-11-29 DIAGNOSIS — R26.9 GAIT DIFFICULTY: Primary | ICD-10-CM

## 2023-11-29 DIAGNOSIS — I10 PRIMARY HYPERTENSION: Chronic | ICD-10-CM

## 2023-11-29 PROCEDURE — G0439 PPPS, SUBSEQ VISIT: HCPCS | Mod: ,,,

## 2023-11-29 PROCEDURE — G0439 PR MEDICARE ANNUAL WELLNESS SUBSEQUENT VISIT: ICD-10-PCS | Mod: ,,,

## 2023-11-29 RX ORDER — ONDANSETRON 4 MG/1
4 TABLET, ORALLY DISINTEGRATING ORAL ONCE
Qty: 1 TABLET | Refills: 0 | Status: SHIPPED | OUTPATIENT
Start: 2023-11-29 | End: 2023-11-29

## 2023-11-29 RX ORDER — HYDROXYZINE HYDROCHLORIDE 25 MG/1
25 TABLET, FILM COATED ORAL DAILY PRN
Qty: 30 TABLET | Refills: 0 | Status: SHIPPED | OUTPATIENT
Start: 2023-11-29 | End: 2024-01-31

## 2023-11-29 NOTE — PROGRESS NOTES
Patient Care Team:  Rosie Flores MD as PCP - General (Internal Medicine)  Destiny Gibbons MD as Consulting Physician (Cardiovascular Disease)  Bebeto Reilly MD as Consulting Physician (Ophthalmology)  Mustapha Zavaleta MD as Consulting Physician (Nephrology)  Delaney Lyn (Dialysis Center)  Johnnie Dow MD as Consulting Physician (Nephrology)  Daren Roberson MD as Consulting Physician (Vascular Surgery)  Travon Junior MD as Consulting Physician (Ophthalmology)  [facility], [facility]      Patient ID: Rayna Haider is a 88 y.o. female.    Chief Complaint: Medicare AWV Follow Up (Wellness- was treated for UTI and is still having upset stomach. Nausea and gagging. BM was normal this morning.)      HPI:     88-year-old female here today for a Medicare wellness.  Her medical comorbidities are significant for hypertension, anxiety, ESRD on hemodialysis on a TTS schedule, congestive heart failure and a previous history of diabetes mellitus type 2 that is now resolved since patient initiated on dialysis.  She has not been on any medications for over 6 months and hemoglobin A1c has remained under 6.  Likewise, blood pressure well-controlled currently on Coreg alone now on dialysis.  Since last visit patient reports she is been fairly well without acute injury or major illness.  Does have left upper arm fistula, however currently dialyzing out of chest wall tunneled cath and followed by vascular.  Most recent labs reviewed and generally stable.  Does have anemia of chronic disease with intermittent effusions with dialysis.  Does express some gait troubles due to chronic disease and frailty, for which we will order a rolling walker.  Age-appropriate immunizations reviewed and discussed with patient.  Otherwise generally stable without acute complaints.       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, without long-term current use of insulin     Vitamin D deficiency       Past Surgical History:   Procedure Laterality Date    ANGIOGRAPHY OF ARTERIOVENOUS SHUNT Left 2023    Procedure: Fistulogram with Possible Intervention;  Surgeon: Daren Roberson MD;  Location: [facility] CATH LAB;  Service: Cardiology;  Laterality: Left;    ANGIOGRAPHY OF ARTERIOVENOUS SHUNT Left 2023    Procedure: Fistulogram with Possible Intervention;  Surgeon: Daren Roberson MD;  Location: [facility] CATH LAB;  Service: Cardiology;  Laterality: Left;  Requesting 7:00am start    ANGIOGRAPHY OF ARTERIOVENOUS SHUNT Left 2023    Procedure: Fistulogram with Possible Intervention;  Surgeon: Daren Roberson MD;  Location: [facility] CATH LAB;  Service: Cardiology;  Laterality: Left;    AV FISTULA PLACEMENT Left     CATARACT EXTRACTION Bilateral      SECTION      EYE SURGERY Left     HYSTERECTOMY      has ovaries    INSERTION OF TUNNELED CENTRAL VENOUS HEMODIALYSIS CATHETER N/A 2023    Procedure: INSERTION, CATHETER, CENTRAL VENOUS, HEMODIALYSIS, TUNNELED;  Surgeon: Eric Angela DO;  Location: [facility] CATH LAB;  Service: Nephrology;  Laterality: N/A;    PLACEMENT OF ARTERIOVENOUS GRAFT Left 2023    Procedure: INSERTION, GRAFT, ARTERIOVENOUS;  Surgeon: Daren Roberson MD;  Location: [facility] OR;  Service: Peripheral Vascular;  Laterality: Left;  LEFT BASILIC TRANSPOSITION -VS- GRAFT PLACEMENT // XX  //  SUPINE //   SUPRACLAVICULAR BLOCK    PLACEMENT, MEDIPORT      RETINAL DETACHMENT SURGERY Left     SKIN GRAFT Right     burns, scald    wisdom Left       Social History     Tobacco Use    Smoking status: Never    Smokeless tobacco: Never   Substance Use Topics     Alcohol use: Not Currently    Drug use: Never          Health Maintenance Due   Topic Date Due    TETANUS VACCINE  Never done    Shingles Vaccine (1 of 2) Never done    RSV Vaccine (Age 60+ and Pregnant patients) (1 - 1-dose 60+ series) Never done    COVID-19 Vaccine (4 - 2023-24 season) 09/01/2023        SUBJECTIVE:     Review of Systems   Constitutional:  Negative for fatigue and fever.   HENT:  Negative for congestion, rhinorrhea, sore throat and trouble swallowing.    Eyes:  Negative for redness and visual disturbance.   Respiratory:  Negative for cough, chest tightness and shortness of breath.    Cardiovascular:  Negative for chest pain and palpitations.   Gastrointestinal:  Negative for abdominal pain, constipation, diarrhea, nausea and vomiting.   Genitourinary:  Negative for dysuria, flank pain, frequency and urgency.   Musculoskeletal:  Negative for arthralgias, gait problem and myalgias.   Skin:  Negative for rash and wound.   Neurological:  Positive for weakness. Negative for facial asymmetry, speech difficulty and headaches.   All other systems reviewed and are negative.        Patient Reported Health Risk Assessment  What is your age?: 80 or older  Are you male or female?: Female  During the past four weeks, how much have you been bothered by emotional problems such as feeling anxious, depressed, irritable, sad, or downhearted and blue?: Not at all  During the past five weeks, has your physical and/or emotional health limited your social activities with family, friends, neighbors, or groups?: Not at all  During the past four weeks, how much bodily pain have you generally had?: Very mild pain  During the past four weeks, was someone available to help if you needed and wanted help?: Yes, as much as I wanted  During the past four weeks, what was the hardest physical activity you could do for at least two minutes?: Light  Can you get to places out of walking distance without help?  (For example, can you  travel alone on buses or taxis, or drive your own car?): Yes  Can you go shopping for groceries or clothes without someone's help?: Yes  Can you prepare your own meals?: Yes  Can you do your own housework without help?: Yes  Because of any health problems, do you need the help of another person with your personal care needs such as eating, bathing, dressing, or getting around the house?: No  Can you handle your own money without help?: Yes  During the past four weeks, how would you rate your health in general?: Good  How have things been going for you during the past four weeks?: Pretty well  Are you having difficulties driving your car?: Not applicable, I do not use a car  Do you always fasten your seat belt when you are in a car?: Yes, usually  How often in the past four weeks have you been bothered by falling or dizzy when standing up?: Seldom  How often in the past four weeks have you been bothered by sexual problems?: Never  How often in the past four weeks have you been bothered by trouble eating well?: Never  How often in the past four weeks have you been bothered by teeth or denture problems?: Never  How often in the past four weeks have you been bothered with problems using the telephone?: Seldom  How often in the past four weeks have you been bothered by tiredness or fatigue?: Sometimes  Have you fallen two or more times in the past year?: No  Are you afraid of falling?: Yes  Are you a smoker?: No  During the past four weeks, how many drinks of wine, beer, or other alcoholic beverages did you have?: No alcohol at all  Do you exercise for about 20 minutes three or more days a week?: No, I usually do not exercise this much  Have you been given any information to help you with hazards in your house that might hurt you?: Yes  Have you been given any information to help you with keeping track of your medications?: Yes  How often do you have trouble taking medicines the way you've been told to take them?: I  always take them as prescribed  How confident are you that you can control and manage most of your health problems?: Somewhat confident  What is your race? (Check all that apply.):     OBJECTIVE:     BP (!) 112/56 (BP Location: Right arm)   Pulse 78   Ht 5' (1.524 m)   Wt 70.3 kg (155 lb)   LMP  (LMP Unknown)   SpO2 98%   BMI 30.27 kg/m²      Physical Exam  Constitutional:       General: She is not in acute distress.     Appearance: Normal appearance.   HENT:      Right Ear: Tympanic membrane, ear canal and external ear normal.      Left Ear: Tympanic membrane, ear canal and external ear normal.      Nose: Nose normal.      Mouth/Throat:      Mouth: Mucous membranes are moist.      Pharynx: Oropharynx is clear.   Eyes:      Extraocular Movements: Extraocular movements intact.      Conjunctiva/sclera: Conjunctivae normal.      Pupils: Pupils are equal, round, and reactive to light.   Cardiovascular:      Rate and Rhythm: Normal rate and regular rhythm.      Pulses: Normal pulses.      Heart sounds: Normal heart sounds. No murmur heard.     No gallop.      Arteriovenous access: Left arteriovenous access is present.     Comments: Right Chest wall temp dialysis cath  Pulmonary:      Effort: Pulmonary effort is normal.      Breath sounds: Normal breath sounds. No wheezing.   Abdominal:      General: Bowel sounds are normal. There is no distension.      Palpations: Abdomen is soft. There is no mass.      Tenderness: There is no abdominal tenderness. There is no guarding.   Musculoskeletal:         General: Normal range of motion.   Skin:     General: Skin is warm and dry.   Neurological:      Mental Status: She is alert. Mental status is at baseline.      Sensory: No sensory deficit.      Motor: Weakness present.                  No data to display                  11/29/2023     1:40 PM 10/3/2023    12:50 PM 9/12/2023    11:00 AM 8/29/2023     3:00 PM 8/1/2023     2:20 PM 7/11/2023     2:40 PM 5/2/2023      1:40 PM   Fall Risk Assessment - Outpatient   Mobility Status Ambulatory Ambulatory w/ assistance Ambulatory w/ assistance Ambulatory w/ assistance Ambulatory w/ assistance Ambulatory Ambulatory w/ assistance   Number of falls 0 0 0 0 0 0 0   Identified as fall risk False True True True True False True                        ASSEMENT/ PLAN:       ICD-10-CM ICD-9-CM   1. Gait difficulty  R26.9 781.2   2. Anxiety  F41.9 300.00   3. Congestive heart failure, unspecified HF chronicity, unspecified heart failure type  I50.9 428.0   4. ESRD (end stage renal disease)  N18.6 585.6   5. Frailty  R54 797   6. ESRD (end stage renal disease) on dialysis  N18.6 585.6    Z99.2 V45.11   7. Primary hypertension  I10 401.9   8. Class 1 obesity due to excess calories with serious comorbidity and body mass index (BMI) of 30.0 to 30.9 in adult  E66.09 278.00    Z68.30 V85.30   9. Anemia of chronic disease  D63.8 285.29   10. Medicare annual wellness visit, subsequent  Z00.00 V70.0   11. Type 2 diabetes mellitus with chronic kidney disease on chronic dialysis, without long-term current use of insulin  E11.22 250.40    N18.6 585.9    Z99.2 V45.11         Plan:     Problem List Items Addressed This Visit          Psychiatric    Anxiety (Chronic)     -stable   -currently on lorazepam and hydroxyzine, continue         Relevant Medications    hydrOXYzine HCL (ATARAX) 25 MG tablet       Cardiac/Vascular    Hypertension (Chronic)     -stable now on dialysis, continue  -currently Coreg, continue  -low-sodium diet         Relevant Orders    Comprehensive Metabolic Panel    Hemoglobin A1C    Microalbumin/Creatinine Ratio, Urine    CBC Auto Differential    CHF (congestive heart failure) (Chronic)     -stable established with Cardiology   -currently on Coreg and ASA         Relevant Orders    WALKER FOR HOME USE       Renal/    ESRD (end stage renal disease) on dialysis (Chronic)     -stable established with Nephrology  -currently on hemodialysis  via chest wall cath         Relevant Orders    Comprehensive Metabolic Panel    Hemoglobin A1C    Microalbumin/Creatinine Ratio, Urine    CBC Auto Differential       Oncology    Anemia of chronic disease (Chronic)     -stable  Iron infusions with dialysis intermittently            Endocrine    Obesity (Chronic)     -BMI 30.2   -encourage low-calorie low carb diet   -encourage some form of routine aerobic exercise            Other    Frailty     -order walker for home use         Relevant Orders    WALKER FOR HOME USE    Medicare annual wellness visit, subsequent     -patient feeling generally well today  -Wellness labs reviewed and stable  -age-appropriate screenings up-to-date  -immunizations discussed and encouraged to obtain  -encourage routine aerobic exercise 2 to 3 times a week  -increase fluid hydration           Other Visit Diagnoses       Gait difficulty    -  Primary    Relevant Orders    WALKER FOR HOME USE    Type 2 diabetes mellitus with chronic kidney disease on chronic dialysis, without long-term current use of insulin        Relevant Orders    Comprehensive Metabolic Panel    Hemoglobin A1C    Microalbumin/Creatinine Ratio, Urine            Advance Care Planning   I attest to discussing Advance Care Planning with patient and/or family member.  Education was provided including the importance of the Health Care Power of , Advance Directives, and/or [place] documentation.  The patient expressed understanding to the importance of this information and discussion.  Length of ACP conversation in minutes:       Opioid Screening: Patient medication list reviewed, patient is not taking prescription opioids. Patient is not using additional opioids than prescribed. Patient is at low risk of substance abuse based on this opioid use history.         Follow up in about 6 months (around 5/29/2024) for Blood Pressure, General Checkup.   -plan specifics discussed above    Orders Placed This Encounter    WALKER  FOR HOME USE    Comprehensive Metabolic Panel    Hemoglobin A1C    Microalbumin/Creatinine Ratio, Urine    CBC Auto Differential    ondansetron (ZOFRAN-ODT) 4 MG TbDL    hydrOXYzine HCL (ATARAX) 25 MG tablet        Medication List with Changes/Refills   New Medications    HYDROXYZINE HCL (ATARAX) 25 MG TABLET    Take 1 tablet (25 mg total) by mouth daily as needed for Anxiety.   Current Medications    ASPIRIN 81 MG CHEW    Take 81 mg by mouth once daily.    CARVEDILOL (COREG) 6.25 MG TABLET    Take 1 tablet (6.25 mg total) by mouth 2 (two) times daily with meals.    FERRIC CITRATE (AURYXIA) 210 MG IRON TAB    Take by mouth. 2 tabs prior to meals    FOLIC ACID (FOLVITE) 1 MG TABLET    Take 1,000 mcg by mouth Daily.    LORAZEPAM (ATIVAN) 0.5 MG TABLET    Take 1 tablet (0.5 mg total) by mouth daily as needed for Anxiety.    METHOXY PEG-EPOETIN BETA (MIRCERA INJ)    50 mcg.    ONDANSETRON (ZOFRAN-ODT) 4 MG TBDL    Take 4 mg by mouth every 8 (eight) hours as needed.    SENNA (SENOKOT) 8.6 MG TABLET    Take 1 tablet by mouth as needed for Constipation.    VITAMIN D3 10 MCG (400 UNIT) CAPSULE    Take 400 Units by mouth Daily.   Discontinued Medications    AMLODIPINE (NORVASC) 10 MG TABLET    Take 10 mg by mouth Daily.    AMOXICILLIN-CLAVULANATE 500-125MG (AUGMENTIN) 500-125 MG TAB        AMOXICILLIN-CLAVULANATE 875-125MG (AUGMENTIN) 875-125 MG PER TABLET    Take 1 tablet by mouth 2 (two) times daily.

## 2023-12-04 NOTE — ASSESSMENT & PLAN NOTE
-patient feeling generally well today  -Wellness labs reviewed and stable  -age-appropriate screenings up-to-date  -immunizations discussed and encouraged to obtain  -encourage routine aerobic exercise 2 to 3 times a week  -increase fluid hydration

## 2023-12-04 NOTE — ASSESSMENT & PLAN NOTE
-BMI 30.2   -encourage low-calorie low carb diet   -encourage some form of routine aerobic exercise

## 2023-12-04 NOTE — PROGRESS NOTES
[facility] Vascular - Clinic Note  Daren Roberson MD      Patient Name: Rayna Haider                   : 1935     MRN: [id number]   Visit Date: 2023          History Present Illness     Reason for Visit: Mechanical Complication    Ms. Haider presents to the clinic for evaluation of her left basilic fistula. The dialysis unit continues to reports cannulation issues. The dialysis unit reports they have been unable to access using larger than 17 gauge needles. They state the venous cannulation site is the issue and rarely get access or blood return. They reports 1-2 infiltrations over the last month. State the are usually able to access the arterial cannulation site. They are typically using 1:1 needle cannulation with her catheter or remain with catheter only.       REVIEW OF SYSTEMS:  ROS  12 point review of systems conducted, negative except as stated in the history of present illness. See HPI for details.        Physical Exam      Vitals:    23 1457   BP: (!) 93/53   BP Location: Right arm   Patient Position: Sitting   Pulse: 85   Resp: 20   Weight: 70.3 kg (155 lb)   Height: 5' (1.524 m)        General: well-nourished, no acute distress, and healthy appearing, ambulating normally, alert, pleasant, conversant, and oriented  Neurologic: cranial nerves are grossly intact, no neurologic deficits, no motor deficits, and no sensory deficits  HEENT: grossly normal and no scleral icterus  Neck/Chest: normal  and soft without lymphadenopathy  Respiratory: breathing easily and without respiratory distress  Abdomen: normal and soft  Cardiology: regular rate and rhythm    Upper Extremity Arterial Exam:   Left - radial is palpable    Dialysis Access:  left brachiobasilic fistula and right chest wall tunneled catheter  Assessment: weak thrill; small size to fistula.   Sonosite: patent stent with thrombus formation in the middle and proximal fistula    Musculoskeletal:   Upper Extremity: normal  left hand function  Lower Extremity: no edema present to bilateral lower extremities            Assessment and Plan     Ms. Haider is a 88 y.o. with end stage renal failure who has a mechanical complication of her left basilic fistula. The dialysis unit reports they continued to have cannulation issues and are unable to advance needle size. On exam, he has a weak thrill.  Sonosite exam shows the proximal and midfistula have recurrent small size and stenosis.   I recommend proceeding LEFT BASILIC FISTULA LIGATION AND PLACEMENT OF LEFT ARM GRAFT. Patient and family are in agreement.  We discussed the risks and benefits of surgery at length including the risks of bleeding, infection, failure of access to mature, neurovascular injury, and/or steal syndrome. She wishes to proceed.        1. Mechanical complication of arteriovenous fistula surgically created, initial encounter  - Basic Metabolic Panel; Future  - CBC Auto Differential; Future  - EKG 12-lead; Future  - X-Ray Chest PA And Lateral Pre-OP; Future    2. End stage renal disease  - Case Request Operating Room: INSERTION, GRAFT, ARTERIOVENOUS, LIGATION, AV FISTULA  - Place in Outpatient; Standing  - Vital Signs; Standing  - Insert peripheral IV; Standing  - Diet NPO; Standing  - 0.9%  NaCl infusion  - IP VTE LOW RISK PATIENT; Standing  - Place sequential compression device; Standing  - ceFAZolin (ANCEF) 2 g in dextrose 5 % (D5W) 50 mL IVPB  - Basic Metabolic Panel; Standing  - Clip and Prep Other (please specifiy) (left arm); Standing    3. 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 CATH LAB;  Service: Cardiology;  Laterality: Left;    ANGIOGRAPHY OF ARTERIOVENOUS SHUNT Left 2023    Procedure: Fistulogram with Possible Intervention;  Surgeon: Daren Roberson MD;  Location: [facility] CATH LAB;  Service: Cardiology;  Laterality: Left;  Requesting 7:00am start    ANGIOGRAPHY OF ARTERIOVENOUS SHUNT Left 2023    Procedure: Fistulogram with Possible Intervention;  Surgeon: Daren Roberson MD;  Location: [facility] CATH LAB;  Service: Cardiology;  Laterality: Left;    AV FISTULA PLACEMENT Left     CATARACT EXTRACTION Bilateral      SECTION      EYE SURGERY Left     HYSTERECTOMY      has ovaries    INSERTION OF TUNNELED CENTRAL VENOUS HEMODIALYSIS CATHETER N/A 2023    Procedure: INSERTION, CATHETER, CENTRAL VENOUS, HEMODIALYSIS, TUNNELED;  Surgeon: Eric Angela DO;  Location: [facility] CATH LAB;  Service: Nephrology;  Laterality: N/A;    PLACEMENT OF ARTERIOVENOUS GRAFT Left 2023    Procedure: INSERTION, GRAFT, ARTERIOVENOUS;  Surgeon: Daren Roberson MD;  Location: [facility];  Service: Peripheral Vascular;  Laterality: Left;  LEFT BASILIC TRANSPOSITION -VS- GRAFT PLACEMENT // XX  //  SUPINE //   SUPRACLAVICULAR BLOCK    PLACEMENT, MEDIPORT      RETINAL DETACHMENT SURGERY Left     SKIN GRAFT Right     burns, scald    wisdom Left      Family History   Problem Relation Age of Onset    Cancer Mother     Glaucoma Mother     Diabetes Father     Cancer Father     Cancer Brother     Diabetes Brother     Cancer Son      Social History     Socioeconomic History    Marital status:    Tobacco Use    Smoking status: Never    Smokeless tobacco: Never   Substance and Sexual Activity     Alcohol use: Not Currently    Drug use: Never    Sexual activity: Not Currently     Partners: Male     Social Determinants of Health     Financial Resource Strain: Low Risk  (9/12/2023)    Overall Financial Resource Strain (CARDIA)     Difficulty of Paying Living Expenses: Not hard at all   Food Insecurity: No Food Insecurity (9/12/2023)    Hunger Vital Sign     Worried About Running Out of Food in the Last Year: Never true     Ran Out of Food in the Last Year: Never true   Transportation Needs: No Transportation Needs (9/12/2023)    PRAPARE - Transportation     Lack of Transportation (Medical): No     Lack of Transportation (Non-Medical): No   Physical Activity: Insufficiently Active (9/12/2023)    Exercise Vital Sign     Days of Exercise per Week: 2 days     Minutes of Exercise per Session: 20 min   Stress: No Stress Concern Present (9/12/2023)    [employer] - Occupational Stress Questionnaire     Feeling of Stress : Not at all   Social Connections: Moderately Isolated (9/12/2023)    Social Connection and Isolation Panel [NHANES]     Frequency of Communication with Friends and Family: More than three times a week     Frequency of Social Gatherings with Friends and Family: More than three times a week     Attends [employer]ology Services: More than 4 times per year     Active Member of Clubs or Organizations: No     Attends Club or Organization Meetings: Never     Marital Status:    Housing Stability: Low Risk  (9/12/2023)    Housing Stability Vital Sign     Unable to Pay for Housing in the Last Year: No     Number of Places Lived in the Last Year: 1     Unstable Housing in the Last Year: No     Current Outpatient Medications   Medication Instructions    aspirin 81 mg, Oral, Daily    carvediloL (COREG) 6.25 mg, Oral, 2 times daily with meals    ferric citrate (AURYXIA) 210 mg iron Tab Oral, 2 tabs prior to meals    folic acid (FOLVITE) 1,000 mcg, Oral, Daily    hydrOXYzine HCL  (ATARAX) 25 mg, Oral, Daily PRN    LORazepam (ATIVAN) 0.5 mg, Oral, Daily PRN    methoxy peg-epoetin beta (MIRCERA INJ) 50 mcg    ondansetron (ZOFRAN-ODT) 4 mg, Oral, Every 8 hours PRN    senna (SENOKOT) 8.6 mg tablet 1 tablet, Oral, As needed (PRN)    sodium chloride 2% (LYNETTE 128) 2 % ophthalmic solution 1 drop, Both Eyes, As needed (PRN)    VITAMIN D3 400 Units, Oral, Daily     Review of patient's allergies indicates:   Allergen Reactions    Hydralazide Nausea And Vomiting and Palpitations    Valsartan        Patient Care Team:  Rosie Flores MD as PCP - General (Internal Medicine)  Destiny Gibbons MD as Consulting Physician (Cardiovascular Disease)  Bebeto Reilly MD as Consulting Physician (Ophthalmology)  Mustapha Zavaleta MD as Consulting Physician (Nephrology)  [facility] (Dialysis Center)  Johnnie Dow MD as Consulting Physician (Nephrology)  Daren Roberson MD as Consulting Physician (Vascular Surgery)  Travon Junior MD as Consulting Physician (Ophthalmology)  [facility]      No follow-ups on file. In addition to their scheduled follow up, the patient has also been instructed to follow up on as needed basis.     Future Appointments   Date Time Provider Department Center   1/30/2024  1:20 PM Daren Roberson MD [facility]   3/27/2024  1:20 PM Rosie Flores MD [place] [id number]

## 2023-12-04 NOTE — H&P (VIEW-ONLY)
[facility] Vascular - Clinic Note  Daren Roberson MD      Patient Name: Rayna Haider                   : 1935     MRN: [id number]   Visit Date: 2023          History Present Illness     Reason for Visit: Mechanical Complication    Ms. Haider presents to the clinic for evaluation of her left basilic fistula. The dialysis unit continues to reports cannulation issues. The dialysis unit reports they have been unable to access using larger than 17 gauge needles. They state the venous cannulation site is the issue and rarely get access or blood return. They reports 1-2 infiltrations over the last month. State the are usually able to access the arterial cannulation site. They are typically using 1:1 needle cannulation with her catheter or remain with catheter only.       REVIEW OF SYSTEMS:  ROS  12 point review of systems conducted, negative except as stated in the history of present illness. See HPI for details.        Physical Exam      Vitals:    23 1457   BP: (!) 93/53   BP Location: Right arm   Patient Position: Sitting   Pulse: 85   Resp: 20   Weight: 70.3 kg (155 lb)   Height: 5' (1.524 m)        General: well-nourished, no acute distress, and healthy appearing, ambulating normally, alert, pleasant, conversant, and oriented  Neurologic: cranial nerves are grossly intact, no neurologic deficits, no motor deficits, and no sensory deficits  HEENT: grossly normal and no scleral icterus  Neck/Chest: normal  and soft without lymphadenopathy  Respiratory: breathing easily and without respiratory distress  Abdomen: normal and soft  Cardiology: regular rate and rhythm    Upper Extremity Arterial Exam:   Left - radial is palpable    Dialysis Access:  left brachiobasilic fistula and right chest wall tunneled catheter  Assessment: weak thrill; small size to fistula.   Sonosite: patent stent with thrombus formation in the middle and proximal fistula    Musculoskeletal:   Upper Extremity: normal  left hand function  Lower Extremity: no edema present to bilateral lower extremities            Assessment and Plan     Ms. Haider is a 88 y.o. with end stage renal failure who has a mechanical complication of her left basilic fistula. The dialysis unit reports they continued to have cannulation issues and are unable to advance needle size. On exam, he has a weak thrill.  Sonosite exam shows the proximal and midfistula have recurrent small size and stenosis.   I recommend proceeding LEFT BASILIC FISTULA LIGATION AND PLACEMENT OF LEFT ARM GRAFT. Patient and family are in agreement.  We discussed the risks and benefits of surgery at length including the risks of bleeding, infection, failure of access to mature, neurovascular injury, and/or steal syndrome. She wishes to proceed.        1. Mechanical complication of arteriovenous fistula surgically created, initial encounter  - Basic Metabolic Panel; Future  - CBC Auto Differential; Future  - EKG 12-lead; Future  - X-Ray Chest PA And Lateral Pre-OP; Future    2. End stage renal disease  - Case Request Operating Room: INSERTION, GRAFT, ARTERIOVENOUS, LIGATION, AV FISTULA  - Place in Outpatient; Standing  - Vital Signs; Standing  - Insert peripheral IV; Standing  - Diet NPO; Standing  - 0.9%  NaCl infusion  - IP VTE LOW RISK PATIENT; Standing  - Place sequential compression device; Standing  - ceFAZolin (ANCEF) 2 g in dextrose 5 % (D5W) 50 mL IVPB  - Basic Metabolic Panel; Standing  - Clip and Prep Other (please specifiy) (left arm); Standing    3. 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, without long-term current use of insulin     Vitamin D deficiency      Past Surgical History:   Procedure Laterality Date    ANGIOGRAPHY OF ARTERIOVENOUS SHUNT Left 2023    Procedure: Fistulogram with Possible Intervention;  Surgeon: Daren Roberson MD;  Location: [facility] CATH LAB;  Service: Cardiology;  Laterality: Left;    ANGIOGRAPHY OF ARTERIOVENOUS SHUNT Left 2023    Procedure: Fistulogram with Possible Intervention;  Surgeon: Daren Roberson MD;  Location: [facility] CATH LAB;  Service: Cardiology;  Laterality: Left;  Requesting 7:00am start    ANGIOGRAPHY OF ARTERIOVENOUS SHUNT Left 2023    Procedure: Fistulogram with Possible Intervention;  Surgeon: Daren Roberson MD;  Location: [facility] CATH LAB;  Service: Cardiology;  Laterality: Left;    AV FISTULA PLACEMENT Left     CATARACT EXTRACTION Bilateral      SECTION      EYE SURGERY Left     HYSTERECTOMY      has ovaries    INSERTION OF TUNNELED CENTRAL VENOUS HEMODIALYSIS CATHETER N/A 2023    Procedure: INSERTION, CATHETER, CENTRAL VENOUS, HEMODIALYSIS, TUNNELED;  Surgeon: Eric Angela DO;  Location: [facility] CATH LAB;  Service: Nephrology;  Laterality: N/A;    PLACEMENT OF ARTERIOVENOUS GRAFT Left 2023    Procedure: INSERTION, GRAFT, ARTERIOVENOUS;  Surgeon: Daren Roberson MD;  Location: [facility];  Service: Peripheral Vascular;  Laterality: Left;  LEFT BASILIC TRANSPOSITION -VS- GRAFT PLACEMENT // XX  //  SUPINE //   SUPRACLAVICULAR BLOCK    PLACEMENT, MEDIPORT      RETINAL DETACHMENT SURGERY Left     SKIN GRAFT Right     burns, scald    wisdom Left      Family History   Problem Relation Age of Onset    Cancer Mother     Glaucoma Mother     Diabetes Father     Cancer Father     Cancer Brother     Diabetes Brother     Cancer Son      Social History     Socioeconomic History    Marital status:    Tobacco Use    Smoking status: Never    Smokeless tobacco: Never   Substance and Sexual Activity     Alcohol use: Not Currently    Drug use: Never    Sexual activity: Not Currently     Partners: Male     Social Determinants of Health     Financial Resource Strain: Low Risk  (9/12/2023)    Overall Financial Resource Strain (CARDIA)     Difficulty of Paying Living Expenses: Not hard at all   Food Insecurity: No Food Insecurity (9/12/2023)    Hunger Vital Sign     Worried About Running Out of Food in the Last Year: Never true     Ran Out of Food in the Last Year: Never true   Transportation Needs: No Transportation Needs (9/12/2023)    PRAPARE - Transportation     Lack of Transportation (Medical): No     Lack of Transportation (Non-Medical): No   Physical Activity: Insufficiently Active (9/12/2023)    Exercise Vital Sign     Days of Exercise per Week: 2 days     Minutes of Exercise per Session: 20 min   Stress: No Stress Concern Present (9/12/2023)    [employer] - Occupational Stress Questionnaire     Feeling of Stress : Not at all   Social Connections: Moderately Isolated (9/12/2023)    Social Connection and Isolation Panel [NHANES]     Frequency of Communication with Friends and Family: More than three times a week     Frequency of Social Gatherings with Friends and Family: More than three times a week     Attends Adventism Services: More than 4 times per year     Active Member of Clubs or Organizations: No     Attends Club or Organization Meetings: Never     Marital Status:    Housing Stability: Low Risk  (9/12/2023)    Housing Stability Vital Sign     Unable to Pay for Housing in the Last Year: No     Number of Places Lived in the Last Year: 1     Unstable Housing in the Last Year: No     Current Outpatient Medications   Medication Instructions    aspirin 81 mg, Oral, Daily    carvediloL (COREG) 6.25 mg, Oral, 2 times daily with meals    ferric citrate (AURYXIA) 210 mg iron Tab Oral, 2 tabs prior to meals    folic acid (FOLVITE) 1,000 mcg, Oral, Daily    hydrOXYzine HCL  (ATARAX) 25 mg, Oral, Daily PRN    LORazepam (ATIVAN) 0.5 mg, Oral, Daily PRN    methoxy peg-epoetin beta (MIRCERA INJ) 50 mcg    ondansetron (ZOFRAN-ODT) 4 mg, Oral, Every 8 hours PRN    senna (SENOKOT) 8.6 mg tablet 1 tablet, Oral, As needed (PRN)    sodium chloride 2% (LYNETTE 128) 2 % ophthalmic solution 1 drop, Both Eyes, As needed (PRN)    VITAMIN D3 400 Units, Oral, Daily     Review of patient's allergies indicates:   Allergen Reactions    Hydralazide Nausea And Vomiting and Palpitations    Valsartan        Patient Care Team:  Rosie Flores MD as PCP - General (Internal Medicine)  Destiny Gibbons MD as Consulting Physician (Cardiovascular Disease)  Bebeto Reilly MD as Consulting Physician (Ophthalmology)  Mustapha Zavaleta MD as Consulting Physician (Nephrology)  [facility] (Dialysis Center)  Johnnie Dow MD as Consulting Physician (Nephrology)  Daren Roberson MD as Consulting Physician (Vascular Surgery)  Travon Junior MD as Consulting Physician (Ophthalmology)  [facility]      No follow-ups on file. In addition to their scheduled follow up, the patient has also been instructed to follow up on as needed basis.     Future Appointments   Date Time Provider Department Center   1/30/2024  1:20 PM Daren Roberson MD [facility]   3/27/2024  1:20 PM Rosie Flores MD [place] [id number]

## 2023-12-05 ENCOUNTER — OFFICE VISIT (OUTPATIENT)
Dept: VASCULAR SURGERY | Facility: CLINIC | Age: 88
End: 2023-12-05
Payer: MEDICARE

## 2023-12-05 VITALS
HEART RATE: 85 BPM | DIASTOLIC BLOOD PRESSURE: 53 MMHG | HEIGHT: 60 IN | BODY MASS INDEX: 30.43 KG/M2 | WEIGHT: 155 LBS | RESPIRATION RATE: 20 BRPM | SYSTOLIC BLOOD PRESSURE: 93 MMHG

## 2023-12-05 DIAGNOSIS — N18.6 END STAGE RENAL DISEASE: ICD-10-CM

## 2023-12-05 DIAGNOSIS — T82.590A MECHANICAL COMPLICATION OF ARTERIOVENOUS FISTULA SURGICALLY CREATED, INITIAL ENCOUNTER: Primary | ICD-10-CM

## 2023-12-05 DIAGNOSIS — N18.6 ESRD (END STAGE RENAL DISEASE): ICD-10-CM

## 2023-12-05 PROCEDURE — 99213 OFFICE O/P EST LOW 20 MIN: CPT | Mod: 57,,, | Performed by: SPECIALIST

## 2023-12-05 PROCEDURE — 99213 PR OFFICE/OUTPT VISIT, EST, LEVL III, 20-29 MIN: ICD-10-PCS | Mod: 57,,, | Performed by: SPECIALIST

## 2023-12-05 NOTE — PROGRESS NOTES
Verbal order from Dr. Roberson for hemodialysis duplex prior to her appointment today to evaluation fistula issues

## 2023-12-06 RX ORDER — SODIUM CHLORIDE 9 MG/ML
INJECTION, SOLUTION INTRAVENOUS CONTINUOUS
Status: CANCELLED | OUTPATIENT
Start: 2023-12-06

## 2023-12-06 RX ORDER — HYDROCODONE BITARTRATE AND ACETAMINOPHEN 7.5; 325 MG/1; MG/1
1 TABLET ORAL EVERY 6 HOURS PRN
Qty: 28 TABLET | Refills: 0 | Status: SHIPPED | OUTPATIENT
Start: 2023-12-06 | End: 2024-01-17

## 2023-12-09 ENCOUNTER — OFFICE VISIT (OUTPATIENT)
Dept: URGENT CARE | Facility: CLINIC | Age: 88
End: 2023-12-09
Payer: MEDICARE

## 2023-12-09 VITALS
HEART RATE: 90 BPM | OXYGEN SATURATION: 97 % | WEIGHT: 155 LBS | BODY MASS INDEX: 30.43 KG/M2 | RESPIRATION RATE: 18 BRPM | HEIGHT: 60 IN | DIASTOLIC BLOOD PRESSURE: 64 MMHG | TEMPERATURE: 99 F | SYSTOLIC BLOOD PRESSURE: 100 MMHG

## 2023-12-09 DIAGNOSIS — N30.01 ACUTE CYSTITIS WITH HEMATURIA: Primary | ICD-10-CM

## 2023-12-09 DIAGNOSIS — R11.0 NAUSEA: ICD-10-CM

## 2023-12-09 LAB
BILIRUB UR QL STRIP: NEGATIVE
CTP QC/QA: YES
GLUCOSE UR QL STRIP: NEGATIVE
KETONES UR QL STRIP: NEGATIVE
LEUKOCYTE ESTERASE UR QL STRIP: POSITIVE
PH, POC UA: 6
POC BLOOD, URINE: POSITIVE
POC MOLECULAR INFLUENZA A AGN: NEGATIVE
POC MOLECULAR INFLUENZA B AGN: NEGATIVE
POC NITRATES, URINE: NEGATIVE
PROT UR QL STRIP: POSITIVE
RSV RAPID ANTIGEN: NEGATIVE
SARS-COV-2 RDRP RESP QL NAA+PROBE: NEGATIVE
SP GR UR STRIP: 1.01 (ref 1–1.03)
UROBILINOGEN UR STRIP-ACNC: NORMAL (ref 0.1–1.1)

## 2023-12-09 PROCEDURE — 87635 SARS-COV-2 COVID-19 AMP PRB: CPT | Mod: QW,,, | Performed by: FAMILY MEDICINE

## 2023-12-09 PROCEDURE — 87635: ICD-10-PCS | Mod: QW,,, | Performed by: FAMILY MEDICINE

## 2023-12-09 PROCEDURE — 87502 INFLUENZA DNA AMP PROBE: CPT | Mod: QW,,, | Performed by: FAMILY MEDICINE

## 2023-12-09 PROCEDURE — 81003 POCT URINALYSIS, DIPSTICK, AUTOMATED, W/O SCOPE: ICD-10-PCS | Mod: QW,,, | Performed by: FAMILY MEDICINE

## 2023-12-09 PROCEDURE — 81003 URINALYSIS AUTO W/O SCOPE: CPT | Mod: QW,,, | Performed by: FAMILY MEDICINE

## 2023-12-09 PROCEDURE — 87502 POCT INFLUENZA A/B MOLECULAR: ICD-10-PCS | Mod: QW,,, | Performed by: FAMILY MEDICINE

## 2023-12-09 PROCEDURE — 87807 RSV ASSAY W/OPTIC: CPT | Mod: QW,,, | Performed by: FAMILY MEDICINE

## 2023-12-09 PROCEDURE — 87186 SC STD MICRODIL/AGAR DIL: CPT | Performed by: FAMILY MEDICINE

## 2023-12-09 PROCEDURE — 99213 OFFICE O/P EST LOW 20 MIN: CPT | Mod: ,,, | Performed by: FAMILY MEDICINE

## 2023-12-09 PROCEDURE — 99213 PR OFFICE/OUTPT VISIT, EST, LEVL III, 20-29 MIN: ICD-10-PCS | Mod: ,,, | Performed by: FAMILY MEDICINE

## 2023-12-09 PROCEDURE — 87086 URINE CULTURE/COLONY COUNT: CPT | Performed by: FAMILY MEDICINE

## 2023-12-09 PROCEDURE — 87807 POCT RESPIRATORY SYNCYTIAL VIRUS: ICD-10-PCS | Mod: QW,,, | Performed by: FAMILY MEDICINE

## 2023-12-09 RX ORDER — CEPHALEXIN 500 MG/1
500 CAPSULE ORAL EVERY 12 HOURS
Qty: 14 CAPSULE | Refills: 0 | Status: SHIPPED | OUTPATIENT
Start: 2023-12-09 | End: 2023-12-16

## 2023-12-09 NOTE — PATIENT INSTRUCTIONS
Adequate hydration. Medications as prescribed.   History of end-stage renal disease, reviewed CMP from November 2023.  Creatinine clearance by Cockcroft-Gault is 7.77ml/min  Reviewed up-to-date on Keflex dosing for renal disease.  On dialysis day take medication after dialysis  Patient and daughter voiced understanding  Urine Culture results will be monitored and reported, and treatment changes made if necessary.  ER precautions with worsening symptoms, fever, flank pain, not able to urinate.   Call or return to clinic as needed. Voiced understanding verbally.  Urine dipstick results shared positive for blood, positive for leukocytes, no nitrates    Flu test negative, RSV test negative, COVID-19 test negative

## 2023-12-09 NOTE — PROGRESS NOTES
Subjective:      Patient ID: Rayna Haider is a 88 y.o. female.    Vitals:  height is 5' (1.524 m) and weight is 70.3 kg (155 lb). Her temperature is 98.9 °F (37.2 °C). Her blood pressure is 100/64 and her pulse is 90. Her respiration is 18 and oxygen saturation is 97%.     Chief Complaint: Nausea (X last night- 100.8, nausea, cough - did take some zofran and it seemed to help)    HPI:  88-year-old female present to clinic with concerns of feeling feverish and nauseated since last night.  Mild coughing, no sore throat, no difficulty swallowing.  No fever in the clinic.  For all other medical conditions follows up with primary MD.  Denies burning with urination, urgency or frequency.  No abdominal pain or pressure.  No vomiting.    History of UTI mid September, patient and daughter states was prescribed Augmentin, made or feels sick.  Was seen at another urgent care, do not see any cultures in the chart.  History of end-stage renal disease, currently on dialysis      ROS :  Constitutional : _No fever, no fatigue or weakness  Neck : _Negative except HPI  HENT :  No sore throat, no difficulty swallowing, no earache  Respiratory :_ + coughing, no shortness of breath  Cardiovascular : _No chest pain, no palpitations  Gastrointestinal : _No nausea, no vomiting  Genitourinary : _No flank pain, no vaginal discharge  Integumentary : _Negative for skin rash   Objective:     Physical Exam  General : Alert and Oriented, No apparent distress, afebrile  Neck - supple  HENT : Oropharynx no redness or swelling. Tonsils _. TMs intact mild fluid no redness.   Respiratory : Bilateral equal breath sounds, nonlabored respirations  Cardiovascular : Rate, rhythm regular, normal volume pulse, no murmur  Gastrointestinal: Full abdomen, soft, nontender to palpate  Integumentary : Warm, Dry and no rash    Assessment:     1. Acute cystitis with hematuria    2. Nausea      Plan:   Adequate hydration. Medications as prescribed.   History of  end-stage renal disease, reviewed CMP from November 2023.  Creatinine clearance by Cockcroft-Gault is 7.77ml/min  Reviewed up-to-date on Keflex dosing for renal disease.  On dialysis day take medication after dialysis  Patient and daughter voiced understanding  Urine Culture results will be monitored and reported, and treatment changes made if necessary.  ER precautions with worsening symptoms, fever, flank pain, not able to urinate.   Call or return to clinic as needed. Voiced understanding verbally.  Urine dipstick results shared positive for blood, positive for leukocytes, no nitrates    Flu test negative, RSV test negative, COVID-19 test negative    Acute cystitis with hematuria  -     Urine Culture High Risk  -     cephALEXin (KEFLEX) 500 MG capsule; Take 1 capsule (500 mg total) by mouth every 12 (twelve) hours. for 7 days  Dispense: 14 capsule; Refill: 0    Nausea  -     POCT COVID-19 Rapid Screening  -     POCT Influenza A/B MOLECULAR  -     POCT respiratory syncytial virus  -     POCT Urinalysis, Dipstick, Automated, W/O Scope

## 2023-12-11 LAB — BACTERIA UR CULT: ABNORMAL

## 2023-12-18 NOTE — PLAN OF CARE
01/31/23 1129   Discharge Assessment   Assessment Type Discharge Planning Assessment   Confirmed/corrected address, phone number and insurance Yes   Confirmed Demographics Correct on Facesheet   Source of Information patient   Communicated EMIR with patient/caregiver Date not available/Unable to determine   Reason For Admission Hyponatremia   People in Home alone   Do you expect to return to your current living situation? Yes   Do you have help at home or someone to help you manage your care at home? Yes   Who are your caregiver(s) and their phone number(s)? Daughter Kaye Haider [phone]   Prior to hospitilization cognitive status: Alert/Oriented   Current cognitive status: Alert/Oriented   Walking or Climbing Stairs ambulation difficulty, requires equipment   Mobility Management Walker   Home Accessibility stairs to enter home;stairs within home   Number of Stairs, Within Home, Primary none   Number of Stairs, Main Entrance none   Home Layout Able to live on 1st floor   Equipment Currently Used at Home bedside commode;cane, straight;shower chair;walker, rolling   Do you currently have service(s) that help you manage your care at home? Yes   Name and Contact number of agency [facility] [phone]   Is the pt/caregiver preference to resume services with current agency Yes   Do you take prescription medications? Yes   Do you have any problems affording any of your prescribed medications? No   Is the patient taking medications as prescribed? yes   Who is going to help you get home at discharge? Daughter   How do you get to doctors appointments? family or friend will provide   Are you on dialysis? No   Do you take coumadin? No   Discharge Plan A Home Health   Discharge Plan B Home Health   Discharge Plan discussed with: Patient;Adult children   Discharge Barriers Identified None        fluticasone / salmeterol  1 respiclick inhaler 0.45g of 113mcg/14mcg   On GoodRx at [facility] for $45.62

## 2023-12-21 ENCOUNTER — TELEPHONE (OUTPATIENT)
Dept: INTERNAL MEDICINE | Facility: CLINIC | Age: 88
End: 2023-12-21
Payer: MEDICARE

## 2023-12-21 DIAGNOSIS — Z98.890 STATUS POST SURGERY: Primary | ICD-10-CM

## 2023-12-21 NOTE — TELEPHONE ENCOUNTER
----- Message from Toya Smith sent at 12/21/2023  3:58 PM CST -----  Regarding: advice  Type:  Needs Medical Advice    Who Called: luna from [facility] home [facility]ancy  Symptoms (please be specific):      Best Call Back Number: [phone]  Additional Information: called about receiving the referral but is needing the office notes on the pt.she stated that she received the orders but no office notes. Please advice ( fax [phone])

## 2023-12-21 NOTE — TELEPHONE ENCOUNTER
----- Message from Cristina Andres sent at 12/20/2023  4:37 PM CST -----  .Type:  Needs Medical Advice    Who Called: Kaye  Symptoms (please be specific):    How long has patient had these symptoms:    Pharmacy name and phone #:    Would the patient rather a call back or a response via [facility]?   Best Call Back Number: [phone]  Additional Information: pt daughter requesting hh after mom surgery

## 2023-12-27 ENCOUNTER — HOSPITAL ENCOUNTER (OUTPATIENT)
Dept: RADIOLOGY | Facility: HOSPITAL | Age: 88
Discharge: HOME | End: 2023-12-27
Attending: SPECIALIST
Payer: MEDICARE

## 2023-12-27 ENCOUNTER — TELEPHONE (OUTPATIENT)
Dept: INTERNAL MEDICINE | Facility: CLINIC | Age: 88
End: 2023-12-27
Payer: MEDICARE
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
## 2023-12-27 DIAGNOSIS — R26.9 GAIT DIFFICULTY: Primary | ICD-10-CM

## 2023-12-27 DIAGNOSIS — T82.590A MECHANICAL COMPLICATION OF ARTERIOVENOUS FISTULA SURGICALLY CREATED, INITIAL ENCOUNTER: ICD-10-CM

## 2023-12-27 PROCEDURE — 71046 X-RAY EXAM CHEST 2 VIEWS: CPT | Mod: TC

## 2023-12-27 NOTE — TELEPHONE ENCOUNTER
----- Message from Cristian Campa sent at 12/27/2023  8:18 AM CST -----  .Type:  Needs Medical Advice    Who Called: Blanquita Estrada,   Symptoms (please be specific):    How long has patient had these symptoms:    Pharmacy name and phone #:    Would the patient rather a call back or a response via [facility]?   Best Call Back Number: [phone]  Additional Information: Requested to speak with the nurse about this patient.

## 2023-12-27 NOTE — TELEPHONE ENCOUNTER
Natalie at [facility] called to get referral for PT for gait training and strengthening. Referral was sent to her. [phone]

## 2023-12-27 NOTE — TELEPHONE ENCOUNTER
----- Message from Cristian Campa sent at 12/27/2023  8:18 AM CST -----  .Type:  Needs Medical Advice    Who Called: Blanquita Estrada,   Symptoms (please be specific):    How long has patient had these symptoms:    Pharmacy name and phone #:    Would the patient rather a call back or a response via [facility]?   Best Call Back Number: [phone]  Additional Information: Requested to speak with the nurse about this patient.

## 2023-12-28 ENCOUNTER — ANESTHESIA EVENT (OUTPATIENT)
Dept: SURGERY | Facility: HOSPITAL | Age: 88
End: 2023-12-28
Payer: MEDICARE

## 2023-12-31 ENCOUNTER — PATIENT MESSAGE (OUTPATIENT)
Dept: ADMINISTRATIVE | Facility: OTHER | Age: 88
End: 2023-12-31
Payer: MEDICARE

## 2024-01-01 ENCOUNTER — PATIENT MESSAGE (OUTPATIENT)
Dept: ADMINISTRATIVE | Facility: OTHER | Age: 89
End: 2024-01-01
Payer: MEDICARE

## 2024-01-02 ENCOUNTER — PATIENT MESSAGE (OUTPATIENT)
Dept: ADMINISTRATIVE | Facility: OTHER | Age: 89
End: 2024-01-02
Payer: MEDICARE

## 2024-01-02 NOTE — PRE-PROCEDURE INSTRUCTIONS
"[facility]: Outpatient Surgery  Preprocedure Instructions    Your physician's office will be calling you with your arrival time.      Expectations: "Because of inconsistent procedure completion times, an unexpected wait may occur. The Physicians would like you to be here to prepare in the event they run ahead of time. We will make you as comfortable as possible and keep you informed. We apologize in advance if this happens."    You will arrive at [facility], [address], [place].  Enter through the [address] entrance next to the Emergency Room, and come to the 6th floor to the Outpatient Surgery Department.     Visitory Policy:  You are allowed 2 adult visitors to be with you in the hospital. Please, no switching visitors in pre-op area. All hospital visitors should be in good current health.  No small children.     What to Bring:  Please have your ID, insurance cards, and all home medication bottles with you at check in.  Bring your CPAP machine if one is used at home.     Fasting:  Nothing to eat or drink after midnight the night before your procedure. This includes no ice, gum, hard candies, and/or tobacco products.    Medications:  Follow your doctor's instructions for taking any medications on the morning of your procedure.  If no instructions for taking medications were given, do not take any medications but bring your medications in their bottles to your procedure check in.     Follow your doctor's preoperative instructions regarding skin prep, bowel prep, bathing, or showering prior to your procedure.  If any special soaps were provided to you, please use according to your doctor's instructions. If no instructions were given from your doctor, take a good bath or shower with antibacterial soap the night before and the morning of your procedure.  On the morning of procedure, wear loose, comfortable clothing.  No lotions, makeup, perfumes, colognes, deodorant, or " jewelry to your procedure.  Removable items (glasses, contact lenses, dentures, retainers, hearing aids) need to be removed for your procedure.  Bring your storage containers for these items if you wear them.     Artificial nails, body jewelry, eyelash extensions, and/or hair extensions with metal clips are not allowed during your surgery.  If you currently wear any of these items, please arrange for them to be removed prior to your arrival to the hospital.     Outpatient or Same Day Surgeries:  Any patients receiving sedation/anesthesia are advised not to drive for 24 hours after their procedure.  We do not allow patients to drive themselves home after discharge.  If you are going home after your procedure, please have someone available to drive you home from the hospital.        You may call the Outpatient Surgery Department at [phone] with any questions or concerns.  We are looking forward to meeting you and taking great care of you for your procedure.  Thank you for choosing [facility] for your surgical needs.

## 2024-01-03 ENCOUNTER — ANESTHESIA (OUTPATIENT)
Dept: SURGERY | Facility: HOSPITAL | Age: 89
End: 2024-01-03
Payer: MEDICARE

## 2024-01-03 ENCOUNTER — HOSPITAL ENCOUNTER (OUTPATIENT)
Facility: HOSPITAL | Age: 89
Discharge: HOME | End: 2024-01-03
Attending: SPECIALIST | Admitting: SPECIALIST
Payer: MEDICARE

## 2024-01-03 DIAGNOSIS — T82.590A MECHANICAL COMPLICATION OF ARTERIOVENOUS FISTULA SURGICALLY CREATED, INITIAL ENCOUNTER: Primary | ICD-10-CM

## 2024-01-03 DIAGNOSIS — N18.6 END STAGE RENAL DISEASE: ICD-10-CM

## 2024-01-03 DIAGNOSIS — N18.6 ESRD (END STAGE RENAL DISEASE): ICD-10-CM

## 2024-01-03 LAB
ANION GAP SERPL CALC-SCNC: 13 MEQ/L
BUN SERPL-MCNC: 15.6 MG/DL (ref 9.8–20.1)
CALCIUM SERPL-MCNC: 9.4 MG/DL (ref 8.4–10.2)
CHLORIDE SERPL-SCNC: 101 MMOL/L (ref 98–107)
CO2 SERPL-SCNC: 24 MMOL/L (ref 23–31)
CREAT SERPL-MCNC: 3.04 MG/DL (ref 0.55–1.02)
CREAT/UREA NIT SERPL: 5
GFR SERPLBLD CREATININE-BSD FMLA CKD-EPI: 14 MLS/MIN/1.73/M2
GLUCOSE SERPL-MCNC: 131 MG/DL (ref 82–115)
POTASSIUM SERPL-SCNC: 3.6 MMOL/L (ref 3.5–5.1)
SODIUM SERPL-SCNC: 138 MMOL/L (ref 136–145)

## 2024-01-03 PROCEDURE — 63600175 PHARM REV CODE 636 W HCPCS: Performed by: ANESTHESIOLOGY

## 2024-01-03 PROCEDURE — 63600175 PHARM REV CODE 636 W HCPCS: Performed by: NURSE ANESTHETIST, CERTIFIED REGISTERED

## 2024-01-03 PROCEDURE — 25000003 PHARM REV CODE 250: Performed by: NURSE ANESTHETIST, CERTIFIED REGISTERED

## 2024-01-03 PROCEDURE — C1768 GRAFT, VASCULAR: HCPCS | Performed by: SPECIALIST

## 2024-01-03 PROCEDURE — 36000706: Performed by: SPECIALIST

## 2024-01-03 PROCEDURE — 71000015 HC POSTOP RECOV 1ST HR: Performed by: SPECIALIST

## 2024-01-03 PROCEDURE — 25000003 PHARM REV CODE 250: Performed by: SPECIALIST

## 2024-01-03 PROCEDURE — 63600175 PHARM REV CODE 636 W HCPCS: Performed by: SPECIALIST

## 2024-01-03 PROCEDURE — 37000008 HC ANESTHESIA 1ST 15 MINUTES: Performed by: SPECIALIST

## 2024-01-03 PROCEDURE — 36830 ARTERY-VEIN NONAUTOGRAFT: CPT | Mod: LT,,, | Performed by: SPECIALIST

## 2024-01-03 PROCEDURE — 71000016 HC POSTOP RECOV ADDL HR: Performed by: SPECIALIST

## 2024-01-03 PROCEDURE — 37607 LIG/BANDING ANGIOACS AV FSTL: CPT | Mod: 51,LT,, | Performed by: SPECIALIST

## 2024-01-03 PROCEDURE — 76942 ECHO GUIDE FOR BIOPSY: CPT | Performed by: ANESTHESIOLOGY

## 2024-01-03 PROCEDURE — 37000009 HC ANESTHESIA EA ADD 15 MINS: Performed by: SPECIALIST

## 2024-01-03 PROCEDURE — 80048 BASIC METABOLIC PNL TOTAL CA: CPT | Performed by: SPECIALIST

## 2024-01-03 PROCEDURE — D9220A PRA ANESTHESIA: Mod: ANES,,, | Performed by: ANESTHESIOLOGY

## 2024-01-03 PROCEDURE — D9220A PRA ANESTHESIA: Mod: CRNA,,, | Performed by: NURSE ANESTHETIST, CERTIFIED REGISTERED

## 2024-01-03 PROCEDURE — 36000707: Performed by: SPECIALIST

## 2024-01-03 DEVICE — PROPATEN VASCULAR GRAFT SW 4-7MMX45CM TAPERED HEPARIN
Type: IMPLANTABLE DEVICE | Site: ARM | Status: FUNCTIONAL
Brand: GORE PROPATEN VASCULAR GRAFT

## 2024-01-03 RX ORDER — ONDANSETRON 2 MG/ML
INJECTION INTRAMUSCULAR; INTRAVENOUS
Status: DISCONTINUED | OUTPATIENT
Start: 2024-01-03 | End: 2024-01-03

## 2024-01-03 RX ORDER — LIDOCAINE HYDROCHLORIDE 10 MG/ML
INJECTION INFILTRATION; PERINEURAL
Status: DISCONTINUED | OUTPATIENT
Start: 2024-01-03 | End: 2024-01-03 | Stop reason: HOSPADM

## 2024-01-03 RX ORDER — SODIUM CHLORIDE 9 MG/ML
INJECTION, SOLUTION INTRAVENOUS CONTINUOUS
Status: DISCONTINUED | OUTPATIENT
Start: 2024-01-03 | End: 2024-01-03 | Stop reason: HOSPADM

## 2024-01-03 RX ORDER — CEFAZOLIN SODIUM 1 G/3ML
INJECTION, POWDER, FOR SOLUTION INTRAMUSCULAR; INTRAVENOUS
Status: DISCONTINUED | OUTPATIENT
Start: 2024-01-03 | End: 2024-01-03 | Stop reason: HOSPADM

## 2024-01-03 RX ORDER — FENTANYL CITRATE 50 UG/ML
25-50 INJECTION, SOLUTION INTRAMUSCULAR; INTRAVENOUS
Status: DISCONTINUED | OUTPATIENT
Start: 2024-01-03 | End: 2024-01-03

## 2024-01-03 RX ORDER — LIDOCAINE HYDROCHLORIDE 20 MG/ML
INJECTION INTRAVENOUS
Status: DISCONTINUED | OUTPATIENT
Start: 2024-01-03 | End: 2024-01-03

## 2024-01-03 RX ORDER — HYDROCODONE BITARTRATE AND ACETAMINOPHEN 5; 325 MG/1; MG/1
1 TABLET ORAL EVERY 4 HOURS PRN
Status: DISCONTINUED | OUTPATIENT
Start: 2024-01-03 | End: 2024-01-03 | Stop reason: HOSPADM

## 2024-01-03 RX ORDER — CEFAZOLIN SODIUM 2 G/50ML
2 SOLUTION INTRAVENOUS
Status: COMPLETED | OUTPATIENT
Start: 2024-01-03 | End: 2024-01-03

## 2024-01-03 RX ORDER — MIDAZOLAM HYDROCHLORIDE 1 MG/ML
0.5 INJECTION INTRAMUSCULAR; INTRAVENOUS
Status: DISCONTINUED | OUTPATIENT
Start: 2024-01-03 | End: 2024-01-03 | Stop reason: HOSPADM

## 2024-01-03 RX ORDER — HYDROMORPHONE HYDROCHLORIDE 2 MG/ML
0.4 INJECTION, SOLUTION INTRAMUSCULAR; INTRAVENOUS; SUBCUTANEOUS EVERY 5 MIN PRN
Status: DISCONTINUED | OUTPATIENT
Start: 2024-01-03 | End: 2024-01-03 | Stop reason: HOSPADM

## 2024-01-03 RX ORDER — PHENYLEPHRINE HCL IN 0.9% NACL 1 MG/10 ML
SYRINGE (ML) INTRAVENOUS
Status: DISCONTINUED | OUTPATIENT
Start: 2024-01-03 | End: 2024-01-03

## 2024-01-03 RX ORDER — ROPIVACAINE HYDROCHLORIDE 5 MG/ML
40 INJECTION, SOLUTION EPIDURAL; INFILTRATION; PERINEURAL ONCE
Status: DISCONTINUED | OUTPATIENT
Start: 2024-01-03 | End: 2024-01-03 | Stop reason: HOSPADM

## 2024-01-03 RX ORDER — HEPARIN SODIUM 5000 [USP'U]/ML
INJECTION, SOLUTION INTRAVENOUS; SUBCUTANEOUS
Status: DISCONTINUED | OUTPATIENT
Start: 2024-01-03 | End: 2024-01-03 | Stop reason: HOSPADM

## 2024-01-03 RX ORDER — BUPIVACAINE HYDROCHLORIDE 5 MG/ML
INJECTION, SOLUTION EPIDURAL; INTRACAUDAL
Status: DISCONTINUED | OUTPATIENT
Start: 2024-01-03 | End: 2024-01-03 | Stop reason: HOSPADM

## 2024-01-03 RX ORDER — GLYCOPYRROLATE 0.2 MG/ML
INJECTION INTRAMUSCULAR; INTRAVENOUS
Status: DISCONTINUED | OUTPATIENT
Start: 2024-01-03 | End: 2024-01-03

## 2024-01-03 RX ORDER — LIDOCAINE HYDROCHLORIDE 10 MG/ML
1 INJECTION, SOLUTION EPIDURAL; INFILTRATION; INTRACAUDAL; PERINEURAL ONCE
Status: DISCONTINUED | OUTPATIENT
Start: 2024-01-03 | End: 2024-01-03 | Stop reason: HOSPADM

## 2024-01-03 RX ORDER — ONDANSETRON 2 MG/ML
4 INJECTION INTRAMUSCULAR; INTRAVENOUS ONCE AS NEEDED
Status: DISCONTINUED | OUTPATIENT
Start: 2024-01-03 | End: 2024-01-03 | Stop reason: HOSPADM

## 2024-01-03 RX ORDER — ROPIVACAINE HYDROCHLORIDE 5 MG/ML
INJECTION, SOLUTION EPIDURAL; INFILTRATION; PERINEURAL
Status: COMPLETED | OUTPATIENT
Start: 2024-01-03 | End: 2024-01-03

## 2024-01-03 RX ORDER — FENTANYL CITRATE 50 UG/ML
100 INJECTION, SOLUTION INTRAMUSCULAR; INTRAVENOUS
Status: DISCONTINUED | OUTPATIENT
Start: 2024-01-03 | End: 2024-01-03 | Stop reason: HOSPADM

## 2024-01-03 RX ORDER — PROPOFOL 10 MG/ML
INJECTION, EMULSION INTRAVENOUS CONTINUOUS PRN
Status: DISCONTINUED | OUTPATIENT
Start: 2024-01-03 | End: 2024-01-03

## 2024-01-03 RX ADMIN — SODIUM CHLORIDE, SODIUM GLUCONATE, SODIUM ACETATE, POTASSIUM CHLORIDE AND MAGNESIUM CHLORIDE: 526; 502; 368; 37; 30 INJECTION, SOLUTION INTRAVENOUS at 09:01

## 2024-01-03 RX ADMIN — CEFAZOLIN SODIUM 2 G: 2 SOLUTION INTRAVENOUS at 09:01

## 2024-01-03 RX ADMIN — PROPOFOL 50 MCG/KG/MIN: 10 INJECTION, EMULSION INTRAVENOUS at 09:01

## 2024-01-03 RX ADMIN — ONDANSETRON 4 MG: 2 INJECTION INTRAMUSCULAR; INTRAVENOUS at 10:01

## 2024-01-03 RX ADMIN — Medication 100 MCG: at 10:01

## 2024-01-03 RX ADMIN — LIDOCAINE HYDROCHLORIDE 80 MG: 20 INJECTION INTRAVENOUS at 09:01

## 2024-01-03 RX ADMIN — FENTANYL CITRATE 100 MCG: 50 INJECTION, SOLUTION INTRAMUSCULAR; INTRAVENOUS at 08:01

## 2024-01-03 RX ADMIN — ROPIVACAINE HYDROCHLORIDE 40 ML: 5 INJECTION, SOLUTION EPIDURAL; INFILTRATION; PERINEURAL at 09:01

## 2024-01-03 RX ADMIN — GLYCOPYRROLATE 0.2 MG: 0.2 INJECTION INTRAMUSCULAR; INTRAVENOUS at 09:01

## 2024-01-03 NOTE — ANESTHESIA PROCEDURE NOTES
Peripheral Block    Patient location during procedure: pre-op    Reason for block: primary anesthetic    Diagnosis: Left Arm AV Fistula mechanical complication   Start time: 1/3/2024 9:02 AM  Timeout: 1/3/2024 9:00 AM   End time: 1/3/2024 9:10 AM    Staffing  Authorizing Provider: Jez Montague MD  Performing Provider: Jez Montague MD    Staffing  Performed by: Jez Montague MD  Authorized by: Jez Montague MD    Preanesthetic Checklist  Completed: patient identified, IV checked, site marked, risks and benefits discussed, surgical consent, monitors and equipment checked, pre-op evaluation and timeout performed  Peripheral Block  Patient position: sitting  Prep: ChloraPrep  Patient monitoring: heart rate, cardiac monitor, continuous pulse ox and frequent blood pressure checks  Block type: supraclavicular  Laterality: left  Injection technique: single shot  Needle  Needle type: Stimuplex   Needle gauge: 22 G  Needle length: 2 in  Needle localization: anatomical landmarks, ultrasound guidance and nerve stimulator   -ultrasound image captured on disc.  Assessment  Injection assessment: negative aspiration, negative parasthesia and local visualized surrounding nerve  Paresthesia pain: none  Heart rate change: no  Slow fractionated injection: yes  Pain Tolerance: comfortable throughout block and no complaints  Medications:    Medications: ropivacaine (NAROPIN) injection 0.5% - Perineural   40 mL - 1/3/2024 9:08:00 AM    Additional Notes  VSS.  [facility] RN monitoring vitals throughout procedure. U/S Image revealed normal appearing supraclavicular anatomy. Continuously aspirated between incremental injections (HEME - neg). Appropriate neuro-stimulator twitch stopped @ 0.80 mA. Patient tolerated procedure well.

## 2024-01-03 NOTE — ANESTHESIA POSTPROCEDURE EVALUATION
Anesthesia Post Evaluation    Patient: Rayna Haider    Procedure(s) Performed: Procedure(s) (LRB):  INSERTION, GRAFT, ARTERIOVENOUS (Left)  LIGATION, AV FISTULA (Left)    Final Anesthesia Type: general      Patient location during evaluation: OPS  Patient participation: Yes- Able to Participate  Level of consciousness: awake and oriented  Post-procedure vital signs: reviewed and stable  Pain management: adequate  Airway patency: patent    PONV status at discharge: No PONV  Anesthetic complications: no      Cardiovascular status: stable and hemodynamically stable  Respiratory status: unassisted and spontaneous ventilation  Hydration status: euvolemic  Follow-up not needed.        Neuro: stable peripheral nerve block      Vitals Value Taken Time   /74 01/03/24 1210   Temp * 01/03/24 1622   Pulse 79 01/03/24 1203   Resp 16 01/03/24 1622   SpO2 98 % 01/03/24 1203         No case tracking events are documented in the log.      Pain/Rivka Score: Pain Rating Prior to Med Admin: 0 (1/3/2024  8:59 AM)  Rivka Score: 10 (1/3/2024 12:33 PM)  Modified Rivka Score: 20 (1/3/2024 12:10 PM)

## 2024-01-03 NOTE — OP NOTE
Surgeon: Daren Roberson MD    Date: 01/03/2024     Diagnosis: Pre-Op Diagnosis Codes:     * End stage renal disease [N18.6]     Procedure: Left  Left upper arm dialysis graft placement  Ligation of left upper arm basilic fistula      History of Presenting Illness: Ms. Haider is a 88 y.o. year old woman who has end stage renal disease and needs long term hemodialysis access.      Procedure:  The patient was taken to the operating room and placed in a supine position.  The left arm was prepped and draped in the usual sterile fashion.  Antibiotics were administered and appropriate time-out was performed.  B-mode ultrasound was performed on the extremity.  B-mode ultrasound was utilized to evaluate the left basilic fistula.  The fistula was small as previously noted.  A transverse incision was made over the course of the proximal fistula dissection was performed down to the level of the vessel and it was controlled circumferentially.  Two separate 2-0 silks were placed around the fistula and tied down to occlude flow.  3-0 Vicryl and 4-0 Monocryl were utilized to close this wound.  Dermabond dressing was used over it.    An incision was made in the axilla over the artery and vein.  Dissection was performed down to the proximal brachial artery and vein and each were isolated.  A 7 mm tapered Crestview-Agustin PTFE graft was selected.  This was tunneled in a loop fashion with the assistance of a counter incision just proximal to the antecubital fossa.  Heparin was administered.  There was a stent graft in the vein at the site of our procedure.  This did somewhat obstruct the course of our graft towards the artery.  The graft and it is adherent vein were excised from the previous basilic fistula.  2-0 silk stick tie was utilized to occlude the fistula side.  The axillary vein had been clamped and the open end associated with that vessel was utilized for an end-to-end anastomosis utilizing Crestview-Agustin CV6 suture.  The artery was  then clamped and arteriotomy was made.  An end-to-side anastomosis was made to the tapered end of the graft utilizing running Pelham-Agustin CV6 suture.  We then flushed the graft and it had good arterial flow.   Flow was allowed through the system and there was a good thrill.  Hemostasis was obtained.  The two wounds were closed with 3-0 Vicryl suture and 4-0 Monocryl suture.  Dermabond was applied.    Complications:  none    Findings:  Good thrill to the graft at case completion.  Light radial pulse and arterial signals at the radial and ulnar artery sites at the wrist.

## 2024-01-03 NOTE — TRANSFER OF CARE
Anesthesia Transfer of Care Note    Patient: Rayna Haider    Procedure(s) Performed: Procedure(s) (LRB):  INSERTION, GRAFT, ARTERIOVENOUS (Left)  LIGATION, AV FISTULA (Left)    Patient location: [facility]    Anesthesia Type: MAC    Transport from OR: Transported from OR on room air with adequate spontaneous ventilation    Post pain: adequate analgesia    Post assessment: no apparent anesthetic complications and tolerated procedure well    Post vital signs: stable    Level of consciousness: awake and responds to stimulation    Nausea/Vomiting: no nausea/vomiting    Complications: none    Transfer of care protocol was followed    Last vitals: Visit Vitals  BP (!) 141/66   Pulse 75   Temp 37.3 °C (99.2 °F) (Oral)   Resp 16   Ht 5' (1.524 m)   Wt 67.1 kg (147 lb 14.9 oz)   LMP  (LMP Unknown)   SpO2 97%   Breastfeeding No   BMI 28.89 kg/m²

## 2024-01-03 NOTE — PLAN OF CARE
Pt up to restroom with assistance of walker. LUE in sling d/t block. Numbness present to LUE. Pt. Meets criteria to go home. IV Removed. Discharge instructions given to pt and daughter. They verbalized understanding. Pt. Dressed and waiting for wheelchair transport to vehicle. Daughter transporting pt home in stable condition.

## 2024-01-03 NOTE — ANESTHESIA PREPROCEDURE EVALUATION
01/02/2024  Rayna Haider is a 88 y.o., female, who presents for the following:    Procedures:      INSERTION, GRAFT, ARTERIOVENOUS (Left)      LIGATION, AV FISTULA (Left) - left basilic fistula ligation   Anesthesia type: Regional   Diagnosis: End stage renal disease [N18.6]   Pre-op diagnosis: End stage renal disease [N18.6]   Location: [facility] OR  / [facility] OR     HPI:    Ms. Haider is a 88 y.o. with end stage renal failure who has a mechanical complication of her left basilic fistula. The dialysis unit reports they continued to have cannulation issues and are unable to advance needle size. On exam, he has a weak thrill.  Sonosite exam shows the proximal and midfistula have recurrent small size and stenosis.   I recommend proceeding LEFT BASILIC FISTULA LIGATION AND PLACEMENT OF LEFT ARM GRAFT. Patient and family are in agreement.  We discussed the risks and benefits of surgery at length including the risks of bleeding, infection, failure of access to mature, neurovascular injury, and/or steal syndrome. She wishes to proceed.     LAB:          TTE:  The left ventricle is normal in size with concentric hypertrophy and normal systolic function.  The estimated ejection fraction is 55%.  Normal right ventricular size with normal right ventricular systolic function.  Mild tricuspid regurgitation.  There is a small left pleural effusion.       Pre-op Assessment    I have reviewed the Patient Summary Reports.     I have reviewed the Nursing Notes. I have reviewed the NPO Status.   I have reviewed the Medications.     Review of Systems  Anesthesia Hx:  No problems with previous Anesthesia             Denies Family Hx of Anesthesia complications.    Denies Personal Hx of Anesthesia complications.                    Social:  Non-Smoker       Cardiovascular:     Hypertension       CHF                                  Pulmonary:         Home O2               Renal/:  Chronic Renal Disease, ESRD, Dialysis                Endocrine:  Diabetes, type 2           Psych:   anxiety                 Physical Exam  General: Alert and Oriented  Frail  Airway:  Mallampati: II   Mouth Opening: Normal  TM Distance: Normal  Tongue: Normal  Neck ROM: Normal ROM    Dental:  Intact    Chest/Lungs:  Normal Respiratory Rate    Heart:  Rate: Normal  Rhythm: Regular Rhythm        Anesthesia Plan  Type of Anesthesia, risks & benefits discussed:    Anesthesia Type: Gen Natural Airway, Regional  Intra-op Monitoring Plan: Standard ASA Monitors  Post Op Pain Control Plan: IV/PO Opioids PRN and peripheral nerve block  Induction:  IV  Airway Plan: Direct  Informed Consent: Informed consent signed with the Patient and all parties understand the risks and agree with anesthesia plan.  All questions answered. Patient consented to blood products? No  ASA Score: 3  Day of Surgery Review of History & Physical: H&P Update referred to the surgeon/provider.  Anesthesia Plan Notes: Nasal cannula vs facemask supplemental oxygenation   Supraclavicular Block w/ IV Propofol Sedation possiuble conversion to GA/LMA       Ready For Surgery From Anesthesia Perspective.     .

## 2024-01-03 NOTE — DISCHARGE INSTRUCTIONS
DISCHARGE INSTRUCTIONS:      Discharge Procedure Orders   Diet general      Keep surgical extremity elevated          Wound care routine (specify)   Order Comments: Leave dermabond in place until it falls off;  Ok to wash with soap under running water but do not submerge.  Do not use antibiotics ointment.      Call MD for:  temperature >100.4      Call MD for:  redness, tenderness, or signs of infection (pain, swelling, redness, odor or green/yellow discharge around incision site)      Activity as tolerated        -NO driving and no alcohol consumption for 24 hours and while taking narcotic pain medications.    -ELEVATE affected extremity as needed to aid in pain and inflammation.    -Keep site clean and dry for 48 hours. OK to shower afterwards. Do not submerge incision under water. Dermabond/steri-strips will fall off on its own time. Do not peel off.    -No heavy lifting with affected extremity. Do not lift objects greater than 10lbs.    -Monitor extremity for good circulation. If you received block, it can last 8-12 hours.    -Monitor incision for signs of infection: redness, swelling, drainage/pus/foul odor, fever, chills.    -Report to your nearest ER and/or notify your provider if you experience any SUDDEN/SEVERE chest/abdominal pain, weakness, trouble breathing, uncontrolled pain or bleeding

## 2024-01-03 NOTE — DISCHARGE SUMMARY
[facility] - Periop Services  Discharge Note  Short Stay    Procedure(s) (LRB):  INSERTION, GRAFT, ARTERIOVENOUS (Left)  LIGATION, AV FISTULA (Left)      OUTCOME: Patient tolerated treatment/procedure well without complication and is now ready for discharge.    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  Do not use antibiotics ointment.     Call MD for:  temperature >100.4     Call MD for:  redness, tenderness, or signs of infection (pain, swelling, redness, odor or green/yellow discharge around incision site)     Activity as tolerated        TIME SPENT ON DISCHARGE: 5 minutes

## 2024-01-03 NOTE — INTERVAL H&P NOTE
The patient has been examined and the H&P has been reviewed:    I concur with the findings and no changes have occurred since H&P was written.  No recent infections.    Surgery risks, benefits and alternative options discussed and understood by patient/family.      There are no hospital problems to display for this patient.

## 2024-01-04 ENCOUNTER — TELEPHONE (OUTPATIENT)
Dept: INTERNAL MEDICINE | Facility: CLINIC | Age: 89
End: 2024-01-04
Payer: MEDICARE

## 2024-01-04 VITALS
DIASTOLIC BLOOD PRESSURE: 74 MMHG | HEIGHT: 60 IN | TEMPERATURE: 99 F | OXYGEN SATURATION: 98 % | HEART RATE: 79 BPM | WEIGHT: 147.94 LBS | BODY MASS INDEX: 29.04 KG/M2 | SYSTOLIC BLOOD PRESSURE: 156 MMHG | RESPIRATION RATE: 16 BRPM

## 2024-01-04 DIAGNOSIS — Z95.828 S/P ARTERIOVENOUS (AV) GRAFT PLACEMENT: Primary | ICD-10-CM

## 2024-01-04 PROCEDURE — G0180 MD CERTIFICATION HHA PATIENT: HCPCS | Mod: ,,, | Performed by: INTERNAL MEDICINE

## 2024-01-04 NOTE — TELEPHONE ENCOUNTER
----- Message from Ludivina Jhaveri sent at 1/4/2024  3:51 PM CST -----  .Type:  Patient Returning Call    Who Called:Mita Jacobson [facility]  Who Left Message for Patient:Mita  Does the patient know what this is regarding?:Home health  Would the patient rather a call back or a response via [facility]?   Best Call Back Number:[phone]  Additional Information: Patient was admitted today to [facility]. Nurse will be in home 1/week.

## 2024-01-05 ENCOUNTER — PATIENT MESSAGE (OUTPATIENT)
Dept: ADMINISTRATIVE | Facility: OTHER | Age: 89
End: 2024-01-05
Payer: MEDICARE

## 2024-01-18 ENCOUNTER — TELEPHONE (OUTPATIENT)
Dept: INTERNAL MEDICINE | Facility: CLINIC | Age: 89
End: 2024-01-18
Payer: MEDICARE

## 2024-01-18 NOTE — TELEPHONE ENCOUNTER
Mita with home health called to say patient is being discharged. Her surgical incision is well healed.

## 2024-01-18 NOTE — TELEPHONE ENCOUNTER
----- Message from Richie Chan sent at 1/18/2024  2:34 PM CST -----  .Type:  Needs Medical Advice    Who Called: Mita Hugh [facility]  Symptoms (please be specific):    How long has patient had these symptoms:    Pharmacy name and phone #:    Would the patient rather a call back or a response via [facility]? Call   Best Call Back Number: [phone]  Additional Information: called to inform nurse pt is being discharged from home health surgical incision is well healed

## 2024-01-25 NOTE — PROGRESS NOTES
[facility] V[facility] - Clinic Note  Daren Roberson MD      Patient Name: Rayna Haider                   : 1935     MRN: [id number]   Visit Date: 2024          History Present Illness     Reason for Visit: Post-Operative Follow up     Ms. Haider presents to the clinic for 4 week follow up s/p left arm graft placement with ligation of her previous basilic fistula 1/3/24. She denies fevers, drainage from the incision, worsening pain or swelling.  She denies numbness or fatigue to her left hand since surgery. Denies issues with right chest wall catheter function.        REVIEW OF SYSTEMS:  12 point review of systems conducted, negative except as stated in the history of present illness. See HPI for details.        Physical Exam      Vitals:    24 1354   BP: 123/71   BP Location: Right arm   Pulse: 80   Weight: 66.7 kg (147 lb)   Height: 5' (1.524 m)        General: well-nourished, no acute distress, and healthy appearing, ambulating with a walker, alert, pleasant, conversant, and oriented  Respiratory: breathing easily and without respiratory distress  Cardiology: regular rate and rhythm    Upper Extremity Arterial Exam:   Left - radial is palpable    Dialysis Access:  left upper arm graft and right chest wall tunneled catheter  Assessment: good thrill to left arm graft    Musculoskeletal:   Upper Extremity: normal bilateral hand function  Lower Extremity: no edema present to bilateral lower extremities    Left upper arm incisions are healing well        Assessment and Plan     Ms. Haider is an 88 y.o. woman with end stage renal disease who is s/p left upper arm graft placement  and basilic fistula ligation on 1/3/24. On exam, the access has a good thrill and no pulsatility. It is ok to begin cannulation on 24 starting with 17g needles for 2 weeks, then progress needle size as tolerated. Once her new access has functioned reliably for 2 weeks or more, OK to remove right chest wall   tunneled catheter. If there are issues with her catheter, please contact the office for possible clearance to proceed with early graft cannulation.         1. 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, without long-term current use of insulin     Vitamin D deficiency      Past Surgical History:   Procedure Laterality Date    ANGIOGRAPHY OF ARTERIOVENOUS SHUNT Left 2023    Procedure: Fistulogram with Possible Intervention;  Surgeon: Daren Roberson MD;  Location: [facility] CATH LAB;  Service: Cardiology;  Laterality: Left;    ANGIOGRAPHY OF ARTERIOVENOUS SHUNT Left 2023    Procedure: Fistulogram with Possible Intervention;  Surgeon: Daren Roberson MD;  Location: [facility] CATH LAB;  Service: Cardiology;  Laterality: Left;  Requesting 7:00am start    ANGIOGRAPHY OF ARTERIOVENOUS SHUNT Left 2023    Procedure: Fistulogram with Possible Intervention;  Surgeon: Daren Roberson MD;  Location: [facility] CATH LAB;  Service: Cardiology;  Laterality: Left;    AV FISTULA PLACEMENT Left     CATARACT EXTRACTION Bilateral      SECTION      ??    EYE SURGERY Left     HYSTERECTOMY      has ovaries    INSERTION OF TUNNELED CENTRAL VENOUS HEMODIALYSIS CATHETER N/A 2023    Procedure: INSERTION, CATHETER, CENTRAL VENOUS, HEMODIALYSIS, TUNNELED;  Surgeon: Eric Angela DO;  Location: [facility] CATH LAB;  Service: Nephrology;  Laterality: N/A;    LIGATION OF ARTERIOVENOUS  FISTULA Left 1/3/2024    Procedure: LIGATION, AV FISTULA;  Surgeon: Daren Roberson MD;  Location: [facility] OR;  Service: Peripheral Vascular;  Laterality: Left;  left basilic fistula ligation    PLACEMENT OF ARTERIOVENOUS GRAFT Left 03/22/2023    Procedure: INSERTION, GRAFT, ARTERIOVENOUS;  Surgeon: Daren Roberson MD;  Location: [facility] OR;  Service: Peripheral Vascular;  Laterality: Left;  LEFT BASILIC TRANSPOSITION -VS- GRAFT PLACEMENT // XX  //  SUPINE //   SUPRACLAVICULAR BLOCK    PLACEMENT OF ARTERIOVENOUS GRAFT Left 1/3/2024    Procedure: INSERTION, GRAFT, ARTERIOVENOUS;  Surgeon: Daren Roberson MD;  Location: [facility] OR;  Service: Peripheral Vascular;  Laterality: Left;    RETINAL DETACHMENT SURGERY Right     SKIN GRAFT Right     burns, scald, bilateral forearm    wisdom Left      Family History   Problem Relation Age of Onset    Cancer Mother     Glaucoma Mother     Diabetes Father     Cancer Father     Cancer Brother     Diabetes Brother     Cancer Son      Social History     Socioeconomic History    Marital status:    Tobacco Use    Smoking status: Never     Passive exposure: Never    Smokeless tobacco: Never   Substance and Sexual Activity    Alcohol use: Not Currently    Drug use: Never    Sexual activity: Not Currently     Partners: Male     Social Determinants of Health     Financial Resource Strain: Low Risk  (9/12/2023)    Overall Financial Resource Strain (CARDIA)     Difficulty of Paying Living Expenses: Not hard at all   Food Insecurity: No Food Insecurity (9/12/2023)    Hunger Vital Sign     Worried About Running Out of Food in the Last Year: Never true     Ran Out of Food in the Last Year: Never true   Transportation Needs: No Transportation Needs (9/12/2023)    PRAPARE - Transportation     Lack of Transportation (Medical): No     Lack of Transportation (Non-Medical): No   Physical Activity: Insufficiently Active (9/12/2023)    Exercise Vital Sign     Days of Exercise per Week: 2  days     Minutes of Exercise per Session: 20 min   Stress: No Stress Concern Present (9/12/2023)    [employer] [place] [employer] - Occupational Stress Questionnaire     Feeling of Stress : Not at all   Social Connections: Moderately Isolated (9/12/2023)    Social Connection and Isolation Panel [NHANES]     Frequency of Communication with Friends and Family: More than three times a week     Frequency of Social Gatherings with Friends and Family: More than three times a week     Attends Muslim Services: More than 4 times per year     Active Member of Clubs or Organizations: No     Attends Club or Organization Meetings: Never     Marital Status:    Housing Stability: Low Risk  (9/12/2023)    Housing Stability Vital Sign     Unable to Pay for Housing in the Last Year: No     Number of Places Lived in the Last Year: 1     Unstable Housing in the Last Year: No     Current Outpatient Medications   Medication Instructions    aspirin 81 mg, Oral, Daily    carvediloL (COREG) 6.25 mg, Oral, 2 times daily with meals    ferric citrate (AURYXIA) 210 mg iron Tab Oral, 2 tabs prior to meals    folic acid (FOLVITE) 1,000 mcg, Oral, Daily    LORazepam (ATIVAN) 0.5 mg, Oral, Daily PRN    methoxy peg-epoetin beta (MIRCERA INJ) 50 mcg    senna (SENOKOT) 8.6 mg tablet 1 tablet, Oral, As needed (PRN)    sodium chloride 2% (LYNETTE 128) 2 % ophthalmic solution 1 drop, Both Eyes, As needed (PRN)    VITAMIN D3 400 Units, Oral, Daily     Review of patient's allergies indicates:   Allergen Reactions    Hydralazide Nausea And Vomiting and Palpitations    Valsartan        Patient Care Team:  Rosie Flores MD as PCP - General (Internal Medicine)  Destiny Gibbons MD as Consulting Physician (Cardiovascular Disease)  Bebeto Reilly MD as Consulting Physician (Ophthalmology)  Mustapha Zavaleta MD as Consulting Physician (Nephrology)  Johnnie Dow MD as Consulting Physician (Nephrology)  Daren Roberson MD as  Consulting Physician (Vascular Surgery)  Travon Junior MD as Consulting Physician (Ophthalmology)  Program, [facility] Kidney Care Education  [facility] (Dialysis Center)  [facility] ([facility])      No follow-ups on file. In addition to their scheduled follow up, the patient has also been instructed to follow up on as needed basis.     Future Appointments   Date Time Provider Department Center   3/27/2024  1:20 PM Rosie Flores MD [place] [id number]

## 2024-01-29 ENCOUNTER — EXTERNAL HOME HEALTH (OUTPATIENT)
Dept: HOME HEALTH SERVICES | Facility: HOSPITAL | Age: 89
End: 2024-01-29
Payer: MEDICARE

## 2024-01-30 ENCOUNTER — OFFICE VISIT (OUTPATIENT)
Dept: VASCULAR SURGERY | Facility: CLINIC | Age: 89
End: 2024-01-30
Payer: MEDICARE

## 2024-01-30 VITALS
DIASTOLIC BLOOD PRESSURE: 71 MMHG | HEIGHT: 60 IN | SYSTOLIC BLOOD PRESSURE: 123 MMHG | BODY MASS INDEX: 28.86 KG/M2 | WEIGHT: 147 LBS | HEART RATE: 80 BPM

## 2024-01-30 DIAGNOSIS — N18.6 ESRD (END STAGE RENAL DISEASE): Primary | ICD-10-CM

## 2024-01-30 PROCEDURE — 99024 POSTOP FOLLOW-UP VISIT: CPT | Mod: POP,,, | Performed by: SPECIALIST

## 2024-01-31 DIAGNOSIS — F41.9 ANXIETY: Chronic | ICD-10-CM

## 2024-01-31 RX ORDER — HYDROXYZINE HYDROCHLORIDE 25 MG/1
25 TABLET, FILM COATED ORAL DAILY PRN
Qty: 30 TABLET | Refills: 3 | Status: SHIPPED | OUTPATIENT
Start: 2024-01-31 | End: 2024-03-04

## 2024-02-09 ENCOUNTER — DOCUMENT SCAN (OUTPATIENT)
Dept: HOME HEALTH SERVICES | Facility: HOSPITAL | Age: 89
End: 2024-02-09
Payer: MEDICARE

## 2024-02-21 ENCOUNTER — DOCUMENT SCAN (OUTPATIENT)
Dept: HOME HEALTH SERVICES | Facility: HOSPITAL | Age: 89
End: 2024-02-21
Payer: MEDICARE

## 2024-02-29 DIAGNOSIS — N18.6 END STAGE RENAL DISEASE: Primary | ICD-10-CM

## 2024-03-01 RX ORDER — ONDANSETRON 4 MG/1
TABLET, ORALLY DISINTEGRATING ORAL
COMMUNITY
Start: 2024-02-02

## 2024-03-01 NOTE — PROGRESS NOTES
[facility] Vascular - Clinic Note  Daren Roberson MD      Patient Name: Rayna Haider                   : 1935     MRN: [id number]   Visit Date: 3/4/2024          History Present Illness     Reason for Visit:  Tunneled Catheter Removal     Ms. Haider presents to the clinic for removal of her right chest wall tunneled catheter. She is s/p left arm graft placement and basilic fistula ligation on 1/3/2024. She denies any complications with her left upper arm graft. She denies any left hand numbness. Her dialysis unit also denies any issues with her left upper arm graft, they report a recent clearance of 1.65 (2024), prior 1.5.       REVIEW OF SYSTEMS:  12 point review of systems conducted, negative except as stated in the history of present illness. See HPI for details.        Physical Exam      Vitals:    24 1303   BP: 127/75   BP Location: Right arm   Pulse: 84   Weight: 68 kg (150 lb)   Height: 5' (1.524 m)        General: well-nourished, no acute distress, and healthy appearing, ambulating with a walker, alert, pleasant, conversant, and oriented  Neurologic: cranial nerves are grossly intact, no neurologic deficits, no motor deficits, and no sensory deficits  HEENT: grossly normal and no scleral icterus  Neck/Chest: normal  and soft without lymphadenopathy  Respiratory: breathing easily and without respiratory distress  Abdomen: normal and soft  Cardiology: regular rate and rhythm    Upper Extremity Arterial Exam:   Left - radial is palpable    Dialysis Access:  left upper arm graft  Assessment: good thrill  Dialysis Access:  right chest wall tunneled catheter    Musculoskeletal:   Upper Extremity: normal left hand function and normal left hand sensation,             Assessment and Plan     Ms. Haider is a 89 y.o. with end stage renal failure who is s/p left upper arm graft placement and basilic fistula ligation on 1/3/2024. On exam, her access has a good thrill and is without  pulsatility. It has been functioning well in dialysis for approximately 2 weeks. The plan today is to remove the right chest wall  tunneled catheter. Risks associated were discussed and the patient wishes to proceed.    Chest wall was prepped and draped in the usual sterile fashion. 1% lidocaine was utilized for local anesthesia throughout the case. Blunt and sharp dissection were utilized to free the catheter cuff from the surrounding tissues. The catheter was removed. Pressure was held. Following good hemostasis, a dressing was placed. Patient was monitored for 30 minutes following removal without complications.            1. 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, without long-term current use of insulin     Vitamin D deficiency      Past Surgical History:   Procedure Laterality Date    ANGIOGRAPHY OF ARTERIOVENOUS SHUNT Left 05/19/2023    Procedure: Fistulogram with Possible Intervention;  Surgeon: Daren Roberson MD;  Location: [facility] CATH LAB;  Service: Cardiology;  Laterality: Left;    ANGIOGRAPHY OF ARTERIOVENOUS SHUNT Left 07/19/2023    Procedure: Fistulogram with Possible Intervention;  Surgeon: Daren Roberson MD;  Location: [facility] CATH LAB;  Service: Cardiology;  Laterality: Left;  Requesting 7:00am start    ANGIOGRAPHY OF ARTERIOVENOUS SHUNT Left 09/08/2023    Procedure: Fistulogram with  Possible Intervention;  Surgeon: Daren Roberson MD;  Location: [facility] CATH LAB;  Service: Cardiology;  Laterality: Left;    AV FISTULA PLACEMENT Left     CATARACT EXTRACTION Bilateral      SECTION      ??    EYE SURGERY Left     HYSTERECTOMY      has ovaries    INSERTION OF TUNNELED CENTRAL VENOUS HEMODIALYSIS CATHETER N/A 2023    Procedure: INSERTION, CATHETER, CENTRAL VENOUS, HEMODIALYSIS, TUNNELED;  Surgeon: Eric Angela DO;  Location: [facility] CATH LAB;  Service: Nephrology;  Laterality: N/A;    LIGATION OF ARTERIOVENOUS FISTULA Left 1/3/2024    Procedure: LIGATION, AV FISTULA;  Surgeon: Daren Roberson MD;  Location: [facility] OR;  Service: Peripheral Vascular;  Laterality: Left;  left basilic fistula ligation    PLACEMENT OF ARTERIOVENOUS GRAFT Left 2023    Procedure: INSERTION, GRAFT, ARTERIOVENOUS;  Surgeon: Daren Roberson MD;  Location: [facility] OR;  Service: Peripheral Vascular;  Laterality: Left;  LEFT BASILIC TRANSPOSITION -VS- GRAFT PLACEMENT // XX  //  SUPINE //   SUPRACLAVICULAR BLOCK    PLACEMENT OF ARTERIOVENOUS GRAFT Left 1/3/2024    Procedure: INSERTION, GRAFT, ARTERIOVENOUS;  Surgeon: Daren Roberson MD;  Location: [facility] OR;  Service: Peripheral Vascular;  Laterality: Left;    RETINAL DETACHMENT SURGERY Right     SKIN GRAFT Right     burns, scald, bilateral forearm    wisdom Left      Family History   Problem Relation Age of Onset    Cancer Mother     Glaucoma Mother     Diabetes Father     Cancer Father     Cancer Brother     Diabetes Brother     Cancer Son      Social History     Socioeconomic History    Marital status:    Tobacco Use    Smoking status: Never     Passive exposure: Never    Smokeless tobacco: Never   Substance and Sexual Activity    Alcohol use: Not Currently    Drug use: Never    Sexual activity: Not Currently     Partners: Male     Social Determinants of Health     Financial Resource Strain: Low Risk  (2023)    Overall Financial Resource  Strain (CARDIA)     Difficulty of Paying Living Expenses: Not hard at all   Food Insecurity: No Food Insecurity (9/12/2023)    Hunger Vital Sign     Worried About Running Out of Food in the Last Year: Never true     Ran Out of Food in the Last Year: Never true   Transportation Needs: No Transportation Needs (9/12/2023)    PRAPARE - Transportation     Lack of Transportation (Medical): No     Lack of Transportation (Non-Medical): No   Physical Activity: Insufficiently Active (9/12/2023)    Exercise Vital Sign     Days of Exercise per Week: 2 days     Minutes of Exercise per Session: 20 min   Stress: No Stress Concern Present (9/12/2023)    [employer] [employer] - Occupational Stress Questionnaire     Feeling of Stress : Not at all   Social Connections: Moderately Isolated (9/12/2023)    Social Connection and Isolation Panel [NHANES]     Frequency of Communication with Friends and Family: More than three times a week     Frequency of Social Gatherings with Friends and Family: More than three times a week     Attends Alevism Services: More than 4 times per year     Active Member of Clubs or Organizations: No     Attends Club or Organization Meetings: Never     Marital Status:    Housing Stability: Low Risk  (9/12/2023)    Housing Stability Vital Sign     Unable to Pay for Housing in the Last Year: No     Number of Places Lived in the Last Year: 1     Unstable Housing in the Last Year: No     Current Outpatient Medications   Medication Instructions    aspirin 81 mg, Oral, Daily    carvediloL (COREG) 6.25 mg, Oral, 2 times daily with meals    ferric citrate (AURYXIA) 210 mg iron Tab Oral, 2 tabs prior to meals    folic acid (FOLVITE) 1,000 mcg, Oral, Daily    LORazepam (ATIVAN) 0.5 mg, Oral, Daily PRN    methoxy peg-epoetin beta (MIRCERA INJ) 50 mcg    ondansetron (ZOFRAN-ODT) 4 MG TbDL Oral    senna (SENOKOT) 8.6 mg tablet 1 tablet, Oral, As needed (PRN)    sodium chloride 2% (LYNETTE 128) 2 %  ophthalmic solution 1 drop, Both Eyes, As needed (PRN)    VITAMIN D3 400 Units, Oral, Daily     Review of patient's allergies indicates:   Allergen Reactions    Hydralazide Nausea And Vomiting and Palpitations    Valsartan        Patient Care Team:  Rosie Flores MD as PCP - General (Internal Medicine)  Destiny Gibbons MD as Consulting Physician (Cardiovascular Disease)  Bebeto Reilly MD as Consulting Physician (Ophthalmology)  Mustapah Zavaleta MD as Consulting Physician (Nephrology)  Johnnie Dow MD as Consulting Physician (Nephrology)  Daren Roberson MD as Consulting Physician (Vascular Surgery)  Travon Junior MD as Consulting Physician (Ophthalmology)  [facility]  [facility] (Dialysis Center)  [facility] ([facility])      No follow-ups on file. In addition to their scheduled follow up, the patient has also been instructed to follow up on as needed basis.     Future Appointments   Date Time Provider Department Center   3/27/2024  1:00 PM Lupillo Lew FNP [facility] [place] [id number]

## 2024-03-04 ENCOUNTER — OFFICE VISIT (OUTPATIENT)
Dept: VASCULAR SURGERY | Facility: CLINIC | Age: 89
End: 2024-03-04
Payer: MEDICARE

## 2024-03-04 VITALS
HEART RATE: 84 BPM | SYSTOLIC BLOOD PRESSURE: 127 MMHG | BODY MASS INDEX: 29.45 KG/M2 | DIASTOLIC BLOOD PRESSURE: 75 MMHG | WEIGHT: 150 LBS | HEIGHT: 60 IN

## 2024-03-04 DIAGNOSIS — N18.6 END STAGE RENAL DISEASE: Primary | ICD-10-CM

## 2024-03-04 PROCEDURE — 99024 POSTOP FOLLOW-UP VISIT: CPT | Mod: POP,,, | Performed by: SPECIALIST

## 2024-03-04 PROCEDURE — 36589 REMOVAL TUNNELED CV CATH: CPT | Mod: 58,RT,, | Performed by: SPECIALIST

## 2024-03-18 DIAGNOSIS — F41.9 ANXIETY: ICD-10-CM

## 2024-03-18 RX ORDER — LORAZEPAM 0.5 MG/1
0.5 TABLET ORAL DAILY PRN
Qty: 30 TABLET | Refills: 2 | Status: SHIPPED | OUTPATIENT
Start: 2024-03-18 | End: 2024-06-12 | Stop reason: SDUPTHER

## 2024-03-27 ENCOUNTER — TELEPHONE (OUTPATIENT)
Dept: INTERNAL MEDICINE | Facility: CLINIC | Age: 89
End: 2024-03-27

## 2024-04-01 ENCOUNTER — LAB VISIT (OUTPATIENT)
Dept: LAB | Facility: HOSPITAL | Age: 89
End: 2024-04-01
Payer: MEDICARE

## 2024-04-01 DIAGNOSIS — N18.6 ESRD (END STAGE RENAL DISEASE) ON DIALYSIS: ICD-10-CM

## 2024-04-01 DIAGNOSIS — Z99.2 ESRD (END STAGE RENAL DISEASE) ON DIALYSIS: ICD-10-CM

## 2024-04-01 DIAGNOSIS — I10 PRIMARY HYPERTENSION: ICD-10-CM

## 2024-04-01 DIAGNOSIS — N18.6 TYPE 2 DIABETES MELLITUS WITH CHRONIC KIDNEY DISEASE ON CHRONIC DIALYSIS, WITHOUT LONG-TERM CURRENT USE OF INSULIN: ICD-10-CM

## 2024-04-01 DIAGNOSIS — E11.22 TYPE 2 DIABETES MELLITUS WITH CHRONIC KIDNEY DISEASE ON CHRONIC DIALYSIS, WITHOUT LONG-TERM CURRENT USE OF INSULIN: ICD-10-CM

## 2024-04-01 DIAGNOSIS — Z99.2 TYPE 2 DIABETES MELLITUS WITH CHRONIC KIDNEY DISEASE ON CHRONIC DIALYSIS, WITHOUT LONG-TERM CURRENT USE OF INSULIN: ICD-10-CM

## 2024-04-01 LAB
ALBUMIN SERPL-MCNC: 3.6 G/DL (ref 3.4–4.8)
ALBUMIN/GLOB SERPL: 1.2 RATIO (ref 1.1–2)
ALP SERPL-CCNC: 72 UNIT/L (ref 40–150)
ALT SERPL-CCNC: 7 UNIT/L (ref 0–55)
AST SERPL-CCNC: 10 UNIT/L (ref 5–34)
BASOPHILS # BLD AUTO: 0.07 X10(3)/MCL
BASOPHILS NFR BLD AUTO: 1.3 %
BILIRUB SERPL-MCNC: 0.4 MG/DL
BUN SERPL-MCNC: 51.8 MG/DL (ref 9.8–20.1)
CALCIUM SERPL-MCNC: 9.6 MG/DL (ref 8.4–10.2)
CHLORIDE SERPL-SCNC: 99 MMOL/L (ref 98–107)
CO2 SERPL-SCNC: 26 MMOL/L (ref 23–31)
CREAT SERPL-MCNC: 4.93 MG/DL (ref 0.55–1.02)
CREAT UR-MCNC: 86.8 MG/DL (ref 45–106)
EOSINOPHIL # BLD AUTO: 0.2 X10(3)/MCL (ref 0–0.9)
EOSINOPHIL NFR BLD AUTO: 3.6 %
ERYTHROCYTE [DISTWIDTH] IN BLOOD BY AUTOMATED COUNT: 13.8 % (ref 11.5–17)
EST. AVERAGE GLUCOSE BLD GHB EST-MCNC: 125.5 MG/DL
GFR SERPLBLD CREATININE-BSD FMLA CKD-EPI: 8 MLS/MIN/1.73/M2
GLOBULIN SER-MCNC: 2.9 GM/DL (ref 2.4–3.5)
GLUCOSE SERPL-MCNC: 119 MG/DL (ref 82–115)
HBA1C MFR BLD: 6 %
HCT VFR BLD AUTO: 36.4 % (ref 37–47)
HGB BLD-MCNC: 11.3 G/DL (ref 12–16)
IMM GRANULOCYTES # BLD AUTO: 0.01 X10(3)/MCL (ref 0–0.04)
IMM GRANULOCYTES NFR BLD AUTO: 0.2 %
LYMPHOCYTES # BLD AUTO: 2.12 X10(3)/MCL (ref 0.6–4.6)
LYMPHOCYTES NFR BLD AUTO: 38.6 %
MCH RBC QN AUTO: 30.5 PG (ref 27–31)
MCHC RBC AUTO-ENTMCNC: 31 G/DL (ref 33–36)
MCV RBC AUTO: 98.4 FL (ref 80–94)
MICROALBUMIN UR-MCNC: 477.2 UG/ML
MICROALBUMIN/CREAT RATIO PNL UR: 549.8 MG/GM CR (ref 0–30)
MONOCYTES # BLD AUTO: 0.49 X10(3)/MCL (ref 0.1–1.3)
MONOCYTES NFR BLD AUTO: 8.9 %
NEUTROPHILS # BLD AUTO: 2.6 X10(3)/MCL (ref 2.1–9.2)
NEUTROPHILS NFR BLD AUTO: 47.4 %
NRBC BLD AUTO-RTO: 0 %
PLATELET # BLD AUTO: 205 X10(3)/MCL (ref 130–400)
PMV BLD AUTO: 10.1 FL (ref 7.4–10.4)
POTASSIUM SERPL-SCNC: 4.4 MMOL/L (ref 3.5–5.1)
PROT SERPL-MCNC: 6.5 GM/DL (ref 5.8–7.6)
RBC # BLD AUTO: 3.7 X10(6)/MCL (ref 4.2–5.4)
SODIUM SERPL-SCNC: 137 MMOL/L (ref 136–145)
WBC # SPEC AUTO: 5.49 X10(3)/MCL (ref 4.5–11.5)

## 2024-04-01 PROCEDURE — 36415 COLL VENOUS BLD VENIPUNCTURE: CPT

## 2024-04-01 PROCEDURE — 80053 COMPREHEN METABOLIC PANEL: CPT

## 2024-04-01 PROCEDURE — 85025 COMPLETE CBC W/AUTO DIFF WBC: CPT

## 2024-04-01 PROCEDURE — 82043 UR ALBUMIN QUANTITATIVE: CPT

## 2024-04-01 PROCEDURE — 83036 HEMOGLOBIN GLYCOSYLATED A1C: CPT

## 2024-04-03 ENCOUNTER — TELEPHONE (OUTPATIENT)
Dept: INTERNAL MEDICINE | Facility: CLINIC | Age: 89
End: 2024-04-03

## 2024-04-03 ENCOUNTER — OFFICE VISIT (OUTPATIENT)
Dept: INTERNAL MEDICINE | Facility: CLINIC | Age: 89
End: 2024-04-03
Payer: MEDICARE

## 2024-04-03 VITALS
SYSTOLIC BLOOD PRESSURE: 126 MMHG | TEMPERATURE: 99 F | HEART RATE: 88 BPM | DIASTOLIC BLOOD PRESSURE: 82 MMHG | RESPIRATION RATE: 18 BRPM | BODY MASS INDEX: 29.69 KG/M2 | WEIGHT: 152 LBS | OXYGEN SATURATION: 98 %

## 2024-04-03 DIAGNOSIS — R73.09 ELEVATED HEMOGLOBIN A1C MEASUREMENT: ICD-10-CM

## 2024-04-03 DIAGNOSIS — R54 FRAILTY: ICD-10-CM

## 2024-04-03 DIAGNOSIS — Z99.2 ESRD (END STAGE RENAL DISEASE) ON DIALYSIS: Primary | Chronic | ICD-10-CM

## 2024-04-03 DIAGNOSIS — N18.6 ESRD (END STAGE RENAL DISEASE) ON DIALYSIS: Primary | Chronic | ICD-10-CM

## 2024-04-03 PROCEDURE — 99214 OFFICE O/P EST MOD 30 MIN: CPT | Mod: ,,,

## 2024-04-03 RX ORDER — HYDROXYZINE HYDROCHLORIDE 25 MG/1
25 TABLET, FILM COATED ORAL
COMMUNITY
Start: 2024-03-26 | End: 2024-05-21

## 2024-04-03 NOTE — ASSESSMENT & PLAN NOTE
-historically a type 2 diabetic, improved now on hemodialysis   -most recent A1c 6.0   -not currently on any oral glycemic agents   -does not follow diabetic diet, highly stressed importance of maintaining diabetic diet to avoid being placed on oral medications in the future

## 2024-04-03 NOTE — PROGRESS NOTES
Patient ID: Rayna Haider is a 89 y.o. female.    Chief Complaint: Follow-up (Pt states she has been doing well )    88-year-old female here today for a Medicare wellness.  Her medical comorbidities are significant for hypertension, anxiety, ESRD on hemodialysis on a TTS schedule, congestive heart failure and a previous history of diabetes mellitus type 2 that is now resolved since patient initiated on dialysis.  Does have insulin resistance with A1c of 6.0.  Per patient, noncompliant with low-carbohydrate diet for which strict compliance was encouraged to avoid having to be placed back onto diabetic medications. Blood pressure well-controlled currently on Coreg alone now on dialysis.  Otherwise feeling generally well dialyzing with left upper arm fistula.  Recent GFR of 8.  Also  Recently had temporary chest wall catheter removed via vascular surgery.  Otherwise generally stable without acute complaints.        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, without long-term current use of insulin     Vitamin D deficiency       Past Surgical History:   Procedure Laterality Date    ANGIOGRAPHY OF ARTERIOVENOUS SHUNT Left 05/19/2023    Procedure: Fistulogram with Possible Intervention;  Surgeon: Daren Roberson MD;  Location: [facility] CATH LAB;  Service: Cardiology;  Laterality: Left;    ANGIOGRAPHY OF ARTERIOVENOUS SHUNT Left 07/19/2023    Procedure: Fistulogram with Possible Intervention;  Surgeon: Daren Roberson MD;   Location: [facility] CATH LAB;  Service: Cardiology;  Laterality: Left;  Requesting 7:00am start    ANGIOGRAPHY OF ARTERIOVENOUS SHUNT Left 2023    Procedure: Fistulogram with Possible Intervention;  Surgeon: Daren Roberson MD;  Location: [facility] CATH LAB;  Service: Cardiology;  Laterality: Left;    AV FISTULA PLACEMENT Left     CATARACT EXTRACTION Bilateral      SECTION      ??    EYE SURGERY Left     HYSTERECTOMY      has ovaries    INSERTION OF TUNNELED CENTRAL VENOUS HEMODIALYSIS CATHETER N/A 2023    Procedure: INSERTION, CATHETER, CENTRAL VENOUS, HEMODIALYSIS, TUNNELED;  Surgeon: Eric Angela DO;  Location: [facility] CATH LAB;  Service: Nephrology;  Laterality: N/A;    LIGATION OF ARTERIOVENOUS FISTULA Left 1/3/2024    Procedure: LIGATION, AV FISTULA;  Surgeon: Daren Roberson MD;  Location: [facility];  Service: Peripheral Vascular;  Laterality: Left;  left basilic fistula ligation    PLACEMENT OF ARTERIOVENOUS GRAFT Left 2023    Procedure: INSERTION, GRAFT, ARTERIOVENOUS;  Surgeon: Daren Roberson MD;  Location: [facility];  Service: Peripheral Vascular;  Laterality: Left;  LEFT BASILIC TRANSPOSITION -VS- GRAFT PLACEMENT // XX  //  SUPINE //   SUPRACLAVICULAR BLOCK    PLACEMENT OF ARTERIOVENOUS GRAFT Left 1/3/2024    Procedure: INSERTION, GRAFT, ARTERIOVENOUS;  Surgeon: Daren Roberson MD;  Location: [facility];  Service: Peripheral Vascular;  Laterality: Left;    RETINAL DETACHMENT SURGERY Right     SKIN GRAFT Right     burns, scald, bilateral forearm    wisdom Left       Social History     Tobacco Use    Smoking status: Never     Passive exposure: Never    Smokeless tobacco: Never   Substance Use Topics    Alcohol use: Not Currently    Drug use: Never          Health Maintenance Due   Topic Date Due    TETANUS VACCINE  Never done    Shingles Vaccine (1 of 2) Never done    RSV Vaccine (Age 60+ and Pregnant patients) (1 - 1-dose 60+ series) Never done    COVID-19 Vaccine (  season) 09/01/2023          Patient Care Team:  Rosie Flores MD as PCP - General (Internal Medicine)  Destiny Gibbons MD as Consulting Physician (Cardiovascular Disease)  Bebeto Reilly MD as Consulting Physician (Ophthalmology)  Mustapha Zavaleta MD as Consulting Physician (Nephrology)  Johnnie Dow MD as Consulting Physician (Nephrology)  Daren Roberson MD as Consulting Physician (Vascular Surgery)  Travon Junior MD as Consulting Physician (Ophthalmology)  [facility], [facility]  [facility] (Dialysis Center)  [facility], [facility] (Home Health Services)      Review of Systems   Constitutional:  Negative for fatigue and fever.   HENT:  Negative for congestion, rhinorrhea, sore throat and trouble swallowing.    Eyes:  Negative for redness and visual disturbance.   Respiratory:  Negative for cough, chest tightness and shortness of breath.    Cardiovascular:  Negative for chest pain and palpitations.   Gastrointestinal:  Negative for abdominal pain, constipation, diarrhea, nausea and vomiting.   Genitourinary:  Negative for dysuria, flank pain, frequency and urgency.   Musculoskeletal:  Negative for arthralgias, gait problem and myalgias.   Skin:  Negative for rash and wound.   Neurological:  Negative for facial asymmetry, speech difficulty, weakness and headaches.   All other systems reviewed and are negative.      Objective:   /82 (BP Location: Left arm, Patient Position: Sitting, BP Method: Small (Automatic))   Pulse 88   Temp 98.6 °F (37 °C) (Temporal)   Resp 18   Wt 68.9 kg (152 lb)   LMP  (LMP Unknown)   SpO2 98%   BMI 29.69 kg/m²      Physical Exam  Constitutional:       General: She is not in acute distress.     Appearance: Normal appearance.   HENT:      Right Ear: Tympanic membrane, ear canal and external ear normal.      Left Ear: Tympanic membrane, ear canal and external ear normal.      Nose: Nose normal.      Mouth/Throat:       Mouth: Mucous membranes are moist.      Pharynx: Oropharynx is clear.   Eyes:      Extraocular Movements: Extraocular movements intact.      Conjunctiva/sclera: Conjunctivae normal.      Pupils: Pupils are equal, round, and reactive to light.   Cardiovascular:      Rate and Rhythm: Normal rate and regular rhythm.      Pulses: Normal pulses.      Heart sounds: Normal heart sounds. No murmur heard.     No gallop.   Pulmonary:      Effort: Pulmonary effort is normal.      Breath sounds: Normal breath sounds. No wheezing.   Abdominal:      General: Bowel sounds are normal. There is no distension.      Palpations: Abdomen is soft. There is no mass.      Tenderness: There is no abdominal tenderness. There is no guarding.   Musculoskeletal:         General: Normal range of motion.   Skin:     General: Skin is warm and dry.   Neurological:      Mental Status: She is alert. Mental status is at baseline.      Sensory: No sensory deficit.      Motor: No weakness.           Assessment:       ICD-10-CM ICD-9-CM   1. ESRD (end stage renal disease) on dialysis  N18.6 585.6    Z99.2 V45.11   2. Elevated hemoglobin A1c measurement  R73.09 790.29   3. Frailty  R54 797        Plan:     Problem List Items Addressed This Visit          Renal/    ESRD (end stage renal disease) on dialysis - Primary (Chronic)     -followed by Nephrology currently on hemodialysis via left upper arm fistula  -most recent GFR 8  -stressed importance of control of carbohydrate/A1c level and hypertension  -avoid nephrotoxic drugs (NSAIDs  -         Relevant Orders    Comprehensive Metabolic Panel    Hemoglobin A1C       Endocrine    Elevated hemoglobin A1c measurement     -historically a type 2 diabetic, improved now on hemodialysis   -most recent A1c 6.0   -not currently on any oral glycemic agents   -does not follow diabetic diet, highly stressed importance of maintaining diabetic diet to avoid being placed on oral medications in the future          Relevant Orders    Comprehensive Metabolic Panel    Hemoglobin A1C       Other    Frailty     -considering patient's age and medical comorbidities   -on end-stage renal with hemodialysis   -allow permissible hyperglycemia (hemoglobin A1c 6.0; initiate treatment if becomes greater than 6.5 again)         Relevant Orders    Comprehensive Metabolic Panel    Hemoglobin A1C          Follow up in about 3 months (around 7/3/2024) for Diabetes, with labs prior.   -plan specifics discussed above    Orders Placed This Encounter    Comprehensive Metabolic Panel    Hemoglobin A1C        Medication List with Changes/Refills   Current Medications    ASPIRIN 81 MG CHEW    Take 81 mg by mouth once daily.    CARVEDILOL (COREG) 6.25 MG TABLET    Take 1 tablet (6.25 mg total) by mouth 2 (two) times daily with meals.    FERRIC CITRATE (AURYXIA) 210 MG IRON TAB    Take by mouth. 2 tabs prior to meals    FOLIC ACID (FOLVITE) 1 MG TABLET    Take 1,000 mcg by mouth Daily.    HYDROXYZINE HCL (ATARAX) 25 MG TABLET    Take 25 mg by mouth.    LORAZEPAM (ATIVAN) 0.5 MG TABLET    Take 1 tablet (0.5 mg total) by mouth daily as needed for Anxiety.    METHOXY PEG-EPOETIN BETA (MIRCERA INJ)    50 mcg.    ONDANSETRON (ZOFRAN-ODT) 4 MG TBDL    Take by mouth.    SENNA (SENOKOT) 8.6 MG TABLET    Take 1 tablet by mouth as needed for Constipation.    SODIUM CHLORIDE 2% (LYNETTE 128) 2 % OPHTHALMIC SOLUTION    Place 1 drop into both eyes as needed.    VITAMIN D3 10 MCG (400 UNIT) CAPSULE    Take 400 Units by mouth Daily.

## 2024-04-03 NOTE — TELEPHONE ENCOUNTER
----- Message from Nazia Hendricks sent at 4/3/2024  2:06 PM CDT -----  Regarding: return call  Type:  Patient Returning Call    Who Called:Kaye    Who Left Message for Patient:Trevor    Does the patient know what this is regarding?:medications    Would the patient rather a call back or a response via [facility]? Call back    Best Call Back Number:[phone]    Additional Information: Kaye was returning Trevor call.

## 2024-04-03 NOTE — ASSESSMENT & PLAN NOTE
-followed by Nephrology currently on hemodialysis via left upper arm fistula  -most recent GFR 8  -stressed importance of control of carbohydrate/A1c level and hypertension  -avoid nephrotoxic drugs (NSAIDs  -

## 2024-04-03 NOTE — ASSESSMENT & PLAN NOTE
-considering patient's age and medical comorbidities   -on end-stage renal with hemodialysis   -allow permissible hyperglycemia (hemoglobin A1c 6.0; initiate treatment if becomes greater than 6.5 again)

## 2024-05-14 ENCOUNTER — OFFICE VISIT (OUTPATIENT)
Dept: URGENT CARE | Facility: CLINIC | Age: 89
End: 2024-05-14
Payer: MEDICARE

## 2024-05-14 VITALS
OXYGEN SATURATION: 98 % | BODY MASS INDEX: 29.84 KG/M2 | HEIGHT: 60 IN | RESPIRATION RATE: 15 BRPM | HEART RATE: 78 BPM | SYSTOLIC BLOOD PRESSURE: 129 MMHG | TEMPERATURE: 97 F | DIASTOLIC BLOOD PRESSURE: 73 MMHG | WEIGHT: 152 LBS

## 2024-05-14 DIAGNOSIS — R31.9 URINARY TRACT INFECTION WITH HEMATURIA, SITE UNSPECIFIED: Primary | ICD-10-CM

## 2024-05-14 DIAGNOSIS — R30.0 DYSURIA: ICD-10-CM

## 2024-05-14 DIAGNOSIS — N39.0 URINARY TRACT INFECTION WITH HEMATURIA, SITE UNSPECIFIED: Primary | ICD-10-CM

## 2024-05-14 LAB
BACTERIA #/AREA URNS AUTO: ABNORMAL /HPF
BILIRUB UR QL STRIP.AUTO: NEGATIVE
BILIRUB UR QL STRIP: NEGATIVE
CLARITY UR: ABNORMAL
COLOR UR AUTO: ABNORMAL
GLUCOSE UR QL STRIP: NEGATIVE
GLUCOSE UR QL STRIP: NORMAL
HGB UR QL STRIP: ABNORMAL
KETONES UR QL STRIP: NEGATIVE
KETONES UR QL STRIP: NEGATIVE
LEUKOCYTE ESTERASE UR QL STRIP: 500
LEUKOCYTE ESTERASE UR QL STRIP: POSITIVE
MUCOUS THREADS URNS QL MICRO: ABNORMAL /LPF
NITRITE UR QL STRIP: NEGATIVE
PH UR STRIP: 5.5 [PH]
PH, POC UA: 5
POC BLOOD, URINE: POSITIVE
POC NITRATES, URINE: NEGATIVE
PROT UR QL STRIP: ABNORMAL
PROT UR QL STRIP: POSITIVE
RBC #/AREA URNS AUTO: ABNORMAL /HPF
SP GR UR STRIP.AUTO: 1.01 (ref 1–1.03)
SP GR UR STRIP: 1.01 (ref 1–1.03)
SQUAMOUS #/AREA URNS LPF: ABNORMAL /HPF
UROBILINOGEN UR STRIP-ACNC: NEGATIVE (ref 0.1–1.1)
UROBILINOGEN UR STRIP-ACNC: NORMAL
WBC #/AREA URNS AUTO: >100 /HPF

## 2024-05-14 PROCEDURE — 96372 THER/PROPH/DIAG INJ SC/IM: CPT | Mod: ,,,

## 2024-05-14 PROCEDURE — 81003 URINALYSIS AUTO W/O SCOPE: CPT | Mod: QW,,,

## 2024-05-14 PROCEDURE — 99214 OFFICE O/P EST MOD 30 MIN: CPT | Mod: 25,,,

## 2024-05-14 RX ORDER — CEFTRIAXONE 1 G/1
1 INJECTION, POWDER, FOR SOLUTION INTRAMUSCULAR; INTRAVENOUS
Status: COMPLETED | OUTPATIENT
Start: 2024-05-14 | End: 2024-05-14

## 2024-05-14 RX ADMIN — CEFTRIAXONE 1 G: 1 INJECTION, POWDER, FOR SOLUTION INTRAMUSCULAR; INTRAVENOUS at 10:05

## 2024-05-14 NOTE — PATIENT INSTRUCTIONS
Given 1 g of Rocephin in urgent care today.      Continue to monitor symptoms, if symptoms do not improve in the next 48-72 hours, recommend that you call the urgent care for an oral prescription.      You may continue taking cranberry juice or over-the-counter azo if you would like.  You may also try Tylenol/ibuprofen for any pain relief.  Recommend that you increase fluid intake.    If you begin to experience fevers/chills, lower back pain, worsening pain, worsening symptoms please return to urgent care or consider emergency medicine care.      Recommend follow up with primary care provider.

## 2024-05-14 NOTE — PROGRESS NOTES
Subjective:      Patient ID: Rayna Haider is a 89 y.o. female.    Vitals:  height is 5' (1.524 m) and weight is 68.9 kg (152 lb). Her temperature is 97.4 °F (36.3 °C). Her blood pressure is 129/73 and her pulse is 78. Her respiration is 15 and oxygen saturation is 98%.     Chief Complaint: Dysuria    Patient is a 89 y.o. female who presents to urgent care with complaints of possible UTI, subjectively details symptoms of dysuria (burning upon urination), abdominal pain, with bladder spasms of acute onset last night, into this am. Alleviating factors include cranberry juice with no relief. Patient denies fever or flank pain.        Constitution: Negative for fever.   Gastrointestinal:  Negative for abdominal pain, nausea and vomiting.   Genitourinary:  Positive for dysuria and frequency. Negative for flank pain, vaginal discharge, vaginal bleeding and vaginal odor.   Musculoskeletal:  Negative for back pain.      Objective:     Physical Exam   Constitutional: She is oriented to person, place, and time.  Non-toxic appearance. No distress. normal  HENT:   Head: Normocephalic and atraumatic.   Nose: Nose normal.   Mouth/Throat: Mucous membranes are moist. No posterior oropharyngeal erythema.   Eyes: Conjunctivae are normal. Extraocular movement intact   Cardiovascular: Normal rate and regular rhythm.   Pulmonary/Chest: Effort normal and breath sounds normal. No respiratory distress.   Abdominal: Normal appearance. She exhibits no distension. Soft. There is no abdominal tenderness. There is no rebound, no guarding, no left CVA tenderness and no right CVA tenderness.   Musculoskeletal: Normal range of motion.         General: Normal range of motion.   Neurological: no focal deficit. She is alert and oriented to person, place, and time.   Skin: Skin is warm and dry.   Psychiatric: Her behavior is normal. Mood and thought content normal.   Nursing note and vitals reviewed.      Assessment:     1. Urinary tract infection  with hematuria, site unspecified    2. Dysuria        Plan:       Urinary tract infection with hematuria, site unspecified    Dysuria  -     POCT Urinalysis, Dipstick, Automated, W/O Scope    Other orders  -     cefTRIAXone injection 1 g          Medical Decision Making:   Initial Assessment:     89-year-old female presents to urgent care for evaluation of dysuria, urinary frequency, mild suprapubic pain x1 day.  Patient reports a history of UTIs in the past which has presented similarly.  The patient denies any vaginal discomfort, vaginal bleeding.  She denies any fevers/chills.  She denies any flank tenderness.  She is here with her transporter/caretaker.  For transport, the patient was last on antibiotics  December 2023.  She responded well to this antibiotic.  The patient does share that she usually has GI sensitivity to antibiotics and would prefer an injection if possible.  Differential Diagnosis:     Pyelonephritis, renal stone, acute cystitis, AMS  Clinical Tests:   Lab Tests: Ordered and Reviewed  The following lab test(s) were unremarkable: Urinalysis       <> Summary of Lab:  Positive leukocyte esterase, positive blood, positive protein   Negative nitrite  Urgent Care Management:   Per chart review, the patient did have a culture of her urine in December 2023 which responded well to cephalosporins.  I discussed with the patient that we can represcribe previously Keflex or do Rocephin injection in urgent care today.  The patient would prefer the injection as she usually has a sensitivity to oral antibiotics.  I believe that this is appropriate.  The patient has no signs of pyelonephritis or altered mental status.  We will give Rocephin injection today.  Discussed with the patient that she continues to have symptoms over the next 48-72 hours that she should call the urgent care for an oral antibiotic.  Patient verbalized her understanding.  Return to urgent Care/ER precautions were reviewed with the patient  and her transporter.       Urine analysis positive for infection.  Patient can not tolerate oral antibiotics.  Received Rocephin injection yesterday.  Nurse called patient and spoke to daughter.  Patient continues to be symptomatic.  Will start patient on Keflex 500 mg twice daily for 7 days.  Await on urine culture results.  Continue hydration.  Nurse notified patient and daughter

## 2024-05-15 RX ORDER — CEPHALEXIN 500 MG/1
500 CAPSULE ORAL EVERY 12 HOURS
Qty: 14 CAPSULE | Refills: 0 | Status: SHIPPED | OUTPATIENT
Start: 2024-05-15 | End: 2024-05-22

## 2024-05-18 LAB — BACTERIA UR CULT: ABNORMAL

## 2024-05-18 NOTE — PROGRESS NOTES
UTI organism sensitive to the cephalosporin medications.  Currently on Keflex.  Okay to continue with current medication.  Notify primary care physician should symptoms fail to improve or resolve etc.

## 2024-05-21 DIAGNOSIS — F41.9 ANXIETY: Primary | ICD-10-CM

## 2024-05-21 RX ORDER — HYDROXYZINE HYDROCHLORIDE 25 MG/1
25 TABLET, FILM COATED ORAL DAILY PRN
Qty: 30 TABLET | Refills: 3 | Status: SHIPPED | OUTPATIENT
Start: 2024-05-21

## 2024-06-12 DIAGNOSIS — F41.9 ANXIETY: ICD-10-CM

## 2024-06-12 RX ORDER — LORAZEPAM 0.5 MG/1
0.5 TABLET ORAL DAILY PRN
Qty: 30 TABLET | Refills: 3 | Status: SHIPPED | OUTPATIENT
Start: 2024-06-12 | End: 2024-09-10

## 2024-06-28 NOTE — PROGRESS NOTES
Subjective:      Patient ID: Rayna Haider is a 89 y.o. female.    Chief Complaint: Follow-up (3 month diabetes/)    Ms Way is a 88-year-old female here today for a three-month follow-up.  Her medical comorbidities are significant for hypertension, anxiety, ESRD on hemodialysis on a TTS schedule, congestive heart failure and a previous history of diabetes mellitus type 2 that is now resolved since patient initiated on dialysis.  Blood pressure well-controlled currently on Coreg alone now on dialysis.  Otherwise feeling generally well dialyzing with left upper arm fistula.    Otherwise generally stable without acute complaints.        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, without long-term current use of insulin     Vitamin D deficiency      Past Surgical History:   Procedure Laterality Date    ANGIOGRAPHY OF ARTERIOVENOUS SHUNT Left 05/19/2023    Procedure: Fistulogram with Possible Intervention;  Surgeon: Daren Roberson MD;  Location: [facility] CATH LAB;  Service: Cardiology;  Laterality: Left;    ANGIOGRAPHY OF ARTERIOVENOUS SHUNT Left 07/19/2023    Procedure: Fistulogram  with Possible Intervention;  Surgeon: Daren Roberson MD;  Location: [facility] CATH LAB;  Service: Cardiology;  Laterality: Left;  Requesting 7:00am start    ANGIOGRAPHY OF ARTERIOVENOUS SHUNT Left 2023    Procedure: Fistulogram with Possible Intervention;  Surgeon: Daren Roberson MD;  Location: [facility] CATH LAB;  Service: Cardiology;  Laterality: Left;    AV FISTULA PLACEMENT Left     CATARACT EXTRACTION Bilateral      SECTION      ??    EYE SURGERY Left     HYSTERECTOMY      has ovaries    INSERTION OF TUNNELED CENTRAL VENOUS HEMODIALYSIS CATHETER N/A 2023    Procedure: INSERTION, CATHETER, CENTRAL VENOUS, HEMODIALYSIS, TUNNELED;  Surgeon: Eric Angela DO;  Location: [facility] CATH LAB;  Service: Nephrology;  Laterality: N/A;    LIGATION OF ARTERIOVENOUS FISTULA Left 1/3/2024    Procedure: LIGATION, AV FISTULA;  Surgeon: Daren Roberson MD;  Location: [facility];  Service: Peripheral Vascular;  Laterality: Left;  left basilic fistula ligation    PLACEMENT OF ARTERIOVENOUS GRAFT Left 2023    Procedure: INSERTION, GRAFT, ARTERIOVENOUS;  Surgeon: Daren Roberson MD;  Location: [facility];  Service: Peripheral Vascular;  Laterality: Left;  LEFT BASILIC TRANSPOSITION -VS- GRAFT PLACEMENT // XX  //  SUPINE //   SUPRACLAVICULAR BLOCK    PLACEMENT OF ARTERIOVENOUS GRAFT Left 1/3/2024    Procedure: INSERTION, GRAFT, ARTERIOVENOUS;  Surgeon: Daren Roberson MD;  Location: [facility];  Service: Peripheral Vascular;  Laterality: Left;    RETINAL DETACHMENT SURGERY Right     SKIN GRAFT Right     burns, scald, bilateral forearm    wisdom Left      Review of patient's allergies indicates:   Allergen Reactions    Hydralazide Nausea And Vomiting and Palpitations    Hydralazine     Valsartan      Social History     Socioeconomic History    Marital status:    Tobacco Use    Smoking status: Never     Passive exposure: Never    Smokeless tobacco: Never   Substance and Sexual Activity    Alcohol use:  Not Currently    Drug use: Never    Sexual activity: Not Currently     Partners: Male     Social Determinants of Health     Financial Resource Strain: Low Risk  (9/12/2023)    Overall Financial Resource Strain (CARDIA)     Difficulty of Paying Living Expenses: Not hard at all   Food Insecurity: No Food Insecurity (9/12/2023)    Hunger Vital Sign     Worried About Running Out of Food in the Last Year: Never true     Ran Out of Food in the Last Year: Never true   Transportation Needs: No Transportation Needs (9/12/2023)    PRAPARE - Transportation     Lack of Transportation (Medical): No     Lack of Transportation (Non-Medical): No   Physical Activity: Insufficiently Active (9/12/2023)    Exercise Vital Sign     Days of Exercise per Week: 2 days     Minutes of Exercise per Session: 20 min   Stress: No Stress Concern Present (9/12/2023)    [employer] - Occupational Stress Questionnaire     Feeling of Stress : Not at all   Housing Stability: Low Risk  (9/12/2023)    Housing Stability Vital Sign     Unable to Pay for Housing in the Last Year: No     Number of Places Lived in the Last Year: 1     Unstable Housing in the Last Year: No     Family History   Problem Relation Name Age of Onset    Cancer Mother Medice     Glaucoma Mother Medice     Diabetes Father Julio     Cancer Father Julio     Cancer Brother Jarrett     Diabetes Brother Jarrett     Cancer Son Ayad        Review of Systems    A comprehensive review of systems was performed and is negative except for that stated above  Objective:   BP (!) 114/54 (BP Location: Left arm, Patient Position: Sitting, BP Method: Small (Manual))   Pulse 83   Ht 5' (1.524 m)   Wt 72.6 kg (160 lb)   LMP  (LMP Unknown)   BMI 31.25 kg/m²     Physical Exam  Constitutional:       Appearance: Normal appearance.   HENT:      Head: Normocephalic and atraumatic.      Nose: Nose normal.      Mouth/Throat:      Mouth: Mucous membranes are moist.      Pharynx:  Oropharynx is clear.   Eyes:      Extraocular Movements: Extraocular movements intact.      Pupils: Pupils are equal, round, and reactive to light.   Cardiovascular:      Rate and Rhythm: Normal rate and regular rhythm.      Pulses: Normal pulses.   Pulmonary:      Effort: Pulmonary effort is normal.      Breath sounds: Normal breath sounds.   Abdominal:      General: Bowel sounds are normal.      Palpations: Abdomen is soft.   Musculoskeletal:         General: Normal range of motion.      Cervical back: Normal range of motion and neck supple.   Skin:     General: Skin is warm.   Neurological:      General: No focal deficit present.      Mental Status: She is alert and oriented to person, place, and time. Mental status is at baseline.   Psychiatric:         Mood and Affect: Mood normal.       Assessment/ Plan:   1. Congestive heart failure, unspecified HF chronicity, unspecified heart failure type  Assessment & Plan:  -stable established with Cardiology   -currently on Coreg and ASA    Echo    Interpretation Summary  · The left ventricle is normal in size with concentric hypertrophy and normal systolic function.  · The estimated ejection fraction is 55%.  · Normal right ventricular size with normal right ventricular systolic function.  · Mild tricuspid regurgitation.  · There is a small left pleural effusion.  .       2. Primary hypertension  Assessment & Plan:  -stable now on dialysis, continue  -currently Coreg, continue  -low-sodium diet      3. ESRD (end stage renal disease) on dialysis  Assessment & Plan:  -followed by Nephrology currently on hemodialysis via left upper arm fistula  -most recent GFR 8  -stressed importance of control of carbohydrate/A1c level and hypertension  -avoid nephrotoxic drugs (NSAIDs         4. Vitamin D deficiency  Assessment & Plan:  -continue vitamin-D supplement      5. Anemia of chronic disease  Assessment & Plan:  -stable  Iron infusions with dialysis intermittently         6.  Anxiety  Assessment & Plan:  -stable   -currently on lorazepam and hydroxyzine, continue      7. Exudative age-related macular degeneration, left eye, with active choroidal neovascularization  Assessment & Plan:  -followed by ophthalmology      8. Secondary hyperparathyroidism of renal origin

## 2024-07-01 ENCOUNTER — OFFICE VISIT (OUTPATIENT)
Dept: INTERNAL MEDICINE | Facility: CLINIC | Age: 89
End: 2024-07-01
Payer: MEDICARE

## 2024-07-01 ENCOUNTER — TELEPHONE (OUTPATIENT)
Dept: INTERNAL MEDICINE | Facility: CLINIC | Age: 89
End: 2024-07-01

## 2024-07-01 VITALS
HEART RATE: 83 BPM | DIASTOLIC BLOOD PRESSURE: 54 MMHG | WEIGHT: 160 LBS | BODY MASS INDEX: 31.41 KG/M2 | HEIGHT: 60 IN | SYSTOLIC BLOOD PRESSURE: 114 MMHG

## 2024-07-01 DIAGNOSIS — N25.81 SECONDARY HYPERPARATHYROIDISM OF RENAL ORIGIN: ICD-10-CM

## 2024-07-01 DIAGNOSIS — I10 PRIMARY HYPERTENSION: Chronic | ICD-10-CM

## 2024-07-01 DIAGNOSIS — D63.8 ANEMIA OF CHRONIC DISEASE: Chronic | ICD-10-CM

## 2024-07-01 DIAGNOSIS — Z99.2 ESRD (END STAGE RENAL DISEASE) ON DIALYSIS: Chronic | ICD-10-CM

## 2024-07-01 DIAGNOSIS — N18.6 ESRD (END STAGE RENAL DISEASE) ON DIALYSIS: Chronic | ICD-10-CM

## 2024-07-01 DIAGNOSIS — H35.3221 EXUDATIVE AGE-RELATED MACULAR DEGENERATION, LEFT EYE, WITH ACTIVE CHOROIDAL NEOVASCULARIZATION: ICD-10-CM

## 2024-07-01 DIAGNOSIS — I50.9 CONGESTIVE HEART FAILURE, UNSPECIFIED HF CHRONICITY, UNSPECIFIED HEART FAILURE TYPE: Primary | Chronic | ICD-10-CM

## 2024-07-01 DIAGNOSIS — E55.9 VITAMIN D DEFICIENCY: Chronic | ICD-10-CM

## 2024-07-01 DIAGNOSIS — F41.9 ANXIETY: Chronic | ICD-10-CM

## 2024-07-01 PROCEDURE — 99214 OFFICE O/P EST MOD 30 MIN: CPT | Mod: ,,, | Performed by: INTERNAL MEDICINE

## 2024-07-01 RX ORDER — CARVEDILOL 3.12 MG/1
3.12 TABLET ORAL 2 TIMES DAILY WITH MEALS
COMMUNITY

## 2024-07-01 NOTE — TELEPHONE ENCOUNTER
----- Message from Cristina Andres sent at 7/1/2024  9:31 AM CDT -----  .Who Called: Rayna Haider        Preferred Method of Contact: Phone Call  Patient's Preferred Phone Number on File: [phone]   Best Call Back Number, if different:  Additional Information: pt wasn't able to do labs for her appt today daughter asking can they still come or need r/s ?

## 2024-07-01 NOTE — ASSESSMENT & PLAN NOTE
-stable established with Cardiology   -currently on Coreg and ASA    Echo    Interpretation Summary  · The left ventricle is normal in size with concentric hypertrophy and normal systolic function.  · The estimated ejection fraction is 55%.  · Normal right ventricular size with normal right ventricular systolic function.  · Mild tricuspid regurgitation.  · There is a small left pleural effusion.  .

## 2024-07-01 NOTE — TELEPHONE ENCOUNTER
LVM letting Ms Restrepo know it was a personal preference If she decides to keep the appt. For Ms Way. Pt can get lab work done prior to the appt, after the appt. Or they can reschedule and get the lab work done before the next visit.

## 2024-07-01 NOTE — ASSESSMENT & PLAN NOTE
-followed by Nephrology currently on hemodialysis via left upper arm fistula  -most recent GFR 8  -stressed importance of control of carbohydrate/A1c level and hypertension  -avoid nephrotoxic drugs (NSAIDs

## 2024-07-03 ENCOUNTER — LAB VISIT (OUTPATIENT)
Dept: LAB | Facility: HOSPITAL | Age: 89
End: 2024-07-03
Payer: MEDICARE

## 2024-07-03 DIAGNOSIS — Z99.2 ESRD (END STAGE RENAL DISEASE) ON DIALYSIS: Chronic | ICD-10-CM

## 2024-07-03 DIAGNOSIS — R54 FRAILTY: ICD-10-CM

## 2024-07-03 DIAGNOSIS — R73.09 ELEVATED HEMOGLOBIN A1C MEASUREMENT: ICD-10-CM

## 2024-07-03 DIAGNOSIS — N18.6 ESRD (END STAGE RENAL DISEASE) ON DIALYSIS: Chronic | ICD-10-CM

## 2024-07-03 LAB
ALBUMIN SERPL-MCNC: 3.7 G/DL (ref 3.4–4.8)
ALBUMIN/GLOB SERPL: 1.2 RATIO (ref 1.1–2)
ALP SERPL-CCNC: 70 UNIT/L (ref 40–150)
ALT SERPL-CCNC: 8 UNIT/L (ref 0–55)
ANION GAP SERPL CALC-SCNC: 13 MEQ/L
AST SERPL-CCNC: 11 UNIT/L (ref 5–34)
BILIRUB SERPL-MCNC: 0.5 MG/DL
BUN SERPL-MCNC: 31.8 MG/DL (ref 9.8–20.1)
CALCIUM SERPL-MCNC: 9.6 MG/DL (ref 8.4–10.2)
CHLORIDE SERPL-SCNC: 99 MMOL/L (ref 98–107)
CO2 SERPL-SCNC: 23 MMOL/L (ref 23–31)
CREAT SERPL-MCNC: 5.29 MG/DL (ref 0.55–1.02)
CREAT/UREA NIT SERPL: 6
EST. AVERAGE GLUCOSE BLD GHB EST-MCNC: 116.9 MG/DL
GFR SERPLBLD CREATININE-BSD FMLA CKD-EPI: 7 ML/MIN/1.73/M2
GLOBULIN SER-MCNC: 3 GM/DL (ref 2.4–3.5)
GLUCOSE SERPL-MCNC: 132 MG/DL (ref 82–115)
HBA1C MFR BLD: 5.7 %
POTASSIUM SERPL-SCNC: 4.6 MMOL/L (ref 3.5–5.1)
PROT SERPL-MCNC: 6.7 GM/DL (ref 5.8–7.6)
SODIUM SERPL-SCNC: 135 MMOL/L (ref 136–145)

## 2024-07-03 PROCEDURE — 83036 HEMOGLOBIN GLYCOSYLATED A1C: CPT

## 2024-07-03 PROCEDURE — 36415 COLL VENOUS BLD VENIPUNCTURE: CPT

## 2024-07-03 PROCEDURE — 80053 COMPREHEN METABOLIC PANEL: CPT

## 2024-07-11 ENCOUNTER — TELEPHONE (OUTPATIENT)
Dept: INTERNAL MEDICINE | Facility: CLINIC | Age: 89
End: 2024-07-11
Payer: MEDICARE

## 2024-07-11 NOTE — TELEPHONE ENCOUNTER
----- Message from Toya Smith sent at 7/11/2024  9:54 AM CDT -----  Regarding: test results  Who Called: Rayna Haider    Caller is requesting information on test results.    Name of Test (Lab/Mammo/Etc): lab  Date of Test: 7/3    Patient's Preferred Phone Number on File: [phone]   Best Call Back Number, if different:  Additional Information: stated that she had blood work done last week, and would like to get a call back with the results. Please advise

## 2024-07-12 ENCOUNTER — TELEPHONE (OUTPATIENT)
Dept: INTERNAL MEDICINE | Facility: CLINIC | Age: 89
End: 2024-07-12
Payer: MEDICARE

## 2024-07-12 NOTE — TELEPHONE ENCOUNTER
----- Message from Nazia Hendricks sent at 7/12/2024  9:55 AM CDT -----  Regarding: lab results  Who Called: Rayna SILVIA Haider    Caller is requesting information on test results.    Name of Test (Lab/Mammo/Etc): labs  Date of Test: 07/03/24  Where the test was performed: [facility]  Ordering Provider:     Preferred Method of Contact: Phone Call  Patient's Preferred Phone Number on File: [phone]   Best Call Back Number, if different:[phone]  Additional Information: Rayna is requesting her lab results.

## 2024-09-12 DIAGNOSIS — F41.9 ANXIETY: ICD-10-CM

## 2024-09-12 RX ORDER — HYDROXYZINE HYDROCHLORIDE 25 MG/1
25 TABLET, FILM COATED ORAL DAILY PRN
Qty: 30 TABLET | Refills: 3 | Status: SHIPPED | OUTPATIENT
Start: 2024-09-12

## 2024-09-19 DIAGNOSIS — R11.0 NAUSEA: Primary | ICD-10-CM

## 2024-09-19 RX ORDER — ONDANSETRON 4 MG/1
TABLET, ORALLY DISINTEGRATING ORAL
Qty: 30 TABLET | Refills: 3 | Status: SHIPPED | OUTPATIENT
Start: 2024-09-19

## 2024-10-02 DIAGNOSIS — F41.9 ANXIETY: ICD-10-CM

## 2024-10-02 RX ORDER — LORAZEPAM 0.5 MG/1
TABLET ORAL
Qty: 30 TABLET | Refills: 3 | Status: SHIPPED | OUTPATIENT
Start: 2024-10-02

## 2024-11-01 DIAGNOSIS — E55.9 VITAMIN D DEFICIENCY: Primary | ICD-10-CM

## 2024-11-01 DIAGNOSIS — Z13.89 SCREENING FOR CARDIOVASCULAR, RESPIRATORY, AND GENITOURINARY DISEASES: ICD-10-CM

## 2024-11-01 DIAGNOSIS — Z13.228 SCREENING FOR ENDOCRINE, NUTRITIONAL, METABOLIC AND IMMUNITY DISORDER: ICD-10-CM

## 2024-11-01 DIAGNOSIS — Z13.21 SCREENING FOR ENDOCRINE, NUTRITIONAL, METABOLIC AND IMMUNITY DISORDER: ICD-10-CM

## 2024-11-01 DIAGNOSIS — N18.6 TYPE 2 DIABETES MELLITUS WITH CHRONIC KIDNEY DISEASE ON CHRONIC DIALYSIS, WITHOUT LONG-TERM CURRENT USE OF INSULIN: ICD-10-CM

## 2024-11-01 DIAGNOSIS — Z13.6 SCREENING FOR CARDIOVASCULAR, RESPIRATORY, AND GENITOURINARY DISEASES: ICD-10-CM

## 2024-11-01 DIAGNOSIS — D63.8 ANEMIA OF CHRONIC DISEASE: ICD-10-CM

## 2024-11-01 DIAGNOSIS — I50.9 CONGESTIVE HEART FAILURE, UNSPECIFIED HF CHRONICITY, UNSPECIFIED HEART FAILURE TYPE: ICD-10-CM

## 2024-11-01 DIAGNOSIS — Z13.83 SCREENING FOR CARDIOVASCULAR, RESPIRATORY, AND GENITOURINARY DISEASES: ICD-10-CM

## 2024-11-01 DIAGNOSIS — I10 HYPERTENSION, UNSPECIFIED TYPE: ICD-10-CM

## 2024-11-01 DIAGNOSIS — Z99.2 TYPE 2 DIABETES MELLITUS WITH CHRONIC KIDNEY DISEASE ON CHRONIC DIALYSIS, WITHOUT LONG-TERM CURRENT USE OF INSULIN: ICD-10-CM

## 2024-11-01 DIAGNOSIS — N18.6 ESRD (END STAGE RENAL DISEASE): ICD-10-CM

## 2024-11-01 DIAGNOSIS — E11.22 TYPE 2 DIABETES MELLITUS WITH CHRONIC KIDNEY DISEASE ON CHRONIC DIALYSIS, WITHOUT LONG-TERM CURRENT USE OF INSULIN: ICD-10-CM

## 2024-11-01 DIAGNOSIS — Z13.29 SCREENING FOR ENDOCRINE, NUTRITIONAL, METABOLIC AND IMMUNITY DISORDER: ICD-10-CM

## 2024-11-01 DIAGNOSIS — Z13.0 SCREENING FOR ENDOCRINE, NUTRITIONAL, METABOLIC AND IMMUNITY DISORDER: ICD-10-CM

## 2024-11-06 ENCOUNTER — LAB VISIT (OUTPATIENT)
Dept: LAB | Facility: HOSPITAL | Age: 89
End: 2024-11-06
Attending: INTERNAL MEDICINE
Payer: MEDICARE

## 2024-11-06 ENCOUNTER — TELEPHONE (OUTPATIENT)
Dept: INTERNAL MEDICINE | Facility: CLINIC | Age: 89
End: 2024-11-06
Payer: MEDICARE

## 2024-11-06 DIAGNOSIS — Z13.6 SCREENING FOR CARDIOVASCULAR, RESPIRATORY, AND GENITOURINARY DISEASES: ICD-10-CM

## 2024-11-06 DIAGNOSIS — Z13.228 SCREENING FOR ENDOCRINE, NUTRITIONAL, METABOLIC AND IMMUNITY DISORDER: ICD-10-CM

## 2024-11-06 DIAGNOSIS — I50.9 CONGESTIVE HEART FAILURE, UNSPECIFIED HF CHRONICITY, UNSPECIFIED HEART FAILURE TYPE: ICD-10-CM

## 2024-11-06 DIAGNOSIS — E11.22 TYPE 2 DIABETES MELLITUS WITH CHRONIC KIDNEY DISEASE ON CHRONIC DIALYSIS, WITHOUT LONG-TERM CURRENT USE OF INSULIN: ICD-10-CM

## 2024-11-06 DIAGNOSIS — I10 HYPERTENSION, UNSPECIFIED TYPE: ICD-10-CM

## 2024-11-06 DIAGNOSIS — Z13.83 SCREENING FOR CARDIOVASCULAR, RESPIRATORY, AND GENITOURINARY DISEASES: ICD-10-CM

## 2024-11-06 DIAGNOSIS — Z13.89 SCREENING FOR CARDIOVASCULAR, RESPIRATORY, AND GENITOURINARY DISEASES: ICD-10-CM

## 2024-11-06 DIAGNOSIS — Z99.2 TYPE 2 DIABETES MELLITUS WITH CHRONIC KIDNEY DISEASE ON CHRONIC DIALYSIS, WITHOUT LONG-TERM CURRENT USE OF INSULIN: ICD-10-CM

## 2024-11-06 DIAGNOSIS — Z13.29 SCREENING FOR ENDOCRINE, NUTRITIONAL, METABOLIC AND IMMUNITY DISORDER: ICD-10-CM

## 2024-11-06 DIAGNOSIS — N18.6 TYPE 2 DIABETES MELLITUS WITH CHRONIC KIDNEY DISEASE ON CHRONIC DIALYSIS, WITHOUT LONG-TERM CURRENT USE OF INSULIN: ICD-10-CM

## 2024-11-06 DIAGNOSIS — Z13.0 SCREENING FOR ENDOCRINE, NUTRITIONAL, METABOLIC AND IMMUNITY DISORDER: ICD-10-CM

## 2024-11-06 DIAGNOSIS — D63.8 ANEMIA OF CHRONIC DISEASE: ICD-10-CM

## 2024-11-06 DIAGNOSIS — N18.6 ESRD (END STAGE RENAL DISEASE): ICD-10-CM

## 2024-11-06 DIAGNOSIS — Z13.21 SCREENING FOR ENDOCRINE, NUTRITIONAL, METABOLIC AND IMMUNITY DISORDER: ICD-10-CM

## 2024-11-06 DIAGNOSIS — E55.9 VITAMIN D DEFICIENCY: ICD-10-CM

## 2024-11-06 LAB
25(OH)D3+25(OH)D2 SERPL-MCNC: 57 NG/ML (ref 30–80)
ALBUMIN SERPL-MCNC: 3.6 G/DL (ref 3.4–4.8)
ALBUMIN/GLOB SERPL: 1.2 RATIO (ref 1.1–2)
ALP SERPL-CCNC: 66 UNIT/L (ref 40–150)
ALT SERPL-CCNC: 9 UNIT/L (ref 0–55)
ANION GAP SERPL CALC-SCNC: 12 MEQ/L
AST SERPL-CCNC: 13 UNIT/L (ref 5–34)
BASOPHILS # BLD AUTO: 0.09 X10(3)/MCL
BASOPHILS NFR BLD AUTO: 1.5 %
BILIRUB SERPL-MCNC: 0.4 MG/DL
BUN SERPL-MCNC: 28.1 MG/DL (ref 9.8–20.1)
CALCIUM SERPL-MCNC: 9.9 MG/DL (ref 8.4–10.2)
CHLORIDE SERPL-SCNC: 97 MMOL/L (ref 98–107)
CHOLEST SERPL-MCNC: 154 MG/DL
CHOLEST/HDLC SERPL: 4 {RATIO} (ref 0–5)
CO2 SERPL-SCNC: 29 MMOL/L (ref 23–31)
CREAT SERPL-MCNC: 4.8 MG/DL (ref 0.55–1.02)
CREAT UR-MCNC: 94.6 MG/DL (ref 45–106)
CREAT/UREA NIT SERPL: 6
EOSINOPHIL # BLD AUTO: 0.24 X10(3)/MCL (ref 0–0.9)
EOSINOPHIL NFR BLD AUTO: 3.9 %
ERYTHROCYTE [DISTWIDTH] IN BLOOD BY AUTOMATED COUNT: 13.8 % (ref 11.5–17)
EST. AVERAGE GLUCOSE BLD GHB EST-MCNC: 142.7 MG/DL
GFR SERPLBLD CREATININE-BSD FMLA CKD-EPI: 8 ML/MIN/1.73/M2
GLOBULIN SER-MCNC: 2.9 GM/DL (ref 2.4–3.5)
GLUCOSE SERPL-MCNC: 131 MG/DL (ref 82–115)
HBA1C MFR BLD: 6.6 %
HCT VFR BLD AUTO: 34.3 % (ref 37–47)
HDLC SERPL-MCNC: 39 MG/DL (ref 35–60)
HGB BLD-MCNC: 10.7 G/DL (ref 12–16)
IMM GRANULOCYTES # BLD AUTO: 0.02 X10(3)/MCL (ref 0–0.04)
IMM GRANULOCYTES NFR BLD AUTO: 0.3 %
LDLC SERPL CALC-MCNC: 82 MG/DL (ref 50–140)
LYMPHOCYTES # BLD AUTO: 1.92 X10(3)/MCL (ref 0.6–4.6)
LYMPHOCYTES NFR BLD AUTO: 31 %
MCH RBC QN AUTO: 32.2 PG (ref 27–31)
MCHC RBC AUTO-ENTMCNC: 31.2 G/DL (ref 33–36)
MCV RBC AUTO: 103.3 FL (ref 80–94)
MICROALBUMIN UR-MCNC: 728.8 UG/ML
MICROALBUMIN/CREAT RATIO PNL UR: 770.4 MG/GM CR (ref 0–30)
MONOCYTES # BLD AUTO: 0.49 X10(3)/MCL (ref 0.1–1.3)
MONOCYTES NFR BLD AUTO: 7.9 %
NEUTROPHILS # BLD AUTO: 3.44 X10(3)/MCL (ref 2.1–9.2)
NEUTROPHILS NFR BLD AUTO: 55.4 %
NRBC BLD AUTO-RTO: 0 %
PLATELET # BLD AUTO: 192 X10(3)/MCL (ref 130–400)
PMV BLD AUTO: 10.5 FL (ref 7.4–10.4)
POTASSIUM SERPL-SCNC: 4.5 MMOL/L (ref 3.5–5.1)
PROT SERPL-MCNC: 6.5 GM/DL (ref 5.8–7.6)
RBC # BLD AUTO: 3.32 X10(6)/MCL (ref 4.2–5.4)
SODIUM SERPL-SCNC: 138 MMOL/L (ref 136–145)
TRIGL SERPL-MCNC: 167 MG/DL (ref 37–140)
TSH SERPL-ACNC: 2.35 UIU/ML (ref 0.35–4.94)
VLDLC SERPL CALC-MCNC: 33 MG/DL
WBC # BLD AUTO: 6.2 X10(3)/MCL (ref 4.5–11.5)

## 2024-11-06 PROCEDURE — 84443 ASSAY THYROID STIM HORMONE: CPT

## 2024-11-06 PROCEDURE — 85025 COMPLETE CBC W/AUTO DIFF WBC: CPT

## 2024-11-06 PROCEDURE — 80061 LIPID PANEL: CPT

## 2024-11-06 PROCEDURE — 82570 ASSAY OF URINE CREATININE: CPT

## 2024-11-06 PROCEDURE — 36415 COLL VENOUS BLD VENIPUNCTURE: CPT

## 2024-11-06 PROCEDURE — 83036 HEMOGLOBIN GLYCOSYLATED A1C: CPT

## 2024-11-06 PROCEDURE — 80053 COMPREHEN METABOLIC PANEL: CPT

## 2024-11-06 PROCEDURE — 82306 VITAMIN D 25 HYDROXY: CPT

## 2024-11-06 NOTE — TELEPHONE ENCOUNTER
----- Message from Med Assistant Hill sent at 11/1/2024  9:21 AM CDT -----  Regarding: PV Wednesday 11-13-24  To early for [facility] 11-29-23

## 2024-11-26 ENCOUNTER — TELEPHONE (OUTPATIENT)
Dept: INTERNAL MEDICINE | Facility: CLINIC | Age: 89
End: 2024-11-26
Payer: MEDICARE

## 2024-11-26 NOTE — TELEPHONE ENCOUNTER
----- Message from Med Assistant Hill sent at 11/12/2024  8:09 AM CST -----  Regarding: PV Wednesday 12-4-24  Wellness Appointment    Labs done 11-6-24 no additional labs required    Last Wellness 11-29-23

## 2024-12-04 ENCOUNTER — OFFICE VISIT (OUTPATIENT)
Dept: INTERNAL MEDICINE | Facility: CLINIC | Age: 89
End: 2024-12-04
Payer: MEDICARE

## 2024-12-04 VITALS
BODY MASS INDEX: 32.2 KG/M2 | HEIGHT: 60 IN | SYSTOLIC BLOOD PRESSURE: 108 MMHG | HEART RATE: 80 BPM | WEIGHT: 164 LBS | OXYGEN SATURATION: 98 % | DIASTOLIC BLOOD PRESSURE: 54 MMHG

## 2024-12-04 DIAGNOSIS — Z99.2 ESRD (END STAGE RENAL DISEASE) ON DIALYSIS: Chronic | ICD-10-CM

## 2024-12-04 DIAGNOSIS — E11.9 TYPE 2 DIABETES MELLITUS WITHOUT COMPLICATION, WITHOUT LONG-TERM CURRENT USE OF INSULIN: ICD-10-CM

## 2024-12-04 DIAGNOSIS — Z23 NEED FOR SHINGLES VACCINE: ICD-10-CM

## 2024-12-04 DIAGNOSIS — E66.811 CLASS 1 OBESITY DUE TO EXCESS CALORIES WITH SERIOUS COMORBIDITY AND BODY MASS INDEX (BMI) OF 32.0 TO 32.9 IN ADULT: Chronic | ICD-10-CM

## 2024-12-04 DIAGNOSIS — I10 PRIMARY HYPERTENSION: Chronic | ICD-10-CM

## 2024-12-04 DIAGNOSIS — I50.9 CONGESTIVE HEART FAILURE, UNSPECIFIED HF CHRONICITY, UNSPECIFIED HEART FAILURE TYPE: Chronic | ICD-10-CM

## 2024-12-04 DIAGNOSIS — Z72.3 LACK OF PHYSICAL ACTIVITY: ICD-10-CM

## 2024-12-04 DIAGNOSIS — E66.09 CLASS 1 OBESITY DUE TO EXCESS CALORIES WITH SERIOUS COMORBIDITY AND BODY MASS INDEX (BMI) OF 32.0 TO 32.9 IN ADULT: Chronic | ICD-10-CM

## 2024-12-04 DIAGNOSIS — N18.6 ESRD (END STAGE RENAL DISEASE) ON DIALYSIS: Chronic | ICD-10-CM

## 2024-12-04 DIAGNOSIS — F41.9 ANXIETY: Chronic | ICD-10-CM

## 2024-12-04 DIAGNOSIS — Z00.00 MEDICARE ANNUAL WELLNESS VISIT, SUBSEQUENT: Primary | ICD-10-CM

## 2024-12-04 DIAGNOSIS — D63.8 ANEMIA OF CHRONIC DISEASE: Chronic | ICD-10-CM

## 2024-12-04 PROCEDURE — G0439 PPPS, SUBSEQ VISIT: HCPCS | Mod: ,,, | Performed by: INTERNAL MEDICINE

## 2024-12-04 NOTE — ASSESSMENT & PLAN NOTE
-emphasized on importance of staying active whenever possible, walking in the house, getting her steps in

## 2024-12-04 NOTE — PATIENT INSTRUCTIONS
Immunization records faxed 4/19/21   Medicare Annual Wellness Visit      Patient Name: Rayna Haider  Today's Date: 12/4/2024    Below you will find your 5-10 year Screening Plan Recommendations:  Health Maintenance       Date Due Completion Date    TETANUS VACCINE Never done ---    Shingles Vaccine (1 of 2) Never done ---    RSV Vaccine (Age 60+ and Pregnant patients) (1 - 1-dose 75+ series) Never done ---    COVID-19 Vaccine (4 - 2024-25 season) 09/01/2024 10/12/2021    Eye Exam 12/11/2024 12/11/2023    Hemoglobin A1c 05/06/2025 11/6/2024    Lipid Panel 11/06/2025 11/6/2024          Below is your summarized Personalized Prevention Plan that addresses any concerns we discussed today at your visit. Please see attached detailed information specific to your Health Concerns.  No orders of the defined types were placed in this encounter.        The following information is provided to all patients.  This information is to help you find additional resources for any problems that may be affecting your health: Living healthy guide: [email or web address]      Understanding Diabetes: www.diabetes.org      Eating healthy: www.cdc.gov/healthyweight      [facility] home safety checklist: www.cdc.gov/steadi/patient.html      Agency on Aging: www.goea.[place].gov      Alcoholics anonymous (AA): www.aa.org      Physical Activity: www.chiara.nih.gov/nz2hpeu      Tobacco use: www.quitwithusla.org                                 Patient Education       Weight Loss Tips   About this topic   More and more people are concerned about their weight. You can choose from many different programs. The goal of a weight loss program may be to cut down on calories or to lose extra weight through exercise.  Losing weight may mean changing your ideas about food. Going on a diet and losing weight does not mean starving yourself. It means cutting down on the amount of food you eat, making healthy food choices, and being active.  General   Ideally, you need to lose 1 to 2 pounds (0.5 to 1 kg) a  "week for a healthy weight loss. Losing too much weight too fast is not good. When you take in fewer calories, you will lose weight. Your ideal calorie and weight goal depends on your current age, weight, height, and personal goals. Ask your doctor or dietitian what your ideal weight is.  You need to burn 3500 calories to lose 1 pound (0.5 kg). That means cutting out 500 calories every day for 7 days. You can cut out 500 calories per day by eating or drinking fewer calories, burning them through exercise, or doing both. To lose that extra weight and stay healthy:  Take time to exercise.  Exercise regularly. Burn calories with activity and exercise. Exercise can help you lose weight and it also strengthens your muscles. Set a schedule where you will have time to do exercises. With just 30 to 60 minutes of exercise each day, you could burn 500 extra calories. Your metabolism stays elevated for a period of time after exercise.  If you don't have time for a 30 minute workout, try three 10 minute exercises each day.  If you work near your home, walk to work. Walking is a very good form of exercise.  Take a 20 minute walk each day. Walk during your lunch break. Park far away, so you have to walk more.  Take the steps instead of elevators. You will burn more calories this way.  If you have an illness, like diabetes or high blood pressure, ask your doctor how much exercise is right for you.  Choose healthy snacks.  Low calorie healthy snacks are a good thing. They help your blood sugar stay even and prevent you from overeating at meals. Choose a balanced snack, such as a small apple with 2 tablespoons (30 grams) of peanut butter.  Keep in mind, even "low calorie" foods can add up. Just because you choose low calorie foods does not mean you do not have to count the calories you eat.  Pack a few fresh fruits or a small salad to take to work or school. Avoid buying a snack at the nearest vending machine.  When you feel thirsty, " drink water. Water has no calories and is a very good thirst quencher.  Plan healthy meals.  Plan ahead. Keep a diary of foods that are low in calories. You can also make a list of meal plans for your breakfast, lunch, and dinner. Planning ahead will prevent you from eating out at a fast food place or restaurant.  Make a grocery list before shopping so you only buy food you need. Don't go to the store hungry.  Visit a dietitian. This person will help you make meal plans that will help you lose weight.  Add fiber to your meals. Adding fiber helps you to feel full for a longer amount of time.  Take care when eating out.  Choose lower fat and lower calorie meals. Try a seafood, lean meat, or vegetarian entrée.  Share a meal with a friend.  Try a salad and appetizer instead of an entree.  Ask for a to-go box when dinner is served and put half of your meal in it for a later meal.  Have fruit for dessert.  Drink water instead of other high calorie drinks.  Learn not to overeat.  Watch your portions. For example, the recommended serving size of meat is 3 ounces (90 grams). This is the size of a deck of playing cards. Two tablespoons (30 grams) of peanut butter is the size of a ping-pong ball. One medium fruit is the size of a baseball.  Use a smaller plate or glass during dinner for less calorie intake.  Try drinking a glass or two of water before eating. This may make you feel more full and help you to eat less.  Eat slowly. Take at least 30 minutes to eat. This gives time for your brain to tell your stomach you are full. This will help you avoid overeating.  Some people eat smaller meals more often to help not to overeat. If you can eat six small meals, make them healthy and low calorie. If three meals are best for you, know your calorie level for the day and spread it out into three healthy low calorie meals.     What will the results be?   Losing weight may make you healthier. You also may have more energy for your  daily activities. You may lower disease risk. You may also add years to your life.  What changes to diet are needed?   Learn how to read nutrition labels. Know the serving size. Knowing the calories in an item will help you make healthier choices and lose weight.  Keep a diary of the food you eat. This will help you count the calories you are taking in.  Make a menu in advance. This will help you make good choices to include in your diet.  Avoid eating 2 hours before bedtime to allow for digestion. If you eat right before you go to bed, you may also have worse heartburn.  Be sure to count the calories in the things you drink. You may want to stop drinking soda pop, beer, wine, and mixed drinks (alcohol). Some coffee drinks also have a lot of calories in them.  Who should use this diet?   A weight loss diet may be needed for people with a calculated body mass index of 25 and over. This means you are overweight or obese.  Who should not use this diet?   People with BMI of 18.5 or lower should not use this diet. Do not use this diet if your doctor does not recommend weight loss.  What foods are good to eat?   Choose foods that are nutritious. Remember, portion control is key. Even a low calorie food can become high in calories if you have too big of a serving. Here is a list of foods that are good to eat:  Vegetables:   Broccoli  Asparagus  Spinach  Green leafy vegetables  Tomatoes  Onions  Mushrooms  Cucumbers  Zucchini  Lean proteins:   Egg whites  Beans including kidney, navy, black, and chickpeas  Grilled, broiled, or baked skinless chicken breast  Grilled, broiled, or baked skinless turkey breast  Lean beef  [place] meat  Grilled, broiled, or baked fish  Beans  Nuts, such as almonds, cashews, and pistachios  Seeds  Whole grains and carbs:   Oatmeal  Brown rice  Sweet potatoes  Cereal  Whole grain bread or pasta  Fruits:    Apples  Grapefruit  Blueberries  Oranges  Bananas  Grapes  Peaches  Pineapple  Strawberries  Dairy:   Fat-free or low-fat milk and cheese  Low fat yogurt  Soy, rice, or almond milk  What foods should be limited or avoided?   Limit or avoid foods that are high in calories like:  Junk foods  Fried foods  Fatty foods  Processed meats  Food with saturated and trans fat  Whole fat dairy products  Butter  Cheese  Ice cream  Food and drinks with a lot of sugar. Some examples are beer, wine, mixed drinks (alcohol), carbonated sodas, cakes, and cookies.  When do I need to call the doctor?   Weakness  Fast heartbeat  Dizziness  Helpful tips   Join a support group and an exercise group. It is much easier to lose weight if you have support and encouragement.  Do not skip meals. If you skip a meal, you most likely will overeat at that next meal.  Eat at the dining room table instead in front of the TV to help monitor intake.  Where can I learn more?   Academy of Nutrition and Dietetics  https://www.eatright.org/health/weight-loss/your-health-and-your-weight/back-to-basics-for-healthy-weight-loss   Centers for Disease Control and Prevention  https://www.cdc.gov/healthyweight/   Weight-Control Information Network  https://www.niddk.nih.gov/health-information/diet-nutrition/changing-habits-better-health   Last Reviewed Date   2021-08-09  Consumer Information Use and Disclaimer   This information is not specific medical advice and does not replace information you receive from your health care provider. This is only a brief summary of general information. It does NOT include all information about conditions, illnesses, injuries, tests, procedures, treatments, therapies, discharge instructions or life-style choices that may apply to you. You must talk with your health care provider for complete information about your health and treatment options. This information should not be used to decide whether or not to accept your health care  providers advice, instructions or recommendations. Only your health care provider has the knowledge and training to provide advice that is right for you.  Copyright   Copyright © 2021 UpToDate, Inc. and its affiliates and/or licensors. All rights reserved.    Patient Education       Health Risks of a High BMI   About this topic   Your weight and health depend on a few things. Doctors use a method called BMI or body mass index as a tool to learn more about your risk of having health problems. This tool uses your weight and your height to find your BMI.  BMI does not include things like your habits, where you live, family history, or amount of body fat. Some people with a normal BMI are still not healthy. Other people with a high BMI may be healthy. Most of the time, a person with a higher BMI is less healthy and will need more care. Ask your doctor for their view of your total health during a well visit or physical.  Being overweight or having a high BMI can hurt many parts of your body. Learn about your BMI and how your weight changes your health risks. Then you can make changes to keep yourself as healthy as you can.  General   Weighing too much can be very harmful to your body. It can cause many illnesses and can make it hard to move about.  Blood Sugar   You have a greater chance of having high blood sugar or diabetes if you weigh too much. Your body normally makes a hormone called insulin. The insulin allows your body to use the sugar in your blood.  The cells in your body may not be able to use insulin. Then your cells cannot get the sugar from your bloodstream that they need for energy. Your pancreas has to work extra hard to try and make enough insulin to keep your blood sugar healthy.  If you lose weight and exercise, your body is able to control your blood sugar levels better. You may not need as much insulin to keep your blood sugar levels healthy.  Your Heart   Being overweight makes your heart work  harder.  The blood vessels that bring blood to your heart muscle may become narrow or blocked and cause a heart attack or your heart may not pump as well as it should. Your heart rate may not be normal.  Losing weight can lower your chances of having problems with your heart.  High Blood Pressure   Your heart has to work harder to pump blood through a larger body and to make sure all of your cells have the oxygen they need.  As your heart has to work harder, your blood pressure goes up.  Being overweight can also harm your kidneys and this may also raise your blood pressure.  You may be able to lower your blood pressure through weight loss and routine exercise.  Stroke   Strokes are more likely to happen when someone has high blood pressure, heart problems, high blood sugar, or high cholesterol.  Being overweight puts you at a higher risk for all of these health problems. These problems put you at a higher risk for having a stroke.  Losing weight may help lower your blood pressure, which is the biggest risk factor for a stroke.  Cholesterol   Your cholesterol level is likely to be higher if you are overweight.  This can lead to narrowing of the blood vessels in your heart, neck, or other parts of your body. Then you may have chest pain or signs of low blood flow to a certain area. It can also lead to a heart attack or stroke.  Lowering your weight and changing what you eat may change your cholesterol levels.  Your Liver and Kidneys   You are more likely to have certain problems with your liver or kidneys if you have a high BMI.  Fatty liver disease is caused by a buildup of fat in your liver. Then your liver may not work as well as it should.  You are at a higher risk for diabetes if you are overweight. This illness can cause kidney problems.  There is no exact way to treat fatty liver disease. By losing weight, you may keep your liver from getting any worse and help your liver work better.  By losing weight, you  lower your chance of having kidney problems. If you already have kidney problems, weight loss may help keep your disease from getting worse.  Bone and Joint Problems   Weighing too much can put a lot of stress on your joints.  The extra weight may make the cartilage wear away more quickly from your bones. Your cartilage lines the surfaces of your bones to help your joints glide more easily.  When your cartilage is worn, your joints become stiff and sore.  Losing weight and exercising is one of the best ways to treat joint pain and stiffness. It can also ease the stress on your hips, knees, and back.  Cancer Risk   Gaining weight and poor health habits raise your risk for some kinds of cancer.  Healthy eating and exercise may lower your risk for some kinds of cancer.  Sleep   With a high BMI, you may have extra fat around your neck. This can make your airway smaller and put you at risk for a problem called sleep apnea. With this problem, your breathing starts and stops when you are sleeping.  Signs of sleep apnea include snoring, feeling sleepy during the day, and problems with focusing.  Sleep apnea can lead to heart problems such as increased risk for heart disease and arrhythmias like atrial fibrillation.  Losing weight can lower the amount of fat around your neck and ease sleep apnea problems.  Pregnancy   Your weight can affect how often you have a period. It may also make it harder for you to get pregnant. A high BMI can cause problems for both mom and baby.  If you are overweight and pregnant you are at a higher risk for high blood sugar or high blood pressure while you are pregnant.  You are also more likely to have your baby early or to need a C section.  Losing weight before you become pregnant may lower your chance for these problems. If you are pregnant, talk to your doctor before you try to lose weight.  Depression   You are more likely to suffer from depression or low mood when you have a high BMI.  You  may have a low mood because of low self-esteem, lack of activity, lack of sleep, and health problems caused by being overweight.  Losing weight and finding out the reasons you overeat are some of the steps to improve your quality of life and deal with depression.  Where can I learn more?   National [employer] of [employer] and [employer]estive and Kidney Diseases  https://www.niddk.nih.gov/health-information/weight-management/adult-overweight-obesity/health-risks   Last Reviewed Date   2021-09-15  Consumer Information Use and Disclaimer   This information is not specific medical advice and does not replace information you receive from your health care provider. This is only a brief summary of general information. It does NOT include all information about conditions, illnesses, injuries, tests, procedures, treatments, therapies, discharge instructions or life-style choices that may apply to you. You must talk with your health care provider for complete information about your health and treatment options. This information should not be used to decide whether or not to accept your health care providers advice, instructions or recommendations. Only your health care provider has the knowledge and training to provide advice that is right for you.  Copyright   Copyright © 2021 UpToDate, Inc. and its affiliates and/or licensors. All rights reserved.    Patient Education       Exercise and Aging   General   Exercise can help older adults prevent falls and stay independent longer. Exercise may also help:  You have stronger muscles and bones and better balance  You lose weight and have more energy  Make your heart and lungs stronger  Lower your chance of health problems like heart disease, high blood sugar, or breast or colon cancer  Control conditions like high blood pressure and diabetes  You manage stress and get better sleep  Your mood, self-esteem, and self-image  How you think, plan, and pay attention  There are four types of  exercise: endurance, strength, balance, and flexibility.  Endurance exercises get your heart rate up and keep it up for a while. Most people should get at least 30 minutes of exercise that gets your heart rate up on 5 or more days each week. Walking, swimming, biking, or going up and down stairs are kinds of exercises to get your heart rate up. You do not have to do all 30 minutes at one time. Try doing 10 minutes 3 times a day.  Strength exercises build muscle. Lifting weights or doing knee bends are kinds of strength exercises.  Balance exercises help to prevent falls. Raise up on your toes or stand on one leg as a kind of balance exercise.  Flexibility exercises move your joints through a full range of motion and stretch your muscles. Bend forward in a chair to stretch your back, do stretches, or bend and extend your arms or legs as a kind of flexibility exercise. Try doing 10 minutes twice a week.  Plan to include all these exercise types in your exercise program. Always check with your doctor before you start a new exercise program.  Some people do not like formal exercise classes, but there are many ways to work your muscles each day. Here are some things you can do.  Use steps instead of elevators.  Park far away in parking lots to walk farther.  Use the time while you wait. Do knee bends as you hold onto the kitchen counter while you wait for your coffee to brew.  When you unload groceries, lift the bags or a container of milk a few times to exercise your arms and legs.  Walk the long way when you go somewhere.  After you walk to the bathroom, walk a few laps around the house before sitting down.  Try doing different standing exercises after you wash your hands each time in the bathroom.  Do balance exercises while you brush your teeth.  Do an exercise or get up and walk during TV commercials.  Don't forget to exercise small muscle groups like your hands, fingers, ankles, toes, and neck. Wiggle, bend, flex,  and rotate these joints from left to right and back to front to help to keep these joints flexible.  When do I need to call the doctor?   Stop exercising and talk to your doctor if you have any of these problems:  Dizziness  Shortness of breath  Pain or pressure in the chest, arms, throat, jaw, or back  Nausea or vomiting  Blood clots  Infection  Joint swelling  Open wounds  Recent hip, back, or eye surgery  New problems that start during exercise  Helpful tips   If you have a health problem like heart disease or diabetes, talk with your doctor about the best exercises for you.  Always warm up before you stretch. Heated muscles stretch much easier than cool muscles. Try to walk or bike at an easy pace for a few minutes to warm up your muscles.  Slow your pace again after you exercise to cool down and bring your heart rate down slowly.  Be sure you do not hold your breath when you exercise because it can raise your blood pressure. If you tend to hold your breath, try to count out loud when you exercise.  Never bounce when doing stretches. Use slow and steady motions and hold your stretch for 20 to 30 seconds.  Have a routine. Doing exercises before a meal may be a good way to get into a routine.  Set small goals for yourself when you start to exercise. Use a chart to see how much you are doing. Ask someone to exercise with you.  Exercise may be slightly uncomfortable, but you should not have sharp pains. If you do get sharp pains, stop what you are doing. If the sharp pains continue, call your doctor.  Drink plenty of fluids without caffeine when you exercise and afterwards. Avoid outdoor exercise if it is too cold or too hot.  Wear the right clothes and shoes. Try layers of clothes, so that you can take them off if you get too hot. Shoes should fit well and support your feet.  Where can I learn more?   National [place] on Aging  https://www.chiara.nih.gov/health/publication/exercise-physical-activity/introduction    Last Reviewed Date   2021-03-18  Consumer Information Use and Disclaimer   This information is not specific medical advice and does not replace information you receive from your health care provider. This is only a brief summary of general information. It does NOT include all information about conditions, illnesses, injuries, tests, procedures, treatments, therapies, discharge instructions or life-style choices that may apply to you. You must talk with your health care provider for complete information about your health and treatment options. This information should not be used to decide whether or not to accept your health care providers advice, instructions or recommendations. Only your health care provider has the knowledge and training to provide advice that is right for you.  Copyright   Copyright © 2021 UpToDate, Inc. and its affiliates and/or licensors. All rights reserved.

## 2024-12-04 NOTE — ASSESSMENT & PLAN NOTE
-labs are reviewed all essentially normal except that consistent with ESRD  -patient is advised on importance of watching her carbohydrate intake and saturated fat intake, making the right nutritional choices and exercising on a regular basis  -up-to-date with the screening

## 2024-12-04 NOTE — ASSESSMENT & PLAN NOTE
-stable   -currently on lorazepam and hydroxyzine, continue      Patient calling stating she needs her cpap supplies asap and she's at the store. Please fax order to [phone] to [facility]

## 2024-12-04 NOTE — PROGRESS NOTES
Internal Medicine    Rayna Haider is a 89 y.o. female here today for a Medicare Annual Wellness visit and comprehensive Health Risk Assessment.     Subjective     Ms. Way is a 89-year-old female here today for a Medicare wellness.    Her medical comorbidities are significant for hypertension, anxiety, ESRD on hemodialysis on a TTS schedule, congestive heart failure and a previous history of diabetes mellitus type 2 that is now off of medications and being controlled with diet and exercise alone.    Does have insulin resistance with A1c of 6.6.  Still remains noncompliant with low-carbohydrate diet for which strict compliance was encouraged to avoid having to be placed back onto diabetic medications.   Blood pressure well-controlled currently on Coreg alone now on dialysis.  Otherwise feeling generally well dialyzing with left upper arm fistula.        MCW: 12/04/24    The following components were reviewed and updated:  Medical history  Family History  Social history  Allergies  Current Medications  Immunizations  Health Maintenance  Patient Care Team    Review of Systems  A comprehensive review of systems was conducted and is negative except as noted above.     Objective   Visit Vitals  BP (!) 108/54 (BP Location: Right arm, Patient Position: Sitting)   Pulse 80   Ht 5' (1.524 m)   Wt 74.4 kg (164 lb)   LMP  (LMP Unknown)   SpO2 98%   BMI 32.03 kg/m²        Physical Exam  Constitutional:       Appearance: Normal appearance. She is obese.   HENT:      Head: Normocephalic and atraumatic.      Nose: Nose normal.      Mouth/Throat:      Mouth: Mucous membranes are moist.      Pharynx: Oropharynx is clear.   Eyes:      Extraocular Movements: Extraocular movements intact.      Pupils: Pupils are equal, round, and reactive to light.   Cardiovascular:      Rate and Rhythm: Normal rate and regular rhythm.      Pulses: Normal pulses.   Pulmonary:      Effort: Pulmonary effort is normal.      Breath sounds: Normal  "breath sounds.   Abdominal:      General: Bowel sounds are normal.      Palpations: Abdomen is soft.   Musculoskeletal:         General: Normal range of motion.      Cervical back: Normal range of motion and neck supple.   Skin:     General: Skin is warm.   Neurological:      General: No focal deficit present.      Mental Status: She is alert and oriented to person, place, and time. Mental status is at baseline.   Psychiatric:         Mood and Affect: Mood normal.          Assessment/Plan:  1. Medicare annual wellness visit, subsequent  Assessment & Plan:  -labs are reviewed all essentially normal  -patient is advised on importance of watching her carbohydrate intake and saturated fat intake, making the right nutritional choices and exercising on a regular basis  -up-to-date with the screening       2. Need for shingles vaccine  -     varicella-zoster [id number], PF, (SHINGRIX) 50 mcg/0.5 mL injection; Inject 0.5 mLs into the muscle once. for 1 dose  Dispense: 1 each; Refill: 0    3. Lack of physical activity  Assessment & Plan:  -emphasized on importance of staying active whenever possible, walking in the house, getting her steps in      4. ESRD (end stage renal disease) on dialysis  Assessment & Plan:  -followed by Nephrology currently on hemodialysis via left upper arm fistula    -stressed importance of control of carbohydrate/A1c level and hypertension  -avoid nephrotoxic drugs (NSAIDs  -currently A1c is at 6.6 with diet and exercise            5. Primary hypertension  Assessment & Plan:  -stable now on dialysis, continue  -currently Coreg, continue  -low-sodium diet         6. Congestive heart failure, unspecified HF chronicity, unspecified heart failure type  Assessment & Plan:  Patient has Diastolic (HFpEF) heart failure that is Chronic. On presentation their CHF was well compensated. Most recent BNP and echo results are listed below.  No results for input(s): "BNP" in the last 72 hours.  Latest ECHO  Results " for orders placed during the hospital encounter of 01/30/23    Echo    Interpretation Summary  · The left ventricle is normal in size with concentric hypertrophy and normal systolic function.  · The estimated ejection fraction is 55%.  · Normal right ventricular size with normal right ventricular systolic function.  · Mild tricuspid regurgitation.  · There is a small left pleural effusion.               7. Class 1 obesity due to excess calories with serious comorbidity and body mass index (BMI) of 32.0 to 32.9 in adult  Assessment & Plan:  Body mass index is 32.03 kg/m².  Goal BMI <30.  Exercise 5 times a week for 30 minutes per day.  Avoid soda, simple sugars, excessive rice, potatoes or bread. Limit fast foods and fried foods.  Choose complex carbs in moderation (example: green vegetables, beans, oatmeal). Eat plenty of fresh fruits and vegetables with lean meats daily.  Do not skip meals. Eat a balanced portion size.  Avoid fad diets. Consider permanent healthy life style changes.        8. Anemia of chronic disease  Assessment & Plan:  -stable  Iron infusions with dialysis intermittently             9. Type 2 diabetes mellitus without complication, without long-term current use of insulin  Assessment & Plan:  -labs are reviewed all essentially normal except that consistent with ESRD  -patient is advised on importance of watching her carbohydrate intake and saturated fat intake, making the right nutritional choices and exercising on a regular basis  -up-to-date with the screening       10. Anxiety  Assessment & Plan:  -stable   -currently on lorazepam and hydroxyzine, continue           A comprehensive HEALTH RISK ASSESSMENT was completed today. Results are summarized below:    There are NO EMOTIONAL/SOCIAL CONCERNS identified on today's screening for Social Isolation, Depression and Anxiety.    There are NO COGNITIVE FUNCTION CONCERNS identified on today's screening.    The following FUNCTIONAL AND/OR SAFETY  CONCERNS were identified on today's screening for Physical Symptoms, Nutritional, Home Safety/Living Situation, Fall Risk, Activities of Daily Living, Independent Activities of Daily Living, Physical Activity,Timed Up and Go test and Whisper test::   *Patient reports she NEEDS ASSISTANCE WITH SOME INDEPENDENT ACTIVITIES OF DAILY LIVING. (Do you need help with phone, transportation, shopping, preparing meals, housework, laundry, meds, or managing money?: (!) Yes)  *Patient's TIMED UP AND GO TEST was ABNORMAL. (Was the patient's Timed Up & Go test unsteady or longer than 30 seconds?: (!) Yes)    The patient reports NO OPIOID PRESCRIPTIONS. This was confirmed through medication reconciliation and the [facility] website.    The patient is NOT A TOBACCO USER.        All Questions regarding food, transportation or housing were not answered today.      I provided Rayna Haider with a 5-10 year written Screening Schedule per USPSTF age appropriate recommendations and a Personal Prevention Plan based on the results of today's Health Risk Assessment. Education, counseling, and referrals were provided as documented above and can be viewed in the After Visit Summary.    Follow up in about 6 months (around 6/4/2025) for BP Check. In addition to this scheduled follow up, the patient has also been instructed to follow up on as needed basis.     none  The patient was asked and declined the use of a free .  Advance Care Planning   Today we discussed advance care planning. She is interested in learning more about how to make Advance Directives. Information was provided and I offered to discuss more at her discretion.     Advance Care Planning     Date: 12/04/2024  Patient did not wish or was not able to name a surrogate decision maker or provide an Advance Care Plan.

## 2024-12-04 NOTE — ASSESSMENT & PLAN NOTE
"Patient has Diastolic (HFpEF) heart failure that is Chronic. On presentation their CHF was well compensated. Most recent BNP and echo results are listed below.  No results for input(s): "BNP" in the last 72 hours.  Latest ECHO  Results for orders placed during the hospital encounter of 01/30/23    Echo    Interpretation Summary  · The left ventricle is normal in size with concentric hypertrophy and normal systolic function.  · The estimated ejection fraction is 55%.  · Normal right ventricular size with normal right ventricular systolic function.  · Mild tricuspid regurgitation.  · There is a small left pleural effusion.           "

## 2024-12-04 NOTE — ASSESSMENT & PLAN NOTE
Body mass index is 32.03 kg/m².  Goal BMI <30.  Exercise 5 times a week for 30 minutes per day.  Avoid soda, simple sugars, excessive rice, potatoes or bread. Limit fast foods and fried foods.  Choose complex carbs in moderation (example: green vegetables, beans, oatmeal). Eat plenty of fresh fruits and vegetables with lean meats daily.  Do not skip meals. Eat a balanced portion size.  Avoid fad diets. Consider permanent healthy life style changes.

## 2024-12-04 NOTE — ASSESSMENT & PLAN NOTE
-followed by Nephrology currently on hemodialysis via left upper arm fistula    -stressed importance of control of carbohydrate/A1c level and hypertension  -avoid nephrotoxic drugs (NSAIDs  -currently A1c is at 6.6 with diet and exercise

## 2024-12-05 DIAGNOSIS — I10 PRIMARY HYPERTENSION: Primary | ICD-10-CM

## 2024-12-05 DIAGNOSIS — I50.9 CONGESTIVE HEART FAILURE, UNSPECIFIED HF CHRONICITY, UNSPECIFIED HEART FAILURE TYPE: ICD-10-CM

## 2024-12-05 RX ORDER — CARVEDILOL 3.12 MG/1
3.12 TABLET ORAL 2 TIMES DAILY WITH MEALS
Qty: 180 TABLET | Refills: 2 | Status: SHIPPED | OUTPATIENT
Start: 2024-12-05

## 2025-01-02 DIAGNOSIS — F41.9 ANXIETY: ICD-10-CM

## 2025-01-02 RX ORDER — HYDROXYZINE HYDROCHLORIDE 25 MG/1
TABLET, FILM COATED ORAL
Qty: 30 TABLET | Refills: 3 | Status: SHIPPED | OUTPATIENT
Start: 2025-01-02

## 2025-01-16 ENCOUNTER — OFFICE VISIT (OUTPATIENT)
Dept: URGENT CARE | Facility: CLINIC | Age: 89
End: 2025-01-16
Payer: MEDICARE

## 2025-01-16 VITALS
OXYGEN SATURATION: 97 % | HEIGHT: 60 IN | WEIGHT: 164 LBS | BODY MASS INDEX: 32.2 KG/M2 | TEMPERATURE: 98 F | DIASTOLIC BLOOD PRESSURE: 51 MMHG | HEART RATE: 79 BPM | RESPIRATION RATE: 17 BRPM | SYSTOLIC BLOOD PRESSURE: 123 MMHG

## 2025-01-16 DIAGNOSIS — B97.89 ACUTE VIRAL SINUSITIS: Primary | ICD-10-CM

## 2025-01-16 DIAGNOSIS — J01.90 ACUTE VIRAL SINUSITIS: Primary | ICD-10-CM

## 2025-01-16 DIAGNOSIS — H61.22 IMPACTED CERUMEN OF LEFT EAR: ICD-10-CM

## 2025-01-16 DIAGNOSIS — R05.1 ACUTE COUGH: ICD-10-CM

## 2025-01-16 PROCEDURE — 99213 OFFICE O/P EST LOW 20 MIN: CPT | Mod: 25,,, | Performed by: FAMILY MEDICINE

## 2025-01-16 PROCEDURE — 69209 REMOVE IMPACTED EAR WAX UNI: CPT | Mod: LT,,, | Performed by: FAMILY MEDICINE

## 2025-01-16 RX ORDER — PREDNISONE 20 MG/1
20 TABLET ORAL 2 TIMES DAILY
Qty: 6 TABLET | Refills: 0 | Status: SHIPPED | OUTPATIENT
Start: 2025-01-16 | End: 2025-01-19

## 2025-01-16 NOTE — PROGRESS NOTES
"Patient ID: Rayna Haider is a 89 y.o. female.  Chief Complaint: URI and Ear Fullness    HPI:   Patient presents here today for above reason.     Patient is a 89 y.o. female who presents to urgent care with complaints of possible URI. Subjectively reports symptoms of "wet" and productive cough, sinus congestion, copious nasal discharge (clear), fatigue, ear fullness (rt>lt) x 3-4 day onset, intensifying yesterday. Alleviating factors include corcidin hbp, mucinex  with mild amount of relief. Patient denies chest congestion, sob, fever. Additionally, raises concerns of possible cerumen impaction to the rt ear--no pain, (+) muffled hearing.         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5 MG tablet TAKE ONE TABLET BY MOUTH ONCE DAILY AS NEEDED FOR FOR ANXIETY    methoxy peg-epoetin beta (MIRCERA INJ) 30 mcg    ondansetron (ZOFRAN-ODT) 4 MG TbDL Dissolve 1 tablet " under tongue 30 mins before dialysis.    predniSONE (DELTASONE) 20 mg, Oral, 2 times daily    senna (SENOKOT) 8.6 mg tablet 1 tablet, As needed (PRN)    sodium chloride 2% (LYNETTE 128) 2 % ophthalmic solution 1 drop, As needed (PRN)    UNABLE TO FIND Heparin Sodium (Porcine) 1,000 Units/mL Systemic    VITAMIN D3 400 Units, Daily           ROS:   Review of Systems  12 point review of systems conducted, negative except as stated in the history of present illness. See HPI for details.   Vitals/PE:   Visit Vitals  BP (!) 123/51 (Patient Position: Sitting)   Pulse 79   Temp 98.4 °F (36.9 °C)   Resp 17   Ht 5' (1.524 m)   Wt 74.4 kg (164 lb)   LMP  (LMP Unknown)   SpO2 97%   BMI 32.03 kg/m²     Physical Exam  Vitals and nursing note reviewed.   Constitutional:       Appearance: She is not ill-appearing, toxic-appearing or diaphoretic.   HENT:      Right Ear: Tympanic membrane normal.      Left Ear: Decreased hearing noted. There is impacted cerumen.      Nose: Mucosal edema and congestion present.      Right Turbinates: Enlarged and swollen.      Left Turbinates: Enlarged and swollen.      Right Sinus: Maxillary sinus tenderness and frontal sinus tenderness present.      Left Sinus: Maxillary sinus tenderness and frontal sinus tenderness present.      Mouth/Throat:      Pharynx: Posterior oropharyngeal erythema present.   Eyes:      Pupils: Pupils are equal, round, and reactive to light.   Cardiovascular:      Rate and Rhythm: Normal rate.      Pulses: Normal pulses.   Pulmonary:      Effort: Pulmonary effort is normal.   Skin:     General: Skin is warm.      Capillary Refill: Capillary refill takes less than 2 seconds.   Neurological:      General: No focal deficit present.      Mental Status: She is alert and oriented to person, place, and time.   Psychiatric:         Mood and Affect: Mood normal.         Assessment/Plan:   Acute viral sinusitis  -     predniSONE (DELTASONE) 20 MG tablet; Take 1 tablet (20 mg total) by  mouth 2 (two) times daily. for 3 days  Dispense: 6 tablet; Refill: 0  Likely driven by rhino virus.  Discussed at great length various OTC regimens including but not limited to nasal sprays Flonase versus other.  Seems that she is having some improvement with the OTC regimens that she is using thus far.  Okay to continue to use at this time.  Acute cough  As above  Impacted cerumen of left ear  Ear Cerumen Removal    Date/Time: 1/16/2025 12:35 PM    Performed by: Guanaco Shen FNP  Authorized by: Guanaco Shen FNP    Consent Done?:  Yes (Verbal) and Yes (Written)    Local anesthetic:  None  Ceruminolytics applied: Ceruminolytics applied prior to the procedure    Medication Used:  Cerumenex  Location details:  Left ear  Procedure type: irrigation    Cerumen  Removal Results:  Cerumen completely removed  Patient tolerance:  Patient tolerated the procedure well with no immediate complications            Education and counseling done face to face regarding medical conditions and plan. Contact office if new symptoms develop. Should any symptoms ever significantly worsen seek emergency medical attention/go to ER. Follow up at least yearly for wellness or sooner PRN. Nurse to call patient with any results. The patient is receptive, expresses understanding and is agreeable to plan. All questions have been answered.

## 2025-01-16 NOTE — PATIENT INSTRUCTIONS
Assessment/Plan:   Acute viral sinusitis  -     predniSONE (DELTASONE) 20 MG tablet; Take 1 tablet (20 mg total) by mouth 2 (two) times daily. for 3 days  Dispense: 6 tablet; Refill: 0  Likely driven by rhino virus.  Discussed at great length various OTC regimens including but not limited to nasal sprays Flonase versus other.  Seems that she is having some improvement with the OTC regimens that she is using thus far.  Okay to continue to use at this time.  Acute cough  As above  Impacted cerumen of left ear  Ear Cerumen Removal  Procedure type: irrigation    Cerumen  Removal Results:  Cerumen completely removed  Patient tolerance:  Patient tolerated the procedure well with no immediate complications        No orders of the defined types were placed in this encounter.      Education and counseling done face to face regarding medical conditions and plan. Contact office if new symptoms develop. Should any symptoms ever significantly worsen seek emergency medical attention/go to ER. Follow up at least yearly for wellness or sooner PRN. Nurse to call patient with any results. The patient is receptive, expresses understanding and is agreeable to plan. All questions have been answered.  No follow-ups on file.

## 2025-01-17 DIAGNOSIS — F41.9 ANXIETY: ICD-10-CM

## 2025-01-17 RX ORDER — LORAZEPAM 0.5 MG/1
0.5 TABLET ORAL DAILY PRN
Qty: 30 TABLET | Refills: 3 | Status: SHIPPED | OUTPATIENT
Start: 2025-01-17 | End: 2025-01-24 | Stop reason: SDUPTHER

## 2025-01-24 ENCOUNTER — OFFICE VISIT (OUTPATIENT)
Dept: URGENT CARE | Facility: CLINIC | Age: 89
End: 2025-01-24
Payer: MEDICARE

## 2025-01-24 VITALS
TEMPERATURE: 98 F | RESPIRATION RATE: 17 BRPM | SYSTOLIC BLOOD PRESSURE: 145 MMHG | WEIGHT: 164 LBS | BODY MASS INDEX: 32.2 KG/M2 | OXYGEN SATURATION: 97 % | HEART RATE: 92 BPM | DIASTOLIC BLOOD PRESSURE: 72 MMHG | HEIGHT: 60 IN

## 2025-01-24 DIAGNOSIS — H92.01 RIGHT EAR PAIN: ICD-10-CM

## 2025-01-24 DIAGNOSIS — R05.1 ACUTE COUGH: ICD-10-CM

## 2025-01-24 DIAGNOSIS — F41.9 ANXIETY: ICD-10-CM

## 2025-01-24 DIAGNOSIS — J18.9 COMMUNITY ACQUIRED PNEUMONIA OF RIGHT LOWER LOBE OF LUNG: Primary | ICD-10-CM

## 2025-01-24 PROCEDURE — 99215 OFFICE O/P EST HI 40 MIN: CPT | Mod: 25,,, | Performed by: FAMILY MEDICINE

## 2025-01-24 PROCEDURE — 96372 THER/PROPH/DIAG INJ SC/IM: CPT | Mod: ,,, | Performed by: FAMILY MEDICINE

## 2025-01-24 RX ORDER — LEVOFLOXACIN 750 MG/1
750 TABLET ORAL DAILY
Qty: 1 TABLET | Refills: 0 | Status: SHIPPED | OUTPATIENT
Start: 2025-01-24 | End: 2025-01-25

## 2025-01-24 RX ORDER — DEXAMETHASONE SODIUM PHOSPHATE 10 MG/ML
5 INJECTION INTRAMUSCULAR; INTRAVENOUS
Status: COMPLETED | OUTPATIENT
Start: 2025-01-24 | End: 2025-01-24

## 2025-01-24 RX ORDER — LEVOFLOXACIN 500 MG/1
500 TABLET, FILM COATED ORAL EVERY OTHER DAY
Qty: 4 TABLET | Refills: 0 | Status: SHIPPED | OUTPATIENT
Start: 2025-01-26 | End: 2025-02-02

## 2025-01-24 RX ORDER — CEFTRIAXONE 1 G/1
1 INJECTION, POWDER, FOR SOLUTION INTRAMUSCULAR; INTRAVENOUS
Status: COMPLETED | OUTPATIENT
Start: 2025-01-24 | End: 2025-01-24

## 2025-01-24 RX ADMIN — DEXAMETHASONE SODIUM PHOSPHATE 5 MG: 10 INJECTION INTRAMUSCULAR; INTRAVENOUS at 10:01

## 2025-01-24 RX ADMIN — CEFTRIAXONE 1 G: 1 INJECTION, POWDER, FOR SOLUTION INTRAMUSCULAR; INTRAVENOUS at 10:01

## 2025-01-24 NOTE — PROGRESS NOTES
Patient ID: Rayna Haider is a 89 y.o. female.  Chief Complaint: URI    HPI:   Patient presents here today for above reason.     Patient is a 89 y.o. female who returns to urgent care with complaints of persistent, productive cough, with viscous, yellow -colored phlegm of copious amounts, sinus congestion, ear fullness bilaterally, malaise, fatigue, nausea w/o vomiting, intermitettent SOB, and hoarseness w/o sore throat. Last seen 01/16/2025--dx: viral sinusitis, rx: prednisone w/o relief. RTC for alternative therapeutic routes.       Very pleasant 89-year-old female presents to urgent care with complaints of persistent cough fatigue sinus congestion ear fullness.  Associated symptoms include generalized malaise.  Small amount of nausea vomiting.  She has a end-stage renal patient on dialysis.  She is overdue for dialysis.  She plans to receive dialysis later today.    Last scheduled dialysis treatment was Monday of this past week on 01/20/2025.        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, without long-term current use of insulin     Vitamin D deficiency      Past Surgical History:   Procedure Laterality Date    ANGIOGRAPHY OF ARTERIOVENOUS SHUNT Left 05/19/2023    Procedure: Fistulogram with Possible Intervention;  Surgeon: Daren Roberson MD;  Location: [facility] CATH LAB;  Service: Cardiology;  Laterality: Left;    ANGIOGRAPHY OF ARTERIOVENOUS SHUNT Left 07/19/2023    Procedure: Fistulogram with Possible Intervention;  Surgeon: Daren RAHMAN  MD Karol;  Location: [facility] CATH LAB;  Service: Cardiology;  Laterality: Left;  Requesting 7:00am start    ANGIOGRAPHY OF ARTERIOVENOUS SHUNT Left 2023    Procedure: Fistulogram with Possible Intervention;  Surgeon: Daren Roberson MD;  Location: [facility] CATH LAB;  Service: Cardiology;  Laterality: Left;    AV FISTULA PLACEMENT Left     CATARACT EXTRACTION Bilateral      SECTION      ??    EYE SURGERY Left     HYSTERECTOMY      has ovaries    INSERTION OF TUNNELED CENTRAL VENOUS HEMODIALYSIS CATHETER N/A 2023    Procedure: INSERTION, CATHETER, CENTRAL VENOUS, HEMODIALYSIS, TUNNELED;  Surgeon: Eric Angela DO;  Location: [facility] CATH LAB;  Service: Nephrology;  Laterality: N/A;    LIGATION OF ARTERIOVENOUS FISTULA Left 1/3/2024    Procedure: LIGATION, AV FISTULA;  Surgeon: Daren Roberson MD;  Location: [facility];  Service: Peripheral Vascular;  Laterality: Left;  left basilic fistula ligation    PLACEMENT OF ARTERIOVENOUS GRAFT Left 2023    Procedure: INSERTION, GRAFT, ARTERIOVENOUS;  Surgeon: Daren Roberson MD;  Location: [facility];  Service: Peripheral Vascular;  Laterality: Left;  LEFT BASILIC TRANSPOSITION -VS- GRAFT PLACEMENT // XX  //  SUPINE //   SUPRACLAVICULAR BLOCK    PLACEMENT OF ARTERIOVENOUS GRAFT Left 1/3/2024    Procedure: INSERTION, GRAFT, ARTERIOVENOUS;  Surgeon: Daren Roberson MD;  Location: [facility];  Service: Peripheral Vascular;  Laterality: Left;    RETINAL DETACHMENT SURGERY Right     SKIN GRAFT Right     burns, scald, bilateral forearm    wisdom Left      Review of patient's allergies indicates:   Allergen Reactions    Hydralazide Nausea And Vomiting and Palpitations    Hydralazine     Valsartan      Current Outpatient Medications   Medication Instructions    aspirin 81 mg, Daily    carvediloL (COREG) 3.125 mg, Oral, 2 times daily with meals    ferric citrate (AURYXIA) 210 mg iron Tab 210 tablets, 3 times daily    folic acid (FOLVITE) 1,000 mcg, Daily     hydrOXYzine HCL (ATARAX) 25 MG tablet TAKE 1 TABLET BY MOUTH ONCE DAILY AS NEEDED FOR ANXIETY    iron sucrose in NS (VENOFER) 50 mg    levoFLOXacin (LEVAQUIN) 750 mg, Oral, Daily, May fill today. Start on 1/25/25    [START ON 1/26/2025] levoFLOXacin (LEVAQUIN) 500 mg, Oral, Every other day    LORazepam (ATIVAN) 0.5 mg, Oral, Daily PRN    methoxy peg-epoetin beta (MIRCERA INJ) 30 mcg    ondansetron (ZOFRAN-ODT) 4 MG TbDL Dissolve 1 tablet under tongue 30 mins before dialysis.    senna (SENOKOT) 8.6 mg tablet 1 tablet, As needed (PRN)    sodium chloride 2% (LYNETTE 128) 2 % ophthalmic solution 1 drop, As needed (PRN)    UNABLE TO FIND Heparin Sodium (Porcine) 1,000 Units/mL Systemic    VITAMIN D3 400 Units, Daily       ROS:   Review of Systems  12 point review of systems conducted, negative except as stated in the history of present illness. See HPI for details.   Vitals/PE:   Visit Vitals  BP (!) 145/72 (Patient Position: Sitting)   Pulse 92   Temp 97.7 °F (36.5 °C)   Resp 17   Ht 5' (1.524 m)   Wt 74.4 kg (164 lb)   LMP  (LMP Unknown)   SpO2 97%   BMI 32.03 kg/m²     Physical Exam  Vitals and nursing note reviewed.   Constitutional:       Appearance: She is ill-appearing. She is not toxic-appearing or diaphoretic.   HENT:      Right Ear: Tympanic membrane normal.      Left Ear: Tympanic membrane normal.      Nose: Mucosal edema and congestion present.      Right Turbinates: Enlarged and swollen.      Left Turbinates: Enlarged and swollen.      Right Sinus: Maxillary sinus tenderness and frontal sinus tenderness present.      Left Sinus: Maxillary sinus tenderness and frontal sinus tenderness present.      Mouth/Throat:      Pharynx: Posterior oropharyngeal erythema present.   Eyes:      Pupils: Pupils are equal, round, and reactive to light.   Cardiovascular:      Rate and Rhythm: Normal rate.      Pulses: Normal pulses.   Pulmonary:      Effort: Pulmonary effort is normal.      Breath sounds: Wheezing, rhonchi and  rales present.       Skin:     General: Skin is warm.      Capillary Refill: Capillary refill takes less than 2 seconds.   Neurological:      General: No focal deficit present.      Mental Status: She is alert and oriented to person, place, and time.   Psychiatric:         Mood and Affect: Mood normal.         Results for orders placed or performed in visit on 11/06/24   Lipid Panel    Collection Time: 11/06/24  7:22 AM   Result Value Ref Range    Cholesterol Total 154 <=200 mg/dL    HDL Cholesterol 39 35 - 60 mg/dL    Triglyceride 167 (H) 37 - 140 mg/dL    Cholesterol/HDL Ratio 4 0 - 5    Very Low Density Lipoprotein 33     LDL Cholesterol 82.00 50.00 - 140.00 mg/dL   Hemoglobin A1C    Collection Time: 11/06/24  7:22 AM   Result Value Ref Range    Hemoglobin A1c 6.6 <=7.0 %    Estimated Average Glucose 142.7 mg/dL   Comprehensive Metabolic Panel    Collection Time: 11/06/24  7:22 AM   Result Value Ref Range    Sodium 138 136 - 145 mmol/L    Potassium 4.5 3.5 - 5.1 mmol/L    Chloride 97 (L) 98 - 107 mmol/L    CO2 29 23 - 31 mmol/L    Glucose 131 (H) 82 - 115 mg/dL    Blood Urea Nitrogen 28.1 (H) 9.8 - 20.1 mg/dL    Creatinine 4.80 (H) 0.55 - 1.02 mg/dL    Calcium 9.9 8.4 - 10.2 mg/dL    Protein Total 6.5 5.8 - 7.6 gm/dL    Albumin 3.6 3.4 - 4.8 g/dL    Globulin 2.9 2.4 - 3.5 gm/dL    Albumin/Globulin Ratio 1.2 1.1 - 2.0 ratio    Bilirubin Total 0.4 <=1.5 mg/dL    ALP 66 40 - 150 unit/L    ALT 9 0 - 55 unit/L    AST 13 5 - 34 unit/L    eGFR 8 mL/min/1.73/m2    Anion Gap 12.0 mEq/L    BUN/Creatinine Ratio 6    TSH    Collection Time: 11/06/24  7:22 AM   Result Value Ref Range    TSH 2.346 0.350 - 4.940 uIU/mL   Microalbumin/Creatinine Ratio, Urine    Collection Time: 11/06/24  7:22 AM   Result Value Ref Range    Urine Microalbumin 728.8 (H) <=30.0 ug/mL    Urine Creatinine 94.6 45.0 - 106.0 mg/dL    Microalbumin Creatinine Ratio 770.4 (H) 0.0 - 30.0 mg/gm Cr   Vitamin D    Collection Time: 11/06/24  7:22 AM   Result  Value Ref Range    Vitamin D 57 30 - 80 ng/mL   CBC with Differential    Collection Time: 11/06/24  7:22 AM   Result Value Ref Range    WBC 6.20 4.50 - 11.50 x10(3)/mcL    RBC 3.32 (L) 4.20 - 5.40 x10(6)/mcL    Hgb 10.7 (L) 12.0 - 16.0 g/dL    Hct 34.3 (L) 37.0 - 47.0 %    .3 (H) 80.0 - 94.0 fL    MCH 32.2 (H) 27.0 - 31.0 pg    MCHC 31.2 (L) 33.0 - 36.0 g/dL    RDW 13.8 11.5 - 17.0 %    Platelet 192 130 - 400 x10(3)/mcL    MPV 10.5 (H) 7.4 - 10.4 fL    Neut % 55.4 %    Lymph % 31.0 %    Mono % 7.9 %    Eos % 3.9 %    Basophil % 1.5 %    Lymph # 1.92 0.6 - 4.6 x10(3)/mcL    Neut # 3.44 2.1 - 9.2 x10(3)/mcL    Mono # 0.49 0.1 - 1.3 x10(3)/mcL    Eos # 0.24 0 - 0.9 x10(3)/mcL    Baso # 0.09 <=0.2 x10(3)/mcL    Imm Gran # 0.02 0 - 0.04 x10(3)/mcL    Imm Grans % 0.3 %    NRBC% 0.0 %     Assessment/Plan:   Community acquired pneumonia of right lower lobe of lung  -     cefTRIAXone injection 1 g  Positive for paces/infiltrates and right lower lobe consistent with that of community-acquired pneumonia.  Lengthy discussion with both patient and caregiver who was present at time of examination was had concerning these findings and potential treatment options.  Extensive time spent with medical decision-making given her extensive past medical history including but not limited to congestive heart failure and end-stage renal disease on dialysis overdue for dialysis treatment.      Reached out in collaboration with medical director.  Patient will receive 1 g intramuscular injection of Rocephin today.  She will proceed to her appointment for hemodialysis immediately after leaving urgent care.  She will return to clinic tomorrow for recheck/evaluation etc..  Extensive ER precautions were reviewed discussed at great length with both patient and caregiver present at time of examination today.  Renal adjustment dosage in fluoroquinolone as below.  However I advised the caregiver to phone us and speak to me personally after  patient receives hemodialysis.  Acute cough  -     X-Ray Chest PA And Lateral; Future; Expected date: 01/24/2025    Right ear pain  Multifactorial.  Extensive pain exacerbated by the attempted removal of cerumen impaction.  May have secondary middle ear infection as well.  Received 5 mg intramuscular injection of dexamethasone in attempts to aid or alleviate pain/severe pain of the right ear.  Patient does note that pain is improved slightly after 5 minutes of receiving her IM steroid.  We will recheck again tomorrow.  Other orders  -     levoFLOXacin (LEVAQUIN) 750 MG tablet; Take 1 tablet (750 mg total) by mouth once daily. May fill today. Start on 1/25/25 for 1 day  Dispense: 1 tablet; Refill: 0  -     levoFLOXacin (LEVAQUIN) 500 MG tablet; Take 1 tablet (500 mg total) by mouth every other day. for 4 doses  Dispense: 4 tablet; Refill: 0  -     dexAMETHasone injection 5 mg       Orders Placed This Encounter   Procedures    X-Ray Chest PA And Lateral       Education and counseling done face to face regarding medical conditions and plan. Contact office if new symptoms develop. Should any symptoms ever significantly worsen seek emergency medical attention/go to ER. Follow up at least yearly for wellness or sooner PRN. Nurse to call patient with any results. The patient is receptive, expresses understanding and is agreeable to plan. All questions have been answered.      I spent a total of 70 minutes on the day of the visit.This includes face to face time and non-face to face time preparing to see the patient (eg, review of tests), obtaining and/or reviewing separately obtained history, documenting clinical information in the electronic or other health record, independently interpreting results and communicating results to the patient/family/caregiver, or care coordinator.

## 2025-01-25 ENCOUNTER — OFFICE VISIT (OUTPATIENT)
Dept: URGENT CARE | Facility: CLINIC | Age: 89
End: 2025-01-25
Payer: MEDICARE

## 2025-01-25 VITALS
BODY MASS INDEX: 32.2 KG/M2 | OXYGEN SATURATION: 96 % | TEMPERATURE: 99 F | HEART RATE: 78 BPM | WEIGHT: 164 LBS | HEIGHT: 60 IN | SYSTOLIC BLOOD PRESSURE: 145 MMHG | DIASTOLIC BLOOD PRESSURE: 77 MMHG | RESPIRATION RATE: 17 BRPM

## 2025-01-25 DIAGNOSIS — H60.391 OTHER INFECTIVE ACUTE OTITIS EXTERNA OF RIGHT EAR: ICD-10-CM

## 2025-01-25 DIAGNOSIS — J18.9 COMMUNITY ACQUIRED PNEUMONIA OF RIGHT LOWER LOBE OF LUNG: Primary | ICD-10-CM

## 2025-01-25 PROCEDURE — 96372 THER/PROPH/DIAG INJ SC/IM: CPT | Mod: ,,, | Performed by: FAMILY MEDICINE

## 2025-01-25 PROCEDURE — 99213 OFFICE O/P EST LOW 20 MIN: CPT | Mod: 25,,, | Performed by: FAMILY MEDICINE

## 2025-01-25 RX ORDER — PREDNISONE 20 MG/1
40 TABLET ORAL DAILY
Qty: 4 TABLET | Refills: 0 | Status: SHIPPED | OUTPATIENT
Start: 2025-01-25 | End: 2025-01-27

## 2025-01-25 RX ORDER — CEFTRIAXONE 1 G/1
1 INJECTION, POWDER, FOR SOLUTION INTRAMUSCULAR; INTRAVENOUS
Status: COMPLETED | OUTPATIENT
Start: 2025-01-25 | End: 2025-01-25

## 2025-01-25 RX ORDER — CIPROFLOXACIN AND DEXAMETHASONE 3; 1 MG/ML; MG/ML
4 SUSPENSION/ DROPS AURICULAR (OTIC) 2 TIMES DAILY
Qty: 7.5 ML | Refills: 0 | Status: SHIPPED | OUTPATIENT
Start: 2025-01-25 | End: 2025-01-30

## 2025-01-25 RX ADMIN — CEFTRIAXONE 1 G: 1 INJECTION, POWDER, FOR SOLUTION INTRAMUSCULAR; INTRAVENOUS at 09:01

## 2025-01-25 NOTE — PROGRESS NOTES
"Patient ID: Rayna Haider is a 89 y.o. female.  Chief Complaint: Other Misc    HPI:   Patient presents here today for above reason.     Patient is a 89 y.o. female who presents to returns to urgent care for follow up, dx'd w/pneumonia at yesterday's evaluation. Notes an overall improvement after yesterday's interventions. Upon interview, pt's daughter describes much improvement yesterday afternoon, notes some regression yesterday evening, w/sleep disturbances due to persistent cough. This morning, patient describes general sensations of weakness--attributed to a loss of appetite. No new onset fever. Loss of audible acuity to the rt ear--"completely stopped up", subtle otalgia (pain largely improved), coupled with ear pressure and fullness.    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, without long-term current use of insulin     Vitamin D deficiency      Past Surgical History:   Procedure Laterality Date    ANGIOGRAPHY OF ARTERIOVENOUS SHUNT Left 05/19/2023    Procedure: Fistulogram with Possible Intervention;  Surgeon: Daren Roberson MD;  Location: [facility] CATH LAB;  Service: Cardiology;  Laterality: Left;    ANGIOGRAPHY OF ARTERIOVENOUS SHUNT Left 07/19/2023    Procedure: Fistulogram with Possible Intervention;  Surgeon: Daren Roberson MD;  Location: [facility] CATH LAB;  Service: Cardiology;  Laterality: Left;  Requesting 7:00am start    ANGIOGRAPHY OF ARTERIOVENOUS SHUNT Left 09/08/2023    Procedure: Fistulogram with Possible Intervention;  " Surgeon: Daren Roberson MD;  Location: [facility] CATH LAB;  Service: Cardiology;  Laterality: Left;    AV FISTULA PLACEMENT Left     CATARACT EXTRACTION Bilateral      SECTION      ??    EYE SURGERY Left     HYSTERECTOMY      has ovaries    INSERTION OF TUNNELED CENTRAL VENOUS HEMODIALYSIS CATHETER N/A 2023    Procedure: INSERTION, CATHETER, CENTRAL VENOUS, HEMODIALYSIS, TUNNELED;  Surgeon: Eric Angela DO;  Location: [facility] CATH LAB;  Service: Nephrology;  Laterality: N/A;    LIGATION OF ARTERIOVENOUS FISTULA Left 1/3/2024    Procedure: LIGATION, AV FISTULA;  Surgeon: Daren Roberson MD;  Location: [facility] OR;  Service: Peripheral Vascular;  Laterality: Left;  left basilic fistula ligation    PLACEMENT OF ARTERIOVENOUS GRAFT Left 2023    Procedure: INSERTION, GRAFT, ARTERIOVENOUS;  Surgeon: Daren Roberson MD;  Location: [facility] OR;  Service: Peripheral Vascular;  Laterality: Left;  LEFT BASILIC TRANSPOSITION -VS- GRAFT PLACEMENT // XX  //  SUPINE //   SUPRACLAVICULAR BLOCK    PLACEMENT OF ARTERIOVENOUS GRAFT Left 1/3/2024    Procedure: INSERTION, GRAFT, ARTERIOVENOUS;  Surgeon: Daren Roberson MD;  Location: [facility] OR;  Service: Peripheral Vascular;  Laterality: Left;    RETINAL DETACHMENT SURGERY Right     SKIN GRAFT Right     burns, scald, bilateral forearm    wisdom Left      Review of patient's allergies indicates:   Allergen Reactions    Hydralazide Nausea And Vomiting and Palpitations    Hydralazine     Valsartan      Current Outpatient Medications   Medication Instructions    aspirin 81 mg, Daily    carvediloL (COREG) 3.125 mg, Oral, 2 times daily with meals    ciprofloxacin-dexAMETHasone 0.3-0.1% (CIPRODEX) 0.3-0.1 % DrpS 4 drops, Right Ear, 2 times daily    ferric citrate (AURYXIA) 210 mg iron Tab 210 tablets, 3 times daily    folic acid (FOLVITE) 1,000 mcg, Daily    hydrOXYzine HCL (ATARAX) 25 MG tablet TAKE 1 TABLET BY MOUTH ONCE DAILY AS NEEDED FOR ANXIETY    iron sucrose in  NS (VENOFER) 50 mg    levoFLOXacin (LEVAQUIN) 750 mg, Oral, Daily, May fill today. Start on 1/25/25    [START ON 1/26/2025] levoFLOXacin (LEVAQUIN) 500 mg, Oral, Every other day    LORazepam (ATIVAN) 0.5 mg, Oral, Daily PRN    methoxy peg-epoetin beta (MIRCERA INJ) 30 mcg    ondansetron (ZOFRAN-ODT) 4 MG TbDL Dissolve 1 tablet under tongue 30 mins before dialysis.    predniSONE (DELTASONE) 40 mg, Oral, Daily    senna (SENOKOT) 8.6 mg tablet 1 tablet, As needed (PRN)    sodium chloride 2% (LYNETTE 128) 2 % ophthalmic solution 1 drop, As needed (PRN)    UNABLE TO FIND Heparin Sodium (Porcine) 1,000 Units/mL Systemic    VITAMIN D3 400 Units, Daily       ROS:   Review of Systems  12 point review of systems conducted, negative except as stated in the history of present illness. See HPI for details.   Vitals/PE:   Visit Vitals  BP (!) 145/77 (Patient Position: Sitting)   Pulse 78   Temp 98.7 °F (37.1 °C)   Resp 17   Ht 5' (1.524 m)   Wt 74.4 kg (164 lb)   LMP  (LMP Unknown)   SpO2 96%   BMI 32.03 kg/m²     Physical Exam  Vitals and nursing note reviewed.   Constitutional:       Appearance: She is ill-appearing.   Cardiovascular:      Rate and Rhythm: Normal rate and regular rhythm.      Pulses: Normal pulses.   Pulmonary:       Skin:     General: Skin is warm.   Neurological:      General: No focal deficit present.      Mental Status: She is alert.         Results for orders placed or performed in visit on 11/06/24   Lipid Panel    Collection Time: 11/06/24  7:22 AM   Result Value Ref Range    Cholesterol Total 154 <=200 mg/dL    HDL Cholesterol 39 35 - 60 mg/dL    Triglyceride 167 (H) 37 - 140 mg/dL    Cholesterol/HDL Ratio 4 0 - 5    Very Low Density Lipoprotein 33     LDL Cholesterol 82.00 50.00 - 140.00 mg/dL   Hemoglobin A1C    Collection Time: 11/06/24  7:22 AM   Result Value Ref Range    Hemoglobin A1c 6.6 <=7.0 %    Estimated Average Glucose 142.7 mg/dL   Comprehensive Metabolic Panel    Collection Time: 11/06/24   7:22 AM   Result Value Ref Range    Sodium 138 136 - 145 mmol/L    Potassium 4.5 3.5 - 5.1 mmol/L    Chloride 97 (L) 98 - 107 mmol/L    CO2 29 23 - 31 mmol/L    Glucose 131 (H) 82 - 115 mg/dL    Blood Urea Nitrogen 28.1 (H) 9.8 - 20.1 mg/dL    Creatinine 4.80 (H) 0.55 - 1.02 mg/dL    Calcium 9.9 8.4 - 10.2 mg/dL    Protein Total 6.5 5.8 - 7.6 gm/dL    Albumin 3.6 3.4 - 4.8 g/dL    Globulin 2.9 2.4 - 3.5 gm/dL    Albumin/Globulin Ratio 1.2 1.1 - 2.0 ratio    Bilirubin Total 0.4 <=1.5 mg/dL    ALP 66 40 - 150 unit/L    ALT 9 0 - 55 unit/L    AST 13 5 - 34 unit/L    eGFR 8 mL/min/1.73/m2    Anion Gap 12.0 mEq/L    BUN/Creatinine Ratio 6    TSH    Collection Time: 11/06/24  7:22 AM   Result Value Ref Range    TSH 2.346 0.350 - 4.940 uIU/mL   Microalbumin/Creatinine Ratio, Urine    Collection Time: 11/06/24  7:22 AM   Result Value Ref Range    Urine Microalbumin 728.8 (H) <=30.0 ug/mL    Urine Creatinine 94.6 45.0 - 106.0 mg/dL    Microalbumin Creatinine Ratio 770.4 (H) 0.0 - 30.0 mg/gm Cr   Vitamin D    Collection Time: 11/06/24  7:22 AM   Result Value Ref Range    Vitamin D 57 30 - 80 ng/mL   CBC with Differential    Collection Time: 11/06/24  7:22 AM   Result Value Ref Range    WBC 6.20 4.50 - 11.50 x10(3)/mcL    RBC 3.32 (L) 4.20 - 5.40 x10(6)/mcL    Hgb 10.7 (L) 12.0 - 16.0 g/dL    Hct 34.3 (L) 37.0 - 47.0 %    .3 (H) 80.0 - 94.0 fL    MCH 32.2 (H) 27.0 - 31.0 pg    MCHC 31.2 (L) 33.0 - 36.0 g/dL    RDW 13.8 11.5 - 17.0 %    Platelet 192 130 - 400 x10(3)/mcL    MPV 10.5 (H) 7.4 - 10.4 fL    Neut % 55.4 %    Lymph % 31.0 %    Mono % 7.9 %    Eos % 3.9 %    Basophil % 1.5 %    Lymph # 1.92 0.6 - 4.6 x10(3)/mcL    Neut # 3.44 2.1 - 9.2 x10(3)/mcL    Mono # 0.49 0.1 - 1.3 x10(3)/mcL    Eos # 0.24 0 - 0.9 x10(3)/mcL    Baso # 0.09 <=0.2 x10(3)/mcL    Imm Gran # 0.02 0 - 0.04 x10(3)/mcL    Imm Grans % 0.3 %    NRBC% 0.0 %     Assessment/Plan:   Community acquired pneumonia of right lower lobe of lung  -      cefTRIAXone injection 1 g  Seeing some slow improvements and overall improvements from previous visit from 24 hours ago.  She did receive hemodialysis yesterday following her urgent care visit.  Plan to receive secondary dosage of Rocephin today.  She can return in another 24 hours for additional follow-up if so desires.  Additional issues and symptoms as treated below.  Other infective acute otitis externa of right ear  -     ciprofloxacin-dexAMETHasone 0.3-0.1% (CIPRODEX) 0.3-0.1 % DrpS; Place 4 drops into the right ear 2 (two) times daily. for 5 days  Dispense: 7.5 mL; Refill: 0    Other orders  -     predniSONE (DELTASONE) 20 MG tablet; Take 2 tablets (40 mg total) by mouth once daily. for 2 days  Dispense: 4 tablet; Refill: 0     Too soon for secondary or additional steroid injection.  Can treat offer symptom improvement of pain of the right ear with oral prednisone as listed above.      Education and counseling done face to face regarding medical conditions and plan. Contact office if new symptoms develop. Should any symptoms ever significantly worsen seek emergency medical attention/go to ER. Follow up at least yearly for wellness or sooner PRN. Nurse to call patient with any results. The patient is receptive, expresses understanding and is agreeable to plan. All questions have been answered.

## 2025-01-25 NOTE — PATIENT INSTRUCTIONS
Assessment/Plan:   Community acquired pneumonia of right lower lobe of lung  -     cefTRIAXone injection 1 g  Seeing some slow improvements and overall improvements from previous visit from 24 hours ago.  She did receive hemodialysis yesterday following her urgent care visit.  Plan to receive secondary dosage of Rocephin today.  She can return in another 24 hours for additional follow-up if so desires.  Additional issues and symptoms as treated below.  Other infective acute otitis externa of right ear  -     ciprofloxacin-dexAMETHasone 0.3-0.1% (CIPRODEX) 0.3-0.1 % DrpS; Place 4 drops into the right ear 2 (two) times daily. for 5 days  Dispense: 7.5 mL; Refill: 0    Other orders  -     predniSONE (DELTASONE) 20 MG tablet; Take 2 tablets (40 mg total) by mouth once daily. for 2 days  Dispense: 4 tablet; Refill: 0     Too soon for secondary or additional steroid injection.  Can treat offer symptom improvement of pain of the right ear with oral prednisone as listed above.      Education and counseling done face to face regarding medical conditions and plan. Contact office if new symptoms develop. Should any symptoms ever significantly worsen seek emergency medical attention/go to ER. Follow up at least yearly for wellness or sooner PRN. Nurse to call patient with any results. The patient is receptive, expresses understanding and is agreeable to plan. All questions have been answered.

## 2025-01-26 ENCOUNTER — TELEPHONE (OUTPATIENT)
Dept: URGENT CARE | Facility: CLINIC | Age: 89
End: 2025-01-26
Payer: MEDICARE

## 2025-01-26 NOTE — TELEPHONE ENCOUNTER
----- Message from Med Assistant Collier sent at 1/26/2025  8:21 AM CST -----  Regarding: Patient Call  Patient's daughter, Kaye, called stating her mom was recently seen here in clinic by Guanaco CHAVARRIA and she would like to speak with him to give an update on  how her mother's doing. Request provider to give her a call when he's available at [phone].

## 2025-01-26 NOTE — TELEPHONE ENCOUNTER
Spoke to patient's daughter Kaye at great length.  She reports significant improvements in her mother's overall condition.  She expresses great thanks for all the work in effort put into her mother's care while seen here at urgent care.  She expresses that her mom's appetite is improving/continuing to improve as well as her overall conditions cough and ear pain.  She notes that she will call should any concerns arise.

## 2025-01-27 DIAGNOSIS — R11.0 NAUSEA: ICD-10-CM

## 2025-01-27 RX ORDER — LORAZEPAM 0.5 MG/1
0.5 TABLET ORAL DAILY PRN
Qty: 30 TABLET | Refills: 3 | Status: SHIPPED | OUTPATIENT
Start: 2025-01-27

## 2025-01-27 RX ORDER — ONDANSETRON 4 MG/1
TABLET, ORALLY DISINTEGRATING ORAL
Qty: 30 TABLET | Refills: 3 | Status: SHIPPED | OUTPATIENT
Start: 2025-01-27

## 2025-04-16 DIAGNOSIS — F41.9 ANXIETY: ICD-10-CM

## 2025-04-16 RX ORDER — HYDROXYZINE HYDROCHLORIDE 25 MG/1
TABLET, FILM COATED ORAL
Qty: 30 TABLET | Refills: 3 | Status: SHIPPED | OUTPATIENT
Start: 2025-04-16

## 2025-05-11 DIAGNOSIS — R11.0 NAUSEA: ICD-10-CM

## 2025-05-12 RX ORDER — ONDANSETRON 4 MG/1
TABLET, ORALLY DISINTEGRATING ORAL
Qty: 30 TABLET | Refills: 3 | Status: SHIPPED | OUTPATIENT
Start: 2025-05-12

## 2025-05-21 ENCOUNTER — OFFICE VISIT (OUTPATIENT)
Dept: INTERNAL MEDICINE | Facility: CLINIC | Age: 89
End: 2025-05-21
Payer: MEDICARE

## 2025-05-21 VITALS
OXYGEN SATURATION: 94 % | HEIGHT: 60 IN | DIASTOLIC BLOOD PRESSURE: 58 MMHG | TEMPERATURE: 98 F | HEART RATE: 91 BPM | WEIGHT: 161 LBS | SYSTOLIC BLOOD PRESSURE: 118 MMHG | BODY MASS INDEX: 31.61 KG/M2

## 2025-05-21 DIAGNOSIS — H35.3221 EXUDATIVE AGE-RELATED MACULAR DEGENERATION, LEFT EYE, WITH ACTIVE CHOROIDAL NEOVASCULARIZATION: ICD-10-CM

## 2025-05-21 DIAGNOSIS — I10 PRIMARY HYPERTENSION: Chronic | ICD-10-CM

## 2025-05-21 DIAGNOSIS — I50.9 CHRONIC CONGESTIVE HEART FAILURE, UNSPECIFIED HEART FAILURE TYPE: ICD-10-CM

## 2025-05-21 DIAGNOSIS — N64.52 NIPPLE DISCHARGE: Primary | ICD-10-CM

## 2025-05-21 DIAGNOSIS — N18.6 ESRD (END STAGE RENAL DISEASE) ON DIALYSIS: ICD-10-CM

## 2025-05-21 DIAGNOSIS — Z99.2 ESRD (END STAGE RENAL DISEASE) ON DIALYSIS: ICD-10-CM

## 2025-05-21 DIAGNOSIS — B18.2 CHRONIC HEPATITIS C WITHOUT HEPATIC COMA: ICD-10-CM

## 2025-05-21 DIAGNOSIS — E11.9 TYPE 2 DIABETES MELLITUS WITHOUT COMPLICATION, WITHOUT LONG-TERM CURRENT USE OF INSULIN: ICD-10-CM

## 2025-05-21 RX ORDER — CINACALCET HYDROCHLORIDE 30 MG/1
30 TABLET, COATED ORAL DAILY
COMMUNITY
Start: 2025-05-06

## 2025-05-21 NOTE — ASSESSMENT & PLAN NOTE
"Patient has Diastolic (HFpEF) heart failure that is Chronic. On presentation their CHF was well compensated. Most recent BNP and echo results are listed below.  No results for input(s): "BNP" in the last 72 hours.  Latest ECHO  Results for orders placed during the hospital encounter of 01/30/23    Echo    Interpretation Summary  · The left ventricle is normal in size with concentric hypertrophy and normal systolic function.  · The estimated ejection fraction is 55%.  · Normal right ventricular size with normal right ventricular systolic function.  · Mild tricuspid regurgitation.  · There is a small left pleural effusion.    Current Heart Failure Medications       Plan  - Monitor strict I&Os and daily weights.    - Place on telemetry  - Low sodium diet  - Place on fluid restriction of 2 L.   - Cardiology has been consulted  - The patient's volume status is at their baseline  -      "

## 2025-05-21 NOTE — LETTER
AUTHORIZATION FOR RELEASE OF   CONFIDENTIAL INFORMATION    Dear Dr Junior     We are seeing Rayna Haider, date of birth 1935, in the clinic at [place] [address] [facility]. Rosie Flores MD is the patient's PCP. Rayna Haider has an outstanding lab/procedure at the time we reviewed her chart. In order to help keep her health information updated, she has authorized us to request the following medical record(s):        (  )  MAMMOGRAM                                      (  )  COLONOSCOPY      (  )  PAP SMEAR                                          (  )  OUTSIDE LAB RESULTS     (  )  DEXA SCAN                                          ( X )  DIABETIC EYE EXAM            (  )  FOOT EXAM                                          (  )  ENTIRE RECORD     (  )  OUTSIDE IMMUNIZATIONS                 (  )  _______________         Please fax records to [facility], Bhanushali, Reshma A, MD, [phone]              Patient Name: Rayna Haider  : 1935  Patient Phone #: [phone]

## 2025-05-21 NOTE — ASSESSMENT & PLAN NOTE
-patient advised on the importance of avoiding excessive carbohydrates and sugary food intake and having some form of routine aerobic exercise on a regular basis.   -managed by diet and exercise

## 2025-05-21 NOTE — PROGRESS NOTES
Subjective:      Patient ID: Rayna Haider is a 89 y.o. female.    Chief Complaint: Bleeding/Bruising (Sore on left breast)    Ms. Way is a 89-year-old female with hypertension, anxiety, ESRD on hemodialysis on a TTS schedule, CHF and a previous history of diabetes mellitus type 2 however now off of medications and being controlled with diet and exercise that is here today for a requested visit.    Complaints of bleeding from the left nipple with some soreness to the left breast.    Considering patient's HGB since she has not had a mammogram for over 5 years.    Discussed possibility of a papilloma causing the bloody discharge however will refer to breast surgeon for completion sake and further evaluation.      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, without long-term current use of insulin     Vitamin D deficiency      Past Surgical History:   Procedure Laterality Date    ANGIOGRAPHY OF ARTERIOVENOUS SHUNT Left 05/19/2023    Procedure: Fistulogram with Possible Intervention;  Surgeon: Daren Roberson MD;  Location: [facility] CATH LAB;  Service: Cardiology;  Laterality:  Left;    ANGIOGRAPHY OF ARTERIOVENOUS SHUNT Left 2023    Procedure: Fistulogram with Possible Intervention;  Surgeon: Daren Roberson MD;  Location: [facility] CATH LAB;  Service: Cardiology;  Laterality: Left;  Requesting 7:00am start    ANGIOGRAPHY OF ARTERIOVENOUS SHUNT Left 2023    Procedure: Fistulogram with Possible Intervention;  Surgeon: Daren Roberson MD;  Location: [facility] CATH LAB;  Service: Cardiology;  Laterality: Left;    AV FISTULA PLACEMENT Left     CATARACT EXTRACTION Bilateral      SECTION      ??    EYE SURGERY Left     HYSTERECTOMY      has ovaries    INSERTION OF TUNNELED CENTRAL VENOUS HEMODIALYSIS CATHETER N/A 2023    Procedure: INSERTION, CATHETER, CENTRAL VENOUS, HEMODIALYSIS, TUNNELED;  Surgeon: Eric Angela DO;  Location: [facility] CATH LAB;  Service: Nephrology;  Laterality: N/A;    LIGATION OF ARTERIOVENOUS FISTULA Left 1/3/2024    Procedure: LIGATION, AV FISTULA;  Surgeon: Daren Roberson MD;  Location: [facility] OR;  Service: Peripheral Vascular;  Laterality: Left;  left basilic fistula ligation    PLACEMENT OF ARTERIOVENOUS GRAFT Left 2023    Procedure: INSERTION, GRAFT, ARTERIOVENOUS;  Surgeon: Daren Roberson MD;  Location: [facility] OR;  Service: Peripheral Vascular;  Laterality: Left;  LEFT BASILIC TRANSPOSITION -VS- GRAFT PLACEMENT // XX  //  SUPINE //   SUPRACLAVICULAR BLOCK    PLACEMENT OF ARTERIOVENOUS GRAFT Left 1/3/2024    Procedure: INSERTION, GRAFT, ARTERIOVENOUS;  Surgeon: Daren Roberson MD;  Location: [facility] OR;  Service: Peripheral Vascular;  Laterality: Left;    RETINAL DETACHMENT SURGERY Right     SKIN GRAFT Right     burns, scald, bilateral forearm    wisdom Left      Review of patient's allergies indicates:   Allergen Reactions    Hydralazide Nausea And Vomiting and Palpitations    Hydralazine     Valsartan      Social History[1]  Family History   Problem Relation Name Age of Onset    Cancer Mother Medice     Glaucoma Mother Medice      Diabetes Father Mansfield     Cancer Father Julio     Cancer Brother Jarrett     Diabetes Brother Jarrett     Cancer Son Ayad        Review of Systems    A comprehensive review of systems was performed and is negative except for that stated above  Objective:   BP (!) 118/58 (BP Location: Right arm, Patient Position: Sitting)   Pulse 91   Temp 98.4 °F (36.9 °C) (Temporal)   Ht 5' (1.524 m)   Wt 73 kg (161 lb)   LMP  (LMP Unknown)   SpO2 (!) 94%   BMI 31.44 kg/m²     Physical Exam  Constitutional:       Appearance: Normal appearance.   HENT:      Head: Normocephalic and atraumatic.      Nose: Nose normal.      Mouth/Throat:      Mouth: Mucous membranes are moist.      Pharynx: Oropharynx is clear.   Eyes:      Extraocular Movements: Extraocular movements intact.      Pupils: Pupils are equal, round, and reactive to light.   Cardiovascular:      Rate and Rhythm: Normal rate and regular rhythm.      Pulses: Normal pulses.   Pulmonary:      Effort: Pulmonary effort is normal.      Breath sounds: Normal breath sounds.   Abdominal:      General: Bowel sounds are normal.      Palpations: Abdomen is soft.   Musculoskeletal:         General: Normal range of motion.      Cervical back: Normal range of motion and neck supple.   Skin:     General: Skin is warm.   Neurological:      General: No focal deficit present.      Mental Status: She is alert and oriented to person, place, and time. Mental status is at baseline.   Psychiatric:         Mood and Affect: Mood normal.       Assessment/ Plan:   1. Nipple discharge  Assessment & Plan:  -bloody discharge noted   -sending a referral to breast surgeon for further evaluation    Orders:  -     Ambulatory referral/consult to Breast Surgery; Future; Expected date: 05/21/2025    2. Type 2 diabetes mellitus without complication, without long-term current use of insulin  Assessment & Plan:  -patient advised on the importance of avoiding excessive carbohydrates and sugary food intake  "and having some form of routine aerobic exercise on a regular basis.   -managed by diet and exercise      3. Chronic hepatitis C without hepatic coma    4. Exudative age-related macular degeneration, left eye, with active choroidal neovascularization    5. ESRD (end stage renal disease) on dialysis  Assessment & Plan:  -followed by Nephrology, stable, on TTS schedule      6. Primary hypertension  Assessment & Plan:  -blood pressure well controlled, currently on carvedilol 3.125 mg p.o. b.i.d.      7. Chronic congestive heart failure, unspecified heart failure type  Assessment & Plan:  Patient has Diastolic (HFpEF) heart failure that is Chronic. On presentation their CHF was well compensated. Most recent BNP and echo results are listed below.  No results for input(s): "BNP" in the last 72 hours.  Latest ECHO  Results for orders placed during the hospital encounter of 01/30/23    Echo    Interpretation Summary  · The left ventricle is normal in size with concentric hypertrophy and normal systolic function.  · The estimated ejection fraction is 55%.  · Normal right ventricular size with normal right ventricular systolic function.  · Mild tricuspid regurgitation.  · There is a small left pleural effusion.    Current Heart Failure Medications       Plan  - Monitor strict I&Os and daily weights.    - Place on telemetry  - Low sodium diet  - Place on fluid restriction of 2 L.   - Cardiology has been consulted  - The patient's volume status is at their baseline  -                    [1]   Social History  Socioeconomic History    Marital status:    Tobacco Use    Smoking status: Never     Passive exposure: Never    Smokeless tobacco: Never   Substance and Sexual Activity    Alcohol use: Not Currently    Drug use: Never    Sexual activity: Not Currently     Partners: Male     Social Drivers of Health     Financial Resource Strain: Low Risk  (9/12/2023)    Overall Financial Resource Strain (CARDIA)     Difficulty of " Paying Living Expenses: Not hard at all   Food Insecurity: No Food Insecurity (9/12/2023)    Hunger Vital Sign     Worried About Running Out of Food in the Last Year: Never true     Ran Out of Food in the Last Year: Never true   Transportation Needs: No Transportation Needs (9/12/2023)    PRAPARE - Transportation     Lack of Transportation (Medical): No     Lack of Transportation (Non-Medical): No   Physical Activity: Insufficiently Active (9/12/2023)    Exercise Vital Sign     Days of Exercise per Week: 2 days     Minutes of Exercise per Session: 20 min   Stress: No Stress Concern Present (9/12/2023)    [employer] - Occupational Stress Questionnaire     Feeling of Stress : Not at all   Housing Stability: Low Risk  (9/12/2023)    Housing Stability Vital Sign     Unable to Pay for Housing in the Last Year: No     Number of Places Lived in the Last Year: 1     Unstable Housing in the Last Year: No

## 2025-05-26 ENCOUNTER — OFFICE VISIT (OUTPATIENT)
Dept: URGENT CARE | Facility: CLINIC | Age: 89
End: 2025-05-26
Payer: MEDICARE

## 2025-05-26 VITALS
DIASTOLIC BLOOD PRESSURE: 60 MMHG | WEIGHT: 161 LBS | BODY MASS INDEX: 31.61 KG/M2 | OXYGEN SATURATION: 97 % | HEIGHT: 60 IN | RESPIRATION RATE: 17 BRPM | SYSTOLIC BLOOD PRESSURE: 149 MMHG | TEMPERATURE: 100 F | HEART RATE: 97 BPM

## 2025-05-26 DIAGNOSIS — N39.0 COMPLICATED UTI (URINARY TRACT INFECTION): Primary | ICD-10-CM

## 2025-05-26 DIAGNOSIS — J06.9 VIRAL URI: ICD-10-CM

## 2025-05-26 DIAGNOSIS — R50.9 FEVER, UNSPECIFIED FEVER CAUSE: ICD-10-CM

## 2025-05-26 LAB
BACTERIA #/AREA URNS AUTO: ABNORMAL /HPF
BILIRUB UR QL STRIP.AUTO: NEGATIVE
BILIRUB UR QL STRIP: NEGATIVE
CLARITY UR: ABNORMAL
COLOR UR AUTO: YELLOW
CTP QC/QA: YES
CTP QC/QA: YES
GLUCOSE UR QL STRIP: NEGATIVE
GLUCOSE UR QL STRIP: NORMAL
HGB UR QL STRIP: ABNORMAL
KETONES UR QL STRIP: NEGATIVE
KETONES UR QL STRIP: NEGATIVE
LEUKOCYTE ESTERASE UR QL STRIP: 500
LEUKOCYTE ESTERASE UR QL STRIP: POSITIVE
MUCOUS THREADS URNS QL MICRO: ABNORMAL /LPF
NITRITE UR QL STRIP: NEGATIVE
PH UR STRIP: 5.5 [PH]
PH, POC UA: 5
POC BLOOD, URINE: NEGATIVE
POC MOLECULAR INFLUENZA A AGN: NEGATIVE
POC MOLECULAR INFLUENZA B AGN: NEGATIVE
POC NITRATES, URINE: NEGATIVE
PROT UR QL STRIP: ABNORMAL
PROT UR QL STRIP: POSITIVE
RBC #/AREA URNS AUTO: ABNORMAL /HPF
SARS-COV+SARS-COV-2 AG RESP QL IA.RAPID: NEGATIVE
SP GR UR STRIP.AUTO: 1.02 (ref 1–1.03)
SP GR UR STRIP: 1.01 (ref 1–1.03)
SQUAMOUS #/AREA URNS LPF: ABNORMAL /HPF
UROBILINOGEN UR STRIP-ACNC: ABNORMAL (ref 0.1–1.1)
UROBILINOGEN UR STRIP-ACNC: NORMAL
WBC #/AREA URNS AUTO: >100 /HPF

## 2025-05-26 PROCEDURE — 81003 URINALYSIS AUTO W/O SCOPE: CPT | Mod: QW,,, | Performed by: FAMILY MEDICINE

## 2025-05-26 PROCEDURE — 87811 SARS-COV-2 COVID19 W/OPTIC: CPT | Mod: QW,,, | Performed by: FAMILY MEDICINE

## 2025-05-26 PROCEDURE — 87502 INFLUENZA DNA AMP PROBE: CPT | Mod: QW,,, | Performed by: FAMILY MEDICINE

## 2025-05-26 PROCEDURE — 99214 OFFICE O/P EST MOD 30 MIN: CPT | Mod: ,,, | Performed by: FAMILY MEDICINE

## 2025-05-26 PROCEDURE — 87086 URINE CULTURE/COLONY COUNT: CPT | Performed by: FAMILY MEDICINE

## 2025-05-26 PROCEDURE — 81001 URINALYSIS AUTO W/SCOPE: CPT | Performed by: FAMILY MEDICINE

## 2025-05-26 RX ORDER — CEFDINIR 300 MG/1
300 CAPSULE ORAL 2 TIMES DAILY
Qty: 14 CAPSULE | Refills: 0 | Status: SHIPPED | OUTPATIENT
Start: 2025-05-26 | End: 2025-06-02

## 2025-05-26 NOTE — PROGRESS NOTES
Patient ID: Rayna Haider is a 89 y.o. female.  Chief Complaint: Fever    HPI:   Patient presents here today for above reason.      Patient is a 89 y.o. female who presents to urgent care with complaints of acute onset fever, decreased appetite, increased pulse x noon today. Denies respiratory symptoms, sob, n/v/d . Alleviating factors include tylenol with mild amount of relief.  Goes on to describe that she does feel as though her appetite is beginning to .  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, without long-term current use of insulin     Vitamin D deficiency      Past Surgical History:   Procedure Laterality Date    ANGIOGRAPHY OF ARTERIOVENOUS SHUNT Left 05/19/2023    Procedure: Fistulogram with Possible Intervention;  Surgeon: Daren Roberson MD;  Location: [facility] CATH LAB;  Service: Cardiology;  Laterality: Left;    ANGIOGRAPHY OF ARTERIOVENOUS SHUNT Left 07/19/2023    Procedure: Fistulogram with Possible Intervention;  Surgeon: Daren Roberson MD;  Location: [facility] CATH LAB;  Service: Cardiology;  Laterality: Left;  Requesting 7:00am start    ANGIOGRAPHY OF ARTERIOVENOUS SHUNT Left 09/08/2023    Procedure: Fistulogram with Possible Intervention;  Surgeon: Daren Roberson MD;  Location: [facility] CATH LAB;  Service: Cardiology;  Laterality: Left;    AV FISTULA PLACEMENT Left     CATARACT EXTRACTION Bilateral       SECTION      ??    EYE SURGERY Left     HYSTERECTOMY      has ovaries    INSERTION OF TUNNELED CENTRAL VENOUS HEMODIALYSIS CATHETER N/A 2023    Procedure: INSERTION, CATHETER, CENTRAL VENOUS, HEMODIALYSIS, TUNNELED;  Surgeon: Eric Angela DO;  Location: [facility] CATH LAB;  Service: Nephrology;  Laterality: N/A;    LIGATION OF ARTERIOVENOUS FISTULA Left 1/3/2024    Procedure: LIGATION, AV FISTULA;  Surgeon: Daren Roberson MD;  Location: [facility] OR;  Service: Peripheral Vascular;  Laterality: Left;  left basilic fistula ligation    PLACEMENT OF ARTERIOVENOUS GRAFT Left 2023    Procedure: INSERTION, GRAFT, ARTERIOVENOUS;  Surgeon: Daren Roberson MD;  Location: [facility] OR;  Service: Peripheral Vascular;  Laterality: Left;  LEFT BASILIC TRANSPOSITION -VS- GRAFT PLACEMENT // XX  //  SUPINE //   SUPRACLAVICULAR BLOCK    PLACEMENT OF ARTERIOVENOUS GRAFT Left 1/3/2024    Procedure: INSERTION, GRAFT, ARTERIOVENOUS;  Surgeon: Daren Roberson MD;  Location: [facility] OR;  Service: Peripheral Vascular;  Laterality: Left;    RETINAL DETACHMENT SURGERY Right     SKIN GRAFT Right     burns, scald, bilateral forearm    wisdom Left      Review of patient's allergies indicates:   Allergen Reactions    Hydralazide Nausea And Vomiting and Palpitations    Hydralazine     Valsartan      Current Outpatient Medications   Medication Instructions    aspirin 81 mg, Daily    carvediloL (COREG) 3.125 mg, Oral, 2 times daily with meals    cefdinir (OMNICEF) 300 mg, Oral, 2 times daily    ferric citrate (AURYXIA) 210 mg iron Tab 210 tablets, 3 times daily    folic acid (FOLVITE) 1,000 mcg, Daily    hydrOXYzine HCL (ATARAX) 25 MG tablet TAKE 1 TABLET BY MOUTH ONCE DAILY AS NEEDED FOR ANXIETY    iron sucrose in NS (VENOFER) 50 mg    LORazepam (ATIVAN) 0.5 mg, Oral, Daily PRN    methoxy peg-epoetin beta (MIRCERA INJ) 30 mcg    ondansetron (ZOFRAN-ODT) 4 MG TbDL DISSOLVE ONE TABLET UNDER THE TONGUE 30 MINUTES BEFORE DIALYSIS AS  NEEDED FOR NAUSEA AND VOMITING    senna (SENOKOT) 8.6 mg tablet 1 tablet, As needed (PRN)    SENSIPAR 30 mg, Daily    sodium chloride 2% (LYNETTE 128) 2 % ophthalmic solution 1 drop, As needed (PRN)     Social History[1]    ROS:   Review of Systems  12 point review of systems conducted, negative except as stated in the history of present illness. See HPI for details.   Vitals/PE:   Visit Vitals  BP (!) 149/60 (Patient Position: Sitting)   Pulse 97   Temp 99.7 °F (37.6 °C)   Resp 17   Ht 5' (1.524 m)   Wt 73 kg (161 lb)   LMP  (LMP Unknown)   SpO2 97%   BMI 31.44 kg/m²     Physical Exam  Vitals and nursing note reviewed.   Constitutional:       General: She is not in acute distress.     Appearance: Normal appearance. She is ill-appearing. She is not toxic-appearing or diaphoretic.   HENT:      Head: Normocephalic and atraumatic.      Right Ear: Tympanic membrane normal.      Left Ear: Tympanic membrane normal.      Nose: Rhinorrhea present.      Mouth/Throat:      Mouth: Mucous membranes are dry.      Pharynx: Oropharynx is clear.   Eyes:      Extraocular Movements: Extraocular movements intact.      Conjunctiva/sclera: Conjunctivae normal.      Pupils: Pupils are equal, round, and reactive to light.   Neck:      Vascular: No carotid bruit.   Cardiovascular:      Rate and Rhythm: Normal rate and regular rhythm.      Pulses: Normal pulses.      Heart sounds: Normal heart sounds. No murmur heard.     No friction rub. No gallop.   Pulmonary:      Effort: Pulmonary effort is normal. No respiratory distress.      Breath sounds: No stridor. No wheezing or rhonchi.   Abdominal:      General: There is no distension.      Palpations: There is no mass.      Tenderness: There is no abdominal tenderness. There is no left CVA tenderness, guarding or rebound.      Hernia: No hernia is present.   Musculoskeletal:         General: No swelling or signs of injury. Normal range of motion.      Cervical back: Normal range of motion and  neck supple. No rigidity or tenderness.      Right lower leg: No edema.      Left lower leg: No edema.   Lymphadenopathy:      Cervical: No cervical adenopathy.   Skin:     Capillary Refill: Capillary refill takes less than 2 seconds.      Coloration: Skin is not jaundiced.      Findings: No bruising, lesion or rash.   Neurological:      General: No focal deficit present.      Mental Status: She is alert and oriented to person, place, and time. Mental status is at baseline.      Cranial Nerves: No cranial nerve deficit.      Sensory: No sensory deficit.      Motor: No weakness.      Coordination: Coordination normal.      Gait: Gait normal.   Psychiatric:         Mood and Affect: Mood normal.         Behavior: Behavior normal.         Thought Content: Thought content normal.         Judgment: Judgment normal.         Results for orders placed or performed in visit on 05/26/25   POCT Influenza A/B Molecular    Collection Time: 05/26/25  7:01 PM   Result Value Ref Range    POC Molecular Influenza A Ag Negative Negative    POC Molecular Influenza B Ag Negative Negative     Acceptable Yes    SARS Coronavirus 2 Antigen, POCT Manual Read    Collection Time: 05/26/25  7:01 PM   Result Value Ref Range    SARS Coronavirus 2 Antigen Negative Negative, Presumptive Negative     Acceptable Yes    POCT Urinalysis, Dipstick, Automated, W/O Scope    Collection Time: 05/26/25  7:10 PM   Result Value Ref Range    POC Blood, Urine Negative Negative    POC Bilirubin, Urine Negative Negative    POC Urobilinogen, Urine norm 0.1 - 1.1    POC Ketones, Urine Negative Negative    POC Protein, Urine Positive (A) Negative    POC Nitrates, Urine Negative Negative    POC Glucose, Urine Negative Negative    pH, UA 5.0     POC Specific Gravity, Urine 1.010 1.003 - 1.029    POC Leukocytes, Urine Positive (A) Negative     Assessment/Plan:   Complicated UTI (urinary tract infection)  -     cefdinir (OMNICEF) 300 MG  capsule; Take 1 capsule (300 mg total) by mouth 2 (two) times daily. for 7 days  Dispense: 14 capsule; Refill: 0  -     Urinalysis; Future; Expected date: 05/26/2025  -     Urine Culture High Risk; Future; Expected date: 05/26/2025  Treatment as above and below.  Additional recommendations pending results of urinary culture sensitivity.  Fever, unspecified fever cause  -     POCT Urinalysis, Dipstick, Automated, W/O Scope  -     POCT Influenza A/B Molecular  -     SARS Coronavirus 2 Antigen, POCT Manual Read  Influenza negative.  COVID negative.  Urinalysis positive.  Five hundred leukocytes positive.  Treatment as above.  Viral URI       Orders Placed This Encounter   Procedures    Urine Culture High Risk    Urinalysis    POCT Urinalysis, Dipstick, Automated, W/O Scope    POCT Influenza A/B Molecular    SARS Coronavirus 2 Antigen, POCT Manual Read       Education and counseling done face to face regarding medical conditions and plan. Contact office if new symptoms develop. Should any symptoms ever significantly worsen seek emergency medical attention/go to ER. Follow up at least yearly for wellness or sooner PRN. Nurse to call patient with any results. The patient is receptive, expresses understanding and is agreeable to plan. All questions have been answered.             [1]   Social History  Socioeconomic History    Marital status:    Tobacco Use    Smoking status: Never     Passive exposure: Never    Smokeless tobacco: Never   Substance and Sexual Activity    Alcohol use: Not Currently    Drug use: Never    Sexual activity: Not Currently     Partners: Male     Social Drivers of Health     Financial Resource Strain: Low Risk  (9/12/2023)    Overall Financial Resource Strain (CARDIA)     Difficulty of Paying Living Expenses: Not hard at all   Food Insecurity: No Food Insecurity (9/12/2023)    Hunger Vital Sign     Worried About Running Out of Food in the Last Year: Never true     Ran Out of Food in the Last  Year: Never true   Transportation Needs: No Transportation Needs (9/12/2023)    PRAPARE - Transportation     Lack of Transportation (Medical): No     Lack of Transportation (Non-Medical): No   Physical Activity: Insufficiently Active (9/12/2023)    Exercise Vital Sign     Days of Exercise per Week: 2 days     Minutes of Exercise per Session: 20 min   Stress: No Stress Concern Present (9/12/2023)    [employer] - Occupational Stress Questionnaire     Feeling of Stress : Not at all   Housing Stability: Low Risk  (9/12/2023)    Housing Stability Vital Sign     Unable to Pay for Housing in the Last Year: No     Number of Places Lived in the Last Year: 1     Unstable Housing in the Last Year: No

## 2025-05-27 ENCOUNTER — RESULTS FOLLOW-UP (OUTPATIENT)
Dept: URGENT CARE | Facility: CLINIC | Age: 89
End: 2025-05-27

## 2025-05-27 NOTE — PROGRESS NOTES
Microscopic UA confirms likelihood of active infection.  Await final urinary culture and sensitivity

## 2025-05-28 LAB — BACTERIA UR CULT: ABNORMAL

## 2025-06-04 NOTE — PROGRESS NOTES
"[facility] - [facility] [place]  Breast Surgical Oncology  New Patient Office Visit - H&P      Referring Provider: Dr. Rosie Flores  PCP: Rosie Flores MD   Care Team:  Medical Oncologist: No care team member to display   Radiation Oncologist: No care team member to display   OBGYN: No data on file.    Chief Complaint:   Chief Complaint   Patient presents with    Breast Discharge     Patient reports nipple discharge,bloody x 2 months in left breast, denies any swelling, redness        Subjective:     HPI:  Rayna Haider is a pleasant 89 y.o. female with a PMH of ESRD on dialysis, CHF, DM2, macular degeneration/retinopathy, who presents on 6/10/2025 for evaluation of spontaneous bloody left nipple discharge. She states that the left breast also "feels different" from the right. Denies pain, redness, rashes, skin changes, or swelling. She reports she cannot remember her last mammogram and states she discontinued screening about 15-20 years ago. Denies a prior history of breast concerns or surgeries.    Imaging:   None    Pathology:  None    OB/GYN History:  Age at Menarche Onset: 13  Menopausal Status: postmenopausal, LMP: No LMP recorded (lmp unknown). Patient has had a hysterectomy.  Hysterectomy/Oophorectomy: hysterectomy, at age 24 (s/t birthing complications). Ovaries intact.  Hormonal birth control (duration): none  Pregnancy History:   Age at first live birth: 23  Hormone Replacement Therapy: No, none    Other:  MG breast density: unknown  Prior thoracic RT: none  Genetic testing: none  Ashkenazi Latter day descent: No    Family History:  Family History   Problem Relation Name Age of Onset    Breast cancer Mother Medice 89    Glaucoma Mother Medice     Diabetes Father Lolo     Pancreatic cancer Father Lolo 80    Skin cancer Brother Jarrett 74    Diabetes Brother Jarrett     Breast cancer Maternal Grandmother  78    Lymphoma Son Ayad 29        Patient History:  Past " Medical History:   Diagnosis Date    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), wet     left eye    Anxiety     CHF (congestive heart failure)     Cornea edema     right eye    Detached retina, right     Dialysis patient     T TH SAT    End stage renal disease     Hyperkalemia     resolved    Hypertension     Mixed hyperlipidemia     On home oxygen therapy     wears at night    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, without long-term current use of insulin     Vitamin D deficiency        Active Problem List with Overview Notes    Diagnosis Date Noted    Nipple discharge 05/21/2025    Type 2 diabetes mellitus without complication, without long-term current use of insulin 12/04/2024    Lack of physical activity 12/04/2024    Exudative age-related macular degeneration, left eye, with active choroidal neovascularization 07/01/2024    Secondary hyperparathyroidism of renal origin 07/01/2024    Elevated hemoglobin A1c measurement 04/03/2024    Chronic congestive heart failure, unspecified heart failure type     Medicare annual wellness visit, subsequent     Mechanical complication of arteriovenous fistula surgically created 05/19/2023    ESRD (end stage renal disease) on dialysis 02/10/2023    Hyponatremia with excess extracellular fluid volume 01/31/2023    Frailty 01/23/2023    Obesity 08/22/2022    Anxiety 08/22/2022    Hypertension     Vitamin D deficiency     Anemia of chronic disease     Hyperkalemia         Past Surgical History:   Procedure Laterality Date    ANGIOGRAPHY OF ARTERIOVENOUS SHUNT Left 05/19/2023    Procedure: Fistulogram with Possible Intervention;  Surgeon: Daren Roberson MD;  Location: [facility] CATH LAB;  Service: Cardiology;  Laterality: Left;    ANGIOGRAPHY OF ARTERIOVENOUS SHUNT Left 07/19/2023    Procedure: Fistulogram with Possible Intervention;  Surgeon: Daren Roberson MD;  Location: [facility] CATH LAB;  Service: Cardiology;   Laterality: Left;  Requesting 7:00am start    ANGIOGRAPHY OF ARTERIOVENOUS SHUNT Left 2023    Procedure: Fistulogram with Possible Intervention;  Surgeon: Daren Roberson MD;  Location: [facility] CATH LAB;  Service: Cardiology;  Laterality: Left;    AV FISTULA PLACEMENT Left     CATARACT EXTRACTION Bilateral      SECTION      ??    EYE SURGERY Left     HYSTERECTOMY      has ovaries    INSERTION OF TUNNELED CENTRAL VENOUS HEMODIALYSIS CATHETER N/A 2023    Procedure: INSERTION, CATHETER, CENTRAL VENOUS, HEMODIALYSIS, TUNNELED;  Surgeon: Eric Angela DO;  Location: [facility] CATH LAB;  Service: Nephrology;  Laterality: N/A;    LIGATION OF ARTERIOVENOUS FISTULA Left 1/3/2024    Procedure: LIGATION, AV FISTULA;  Surgeon: Daren Roberson MD;  Location: [facility] OR;  Service: Peripheral Vascular;  Laterality: Left;  left basilic fistula ligation    PLACEMENT OF ARTERIOVENOUS GRAFT Left 2023    Procedure: INSERTION, GRAFT, ARTERIOVENOUS;  Surgeon: Daren Roberson MD;  Location: [facility] OR;  Service: Peripheral Vascular;  Laterality: Left;  LEFT BASILIC TRANSPOSITION -VS- GRAFT PLACEMENT // XX  //  SUPINE //   SUPRACLAVICULAR BLOCK    PLACEMENT OF ARTERIOVENOUS GRAFT Left 1/3/2024    Procedure: INSERTION, GRAFT, ARTERIOVENOUS;  Surgeon: Daren Roberson MD;  Location: [facility] OR;  Service: Peripheral Vascular;  Laterality: Left;    RETINAL DETACHMENT SURGERY Right     SKIN GRAFT Right     burns, scald, bilateral forearm    wisdom Left        Social History[1]    Immunization History   Administered Date(s) Administered    COVID-19, MRNA, LN-S, PF (Pfizer) (Purple Cap) 01/10/2021, 2021, 10/12/2021    Hepatitis B (recombinant) Adjuvanted, 2 dose 2024, 2024, 10/15/2024    Influenza (FLUBLOK) - Quadrivalent - Recombinant - PF *Preferred* (egg allergy) 10/10/2023    Influenza - Quadrivalent - High Dose - PF (65 years and older) 10/17/2020, 10/26/2022    Influenza - Trivalent - Fluarix,  Flulaval, Fluzone, Afluria - PF 10/05/2019    Influenza - Trivalent - Flublok - PF (18 years and older) 10/22/2024    Influenza - Trivalent - Fluzone High Dose - PF (65 years and older) 11/05/2021    Pneumococcal Conjugate - 13 Valent 03/15/2020    Pneumococcal Polysaccharide - 23 Valent 11/22/2022, 12/02/2022       Medications/Allergies:  Current Medications[2]     Review of patient's allergies indicates:   Allergen Reactions    Hydralazide Nausea And Vomiting and Palpitations    Hydralazine     Valsartan        Review of Systems:  ROS     Objective:     Vitals:  Vitals:    06/10/25 0900   BP: 134/63   BP Location: Right arm   Patient Position: Sitting   Pulse: 67   Resp: 18   Temp: 98 °F (36.7 °C)   TempSrc: Oral   SpO2: 95%   Weight: 75.6 kg (166 lb 9.6 oz)   Height: 5' (1.524 m)       Body mass index is 32.54 kg/m².     Physical Exam:  General: The patient is awake, alert and oriented times three. The patient is well nourished and in no acute distress.  Neck: There is no evidence of palpable cervical, supraclavicular or axillary adenopathy. The neck is supple. The thyroid is not enlarged.  Musculoskeletal: The patient has a normal range of motion of her bilateral upper extremities.  Chest: Examination of the chest wall fails to reveal any obvious abnormalities. Nonlabored breathing, symmetric expansion.  Breast:  Right:  Examination of right breast fails to reveal any dominant masses or areas of significant focal nodularity. The nipple is everted without evidence of discharge. There is no skin dimpling with movement of the pectoralis. There are no significant skin changes overlying the breast.   Left: There is a large mass in the lateral left breast extending across multiple quadrants. The left breast appears smaller or more lifted compared to the right. Examination of the left breast fails to reveal any dominant masses or areas of significant focal nodularity. There is greenish gray spontaneous nipple discharge  seen during exam. The nipple is slightly inverted. There is no skin dimpling with movement of the pectoralis. There are no significant skin changes overlying the breast.  Abdomen: The abdomen is soft, flat, nontender and nondistended.  Integumentary: no rashes or skin lesions present  Neurologic: cranial nerves intact, no signs of peripheral neurological deficit, motor/sensory function intact      Physical Exam  Chest:              Assessment:     Problem List[3]     Rayna was seen today for breast discharge.    Diagnoses and all orders for this visit:    Large mass of left breast  -     Mammo Digital Diagnostic Bilat with Dieter (XPD); Future  -     US Breast Left Complete; Future    Nipple discharge  -     Ambulatory referral/consult to Breast Surgery  -     Mammo Digital Diagnostic Bilat with Dieter (XPD); Future  -     US Breast Left Complete; Future    Family history of breast cancer       Findings on my physical exam today are concerning for breast malignancy. She is recommended for diagnostic breast imaging.  This has been scheduled for next Monday at 10:00.  Discussed that she will likely need a breast biopsy following this workup.  Patient and her daughter verbalized understanding. All of their questions were answered.      Plan:      Bilateral diagnostic mammogram and left breast ultrasound recommended.    RTC with Dr. Martin after work up is complete.    CC: Rosie Flores MD       All of her questions were answered. She was advised to call if she develops any questions or concerns.    Cristina Montiel PA-C         --------------------------------------------------------------------------------------------------------------  Total time on the date of the visit ranged from 60-74 mins (94067). Total time includes both face-to-face and non-face-to-face time personally spent by myself on the day of the visit.    Non-face-to-face time included:  _X_ preparing to see the patient such as reviewing the  patient record  _X_ obtaining and reviewing separately obtained history  _X_ independently interpreting results  _X_ documenting clinical information in electronic health record.    Face-to-face time included:  _X_ performing an appropriate history and examination  _X_ communicating results to the patient  _X_ counseling and educating the patient  __ ordering appropriate medications  _x_ ordering appropriate tests  _X_ ordering appropriate procedures (including follow-up)  _X_ answering any questions the patient had    Total Time spent on date of visit: 69 minutes          [1]   Social History  Socioeconomic History    Marital status:    Tobacco Use    Smoking status: Never     Passive exposure: Never    Smokeless tobacco: Never   Substance and Sexual Activity    Alcohol use: Not Currently    Drug use: Never    Sexual activity: Not Currently     Partners: Male     Social Drivers of Health     Financial Resource Strain: Low Risk  (9/12/2023)    Overall Financial Resource Strain (CARDIA)     Difficulty of Paying Living Expenses: Not hard at all   Food Insecurity: No Food Insecurity (9/12/2023)    Hunger Vital Sign     Worried About Running Out of Food in the Last Year: Never true     Ran Out of Food in the Last Year: Never true   Transportation Needs: No Transportation Needs (9/12/2023)    PRAPARE - Transportation     Lack of Transportation (Medical): No     Lack of Transportation (Non-Medical): No   Physical Activity: Insufficiently Active (9/12/2023)    Exercise Vital Sign     Days of Exercise per Week: 2 days     Minutes of Exercise per Session: 20 min   Stress: No Stress Concern Present (9/12/2023)    [employer] - Occupational Stress Questionnaire     Feeling of Stress : Not at all   Housing Stability: Low Risk  (9/12/2023)    Housing Stability Vital Sign     Unable to Pay for Housing in the Last Year: No     Number of Places Lived in the Last Year: 1     Unstable Housing in  the Last Year: No   [2]   Current Outpatient Medications:     ascorbic acid, vitamin C, (VITAMIN C) 500 MG tablet, Take 500 mg by mouth once daily., Disp: , Rfl:     aspirin 81 MG Chew, Take 81 mg by mouth once daily., Disp: , Rfl:     calcitonin, salmon, (FORTICAL) 200 unit/actuation nasal spray, 1 spray by Nasal route., Disp: , Rfl:     carvediloL (COREG) 3.125 MG tablet, Take 1 tablet (3.125 mg total) by mouth 2 (two) times daily with meals., Disp: 180 tablet, Rfl: 2    cranberry extract 200 mg Cap, Take by mouth., Disp: , Rfl:     cyanocobalamin, vitamin B-12, (VITAMIN B-12) 50 mcg tablet, Take 50 mcg by mouth once daily., Disp: , Rfl:     doxercalciferol (HECTOROL IV), 3 mcg., Disp: , Rfl:     ferric citrate (AURYXIA) 210 mg iron Tab, Take 210 tablets by mouth 3 (three) times daily. 2 tabs prior to all meals including snacks, Disp: , Rfl:     folic acid (FOLVITE) 1 MG tablet, Take 1,000 mcg by mouth Daily., Disp: , Rfl:     heparin flush,porcine,-0.9NaCl 100 unit/mL Kit, Heparin Sodium (Porcine) 1,000 Units/mL Systemic, Disp: , Rfl:     hydrOXYzine HCL (ATARAX) 25 MG tablet, TAKE 1 TABLET BY MOUTH ONCE DAILY AS NEEDED FOR ANXIETY, Disp: 30 tablet, Rfl: 3    iron sucrose in NS (VENOFER) 100 mg/100 mL PgBk, 50 mg., Disp: , Rfl:     LORazepam (ATIVAN) 0.5 MG tablet, Take 1 tablet (0.5 mg total) by mouth daily as needed for Anxiety., Disp: 30 tablet, Rfl: 3    methoxy peg-epoetin beta (MIRCERA INJ), 30 mcg., Disp: , Rfl:     ondansetron (ZOFRAN-ODT) 4 MG TbDL, DISSOLVE ONE TABLET UNDER THE TONGUE 30 MINUTES BEFORE DIALYSIS AS NEEDED FOR NAUSEA AND VOMITING, Disp: 30 tablet, Rfl: 3    senna (SENOKOT) 8.6 mg tablet, Take 1 tablet by mouth as needed for Constipation., Disp: , Rfl:     SENSIPAR 30 mg Tab, Take 30 mg by mouth Daily., Disp: , Rfl:     sodium chloride 2% (LYNETTE 128) 2 % ophthalmic solution, Place 1 drop into both eyes as needed., Disp: , Rfl:   [3]   Patient Active Problem List  Diagnosis    Hypertension     Vitamin D deficiency    Anemia of chronic disease    Hyperkalemia    Obesity    Anxiety    Frailty    Hyponatremia with excess extracellular fluid volume    ESRD (end stage renal disease) on dialysis    Mechanical complication of arteriovenous fistula surgically created    Chronic congestive heart failure, unspecified heart failure type    Medicare annual wellness visit, subsequent    Elevated hemoglobin A1c measurement    Exudative age-related macular degeneration, left eye, with active choroidal neovascularization    Secondary hyperparathyroidism of renal origin    Type 2 diabetes mellitus without complication, without long-term current use of insulin    Lack of physical activity    Nipple discharge

## 2025-06-10 ENCOUNTER — OFFICE VISIT (OUTPATIENT)
Dept: SURGERY | Facility: CLINIC | Age: 89
End: 2025-06-10
Payer: MEDICARE

## 2025-06-10 VITALS
WEIGHT: 166.63 LBS | BODY MASS INDEX: 32.71 KG/M2 | TEMPERATURE: 98 F | RESPIRATION RATE: 18 BRPM | SYSTOLIC BLOOD PRESSURE: 134 MMHG | HEIGHT: 60 IN | HEART RATE: 67 BPM | DIASTOLIC BLOOD PRESSURE: 63 MMHG | OXYGEN SATURATION: 95 %

## 2025-06-10 DIAGNOSIS — N64.52 NIPPLE DISCHARGE: ICD-10-CM

## 2025-06-10 DIAGNOSIS — Z80.3 FAMILY HISTORY OF BREAST CANCER: ICD-10-CM

## 2025-06-10 DIAGNOSIS — N63.20 LARGE MASS OF LEFT BREAST: Primary | ICD-10-CM

## 2025-06-10 PROCEDURE — 99999 PR PBB SHADOW E&M-EST. PATIENT-LVL V: CPT | Mod: PBBFAC,,, | Performed by: PHYSICIAN ASSISTANT

## 2025-06-10 PROCEDURE — 99215 OFFICE O/P EST HI 40 MIN: CPT | Mod: PBBFAC | Performed by: PHYSICIAN ASSISTANT

## 2025-06-10 RX ORDER — CALCITONIN SALMON 200 [IU]/.09ML
1 SPRAY, METERED NASAL
COMMUNITY
Start: 2025-02-25

## 2025-06-10 RX ORDER — GINSENG 100 MG
CAPSULE ORAL
COMMUNITY

## 2025-06-10 RX ORDER — ASCORBIC ACID 500 MG
500 TABLET ORAL DAILY
COMMUNITY

## 2025-06-10 RX ORDER — HEPARIN SOD,PORCINE/0.9 % NACL 100/ML
KIT INTRAVENOUS
COMMUNITY
Start: 2025-03-01 | End: 2026-02-28

## 2025-06-16 ENCOUNTER — HOSPITAL ENCOUNTER (OUTPATIENT)
Dept: RADIOLOGY | Facility: HOSPITAL | Age: 89
Discharge: HOME | End: 2025-06-16
Attending: PHYSICIAN ASSISTANT
Payer: MEDICARE

## 2025-06-16 DIAGNOSIS — N64.52 NIPPLE DISCHARGE: ICD-10-CM

## 2025-06-16 DIAGNOSIS — N63.20 LARGE MASS OF LEFT BREAST: ICD-10-CM

## 2025-06-16 DIAGNOSIS — R92.8 ABNORMAL MAMMOGRAM: ICD-10-CM

## 2025-06-16 DIAGNOSIS — R92.8 ABNORMAL MAMMOGRAM: Primary | ICD-10-CM

## 2025-06-16 PROCEDURE — 19083 BX BREAST 1ST LESION US IMAG: CPT | Mod: LT

## 2025-06-16 PROCEDURE — 76641 ULTRASOUND BREAST COMPLETE: CPT | Mod: TC,LT

## 2025-06-16 PROCEDURE — 77062 BREAST TOMOSYNTHESIS BI: CPT | Mod: 26,,, | Performed by: RADIOLOGY

## 2025-06-16 PROCEDURE — 76641 ULTRASOUND BREAST COMPLETE: CPT | Mod: 26,LT,, | Performed by: RADIOLOGY

## 2025-06-16 PROCEDURE — 77066 DX MAMMO INCL CAD BI: CPT | Mod: TC

## 2025-06-16 PROCEDURE — 77066 DX MAMMO INCL CAD BI: CPT | Mod: 26,,, | Performed by: RADIOLOGY

## 2025-06-16 PROCEDURE — 77065 DX MAMMO INCL CAD UNI: CPT | Mod: TC,LT

## 2025-06-17 ENCOUNTER — RESULTS FOLLOW-UP (OUTPATIENT)
Dept: RADIOLOGY | Facility: HOSPITAL | Age: 89
End: 2025-06-17

## 2025-06-17 DIAGNOSIS — N61.1 ABSCESS OF BREAST, LEFT: Primary | ICD-10-CM

## 2025-06-17 LAB — PSYCHE PATHOLOGY RESULT: NORMAL

## 2025-06-17 RX ORDER — CEPHALEXIN 500 MG/1
500 CAPSULE ORAL 4 TIMES DAILY
Qty: 40 CAPSULE | Refills: 0 | Status: SHIPPED | OUTPATIENT
Start: 2025-06-17 | End: 2025-06-18

## 2025-06-18 ENCOUNTER — TELEPHONE (OUTPATIENT)
Dept: SURGERY | Facility: CLINIC | Age: 89
End: 2025-06-18
Payer: MEDICARE

## 2025-06-18 DIAGNOSIS — N61.1 ABSCESS OF BREAST, LEFT: Primary | ICD-10-CM

## 2025-06-18 RX ORDER — CEPHALEXIN 250 MG/1
250 CAPSULE ORAL EVERY 12 HOURS
Qty: 20 CAPSULE | Refills: 0 | Status: SHIPPED | OUTPATIENT
Start: 2025-06-18 | End: 2025-06-18

## 2025-06-18 RX ORDER — CLINDAMYCIN HYDROCHLORIDE 300 MG/1
300 CAPSULE ORAL 3 TIMES DAILY
Qty: 30 CAPSULE | Refills: 0 | Status: SHIPPED | OUTPATIENT
Start: 2025-06-18 | End: 2025-06-28

## 2025-06-18 NOTE — TELEPHONE ENCOUNTER
I spoke with patient's daughter regarding pathology results from recent breast biopsy which revealed inflammatory process possibly representing abscess. Explained that while findings are benign I am still concerned about underlying malignancy.   Discussed that I recommend a round of antibiotics and close interval follow up.  She was scheduled for follow up next week.  We decided on clindamycin due to no nephrotoxicity with patient on dialysis.  However did discuss that it can cause stomach upset which we will monitor closely.

## 2025-06-23 ENCOUNTER — OFFICE VISIT (OUTPATIENT)
Dept: SURGERY | Facility: CLINIC | Age: 89
End: 2025-06-23
Payer: MEDICARE

## 2025-06-23 VITALS
SYSTOLIC BLOOD PRESSURE: 116 MMHG | HEART RATE: 66 BPM | OXYGEN SATURATION: 95 % | RESPIRATION RATE: 16 BRPM | WEIGHT: 162 LBS | DIASTOLIC BLOOD PRESSURE: 69 MMHG | HEIGHT: 60 IN | TEMPERATURE: 98 F | BODY MASS INDEX: 31.8 KG/M2

## 2025-06-23 DIAGNOSIS — N61.1 ABSCESS OF BREAST, LEFT: Primary | ICD-10-CM

## 2025-06-23 DIAGNOSIS — Z80.3 FAMILY HISTORY OF BREAST CANCER: ICD-10-CM

## 2025-06-23 DIAGNOSIS — N63.20 LARGE MASS OF LEFT BREAST: ICD-10-CM

## 2025-06-23 PROCEDURE — 99215 OFFICE O/P EST HI 40 MIN: CPT | Mod: PBBFAC | Performed by: PHYSICIAN ASSISTANT

## 2025-06-23 PROCEDURE — 99999 PR PBB SHADOW E&M-EST. PATIENT-LVL V: CPT | Mod: PBBFAC,,, | Performed by: PHYSICIAN ASSISTANT

## 2025-06-23 PROCEDURE — 99214 OFFICE O/P EST MOD 30 MIN: CPT | Mod: S$PBB,,, | Performed by: PHYSICIAN ASSISTANT

## 2025-06-23 RX ORDER — CEPHALEXIN 250 MG/1
CAPSULE ORAL
COMMUNITY
Start: 2025-06-18

## 2025-06-23 NOTE — PROGRESS NOTES
"[facility] - [facility] [place] [facility]  Breast Surgical Oncology  New Patient Office Visit - H&P      Referring Provider: No ref. provider found  PCP: Rosie Flores MD   Care Team:  Medical Oncologist: No care team member to display   Radiation Oncologist: No care team member to display   OBGYN: No data on file.    Chief Complaint:   Chief Complaint   Patient presents with    Follow-up     Patient denies any breast related concerns or issues to report on today         Subjective:     Treatments:  6/16/2025 - core needle biopsy - marked acute and chronic abscediform inflammation with focal necrosis    Interval History:  06/23/2025 - Rayna Haider returns today for follow up since biopsy. She was started on Clindamycin which she is tolerating well with no side effects. She continues dialysis on Tuesday/Thursday/Saturdays without complication. Since she started Clindamycin, her breast is starting to feel better. She states it no longer feels "different" as she previously described. She states that nipple discharge and inversion have resolved. She does still feel a lump in the left breast. It is also mildly bruised from biopsy.    HPI:  Rayna Haider is a pleasant 89 y.o. female with a PMH of ESRD on dialysis, CHF, DM2, macular degeneration/retinopathy, who presents on 6/10/2025 for evaluation of spontaneous bloody left nipple discharge. She states that the left breast also "feels different" from the right. Denies pain, redness, rashes, skin changes, or swelling. She reports she cannot remember her last mammogram and states she discontinued screening about 15-20 years ago. Denies a prior history of breast concerns or surgeries.    On physical exam, 6/10/2025, she was noted to have a large mass in the lateral left breast extending across multiple quadrants. The left breast appeared smaller or more lifted compared to the right. There was greenish gray spontaneous nipple discharge seen during " exam. The nipple was slightly inverted. She was recommended for imaging evaluation (see below).    Imagin2025 BL DG MG and L breast US at [facility] - 1.  Diffuse left breast skin and trabecular thickening with a global asymmetry occupying all 4 quadrants of the left breast, spanning roughly 9.5 cm x 12.1 cm x 10.0 cm, is suspicious for malignancy.  2.  No mammographic evidence of malignancy in the right breast. RECOMMENDATION: Ultrasound-guided core needle biopsy of one of the more conspicuous masses within the global asymmetry in the 12:00 periareolar left breast is recommended, and will be performed today.      Pathology:  2025 LEFT BREAST AT 12 O'CLOCK, BIOPSY:   MARKED ACUTE AND CHRONIC ABSCEDIFORM INFLAMMATION WITH FOCAL NECROSIS.   Comment   If this inflammatory process is focal, it may represent a localized abscess or   reaction to ruptured cyst / duct. However, if the process is more diffuse, it   may be compatible with mastitis. Granulomas are not identified. However, an   infectious etiology cannot be excluded. Clinicopathologic correlation is   recommended. There are focal fragments of reactive squamous epithelium.     OB/GYN History:  Age at Menarche Onset: 13  Menopausal Status: postmenopausal, LMP: No LMP recorded (lmp unknown). Patient has had a hysterectomy.  Hysterectomy/Oophorectomy: hysterectomy, at age 24 (s/t birthing complications). Ovaries intact.  Hormonal birth control (duration): none  Pregnancy History:   Age at first live birth: 23  Hormone Replacement Therapy: No, none    Other:  MG breast density: unknown  Prior thoracic RT: none  Genetic testing: none  Ashkenazi [employer]ec[employer]al descent: No    Family History:  Family History   Problem Relation Name Age of Onset    Breast cancer Mother Medice 89    Glaucoma Mother Medice     Diabetes Father Julio     Pancreatic cancer Father Julio 80    Skin cancer Brother Jarrett 74    Diabetes Brother Jarrett     Breast cancer Maternal Grandmother   78    Lymphoma Son Ayad Baxter        Patient History:  Past Medical History:   Diagnosis Date    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), wet     left eye    Anxiety     CHF (congestive heart failure)     Cornea edema     right eye    Detached retina, right     Dialysis patient     T TH SAT    End stage renal disease     Hyperkalemia     resolved    Hypertension     Mixed hyperlipidemia     On home oxygen therapy     wears at night    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, without long-term current use of insulin     Vitamin D deficiency        Active Problem List with Overview Notes    Diagnosis Date Noted    Nipple discharge 05/21/2025    Type 2 diabetes mellitus without complication, without long-term current use of insulin 12/04/2024    Lack of physical activity 12/04/2024    Exudative age-related macular degeneration, left eye, with active choroidal neovascularization 07/01/2024    Secondary hyperparathyroidism of renal origin 07/01/2024    Elevated hemoglobin A1c measurement 04/03/2024    Chronic congestive heart failure, unspecified heart failure type     Medicare annual wellness visit, subsequent     Mechanical complication of arteriovenous fistula surgically created 05/19/2023    ESRD (end stage renal disease) on dialysis 02/10/2023    Hyponatremia with excess extracellular fluid volume 01/31/2023    Frailty 01/23/2023    Obesity 08/22/2022    Anxiety 08/22/2022    Hypertension     Vitamin D deficiency     Anemia of chronic disease     Hyperkalemia         Past Surgical History:   Procedure Laterality Date    ANGIOGRAPHY OF ARTERIOVENOUS SHUNT Left 05/19/2023    Procedure: Fistulogram with Possible Intervention;  Surgeon: Daren Roberson MD;  Location: [facility] CATH LAB;  Service: Cardiology;  Laterality: Left;    ANGIOGRAPHY OF ARTERIOVENOUS SHUNT Left 07/19/2023    Procedure: Fistulogram with Possible Intervention;  Surgeon: Daren RAHMAN  MD Karol;  Location: [facility] CATH LAB;  Service: Cardiology;  Laterality: Left;  Requesting 7:00am start    ANGIOGRAPHY OF ARTERIOVENOUS SHUNT Left 2023    Procedure: Fistulogram with Possible Intervention;  Surgeon: Daren Roberson MD;  Location: [facility] CATH LAB;  Service: Cardiology;  Laterality: Left;    AV FISTULA PLACEMENT Left     CATARACT EXTRACTION Bilateral      SECTION      ??    EYE SURGERY Left     HYSTERECTOMY      has ovaries    INSERTION OF TUNNELED CENTRAL VENOUS HEMODIALYSIS CATHETER N/A 2023    Procedure: INSERTION, CATHETER, CENTRAL VENOUS, HEMODIALYSIS, TUNNELED;  Surgeon: Eric Angela DO;  Location: [facility] CATH LAB;  Service: Nephrology;  Laterality: N/A;    LIGATION OF ARTERIOVENOUS FISTULA Left 1/3/2024    Procedure: LIGATION, AV FISTULA;  Surgeon: Daren Roberson MD;  Location: [facility];  Service: Peripheral Vascular;  Laterality: Left;  left basilic fistula ligation    PLACEMENT OF ARTERIOVENOUS GRAFT Left 2023    Procedure: INSERTION, GRAFT, ARTERIOVENOUS;  Surgeon: Daren Roberson MD;  Location: [facility];  Service: Peripheral Vascular;  Laterality: Left;  LEFT BASILIC TRANSPOSITION -VS- GRAFT PLACEMENT // XX  //  SUPINE //   SUPRACLAVICULAR BLOCK    PLACEMENT OF ARTERIOVENOUS GRAFT Left 1/3/2024    Procedure: INSERTION, GRAFT, ARTERIOVENOUS;  Surgeon: Daren Roberson MD;  Location: [facility];  Service: Peripheral Vascular;  Laterality: Left;    RETINAL DETACHMENT SURGERY Right     SKIN GRAFT Right     burns, scald, bilateral forearm    wisdom Left        Social History[1]    Immunization History   Administered Date(s) Administered    COVID-19, MRNA, LN-S, PF (Pfizer) (Purple Cap) 01/10/2021, 2021, 10/12/2021    Hepatitis B (recombinant) Adjuvanted, 2 dose 2024, 2024, 10/15/2024    Influenza (FLUBLOK) - Quadrivalent - Recombinant - PF *Preferred* (egg allergy) 10/10/2023    Influenza - Quadrivalent - High Dose - PF (65 years and older)  10/17/2020, 10/26/2022    Influenza - Trivalent - Fluarix, Flulaval, Fluzone, Afluria - PF 10/05/2019    Influenza - Trivalent - Flublok - PF (18 years and older) 10/22/2024    Influenza - Trivalent - Fluzone High Dose - PF (65 years and older) 11/05/2021    Pneumococcal Conjugate - 13 Valent 03/15/2020    Pneumococcal Polysaccharide - 23 Valent 11/22/2022, 12/02/2022       Medications/Allergies:  Current Medications[2]     Review of patient's allergies indicates:   Allergen Reactions    Hydralazide Nausea And Vomiting and Palpitations    Hydralazine     Valsartan        Review of Systems:  ROS     Objective:     Vitals:  Vitals:    06/23/25 0819   BP: 116/69   BP Location: Right arm   Patient Position: Sitting   Pulse: 66   Resp: 16   Temp: 97.6 °F (36.4 °C)   TempSrc: Oral   SpO2: 95%   Weight: 73.5 kg (162 lb)   Height: 5' (1.524 m)       Body mass index is 31.64 kg/m².     Physical Exam:  General: The patient is awake, alert and oriented times three. The patient is well nourished and in no acute distress.  Neck: There is no evidence of palpable cervical, supraclavicular or axillary adenopathy. The neck is supple. The thyroid is not enlarged.  Musculoskeletal: The patient has a normal range of motion of her bilateral upper extremities.  Chest: Examination of the chest wall fails to reveal any obvious abnormalities. Nonlabored breathing, symmetric expansion.  Breast:  Right:  Examination of right breast fails to reveal any dominant masses or areas of significant focal nodularity. The nipple is everted without evidence of discharge. There is no skin dimpling with movement of the pectoralis. There are no significant skin changes overlying the breast.   Left: There is a large mass in the superiolateral left breast extending across multiple quadrants. However, the mass is slightly smaller and the breast feels softer compared to last weeks exam. Also, the left breast appears more symmetric to the right on todays' exam  which is improved compared to the asymmetry seen last week. Examination of the left breast fails to reveal any dominant masses or areas of significant focal nodularity. Nipple discharge resolved - There is no nipple discharge seen or expressed during today's exam. The nipple inversion is also resolved. There is no skin dimpling with movement of the pectoralis. There are no significant skin changes overlying the breast.  Abdomen: The abdomen is soft, flat, nontender and nondistended.  Integumentary: no rashes or skin lesions present  Neurologic: cranial nerves intact, no signs of peripheral neurological deficit, motor/sensory function intact      Physical Exam  Chest:              Assessment:     Problem List[3]     Rayna was seen today for follow-up.    Diagnoses and all orders for this visit:    Abscess of breast, left  -     Mammo Digital Diagnostic Left with Dieter (XPD); Future  -     US Breast Left Complete; Future    Large mass of left breast    Family history of breast cancer         Explained that I am happy to see improvement of symptoms with the antibiotic.  While findings on pathology are benign, I still recommend close interval follow up to ensure there is no underlying malignancy. Would like to see the lump in the left breast resolve as her other symptoms have.      Plan:      Left diagnostic mammogram and left breast ultrasound recommended in 3 weeks (4 week f/u after biopsy). RTC after imaging.    Continue Clindamycin until completion of script. Advise she call for sooner re-evaluation should symptoms recur or worsen.        CC: Rosie Floers MD       All of her questions were answered. She was advised to call if she develops any questions or concerns.    Cristina Montiel PA-C          --------------------------------------------------------------------------------------------------------------  --------------------------------------------------------------------------------------------------------------    Total time on the date of the visit ranged from 30-39 mins (86438). Total time includes both face-to-face and non-face-to-face time personally spent by myself on the day of the visit.    Non-face-to-face time included:  _X_ preparing to see the patient such as reviewing the patient record  __ obtaining and reviewing separately obtained history  _X_ independently interpreting results  _X_ documenting clinical information in electronic health record.    Face-to-face time included:  _X_ performing an appropriate history and examination  _X_ communicating results to the patient  _X_ counseling and educating the patient  __ ordering appropriate medications  __ ordering appropriate tests  _X_ ordering appropriate procedures (including follow-up)  _X_ answering any questions the patient had    Total Time spent on date of visit: 35 minutes              [1]   Social History  Socioeconomic History    Marital status:    Tobacco Use    Smoking status: Never     Passive exposure: Never    Smokeless tobacco: Never   Substance and Sexual Activity    Alcohol use: Not Currently    Drug use: Never    Sexual activity: Not Currently     Partners: Male     Social Drivers of Health     Financial Resource Strain: Low Risk  (9/12/2023)    Overall Financial Resource Strain ([facility])     Difficulty of Paying Living Expenses: Not hard at all   Food Insecurity: No Food Insecurity (9/12/2023)    Hunger Vital Sign     Worried About Running Out of Food in the Last Year: Never true     Ran Out of Food in the Last Year: Never true   Transportation Needs: No Transportation Needs (9/12/2023)    PRAPARE - Transportation     Lack of Transportation (Medical): No     Lack of Transportation (Non-Medical): No   Physical  Activity: Insufficiently Active (9/12/2023)    Exercise Vital Sign     Days of Exercise per Week: 2 days     Minutes of Exercise per Session: 20 min   Stress: No Stress Concern Present (9/12/2023)    [employer] [place] [employer] - Occupational Stress Questionnaire     Feeling of Stress : Not at all   Housing Stability: Low Risk  (9/12/2023)    Housing Stability Vital Sign     Unable to Pay for Housing in the Last Year: No     Number of Places Lived in the Last Year: 1     Unstable Housing in the Last Year: No   [2]   Current Outpatient Medications:     ascorbic acid, vitamin C, (VITAMIN C) 500 MG tablet, Take 500 mg by mouth once daily., Disp: , Rfl:     aspirin 81 MG Chew, Take 81 mg by mouth once daily., Disp: , Rfl:     calcitonin, salmon, (FORTICAL) 200 unit/actuation nasal spray, 1 spray by Nasal route., Disp: , Rfl:     carvediloL (COREG) 3.125 MG tablet, Take 1 tablet (3.125 mg total) by mouth 2 (two) times daily with meals., Disp: 180 tablet, Rfl: 2    cephALEXin (KEFLEX) 250 MG capsule, Take by mouth., Disp: , Rfl:     clindamycin (CLEOCIN) 300 MG capsule, Take 1 capsule (300 mg total) by mouth 3 (three) times daily. Take with food for 10 days, Disp: 30 capsule, Rfl: 0    cranberry extract 200 mg Cap, Take by mouth., Disp: , Rfl:     cyanocobalamin, vitamin B-12, (VITAMIN B-12) 50 mcg tablet, Take 50 mcg by mouth once daily., Disp: , Rfl:     doxercalciferol (HECTOROL IV), 1 mcg., Disp: , Rfl:     ferric citrate (AURYXIA) 210 mg iron Tab, Take 210 tablets by mouth 3 (three) times daily. 2 tabs prior to all meals including snacks, Disp: , Rfl:     folic acid (FOLVITE) 1 MG tablet, Take 1,000 mcg by mouth Daily., Disp: , Rfl:     heparin flush,porcine,-0.9NaCl 100 unit/mL Kit, Heparin Sodium (Porcine) 1,000 Units/mL Systemic, Disp: , Rfl:     hydrOXYzine HCL (ATARAX) 25 MG tablet, TAKE 1 TABLET BY MOUTH ONCE DAILY AS NEEDED FOR ANXIETY, Disp: 30 tablet, Rfl: 3    iron sucrose in NS (VENOFER) 100  mg/100 mL PgBk, 50 mg., Disp: , Rfl:     LORazepam (ATIVAN) 0.5 MG tablet, Take 1 tablet (0.5 mg total) by mouth daily as needed for Anxiety., Disp: 30 tablet, Rfl: 3    methoxy peg-epoetin beta (MIRCERA INJ), 30 mcg., Disp: , Rfl:     ondansetron (ZOFRAN-ODT) 4 MG TbDL, DISSOLVE ONE TABLET UNDER THE TONGUE 30 MINUTES BEFORE DIALYSIS AS NEEDED FOR NAUSEA AND VOMITING, Disp: 30 tablet, Rfl: 3    senna (SENOKOT) 8.6 mg tablet, Take 1 tablet by mouth as needed for Constipation., Disp: , Rfl:     SENSIPAR 30 mg Tab, Take 30 mg by mouth Daily., Disp: , Rfl:     sodium chloride 2% (LYNETTE 128) 2 % ophthalmic solution, Place 1 drop into both eyes as needed., Disp: , Rfl:   [3]   Patient Active Problem List  Diagnosis    Hypertension    Vitamin D deficiency    Anemia of chronic disease    Hyperkalemia    Obesity    Anxiety    Frailty    Hyponatremia with excess extracellular fluid volume    ESRD (end stage renal disease) on dialysis    Mechanical complication of arteriovenous fistula surgically created    Chronic congestive heart failure, unspecified heart failure type    Medicare annual wellness visit, subsequent    Elevated hemoglobin A1c measurement    Exudative age-related macular degeneration, left eye, with active choroidal neovascularization    Secondary hyperparathyroidism of renal origin    Type 2 diabetes mellitus without complication, without long-term current use of insulin    Lack of physical activity    Nipple discharge

## 2025-07-20 ENCOUNTER — OFFICE VISIT (OUTPATIENT)
Dept: URGENT CARE | Facility: CLINIC | Age: 89
End: 2025-07-20
Payer: MEDICARE

## 2025-07-20 VITALS
HEIGHT: 60 IN | HEART RATE: 85 BPM | DIASTOLIC BLOOD PRESSURE: 75 MMHG | BODY MASS INDEX: 31.8 KG/M2 | TEMPERATURE: 98 F | OXYGEN SATURATION: 98 % | RESPIRATION RATE: 16 BRPM | WEIGHT: 162 LBS | SYSTOLIC BLOOD PRESSURE: 133 MMHG

## 2025-07-20 DIAGNOSIS — N30.01 ACUTE CYSTITIS WITH HEMATURIA: Primary | ICD-10-CM

## 2025-07-20 DIAGNOSIS — R30.0 DYSURIA: ICD-10-CM

## 2025-07-20 DIAGNOSIS — F41.9 ANXIETY: ICD-10-CM

## 2025-07-20 LAB
BACTERIA #/AREA URNS AUTO: ABNORMAL /HPF
BILIRUB UR QL STRIP.AUTO: NEGATIVE
BILIRUB UR QL STRIP: NEGATIVE
CLARITY UR: ABNORMAL
COLOR UR AUTO: ABNORMAL
GLUCOSE UR QL STRIP: NEGATIVE
GLUCOSE UR QL STRIP: NORMAL
HGB UR QL STRIP: ABNORMAL
KETONES UR QL STRIP: NEGATIVE
KETONES UR QL STRIP: NEGATIVE
LEUKOCYTE ESTERASE UR QL STRIP: 500
LEUKOCYTE ESTERASE UR QL STRIP: POSITIVE
MUCOUS THREADS URNS QL MICRO: ABNORMAL /LPF
NITRITE UR QL STRIP: NEGATIVE
PH UR STRIP: 6 [PH]
PH, POC UA: 5
POC BLOOD, URINE: POSITIVE
POC NITRATES, URINE: NEGATIVE
PROT UR QL STRIP: ABNORMAL
PROT UR QL STRIP: POSITIVE
RBC #/AREA URNS AUTO: >100 /HPF
SP GR UR STRIP.AUTO: 1.02 (ref 1–1.03)
SP GR UR STRIP: 1.01 (ref 1–1.03)
SQUAMOUS #/AREA URNS LPF: ABNORMAL /HPF
UROBILINOGEN UR STRIP-ACNC: NORMAL
UROBILINOGEN UR STRIP-ACNC: NORMAL (ref 0.1–1.1)
WBC #/AREA URNS AUTO: >100 /HPF
WBC CLUMPS UR QL AUTO: ABNORMAL

## 2025-07-20 PROCEDURE — 87086 URINE CULTURE/COLONY COUNT: CPT | Performed by: FAMILY MEDICINE

## 2025-07-20 PROCEDURE — 99213 OFFICE O/P EST LOW 20 MIN: CPT | Mod: ,,, | Performed by: FAMILY MEDICINE

## 2025-07-20 PROCEDURE — 81003 URINALYSIS AUTO W/O SCOPE: CPT | Mod: QW,,, | Performed by: FAMILY MEDICINE

## 2025-07-20 PROCEDURE — 81001 URINALYSIS AUTO W/SCOPE: CPT | Performed by: FAMILY MEDICINE

## 2025-07-20 RX ORDER — CEFDINIR 300 MG/1
300 CAPSULE ORAL DAILY
Qty: 7 CAPSULE | Refills: 0 | Status: SHIPPED | OUTPATIENT
Start: 2025-07-20 | End: 2025-07-27

## 2025-07-20 NOTE — PATIENT INSTRUCTIONS
Urine dipstick positive for blood, protein and leukocytes.  Appears cloudy  Considering acute onset symptoms and history UTI we will start on antibiotics  Continue hydration as directed.  Monitor the symptoms.  Reviewed last labs in the chart, creatinine 6.69.  Calculated creatinine clearance 6.61 mL/minute  Reviewed up-to-date on dosing of cefdinir.  Urine analysis, results will be monitored on reported, reflex cultures for infections  ER precautions for worsening symptoms, fever, flank pain.  Patient and daughter Kaye voiced understanding  Call or return to clinic for any questions

## 2025-07-20 NOTE — PROGRESS NOTES
Subjective:      Patient ID: Rayna Haider is a 89 y.o. female.    Vitals:  height is 5' (1.524 m) and weight is 73.5 kg (162 lb). Her temperature is 97.5 °F (36.4 °C). Her blood pressure is 133/75 and her pulse is 85. Her respiration is 16 and oxygen saturation is 98%.     Chief Complaint: Dysuria     Patient is a 89 y.o. female who presents to urgent care with complaints of painful urination x Thursday . HX of End stage renal disease. Dialysis treatment x yesterday.       [place]:  89-year-old female with history of end-stage renal disease, currently on dialysis 3 days a week.  Last dialysis was yesterday.  Present to clinic with concerns of burning with urination since 3 days.  Urgency and frequency.  States with her kidney condition, allowed 32 oz of water every day.  Otherwise she feels okay.  No fever, no flank pain.  Reviewed the chart, similar complaints in May 2025, positive urine cultures with E coli.    ROS :  Constitutional : _No fever, no fatigue or weakness  Neck : _Negative except HPI  Respiratory :_ No coughing, no shortness of breath  Cardiovascular : _No chest pain, no palpitations  Gastrointestinal : _No nausea, no vomiting  Genitourinary : _No flank pain, no vaginal discharge  Integumentary : _Negative for skin rash   Objective:     Physical Exam  General : Alert and Oriented, No apparent distress, afebrile  Neck - supple  Respiratory : Bilateral equal breath sounds, nonlabored respirations, clear to auscultate   Cardiovascular : Rate rhythm regular, normal volume pulse  Gastrointestinal : Soft, nontender to palpate, BS X 4 quadrants - normal  Genitourinary : No CVA tenderness Bilateral  Integumentary : Warm, Dry and no rash     Reviewed up-to-date on dosing of cefdinir.  Creatinine clearance less than 30 mL/minute, recommendation once a day.  Assessment:     1. Acute cystitis with hematuria    2. Dysuria      Plan:   Urine dipstick positive for blood, protein and leukocytes.  Appears  cloudy  Considering acute onset symptoms and history UTI we will start on antibiotics  Continue hydration as directed.  Monitor the symptoms.  Reviewed last labs in the chart, creatinine 6.69.  Calculated creatinine clearance 6.61 mL/minute  Reviewed up-to-date on dosing of cefdinir.  Urine analysis, results will be monitored on reported, reflex cultures for infections  ER precautions for worsening symptoms, fever, flank pain.  Patient and daughter Kaye voiced understanding  Call or return to clinic for any questions    Acute cystitis with hematuria  -     Cancel: Urine Culture High Risk ($$)  -     Urinalysis, Reflex to Urine Culture Urine, Clean Catch  -     cefdinir (OMNICEF) 300 MG capsule; Take 1 capsule (300 mg total) by mouth once daily. for 7 days  Dispense: 7 capsule; Refill: 0    Dysuria  -     POCT Urinalysis, Dipstick, Automated, W/O Scope

## 2025-07-21 DIAGNOSIS — E55.9 VITAMIN D DEFICIENCY: Chronic | ICD-10-CM

## 2025-07-21 DIAGNOSIS — I10 PRIMARY HYPERTENSION: Primary | Chronic | ICD-10-CM

## 2025-07-21 DIAGNOSIS — I50.9 CHRONIC CONGESTIVE HEART FAILURE, UNSPECIFIED HEART FAILURE TYPE: ICD-10-CM

## 2025-07-21 DIAGNOSIS — E11.9 TYPE 2 DIABETES MELLITUS WITHOUT COMPLICATION, WITHOUT LONG-TERM CURRENT USE OF INSULIN: ICD-10-CM

## 2025-07-21 RX ORDER — LORAZEPAM 0.5 MG/1
0.5 TABLET ORAL DAILY PRN
Qty: 30 TABLET | Refills: 3 | Status: SHIPPED | OUTPATIENT
Start: 2025-07-21

## 2025-07-22 ENCOUNTER — TELEPHONE (OUTPATIENT)
Dept: INTERNAL MEDICINE | Facility: CLINIC | Age: 89
End: 2025-07-22
Payer: MEDICARE

## 2025-07-22 LAB — BACTERIA UR CULT: ABNORMAL

## 2025-07-22 NOTE — TELEPHONE ENCOUNTER
----- Message from Med Assistant Vickie sent at 7/21/2025  1:55 PM CDT -----  Regarding: appt reminder  Patient has an appointment on 07/28/2025  Fasting labs   Please call and remind patient of appointment

## 2025-07-23 ENCOUNTER — HOSPITAL ENCOUNTER (OUTPATIENT)
Dept: RADIOLOGY | Facility: HOSPITAL | Age: 89
Discharge: HOME | End: 2025-07-23
Attending: PHYSICIAN ASSISTANT
Payer: MEDICARE

## 2025-07-23 DIAGNOSIS — R92.8 ABNORMAL MAMMOGRAM: ICD-10-CM

## 2025-07-23 PROCEDURE — 77065 DX MAMMO INCL CAD UNI: CPT | Mod: 26,LT,, | Performed by: STUDENT IN AN ORGANIZED HEALTH CARE EDUCATION/TRAINING PROGRAM

## 2025-07-23 PROCEDURE — 76641 ULTRASOUND BREAST COMPLETE: CPT | Mod: 26,LT,, | Performed by: STUDENT IN AN ORGANIZED HEALTH CARE EDUCATION/TRAINING PROGRAM

## 2025-07-23 PROCEDURE — 76641 ULTRASOUND BREAST COMPLETE: CPT | Mod: TC,LT

## 2025-07-23 PROCEDURE — 77061 BREAST TOMOSYNTHESIS UNI: CPT | Mod: TC,LT

## 2025-07-23 PROCEDURE — 77061 BREAST TOMOSYNTHESIS UNI: CPT | Mod: 26,LT,, | Performed by: STUDENT IN AN ORGANIZED HEALTH CARE EDUCATION/TRAINING PROGRAM

## 2025-07-23 NOTE — PROGRESS NOTES
"[facility] - [facility]  Breast Surgical Oncology  New Patient Office Visit - H&P      Referring Provider: No ref. provider found  PCP: Rosie Flores MD   Care Team:  Medical Oncologist: No care team member to display   Radiation Oncologist: No care team member to display   OBGYN: No data on file.    Chief Complaint:   Chief Complaint   Patient presents with    Follow-up     Patient reports no breast related concerns         Subjective:     Treatments:  6/16/2025 - core needle biopsy - marked acute and chronic abscediform inflammation with focal necrosis    Interval History:  07/30/2025 - Rayna Haider returns today for follow up after MG/US.  Imaging showed improved mammographic and sonographic appearance of the left breast with a resolving mastitis and multifocal abscess formation.  All findings had decreased in size and continue to show signs of healing.  She is asymptomatic.  No longer feels a hard mass in the breast and denies any redness or fevers or pain.  She does feel a little bit of firmness in the upper outer quadrant periareolar position.  She has been on cefdinir in the interval secondary to UTI.  She thinks that this has helped with her breast symptoms as well.    6/23/2025-  She was started on Clindamycin which she is tolerating well with no side effects. She continues dialysis on Tuesday/Thursday/Saturdays without complication. Since she started Clindamycin, her breast is starting to feel better. She states it no longer feels "different" as she previously described. She states that nipple discharge and inversion have resolved. She does still feel a lump in the left breast. It is also mildly bruised from biopsy.    HPI:  Rayna Haider is a pleasant 89 y.o. female with a PMH of ESRD on dialysis, CHF, DM2, macular degeneration/retinopathy, who presents on 6/10/2025 for evaluation of spontaneous bloody left nipple discharge. She states that the left breast also " ""feels different" from the right. Denies pain, redness, rashes, skin changes, or swelling. She reports she cannot remember her last mammogram and states she discontinued screening about 15-20 years ago. Denies a prior history of breast concerns or surgeries.    On physical exam, 6/10/2025, she was noted to have a large mass in the lateral left breast extending across multiple quadrants. The left breast appeared smaller or more lifted compared to the right. There was greenish gray spontaneous nipple discharge seen during exam. The nipple was slightly inverted. She was recommended for imaging evaluation (see below).    Imagin2025 BL DG MG and L breast US at [facility] - 1.  Diffuse left breast skin and trabecular thickening with a global asymmetry occupying all 4 quadrants of the left breast, spanning roughly 9.5 cm x 12.1 cm x 10.0 cm, is suspicious for malignancy.  2.  No mammographic evidence of malignancy in the right breast. RECOMMENDATION: Ultrasound-guided core needle biopsy of one of the more conspicuous masses within the global asymmetry in the 12:00 periareolar left breast is recommended, and will be performed today.      2025 L DG MG and L breast US at [facility] -  1) No mammographic or sonographic evidence of malignancy in the left breast.    2) Improved mammographic and sonographic appearance of the left breast in this patient with resolving mastitis and multifocal abscess formation.  The left breast skin thickening, edema, multifocal masses, nonmass lesions, and ductal dilatation with echogenic intraductal material has all decreased.  The multifocal masses and nonmass lesions correlate with resolving multifocal abscesses, noting the 12:00 periareolar mass is biopsy-proven marked acute and chronic abscediform inflammation with focal necrosis.  BI-RADS 2: Benign.    Pathology:  2025 LEFT BREAST AT 12 O'CLOCK, BIOPSY:   MARKED ACUTE AND CHRONIC ABSCEDIFORM INFLAMMATION WITH FOCAL NECROSIS.   Comment "   If this inflammatory process is focal, it may represent a localized abscess or   reaction to ruptured cyst / duct. However, if the process is more diffuse, it   may be compatible with mastitis. Granulomas are not identified. However, an   infectious etiology cannot be excluded. Clinicopathologic correlation is   recommended. There are focal fragments of reactive squamous epithelium.     OB/GYN History:  Age at Menarche Onset: 13  Menopausal Status: postmenopausal, LMP: No LMP recorded (lmp unknown). Patient has had a hysterectomy.  Hysterectomy/Oophorectomy: hysterectomy, at age 24 (s/t birthing complications). Ovaries intact.  Hormonal birth control (duration): none  Pregnancy History:   Age at first live birth: 23  Hormone Replacement Therapy: No, none    Other:  MG breast density: unknown  Prior thoracic RT: none  Genetic testing: none  Ashkenazi Bahai descent: No    Family History:  Family History   Problem Relation Name Age of Onset    Breast cancer Mother Medice 89    Glaucoma Mother Medice     Diabetes Father [place]     Pancreatic cancer Father Julio 80    Skin cancer Brother Jarrett 74    Diabetes Brother Jarrett     Breast cancer Maternal Grandmother  78    Lymphoma Son Ayad 29        Patient History:  Past Medical History:   Diagnosis Date    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), wet     left eye    Anxiety     CHF (congestive heart failure)     Cornea edema     right eye    Detached retina, right     Dialysis patient     T TH SAT    End stage renal disease     Hyperkalemia     resolved    Hypertension     Mixed hyperlipidemia     On home oxygen therapy     wears at night    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, without long-term current use of insulin     Vitamin D deficiency        Active Problem List with Overview Notes    Diagnosis Date Noted    Nipple discharge 2025    Type 2 diabetes mellitus without complication,  without long-term current use of insulin 2024    Lack of physical activity 2024    Exudative age-related macular degeneration, left eye, with active choroidal neovascularization 2024    Secondary hyperparathyroidism of renal origin 2024    Elevated hemoglobin A1c measurement 2024    Chronic congestive heart failure, unspecified heart failure type     Medicare annual wellness visit, subsequent     Mechanical complication of arteriovenous fistula surgically created 2023    ESRD (end stage renal disease) on dialysis 02/10/2023    Hyponatremia with excess extracellular fluid volume 2023    Frailty 2023    Obesity 2022    Anxiety 2022    Hypertension     Vitamin D deficiency     Anemia of chronic disease     Hyperkalemia         Past Surgical History:   Procedure Laterality Date    ANGIOGRAPHY OF ARTERIOVENOUS SHUNT Left 2023    Procedure: Fistulogram with Possible Intervention;  Surgeon: Daren Roberson MD;  Location: [facility] CATH LAB;  Service: Cardiology;  Laterality: Left;    ANGIOGRAPHY OF ARTERIOVENOUS SHUNT Left 2023    Procedure: Fistulogram with Possible Intervention;  Surgeon: Daren Roberson MD;  Location: [facility] CATH LAB;  Service: Cardiology;  Laterality: Left;  Requesting 7:00am start    ANGIOGRAPHY OF ARTERIOVENOUS SHUNT Left 2023    Procedure: Fistulogram with Possible Intervention;  Surgeon: Daren Roberson MD;  Location: [facility] CATH LAB;  Service: Cardiology;  Laterality: Left;    AV FISTULA PLACEMENT Left     CATARACT EXTRACTION Bilateral      SECTION      ??    EYE SURGERY Left     HYSTERECTOMY      has ovaries    INSERTION OF TUNNELED CENTRAL VENOUS HEMODIALYSIS CATHETER N/A 2023    Procedure: INSERTION, CATHETER, CENTRAL VENOUS, HEMODIALYSIS, TUNNELED;  Surgeon: Eric Angela DO;  Location: [facility] CATH LAB;  Service: Nephrology;  Laterality: N/A;    LIGATION OF ARTERIOVENOUS FISTULA Left 1/3/2024     Procedure: LIGATION, AV FISTULA;  Surgeon: Daren Roberson MD;  Location: [facility] OR;  Service: Peripheral Vascular;  Laterality: Left;  left basilic fistula ligation    PLACEMENT OF ARTERIOVENOUS GRAFT Left 03/22/2023    Procedure: INSERTION, GRAFT, ARTERIOVENOUS;  Surgeon: Daren Roberson MD;  Location: [facility] OR;  Service: Peripheral Vascular;  Laterality: Left;  LEFT BASILIC TRANSPOSITION -VS- GRAFT PLACEMENT // XX  //  SUPINE //   SUPRACLAVICULAR BLOCK    PLACEMENT OF ARTERIOVENOUS GRAFT Left 1/3/2024    Procedure: INSERTION, GRAFT, ARTERIOVENOUS;  Surgeon: Daren Roberson MD;  Location: [facility] OR;  Service: Peripheral Vascular;  Laterality: Left;    RETINAL DETACHMENT SURGERY Right     SKIN GRAFT Right     burns, scald, bilateral forearm    wisdom Left        Social History[1]    Immunization History   Administered Date(s) Administered    COVID-19, MRNA, LN-S, PF (Pfizer) (Purple Cap) 01/10/2021, 01/31/2021, 10/12/2021    Hepatitis B (recombinant) Adjuvanted, 2 dose 01/09/2024, 05/14/2024, 10/15/2024    Influenza (FLUBLOK) - Quadrivalent - Recombinant - PF *Preferred* (egg allergy) 10/10/2023    Influenza - Quadrivalent - High Dose - PF (65 years and older) 10/17/2020, 10/26/2022    Influenza - Trivalent - Fluarix, Flulaval, Fluzone, Afluria - PF 10/05/2019    Influenza - Trivalent - Flublok - PF (18 years and older) 10/22/2024    Influenza - Trivalent - Fluzone High Dose - PF (65 years and older) 11/05/2021    Pneumococcal Conjugate - 13 Valent 03/15/2020    Pneumococcal Polysaccharide - 23 Valent 11/22/2022, 12/02/2022       Medications/Allergies:  Current Medications[2]     Review of patient's allergies indicates:   Allergen Reactions    Hydralazide Nausea And Vomiting and Palpitations    Hydralazine     Valsartan        Review of Systems:  ROS     Objective:     Vitals:  Vitals:    07/30/25 0816   BP: 110/69   BP Location: Right arm   Patient Position: Sitting   Pulse: 75   Resp: 18   Temp: 97.8 °F  (36.6 °C)   TempSrc: Oral   SpO2: 96%   Weight: 73.9 kg (163 lb)   Height: 5' (1.524 m)         Body mass index is 31.83 kg/m².     Physical Exam:  General: The patient is awake, alert and oriented times three. The patient is well nourished and in no acute distress.  Neck: There is no evidence of palpable cervical, supraclavicular or axillary adenopathy. The neck is supple. The thyroid is not enlarged.  Musculoskeletal: The patient has a normal range of motion of her bilateral upper extremities.  Chest: Examination of the chest wall fails to reveal any obvious abnormalities. Nonlabored breathing, symmetric expansion.  Breast:  Right:  Examination of right breast fails to reveal any dominant masses or areas of significant focal nodularity. The nipple is everted without evidence of discharge. There is no skin dimpling with movement of the pectoralis. There are no significant skin changes overlying the breast.   Left: Previously noted mass palpated over multiple quadrants of the breast is now resolved. There is ill defined firmness in the UOQ in a 3 cm area of the breast c/w resolving inflammation from prior abscess. Also, the left breast is symmetric to the right on todays' exam which is improved compared to the asymmetry seen previously. Examination of the left breast fails to reveal any dominant masses or areas of significant focal nodularity. Nipple discharge resolved - There is no nipple discharge seen or expressed during today's exam. The nipple inversion is also resolved. There is no skin dimpling with movement of the pectoralis. There are no significant skin changes overlying the breast.  Abdomen: The abdomen is soft, flat, nontender and nondistended.  Integumentary: no rashes or skin lesions present  Neurologic: cranial nerves intact, no signs of peripheral neurological deficit, motor/sensory function intact      Physical Exam  Chest:              Assessment:     Problem List[3]     Rayna was seen today for  follow-up.    Diagnoses and all orders for this visit:    Abscess of breast, left  -     cefdinir (OMNICEF) 300 MG capsule; Take 1 capsule (300 mg total) by mouth 2 (two) times daily. for 10 days    Family history of breast cancer             Plan:      Left diagnostic mammogram and left breast ultrasound to ensure resolution scheduled 8/25. RTC after imaging.    Continue Cefdinir, refilled x's 10 days. Advise she call for sooner re-evaluation should symptoms recur or worsen.        CC: Rosie Flores MD       All of her questions were answered. She was advised to call if she develops any questions or concerns.    Cristina Montiel PA-C         --------------------------------------------------------------------------------------------------------------  --------------------------------------------------------------------------------------------------------------  --------------------------------------------------------------------------------------------------------------  Total time on the date of the visit ranged from 20-29 mins (87303). Total time includes both face-to-face and non-face-to-face time personally spent by myself on the day of the visit.    Non-face-to-face time included:  _X_ preparing to see the patient such as reviewing the patient record  __ obtaining and reviewing separately obtained history  _X_ independently interpreting results  _X_ documenting clinical information in electronic health record.    Face-to-face time included:  _X_ performing an appropriate history and examination  _X_ communicating results to the patient  _X_ counseling and educating the patient  __ ordering appropriate medications  _X_ ordering appropriate tests  _X_ ordering appropriate procedures (including follow-up)  _X_ answering any questions the patient had    Total Time spent on date of visit: 29 minutes              [1]   Social History  Socioeconomic History    Marital status:    Tobacco Use     Smoking status: Never     Passive exposure: Never    Smokeless tobacco: Never   Substance and Sexual Activity    Alcohol use: Not Currently    Drug use: Never    Sexual activity: Not Currently     Partners: Male     Social Drivers of Health     Financial Resource Strain: Low Risk  (9/12/2023)    Overall Financial Resource Strain (CARDIA)     Difficulty of Paying Living Expenses: Not hard at all   Food Insecurity: No Food Insecurity (9/12/2023)    Hunger Vital Sign     Worried About Running Out of Food in the Last Year: Never true     Ran Out of Food in the Last Year: Never true   Transportation Needs: No Transportation Needs (9/12/2023)    PRAPARE - Transportation     Lack of Transportation (Medical): No     Lack of Transportation (Non-Medical): No   Physical Activity: Insufficiently Active (9/12/2023)    Exercise Vital Sign     Days of Exercise per Week: 2 days     Minutes of Exercise per Session: 20 min   Stress: No Stress Concern Present (9/12/2023)    [employer] - Occupational Stress Questionnaire     Feeling of Stress : Not at all   Housing Stability: Low Risk  (9/12/2023)    Housing Stability Vital Sign     Unable to Pay for Housing in the Last Year: No     Number of Places Lived in the Last Year: 1     Unstable Housing in the Last Year: No   [2]   Current Outpatient Medications:     ascorbic acid, vitamin C, (VITAMIN C) 500 MG tablet, Take 500 mg by mouth once daily., Disp: , Rfl:     aspirin 81 MG Chew, Take 81 mg by mouth once daily., Disp: , Rfl:     calcitonin, salmon, (FORTICAL) 200 unit/actuation nasal spray, 1 spray by Nasal route., Disp: , Rfl:     carvediloL (COREG) 3.125 MG tablet, Take 1 tablet (3.125 mg total) by mouth 2 (two) times daily with meals., Disp: 180 tablet, Rfl: 2    cranberry extract 200 mg Cap, Take by mouth., Disp: , Rfl:     cyanocobalamin, vitamin B-12, (VITAMIN B-12) 50 mcg tablet, Take 50 mcg by mouth once daily., Disp: , Rfl:     doxercalciferol  (HECTOROL IV), 1 mcg., Disp: , Rfl:     ferric citrate (AURYXIA) 210 mg iron Tab, Take 210 tablets by mouth 3 (three) times daily. 2 tabs prior to all meals including snacks, Disp: , Rfl:     folic acid (FOLVITE) 1 MG tablet, Take 1,000 mcg by mouth Daily., Disp: , Rfl:     heparin flush,porcine,-0.9NaCl 100 unit/mL Kit, Heparin Sodium (Porcine) 1,000 Units/mL Systemic, Disp: , Rfl:     hydrOXYzine HCL (ATARAX) 25 MG tablet, TAKE ONE TABLET BY MOUTH EVERY DAY AS NEEDED FOR ANXIETY, Disp: 30 tablet, Rfl: 3    iron sucrose in NS (VENOFER) 100 mg/100 mL PgBk, 50 mg., Disp: , Rfl:     LORazepam (ATIVAN) 0.5 MG tablet, TAKE ONE TABLET BY MOUTH EVERY DAY AS NEEDED FOR ANXIETY, Disp: 30 tablet, Rfl: 3    methoxy peg-epoetin beta (MIRCERA INJ), 30 mcg., Disp: , Rfl:     ondansetron (ZOFRAN-ODT) 4 MG TbDL, DISSOLVE ONE TABLET UNDER THE TONGUE 30 MINUTES BEFORE DIALYSIS AS NEEDED FOR NAUSEA AND VOMITING, Disp: 30 tablet, Rfl: 3    senna (SENOKOT) 8.6 mg tablet, Take 1 tablet by mouth as needed for Constipation., Disp: , Rfl:     SENSIPAR 30 mg Tab, Take 30 mg by mouth Daily., Disp: , Rfl:     sodium chloride 2% (LYNETTE 128) 2 % ophthalmic solution, Place 1 drop into both eyes as needed., Disp: , Rfl:     cefdinir (OMNICEF) 300 MG capsule, Take 1 capsule (300 mg total) by mouth 2 (two) times daily. for 10 days, Disp: 20 capsule, Rfl: 0  [3]   Patient Active Problem List  Diagnosis    Hypertension    Vitamin D deficiency    Anemia of chronic disease    Hyperkalemia    Obesity    Anxiety    Frailty    Hyponatremia with excess extracellular fluid volume    ESRD (end stage renal disease) on dialysis    Mechanical complication of arteriovenous fistula surgically created    Chronic congestive heart failure, unspecified heart failure type    Medicare annual wellness visit, subsequent    Elevated hemoglobin A1c measurement    Exudative age-related macular degeneration, left eye, with active choroidal neovascularization    Secondary  hyperparathyroidism of renal origin    Type 2 diabetes mellitus without complication, without long-term current use of insulin    Lack of physical activity    Nipple discharge

## 2025-07-27 DIAGNOSIS — F41.9 ANXIETY: ICD-10-CM

## 2025-07-28 RX ORDER — HYDROXYZINE HYDROCHLORIDE 25 MG/1
25 TABLET, FILM COATED ORAL DAILY PRN
Qty: 30 TABLET | Refills: 3 | Status: SHIPPED | OUTPATIENT
Start: 2025-07-28

## 2025-07-30 ENCOUNTER — OFFICE VISIT (OUTPATIENT)
Dept: SURGERY | Facility: CLINIC | Age: 89
End: 2025-07-30
Payer: MEDICARE

## 2025-07-30 VITALS
OXYGEN SATURATION: 96 % | BODY MASS INDEX: 32 KG/M2 | TEMPERATURE: 98 F | DIASTOLIC BLOOD PRESSURE: 69 MMHG | HEIGHT: 60 IN | SYSTOLIC BLOOD PRESSURE: 110 MMHG | HEART RATE: 75 BPM | WEIGHT: 163 LBS | RESPIRATION RATE: 18 BRPM

## 2025-07-30 DIAGNOSIS — Z80.3 FAMILY HISTORY OF BREAST CANCER: ICD-10-CM

## 2025-07-30 DIAGNOSIS — N61.1 ABSCESS OF BREAST, LEFT: Primary | ICD-10-CM

## 2025-07-30 PROCEDURE — 99213 OFFICE O/P EST LOW 20 MIN: CPT | Mod: S$PBB,,, | Performed by: PHYSICIAN ASSISTANT

## 2025-07-30 PROCEDURE — 99213 OFFICE O/P EST LOW 20 MIN: CPT | Mod: PBBFAC | Performed by: PHYSICIAN ASSISTANT

## 2025-07-30 PROCEDURE — 99999 PR PBB SHADOW E&M-EST. PATIENT-LVL III: CPT | Mod: PBBFAC,,, | Performed by: PHYSICIAN ASSISTANT

## 2025-07-30 RX ORDER — CEFDINIR 300 MG/1
300 CAPSULE ORAL 2 TIMES DAILY
Qty: 20 CAPSULE | Refills: 0 | Status: SHIPPED | OUTPATIENT
Start: 2025-07-30 | End: 2025-08-09

## 2025-08-05 ENCOUNTER — TELEPHONE (OUTPATIENT)
Dept: INTERNAL MEDICINE | Facility: CLINIC | Age: 89
End: 2025-08-05
Payer: MEDICARE

## 2025-08-05 NOTE — TELEPHONE ENCOUNTER
----- Message from Med Assistant Johnston sent at 8/5/2025  7:43 AM CDT -----  Regarding: appt reminder  Patient has an appointment on 08/11/2025  Fasting labs   Please call and remind patient of appointment

## 2025-08-08 ENCOUNTER — LAB VISIT (OUTPATIENT)
Dept: LAB | Facility: HOSPITAL | Age: 89
End: 2025-08-08
Attending: INTERNAL MEDICINE
Payer: MEDICARE

## 2025-08-08 DIAGNOSIS — I10 PRIMARY HYPERTENSION: ICD-10-CM

## 2025-08-08 DIAGNOSIS — E11.9 TYPE 2 DIABETES MELLITUS WITHOUT COMPLICATION, WITHOUT LONG-TERM CURRENT USE OF INSULIN: ICD-10-CM

## 2025-08-08 DIAGNOSIS — E55.9 VITAMIN D DEFICIENCY: Chronic | ICD-10-CM

## 2025-08-08 DIAGNOSIS — I50.9 CHRONIC CONGESTIVE HEART FAILURE, UNSPECIFIED HEART FAILURE TYPE: ICD-10-CM

## 2025-08-08 LAB
25(OH)D3+25(OH)D2 SERPL-MCNC: 49 NG/ML (ref 30–80)
ALBUMIN SERPL-MCNC: 3.6 G/DL (ref 3.4–4.8)
ALBUMIN/GLOB SERPL: 1.1 RATIO (ref 1.1–2)
ALP SERPL-CCNC: 111 UNIT/L (ref 40–150)
ALT SERPL-CCNC: 10 UNIT/L (ref 0–55)
ANION GAP SERPL CALC-SCNC: 13 MEQ/L
AST SERPL-CCNC: 11 UNIT/L (ref 11–45)
BASOPHILS # BLD AUTO: 0.07 X10(3)/MCL
BASOPHILS NFR BLD AUTO: 1.1 %
BILIRUB SERPL-MCNC: 0.3 MG/DL
BUN SERPL-MCNC: 29.1 MG/DL (ref 9.8–20.1)
CALCIUM SERPL-MCNC: 10.1 MG/DL (ref 8.4–10.2)
CHLORIDE SERPL-SCNC: 100 MMOL/L (ref 98–107)
CHOLEST SERPL-MCNC: 139 MG/DL
CHOLEST/HDLC SERPL: 3 {RATIO} (ref 0–5)
CO2 SERPL-SCNC: 26 MMOL/L (ref 23–31)
CREAT SERPL-MCNC: 3.86 MG/DL (ref 0.55–1.02)
CREAT/UREA NIT SERPL: 8
EOSINOPHIL # BLD AUTO: 0.21 X10(3)/MCL (ref 0–0.9)
EOSINOPHIL NFR BLD AUTO: 3.4 %
ERYTHROCYTE [DISTWIDTH] IN BLOOD BY AUTOMATED COUNT: 13.8 % (ref 11.5–17)
EST. AVERAGE GLUCOSE BLD GHB EST-MCNC: 139.9 MG/DL
GFR SERPLBLD CREATININE-BSD FMLA CKD-EPI: 11 ML/MIN/1.73/M2
GLOBULIN SER-MCNC: 3.2 GM/DL (ref 2.4–3.5)
GLUCOSE SERPL-MCNC: 121 MG/DL (ref 82–115)
HBA1C MFR BLD: 6.5 %
HCT VFR BLD AUTO: 35.3 % (ref 37–47)
HDLC SERPL-MCNC: 44 MG/DL (ref 35–60)
HGB BLD-MCNC: 10.9 G/DL (ref 12–16)
IMM GRANULOCYTES # BLD AUTO: 0.02 X10(3)/MCL (ref 0–0.04)
IMM GRANULOCYTES NFR BLD AUTO: 0.3 %
LDLC SERPL CALC-MCNC: 69 MG/DL (ref 50–140)
LYMPHOCYTES # BLD AUTO: 2.23 X10(3)/MCL (ref 0.6–4.6)
LYMPHOCYTES NFR BLD AUTO: 35.8 %
MCH RBC QN AUTO: 32.1 PG (ref 27–31)
MCHC RBC AUTO-ENTMCNC: 30.9 G/DL (ref 33–36)
MCV RBC AUTO: 103.8 FL (ref 80–94)
MONOCYTES # BLD AUTO: 0.65 X10(3)/MCL (ref 0.1–1.3)
MONOCYTES NFR BLD AUTO: 10.4 %
NEUTROPHILS # BLD AUTO: 3.05 X10(3)/MCL (ref 2.1–9.2)
NEUTROPHILS NFR BLD AUTO: 49 %
NRBC BLD AUTO-RTO: 0 %
PLATELET # BLD AUTO: 198 X10(3)/MCL (ref 130–400)
PMV BLD AUTO: 10 FL (ref 7.4–10.4)
POTASSIUM SERPL-SCNC: 4.5 MMOL/L (ref 3.5–5.1)
PROT SERPL-MCNC: 6.8 GM/DL (ref 5.8–7.6)
RBC # BLD AUTO: 3.4 X10(6)/MCL (ref 4.2–5.4)
SODIUM SERPL-SCNC: 139 MMOL/L (ref 136–145)
TRIGL SERPL-MCNC: 130 MG/DL (ref 37–140)
TSH SERPL-ACNC: 2.71 UIU/ML (ref 0.35–4.94)
VLDLC SERPL CALC-MCNC: 26 MG/DL
WBC # BLD AUTO: 6.23 X10(3)/MCL (ref 4.5–11.5)

## 2025-08-08 PROCEDURE — 36415 COLL VENOUS BLD VENIPUNCTURE: CPT

## 2025-08-08 PROCEDURE — 80061 LIPID PANEL: CPT

## 2025-08-08 PROCEDURE — 84443 ASSAY THYROID STIM HORMONE: CPT

## 2025-08-08 PROCEDURE — 83036 HEMOGLOBIN GLYCOSYLATED A1C: CPT

## 2025-08-08 PROCEDURE — 80053 COMPREHEN METABOLIC PANEL: CPT

## 2025-08-08 PROCEDURE — 85025 COMPLETE CBC W/AUTO DIFF WBC: CPT

## 2025-08-08 PROCEDURE — 82306 VITAMIN D 25 HYDROXY: CPT

## 2025-08-11 ENCOUNTER — OFFICE VISIT (OUTPATIENT)
Dept: INTERNAL MEDICINE | Facility: CLINIC | Age: 89
End: 2025-08-11
Payer: MEDICARE

## 2025-08-11 VITALS
WEIGHT: 164 LBS | OXYGEN SATURATION: 95 % | BODY MASS INDEX: 32.2 KG/M2 | RESPIRATION RATE: 16 BRPM | SYSTOLIC BLOOD PRESSURE: 130 MMHG | HEIGHT: 60 IN | DIASTOLIC BLOOD PRESSURE: 72 MMHG | HEART RATE: 61 BPM

## 2025-08-11 DIAGNOSIS — E66.09 CLASS 1 OBESITY DUE TO EXCESS CALORIES WITH SERIOUS COMORBIDITY AND BODY MASS INDEX (BMI) OF 32.0 TO 32.9 IN ADULT: Chronic | ICD-10-CM

## 2025-08-11 DIAGNOSIS — E55.9 VITAMIN D DEFICIENCY: Chronic | ICD-10-CM

## 2025-08-11 DIAGNOSIS — F41.9 ANXIETY: Chronic | ICD-10-CM

## 2025-08-11 DIAGNOSIS — I10 PRIMARY HYPERTENSION: Primary | Chronic | ICD-10-CM

## 2025-08-11 DIAGNOSIS — Z99.2 ESRD (END STAGE RENAL DISEASE) ON DIALYSIS: Chronic | ICD-10-CM

## 2025-08-11 DIAGNOSIS — E66.811 CLASS 1 OBESITY DUE TO EXCESS CALORIES WITH SERIOUS COMORBIDITY AND BODY MASS INDEX (BMI) OF 32.0 TO 32.9 IN ADULT: Chronic | ICD-10-CM

## 2025-08-11 DIAGNOSIS — E11.9 TYPE 2 DIABETES MELLITUS WITHOUT COMPLICATION, WITHOUT LONG-TERM CURRENT USE OF INSULIN: ICD-10-CM

## 2025-08-11 DIAGNOSIS — N18.6 ESRD (END STAGE RENAL DISEASE) ON DIALYSIS: Chronic | ICD-10-CM

## 2025-08-11 DIAGNOSIS — N64.52 NIPPLE DISCHARGE: ICD-10-CM

## 2025-08-11 PROCEDURE — 99214 OFFICE O/P EST MOD 30 MIN: CPT | Mod: ,,, | Performed by: NURSE PRACTITIONER

## 2025-08-25 ENCOUNTER — HOSPITAL ENCOUNTER (OUTPATIENT)
Dept: RADIOLOGY | Facility: HOSPITAL | Age: 89
Discharge: HOME | End: 2025-08-25
Attending: PHYSICIAN ASSISTANT
Payer: MEDICARE

## 2025-08-25 DIAGNOSIS — R92.8 ABNORMAL MAMMOGRAM: ICD-10-CM

## 2025-08-25 PROCEDURE — 76641 ULTRASOUND BREAST COMPLETE: CPT | Mod: TC,LT

## 2025-08-25 PROCEDURE — 77061 BREAST TOMOSYNTHESIS UNI: CPT | Mod: 26,LT,, | Performed by: RADIOLOGY

## 2025-08-25 PROCEDURE — 77065 DX MAMMO INCL CAD UNI: CPT | Mod: 26,LT,, | Performed by: RADIOLOGY

## 2025-08-25 PROCEDURE — 76641 ULTRASOUND BREAST COMPLETE: CPT | Mod: 26,LT,, | Performed by: RADIOLOGY

## 2025-08-25 PROCEDURE — 77061 BREAST TOMOSYNTHESIS UNI: CPT | Mod: TC,LT

## 2025-08-27 ENCOUNTER — TELEPHONE (OUTPATIENT)
Dept: SURGERY | Facility: CLINIC | Age: 89
End: 2025-08-27
Payer: MEDICARE

## 2025-09-03 ENCOUNTER — OFFICE VISIT (OUTPATIENT)
Dept: SURGERY | Facility: CLINIC | Age: 89
End: 2025-09-03
Payer: MEDICARE

## 2025-09-03 VITALS
HEART RATE: 81 BPM | DIASTOLIC BLOOD PRESSURE: 71 MMHG | SYSTOLIC BLOOD PRESSURE: 122 MMHG | WEIGHT: 160.63 LBS | RESPIRATION RATE: 18 BRPM | HEIGHT: 60 IN | TEMPERATURE: 98 F | OXYGEN SATURATION: 95 % | BODY MASS INDEX: 31.54 KG/M2

## 2025-09-03 DIAGNOSIS — N61.1 ABSCESS OF BREAST, LEFT: ICD-10-CM

## 2025-09-03 DIAGNOSIS — R92.8 ABNORMAL MAMMOGRAM: Primary | ICD-10-CM

## 2025-09-03 DIAGNOSIS — Z80.3 FAMILY HISTORY OF BREAST CANCER: ICD-10-CM

## 2025-09-03 PROCEDURE — 99215 OFFICE O/P EST HI 40 MIN: CPT | Mod: PBBFAC | Performed by: PHYSICIAN ASSISTANT

## 2025-09-03 PROCEDURE — 99214 OFFICE O/P EST MOD 30 MIN: CPT | Mod: S$PBB,,, | Performed by: PHYSICIAN ASSISTANT

## 2025-09-03 PROCEDURE — 99999 PR PBB SHADOW E&M-EST. PATIENT-LVL V: CPT | Mod: PBBFAC,,, | Performed by: PHYSICIAN ASSISTANT

## (undated) DEVICE — ADHESIVE DERMABOND ADVANCED

## (undated) DEVICE — SUT 3-0 12-18IN SILK

## (undated) DEVICE — INTRODUCER BRITE TIP 8F 35CM

## (undated) DEVICE — SUT PROLENE 6-0 BV-1 30IN

## (undated) DEVICE — COVER PROBE US 5.5X58L NON LTX

## (undated) DEVICE — DRAPE INCISE IOBAN 2 23X23IN

## (undated) DEVICE — INTRODUCER W/GW 6F 5.5CM

## (undated) DEVICE — CHLORAPREP W TINT 26ML APPL

## (undated) DEVICE — NDL HYPO REG 25G X 1 1/2

## (undated) DEVICE — GUIDEWIRE INQWIRE SE 3MM JTIP

## (undated) DEVICE — Device

## (undated) DEVICE — CATH STERLING OTW 6X60X135

## (undated) DEVICE — PAD PREP 50/CA

## (undated) DEVICE — GUIDEWIRE STD .035X180CM ANG

## (undated) DEVICE — BOWL STERILE LARGE 32OZ

## (undated) DEVICE — SUT 2/0 30IN SILK BLK BRAI

## (undated) DEVICE — SYR ONLY LUER LOCK 20CC

## (undated) DEVICE — SUT 2-0 12-18IN SILK

## (undated) DEVICE — KIT MINI STK MAX COAX 5FR 10CM

## (undated) DEVICE — SUT MCRYL PLUS 4-0 PS2 27IN

## (undated) DEVICE — CATH DORADO 8X6

## (undated) DEVICE — PROBE DOPPLER VTI DISP 8MHZ

## (undated) DEVICE — CATH DORADO 7X8X80

## (undated) DEVICE — SUT VICRYL 3-0 27 SH

## (undated) DEVICE — ELECTRODE PATIENT RETURN DISP

## (undated) DEVICE — PRESTO INFLATION DEVICE

## (undated) DEVICE — CATH DORADO 9X4

## (undated) DEVICE — GUIDEWIRE STF .035X180CM ANG

## (undated) DEVICE — CANNULA NASAL ADULT

## (undated) DEVICE — GLIDE CATH ANGLED 4FR 65CM

## (undated) DEVICE — SUT GORETEX CV-6 TTC-09 24IN

## (undated) DEVICE — LOOP STERION MINI RED 8X406MM

## (undated) DEVICE — SYR 10CC LUER LOCK

## (undated) DEVICE — DRAPE C-ARM COVER EZ 36X28IN

## (undated) DEVICE — CUP PROFEX GLASS GRADUATE 1OZ

## (undated) DEVICE — SOL IRR NACL .9% 1000CC

## (undated) DEVICE — KIT SURGICAL TURNOVER

## (undated) DEVICE — BAG MEDI-PLAST DECANTER C-FLOW

## (undated) DEVICE — CATH BLLN DURADO 7 X 4

## (undated) DEVICE — GUIDEWIRE ANGLED .035 150CM

## (undated) DEVICE — APPLICATOR CHLORAPREP ORN 26ML

## (undated) DEVICE — GLOVE PROTEXIS HYDROGEL SZ6.5

## (undated) DEVICE — SOL NORMAL USPCA 0.9%

## (undated) DEVICE — SUT 4-0 12-18IN SILK BLACK

## (undated) DEVICE — SYR 30CC LUER LOCK

## (undated) DEVICE — SHEATH TUNN GRAFT 12IN 7X6MM

## (undated) DEVICE — DRAPE UTILITY W/ TAPE 20X30IN

## (undated) DEVICE — DRESSING TRANS 4X4 TEGADERM

## (undated) DEVICE — CLIP LIGATING HEMOCLP SMALL

## (undated) DEVICE — DRESSING ANTIMICROBIAL 1 INCH

## (undated) DEVICE — COVER TABLE HVY DTY 60X90IN

## (undated) DEVICE — COVER BAND BAG 28 X 12

## (undated) DEVICE — GAUZE SPONGE XRAY 4X4

## (undated) DEVICE — SHEATH PINNACLE 6FR HIFLO

## (undated) DEVICE — DRESSING TELFA STRL 4X3 LF

## (undated) DEVICE — INTRODUCER 4FR 5.5CM

## (undated) DEVICE — SUT 2-0 ETHILON 18 FS

## (undated) DEVICE — SUT MONOCRYL 3-0 PS-2 UND

## (undated) DEVICE — CANNULA NASAL ADLT EAR 25FT

## (undated) DEVICE — SYS CLSR DERMABOND PRINEO 22CM

## (undated) DEVICE — CANNULA VESSEL